# Patient Record
Sex: MALE | Race: BLACK OR AFRICAN AMERICAN | NOT HISPANIC OR LATINO | Employment: UNEMPLOYED | URBAN - METROPOLITAN AREA
[De-identification: names, ages, dates, MRNs, and addresses within clinical notes are randomized per-mention and may not be internally consistent; named-entity substitution may affect disease eponyms.]

---

## 2017-03-14 ENCOUNTER — HISTORICAL (OUTPATIENT)
Dept: RADIOLOGY | Facility: HOSPITAL | Age: 52
End: 2017-03-14

## 2017-04-10 ENCOUNTER — HISTORICAL (OUTPATIENT)
Dept: INTERNAL MEDICINE | Facility: CLINIC | Age: 52
End: 2017-04-10

## 2017-07-17 ENCOUNTER — HISTORICAL (OUTPATIENT)
Dept: INTERNAL MEDICINE | Facility: CLINIC | Age: 52
End: 2017-07-17

## 2017-07-17 LAB
ABS NEUT (OLG): 3.79 X10(3)/MCL (ref 2.1–9.2)
ALBUMIN SERPL-MCNC: 3.3 GM/DL (ref 3.4–5)
ALBUMIN/GLOB SERPL: 1 RATIO (ref 1–2)
ALP SERPL-CCNC: 141 UNIT/L (ref 45–117)
ALT SERPL-CCNC: 41 UNIT/L (ref 12–78)
APPEARANCE, UA: CLEAR
AST SERPL-CCNC: 18 UNIT/L (ref 15–37)
BACTERIA #/AREA URNS AUTO: ABNORMAL /[HPF]
BASOPHILS # BLD AUTO: 0.03 X10(3)/MCL
BASOPHILS NFR BLD AUTO: 0 % (ref 0–1)
BILIRUB SERPL-MCNC: 0.4 MG/DL (ref 0.2–1)
BILIRUB UR QL STRIP: NEGATIVE
BILIRUBIN DIRECT+TOT PNL SERPL-MCNC: 0.1 MG/DL
BILIRUBIN DIRECT+TOT PNL SERPL-MCNC: 0.3 MG/DL
BUN SERPL-MCNC: 22 MG/DL (ref 7–18)
CALCIUM SERPL-MCNC: 9 MG/DL (ref 8.5–10.1)
CHLORIDE SERPL-SCNC: 109 MMOL/L (ref 98–107)
CHOLEST SERPL-MCNC: 148 MG/DL
CHOLEST/HDLC SERPL: 3.5 {RATIO} (ref 0–5)
CO2 SERPL-SCNC: 29 MMOL/L (ref 21–32)
COLOR UR: NORMAL
CREAT SERPL-MCNC: 1.3 MG/DL (ref 0.6–1.3)
CREAT UR-MCNC: 149 MG/DL
EOSINOPHIL # BLD AUTO: 0.24 X10(3)/MCL
EOSINOPHIL NFR BLD AUTO: 4 % (ref 0–5)
ERYTHROCYTE [DISTWIDTH] IN BLOOD BY AUTOMATED COUNT: 12.7 % (ref 11.5–14.5)
EST. AVERAGE GLUCOSE BLD GHB EST-MCNC: 240 MG/DL
GLOBULIN SER-MCNC: 3.8 GM/ML (ref 2.3–3.5)
GLUCOSE (UA): NORMAL
GLUCOSE SERPL-MCNC: 189 MG/DL (ref 74–106)
HBA1C MFR BLD: 10 % (ref 4.2–6.3)
HCT VFR BLD AUTO: 40.2 % (ref 40–51)
HDLC SERPL-MCNC: 42 MG/DL
HGB BLD-MCNC: 13.4 GM/DL (ref 13.5–17.5)
HGB UR QL STRIP: NEGATIVE
HYALINE CASTS #/AREA URNS LPF: ABNORMAL /[LPF]
IMM GRANULOCYTES # BLD AUTO: 0.02 10*3/UL
IMM GRANULOCYTES NFR BLD AUTO: 0 %
KETONES UR QL STRIP: NEGATIVE
LDLC SERPL CALC-MCNC: 79 MG/DL (ref 0–130)
LEUKOCYTE ESTERASE UR QL STRIP: NEGATIVE
LYMPHOCYTES # BLD AUTO: 1.57 X10(3)/MCL
LYMPHOCYTES NFR BLD AUTO: 25 % (ref 15–40)
MCH RBC QN AUTO: 28.5 PG (ref 26–34)
MCHC RBC AUTO-ENTMCNC: 33.3 GM/DL (ref 31–37)
MCV RBC AUTO: 85.5 FL (ref 80–100)
MICROALBUMIN UR-MCNC: 6.2 MG/L (ref 0–19)
MICROALBUMIN/CREAT RATIO PNL UR: 4.2 MCG/MG CR (ref 0–29)
MONOCYTES # BLD AUTO: 0.54 X10(3)/MCL
MONOCYTES NFR BLD AUTO: 9 % (ref 4–12)
NEUTROPHILS # BLD AUTO: 3.79 X10(3)/MCL
NEUTROPHILS NFR BLD AUTO: 61 X10(3)/MCL
NITRITE UR QL STRIP: NEGATIVE
PH UR STRIP: 5.5 [PH] (ref 4.5–8)
PLATELET # BLD AUTO: 249 X10(3)/MCL (ref 130–400)
PMV BLD AUTO: 9.4 FL (ref 7.4–10.4)
POTASSIUM SERPL-SCNC: 3.7 MMOL/L (ref 3.5–5.1)
PROT SERPL-MCNC: 7.1 GM/DL (ref 6.4–8.2)
PROT UR QL STRIP: NEGATIVE
PSA SERPL-MCNC: 0.7 NG/ML
RBC # BLD AUTO: 4.7 X10(6)/MCL (ref 4.5–5.9)
RBC #/AREA URNS AUTO: ABNORMAL /[HPF]
SODIUM SERPL-SCNC: 144 MMOL/L (ref 136–145)
SP GR UR STRIP: 1.02 (ref 1–1.03)
SQUAMOUS #/AREA URNS LPF: ABNORMAL /[LPF]
TRIGL SERPL-MCNC: 136 MG/DL
TSH SERPL-ACNC: 1.24 MIU/L (ref 0.36–3.74)
UROBILINOGEN UR STRIP-ACNC: NORMAL
VLDLC SERPL CALC-MCNC: 27 MG/DL
WBC # SPEC AUTO: 6.2 X10(3)/MCL (ref 4.5–11)
WBC #/AREA URNS AUTO: ABNORMAL /HPF

## 2017-08-29 ENCOUNTER — HISTORICAL (OUTPATIENT)
Dept: INTERNAL MEDICINE | Facility: CLINIC | Age: 52
End: 2017-08-29

## 2017-08-29 LAB
ALBUMIN SERPL-MCNC: 3.3 GM/DL (ref 3.4–5)
ALBUMIN/GLOB SERPL: 1 RATIO (ref 1–2)
ALP SERPL-CCNC: 127 UNIT/L (ref 45–117)
ALT SERPL-CCNC: 42 UNIT/L (ref 12–78)
AST SERPL-CCNC: 19 UNIT/L (ref 15–37)
BILIRUB SERPL-MCNC: 0.3 MG/DL (ref 0.2–1)
BILIRUBIN DIRECT+TOT PNL SERPL-MCNC: 0.1 MG/DL
BILIRUBIN DIRECT+TOT PNL SERPL-MCNC: 0.2 MG/DL
BUN SERPL-MCNC: 19 MG/DL (ref 7–18)
CALCIUM SERPL-MCNC: 8.9 MG/DL (ref 8.5–10.1)
CHLORIDE SERPL-SCNC: 109 MMOL/L (ref 98–107)
CO2 SERPL-SCNC: 32 MMOL/L (ref 21–32)
CREAT SERPL-MCNC: 1.3 MG/DL (ref 0.6–1.3)
EST. AVERAGE GLUCOSE BLD GHB EST-MCNC: 197 MG/DL
GLOBULIN SER-MCNC: 3.8 GM/ML (ref 2.3–3.5)
GLUCOSE SERPL-MCNC: 185 MG/DL (ref 74–106)
HBA1C MFR BLD: 8.5 % (ref 4.2–6.3)
POTASSIUM SERPL-SCNC: 3.9 MMOL/L (ref 3.5–5.1)
PROT SERPL-MCNC: 7.1 GM/DL (ref 6.4–8.2)
SODIUM SERPL-SCNC: 145 MMOL/L (ref 136–145)

## 2017-09-20 ENCOUNTER — HISTORICAL (OUTPATIENT)
Dept: LAB | Facility: HOSPITAL | Age: 52
End: 2017-09-20

## 2017-09-20 LAB
ALBUMIN SERPL-MCNC: 3.7 GM/DL (ref 3.4–5)
ALBUMIN/GLOB SERPL: 1.1 RATIO (ref 1.1–2)
ALP SERPL-CCNC: 120 UNIT/L (ref 46–116)
ALT SERPL-CCNC: 49 UNIT/L (ref 12–78)
AST SERPL-CCNC: 23 UNIT/L (ref 15–37)
BILIRUB SERPL-MCNC: 0.6 MG/DL (ref 0.2–1)
BILIRUBIN DIRECT+TOT PNL SERPL-MCNC: 0.16 MG/DL (ref 0–0.2)
BILIRUBIN DIRECT+TOT PNL SERPL-MCNC: 0.44 MG/DL (ref 0–0.8)
BUN SERPL-MCNC: 20.5 MG/DL (ref 7–18)
CALCIUM SERPL-MCNC: 9.6 MG/DL (ref 8.5–10.1)
CHLORIDE SERPL-SCNC: 106 MMOL/L (ref 98–107)
CO2 SERPL-SCNC: 28.6 MMOL/L (ref 21–32)
CREAT SERPL-MCNC: 1.23 MG/DL (ref 0.6–1.3)
EST. AVERAGE GLUCOSE BLD GHB EST-MCNC: 209 MG/DL
GLOBULIN SER-MCNC: 3.4 GM/DL (ref 2.4–3.5)
GLUCOSE SERPL-MCNC: 191 MG/DL (ref 74–106)
HBA1C MFR BLD: 8.9 % (ref 4.5–6.2)
POTASSIUM SERPL-SCNC: 3.8 MMOL/L (ref 3.5–5.1)
PROT SERPL-MCNC: 7.1 GM/DL (ref 6.4–8.2)
SODIUM SERPL-SCNC: 145 MMOL/L (ref 136–145)

## 2017-12-18 ENCOUNTER — HISTORICAL (OUTPATIENT)
Dept: INTERNAL MEDICINE | Facility: CLINIC | Age: 52
End: 2017-12-18

## 2017-12-18 LAB
ALBUMIN SERPL-MCNC: 3.4 GM/DL (ref 3.4–5)
ALBUMIN/GLOB SERPL: 1 RATIO (ref 1–2)
ALP SERPL-CCNC: 143 UNIT/L (ref 45–117)
ALT SERPL-CCNC: 51 UNIT/L (ref 12–78)
AST SERPL-CCNC: 21 UNIT/L (ref 15–37)
BILIRUB SERPL-MCNC: 0.4 MG/DL (ref 0.2–1)
BILIRUBIN DIRECT+TOT PNL SERPL-MCNC: 0.1 MG/DL
BILIRUBIN DIRECT+TOT PNL SERPL-MCNC: 0.3 MG/DL
BUN SERPL-MCNC: 19 MG/DL (ref 7–18)
CALCIUM SERPL-MCNC: 9.3 MG/DL (ref 8.5–10.1)
CHLORIDE SERPL-SCNC: 109 MMOL/L (ref 98–107)
CO2 SERPL-SCNC: 30 MMOL/L (ref 21–32)
CREAT SERPL-MCNC: 1 MG/DL (ref 0.6–1.3)
EST. AVERAGE GLUCOSE BLD GHB EST-MCNC: 206 MG/DL
GLOBULIN SER-MCNC: 3.9 GM/ML (ref 2.3–3.5)
GLUCOSE SERPL-MCNC: 199 MG/DL (ref 74–106)
HBA1C MFR BLD: 8.8 % (ref 4.2–6.3)
POTASSIUM SERPL-SCNC: 3.8 MMOL/L (ref 3.5–5.1)
PROT SERPL-MCNC: 7.3 GM/DL (ref 6.4–8.2)
SODIUM SERPL-SCNC: 142 MMOL/L (ref 136–145)

## 2018-02-07 ENCOUNTER — HISTORICAL (OUTPATIENT)
Dept: INTERNAL MEDICINE | Facility: CLINIC | Age: 53
End: 2018-02-07

## 2018-02-07 LAB
ALBUMIN SERPL-MCNC: 3.2 GM/DL (ref 3.4–5)
ALBUMIN/GLOB SERPL: 1 RATIO (ref 1–2)
ALP SERPL-CCNC: 118 UNIT/L (ref 45–117)
ALT SERPL-CCNC: 38 UNIT/L (ref 12–78)
AST SERPL-CCNC: 16 UNIT/L (ref 15–37)
BILIRUB SERPL-MCNC: 0.4 MG/DL (ref 0.2–1)
BILIRUBIN DIRECT+TOT PNL SERPL-MCNC: 0.1 MG/DL
BILIRUBIN DIRECT+TOT PNL SERPL-MCNC: 0.3 MG/DL
BUN SERPL-MCNC: 12 MG/DL (ref 7–18)
CALCIUM SERPL-MCNC: 8.9 MG/DL (ref 8.5–10.1)
CHLORIDE SERPL-SCNC: 109 MMOL/L (ref 98–107)
CO2 SERPL-SCNC: 29 MMOL/L (ref 21–32)
CREAT SERPL-MCNC: 0.9 MG/DL (ref 0.6–1.3)
EST. AVERAGE GLUCOSE BLD GHB EST-MCNC: 258 MG/DL
GLOBULIN SER-MCNC: 3.8 GM/ML (ref 2.3–3.5)
GLUCOSE SERPL-MCNC: 88 MG/DL (ref 74–106)
HBA1C MFR BLD: 10.6 % (ref 4.2–6.3)
POTASSIUM SERPL-SCNC: 3.4 MMOL/L (ref 3.5–5.1)
PROT SERPL-MCNC: 7 GM/DL (ref 6.4–8.2)
SODIUM SERPL-SCNC: 145 MMOL/L (ref 136–145)

## 2018-03-29 ENCOUNTER — HISTORICAL (OUTPATIENT)
Dept: INTERNAL MEDICINE | Facility: CLINIC | Age: 53
End: 2018-03-29

## 2018-03-29 LAB
ALBUMIN SERPL-MCNC: 3.7 GM/DL (ref 3.4–5)
ALBUMIN/GLOB SERPL: 1 RATIO (ref 1–2)
ALP SERPL-CCNC: 103 UNIT/L (ref 45–117)
ALT SERPL-CCNC: 57 UNIT/L (ref 12–78)
AST SERPL-CCNC: 32 UNIT/L (ref 15–37)
BILIRUB SERPL-MCNC: 0.6 MG/DL (ref 0.2–1)
BILIRUBIN DIRECT+TOT PNL SERPL-MCNC: 0.2 MG/DL
BILIRUBIN DIRECT+TOT PNL SERPL-MCNC: 0.4 MG/DL
BUN SERPL-MCNC: 20 MG/DL (ref 7–18)
CALCIUM SERPL-MCNC: 9 MG/DL (ref 8.5–10.1)
CHLORIDE SERPL-SCNC: 108 MMOL/L (ref 98–107)
CO2 SERPL-SCNC: 31 MMOL/L (ref 21–32)
CREAT SERPL-MCNC: 1.2 MG/DL (ref 0.6–1.3)
EST. AVERAGE GLUCOSE BLD GHB EST-MCNC: 243 MG/DL
GLOBULIN SER-MCNC: 3.8 GM/ML (ref 2.3–3.5)
GLUCOSE SERPL-MCNC: 182 MG/DL (ref 74–106)
HBA1C MFR BLD: 10.1 % (ref 4.2–6.3)
HIV 1+2 AB+HIV1 P24 AG SERPL QL IA: NONREACTIVE
POTASSIUM SERPL-SCNC: 3.9 MMOL/L (ref 3.5–5.1)
PROT SERPL-MCNC: 7.5 GM/DL (ref 6.4–8.2)
SODIUM SERPL-SCNC: 145 MMOL/L (ref 136–145)

## 2018-05-21 ENCOUNTER — HISTORICAL (OUTPATIENT)
Dept: INTERNAL MEDICINE | Facility: CLINIC | Age: 53
End: 2018-05-21

## 2018-05-21 LAB
ABS NEUT (OLG): 11.63 X10(3)/MCL (ref 2.1–9.2)
ALBUMIN SERPL-MCNC: 3 GM/DL (ref 3.4–5)
ALBUMIN/GLOB SERPL: 1 RATIO (ref 1–2)
ALP SERPL-CCNC: 102 UNIT/L (ref 45–117)
ALT SERPL-CCNC: 32 UNIT/L (ref 12–78)
APPEARANCE, UA: CLEAR
AST SERPL-CCNC: 13 UNIT/L (ref 15–37)
BACTERIA #/AREA URNS AUTO: ABNORMAL /[HPF]
BASOPHILS # BLD AUTO: 0.03 X10(3)/MCL
BASOPHILS NFR BLD AUTO: 0 %
BILIRUB SERPL-MCNC: 0.9 MG/DL (ref 0.2–1)
BILIRUB UR QL STRIP: NEGATIVE
BILIRUBIN DIRECT+TOT PNL SERPL-MCNC: 0.3 MG/DL
BILIRUBIN DIRECT+TOT PNL SERPL-MCNC: 0.6 MG/DL
BUN SERPL-MCNC: 16 MG/DL (ref 7–18)
CALCIUM SERPL-MCNC: 8.3 MG/DL (ref 8.5–10.1)
CHLORIDE SERPL-SCNC: 100 MMOL/L (ref 98–107)
CHOLEST SERPL-MCNC: 127 MG/DL
CHOLEST/HDLC SERPL: 2.8 {RATIO} (ref 0–5)
CO2 SERPL-SCNC: 29 MMOL/L (ref 21–32)
COLOR UR: ABNORMAL
CREAT SERPL-MCNC: 1.2 MG/DL (ref 0.6–1.3)
CREAT UR-MCNC: 94 MG/DL
EOSINOPHIL # BLD AUTO: 0.02 X10(3)/MCL
EOSINOPHIL NFR BLD AUTO: 0 %
ERYTHROCYTE [DISTWIDTH] IN BLOOD BY AUTOMATED COUNT: 12.7 % (ref 11.5–14.5)
EST. AVERAGE GLUCOSE BLD GHB EST-MCNC: 235 MG/DL
GLOBULIN SER-MCNC: 4.6 GM/ML (ref 2.3–3.5)
GLUCOSE (UA): ABNORMAL MG/DL
GLUCOSE SERPL-MCNC: 324 MG/DL (ref 74–106)
HBA1C MFR BLD: 9.8 % (ref 4.2–6.3)
HCT VFR BLD AUTO: 38.8 % (ref 40–51)
HDLC SERPL-MCNC: 45 MG/DL
HGB BLD-MCNC: 13 GM/DL (ref 13.5–17.5)
HGB UR QL STRIP: 0.03 MG/DL
HYALINE CASTS #/AREA URNS LPF: ABNORMAL /[LPF]
IMM GRANULOCYTES # BLD AUTO: 0.06 10*3/UL
IMM GRANULOCYTES NFR BLD AUTO: 0 %
KETONES UR QL STRIP: NEGATIVE
LDLC SERPL CALC-MCNC: 64 MG/DL (ref 0–130)
LEUKOCYTE ESTERASE UR QL STRIP: NEGATIVE
LYMPHOCYTES # BLD AUTO: 1.13 X10(3)/MCL
LYMPHOCYTES NFR BLD AUTO: 8 % (ref 13–40)
MCH RBC QN AUTO: 28.6 PG (ref 26–34)
MCHC RBC AUTO-ENTMCNC: 33.5 GM/DL (ref 31–37)
MCV RBC AUTO: 85.5 FL (ref 80–100)
MICROALBUMIN UR-MCNC: 39 MG/L (ref 0–19)
MICROALBUMIN/CREAT RATIO PNL UR: 41.5 MCG/MG CR (ref 0–29)
MONOCYTES # BLD AUTO: 1.32 X10(3)/MCL
MONOCYTES NFR BLD AUTO: 9 % (ref 4–12)
NEUTROPHILS # BLD AUTO: 11.63 X10(3)/MCL
NEUTROPHILS NFR BLD AUTO: 82 X10(3)/MCL
NITRITE UR QL STRIP: NEGATIVE
PH UR STRIP: 5.5 [PH] (ref 4.5–8)
PLATELET # BLD AUTO: 230 X10(3)/MCL (ref 130–400)
PMV BLD AUTO: 10 FL (ref 7.4–10.4)
POTASSIUM SERPL-SCNC: 3.5 MMOL/L (ref 3.5–5.1)
PROT SERPL-MCNC: 7.6 GM/DL (ref 6.4–8.2)
PROT UR QL STRIP: 10 MG/DL
RBC # BLD AUTO: 4.54 X10(6)/MCL (ref 4.5–5.9)
RBC #/AREA URNS AUTO: ABNORMAL /[HPF]
SODIUM SERPL-SCNC: 134 MMOL/L (ref 136–145)
SP GR UR STRIP: 1.02 (ref 1–1.03)
SQUAMOUS #/AREA URNS LPF: ABNORMAL /[LPF]
TRIGL SERPL-MCNC: 92 MG/DL
TSH SERPL-ACNC: 0.84 MIU/L (ref 0.36–3.74)
UROBILINOGEN UR STRIP-ACNC: NORMAL
VLDLC SERPL CALC-MCNC: 18 MG/DL
WBC # SPEC AUTO: 14.2 X10(3)/MCL (ref 4.5–11)
WBC #/AREA URNS AUTO: ABNORMAL /HPF

## 2018-06-11 ENCOUNTER — HISTORICAL (OUTPATIENT)
Dept: RADIOLOGY | Facility: HOSPITAL | Age: 53
End: 2018-06-11

## 2018-07-06 ENCOUNTER — HISTORICAL (OUTPATIENT)
Dept: INTERNAL MEDICINE | Facility: CLINIC | Age: 53
End: 2018-07-06

## 2018-07-06 LAB
ALBUMIN SERPL-MCNC: 3.5 GM/DL (ref 3.4–5)
ALBUMIN/GLOB SERPL: 1 RATIO (ref 1–2)
ALP SERPL-CCNC: 132 UNIT/L (ref 45–117)
ALT SERPL-CCNC: 41 UNIT/L (ref 12–78)
AST SERPL-CCNC: 24 UNIT/L (ref 15–37)
BILIRUB SERPL-MCNC: 0.5 MG/DL (ref 0.2–1)
BILIRUBIN DIRECT+TOT PNL SERPL-MCNC: 0.2 MG/DL
BILIRUBIN DIRECT+TOT PNL SERPL-MCNC: 0.3 MG/DL
BUN SERPL-MCNC: 21 MG/DL (ref 7–18)
CALCIUM SERPL-MCNC: 8.8 MG/DL (ref 8.5–10.1)
CHLORIDE SERPL-SCNC: 109 MMOL/L (ref 98–107)
CO2 SERPL-SCNC: 28 MMOL/L (ref 21–32)
CREAT SERPL-MCNC: 1.2 MG/DL (ref 0.6–1.3)
DEPRECATED CALCIDIOL+CALCIFEROL SERPL-MC: 20.51 NG/ML (ref 30–80)
EST. AVERAGE GLUCOSE BLD GHB EST-MCNC: 209 MG/DL
GLOBULIN SER-MCNC: 3.9 GM/ML (ref 2.3–3.5)
GLUCOSE SERPL-MCNC: 258 MG/DL (ref 74–106)
HBA1C MFR BLD: 8.9 % (ref 4.2–6.3)
POTASSIUM SERPL-SCNC: 3.3 MMOL/L (ref 3.5–5.1)
PROT SERPL-MCNC: 7.4 GM/DL (ref 6.4–8.2)
SODIUM SERPL-SCNC: 145 MMOL/L (ref 136–145)

## 2018-10-25 ENCOUNTER — HISTORICAL (OUTPATIENT)
Dept: INTERNAL MEDICINE | Facility: CLINIC | Age: 53
End: 2018-10-25

## 2018-10-25 LAB
ALBUMIN SERPL-MCNC: 3.4 GM/DL (ref 3.4–5)
ALBUMIN/GLOB SERPL: 1 RATIO (ref 1–2)
ALP SERPL-CCNC: 118 UNIT/L (ref 45–117)
ALT SERPL-CCNC: 42 UNIT/L (ref 12–78)
AST SERPL-CCNC: 16 UNIT/L (ref 15–37)
BILIRUB SERPL-MCNC: 0.4 MG/DL (ref 0.2–1)
BILIRUBIN DIRECT+TOT PNL SERPL-MCNC: 0.1 MG/DL
BILIRUBIN DIRECT+TOT PNL SERPL-MCNC: 0.3 MG/DL
BUN SERPL-MCNC: 20 MG/DL (ref 7–18)
CALCIUM SERPL-MCNC: 8.6 MG/DL (ref 8.5–10.1)
CHLORIDE SERPL-SCNC: 107 MMOL/L (ref 98–107)
CO2 SERPL-SCNC: 29 MMOL/L (ref 21–32)
CREAT SERPL-MCNC: 1.2 MG/DL (ref 0.6–1.3)
EST. AVERAGE GLUCOSE BLD GHB EST-MCNC: 209 MG/DL
GLOBULIN SER-MCNC: 4.1 GM/ML (ref 2.3–3.5)
GLUCOSE SERPL-MCNC: 262 MG/DL (ref 74–106)
HBA1C MFR BLD: 8.9 % (ref 4.2–6.3)
POTASSIUM SERPL-SCNC: 3.7 MMOL/L (ref 3.5–5.1)
PROT SERPL-MCNC: 7.5 GM/DL (ref 6.4–8.2)
SODIUM SERPL-SCNC: 142 MMOL/L (ref 136–145)

## 2018-10-31 ENCOUNTER — HISTORICAL (OUTPATIENT)
Dept: ADMINISTRATIVE | Facility: HOSPITAL | Age: 53
End: 2018-10-31

## 2018-12-17 ENCOUNTER — HISTORICAL (OUTPATIENT)
Dept: LAB | Facility: HOSPITAL | Age: 53
End: 2018-12-17

## 2018-12-21 ENCOUNTER — HISTORICAL (OUTPATIENT)
Dept: INTERNAL MEDICINE | Facility: CLINIC | Age: 53
End: 2018-12-21

## 2019-01-18 ENCOUNTER — HISTORICAL (OUTPATIENT)
Dept: RADIOLOGY | Facility: HOSPITAL | Age: 54
End: 2019-01-18

## 2019-01-18 LAB
BUN SERPL-MCNC: 16 MG/DL (ref 7–18)
CALCIUM SERPL-MCNC: 8.8 MG/DL (ref 8.5–10.1)
CHLORIDE SERPL-SCNC: 111 MMOL/L (ref 98–107)
CO2 SERPL-SCNC: 32 MMOL/L (ref 21–32)
CREAT SERPL-MCNC: 1.1 MG/DL (ref 0.6–1.3)
CREAT/UREA NIT SERPL: 15
GLUCOSE SERPL-MCNC: 146 MG/DL (ref 74–106)
POTASSIUM SERPL-SCNC: 3.9 MMOL/L (ref 3.5–5.1)
SODIUM SERPL-SCNC: 144 MMOL/L (ref 136–145)

## 2019-03-20 ENCOUNTER — HISTORICAL (OUTPATIENT)
Dept: LAB | Facility: HOSPITAL | Age: 54
End: 2019-03-20

## 2019-03-20 LAB
ABS NEUT (OLG): 4.45 X10(3)/MCL (ref 2.1–9.2)
ALBUMIN SERPL-MCNC: 3.2 GM/DL (ref 3.4–5)
ALBUMIN/GLOB SERPL: 0.8 RATIO (ref 1.1–2)
ALP SERPL-CCNC: 126 UNIT/L (ref 46–116)
ALT SERPL-CCNC: 49 UNIT/L (ref 12–78)
APPEARANCE, UA: CLEAR
AST SERPL-CCNC: 31 UNIT/L (ref 15–37)
BASOPHILS # BLD AUTO: 0 X10(3)/MCL (ref 0–0.2)
BASOPHILS NFR BLD AUTO: 0 %
BILIRUB SERPL-MCNC: 0.4 MG/DL (ref 0.2–1)
BILIRUB UR QL STRIP: NEGATIVE
BILIRUBIN DIRECT+TOT PNL SERPL-MCNC: 0.13 MG/DL (ref 0–0.2)
BILIRUBIN DIRECT+TOT PNL SERPL-MCNC: 0.27 MG/DL (ref 0–0.8)
BUN SERPL-MCNC: 21 MG/DL (ref 7–18)
CALCIUM SERPL-MCNC: 9.2 MG/DL (ref 8.5–10.1)
CHLORIDE SERPL-SCNC: 106 MMOL/L (ref 98–107)
CHOLEST SERPL-MCNC: 156 MG/DL (ref 0–200)
CHOLEST/HDLC SERPL: 3.2 {RATIO} (ref 0–5)
CO2 SERPL-SCNC: 29.5 MMOL/L (ref 21–32)
COLOR UR: YELLOW
CREAT SERPL-MCNC: 1.23 MG/DL (ref 0.6–1.3)
DEPRECATED CALCIDIOL+CALCIFEROL SERPL-MC: 15.47 NG/ML (ref 30–80)
EOSINOPHIL # BLD AUTO: 0.3 X10(3)/MCL (ref 0–0.9)
EOSINOPHIL NFR BLD AUTO: 4 %
ERYTHROCYTE [DISTWIDTH] IN BLOOD BY AUTOMATED COUNT: 13.2 % (ref 11.5–17)
EST. AVERAGE GLUCOSE BLD GHB EST-MCNC: 252 MG/DL
GLOBULIN SER-MCNC: 3.8 GM/DL (ref 2.4–3.5)
GLUCOSE (UA): 250
GLUCOSE SERPL-MCNC: 248 MG/DL (ref 74–106)
HBA1C MFR BLD: 10.4 % (ref 4.5–6.2)
HCT VFR BLD AUTO: 41 % (ref 42–52)
HDLC SERPL-MCNC: 49 MG/DL (ref 40–60)
HGB BLD-MCNC: 13.5 GM/DL (ref 14–18)
HGB UR QL STRIP: NEGATIVE
IMM GRANULOCYTES # BLD AUTO: 0.01 % (ref 0–0.02)
IMM GRANULOCYTES NFR BLD AUTO: 0.2 % (ref 0–0.43)
KETONES UR QL STRIP: NEGATIVE
LDLC SERPL CALC-MCNC: 94 MG/DL (ref 0–129)
LEUKOCYTE ESTERASE UR QL STRIP: NEGATIVE
LYMPHOCYTES # BLD AUTO: 1.3 X10(3)/MCL (ref 0.6–4.6)
LYMPHOCYTES NFR BLD AUTO: 19 %
MCH RBC QN AUTO: 28.4 PG (ref 27–31)
MCHC RBC AUTO-ENTMCNC: 32.9 GM/DL (ref 33–36)
MCV RBC AUTO: 86.3 FL (ref 80–94)
MONOCYTES # BLD AUTO: 0.6 X10(3)/MCL (ref 0.1–1.3)
MONOCYTES NFR BLD AUTO: 8 %
NEUTROPHILS # BLD AUTO: 4.45 X10(3)/MCL (ref 1.4–7.9)
NEUTROPHILS NFR BLD AUTO: 68 %
NITRITE UR QL STRIP: NEGATIVE
PH UR STRIP: 6 [PH] (ref 5–9)
PLATELET # BLD AUTO: 244 X10(3)/MCL (ref 130–400)
PMV BLD AUTO: 9.8 FL (ref 9.4–12.4)
POTASSIUM SERPL-SCNC: 4.5 MMOL/L (ref 3.5–5.1)
PROT SERPL-MCNC: 7 GM/DL (ref 6.4–8.2)
PROT UR QL STRIP: ABNORMAL
PSA SERPL-MCNC: 0.82 NG/ML (ref 0–4)
RBC # BLD AUTO: 4.75 X10(6)/MCL (ref 4.7–6.1)
SODIUM SERPL-SCNC: 144 MMOL/L (ref 136–145)
SP GR UR STRIP: >=1.03 (ref 1–1.03)
TRIGL SERPL-MCNC: 65 MG/DL
UROBILINOGEN UR STRIP-ACNC: 0.2
VLDLC SERPL CALC-MCNC: 13 MG/DL
WBC # SPEC AUTO: 6.6 X10(3)/MCL (ref 4.5–11.5)

## 2019-05-06 ENCOUNTER — HISTORICAL (OUTPATIENT)
Dept: INTERNAL MEDICINE | Facility: CLINIC | Age: 54
End: 2019-05-06

## 2019-05-06 LAB
APPEARANCE, UA: CLEAR
BACTERIA #/AREA URNS AUTO: ABNORMAL /[HPF]
BILIRUB UR QL STRIP: NEGATIVE
BUN SERPL-MCNC: 20 MG/DL (ref 7–18)
CALCIUM SERPL-MCNC: 9.2 MG/DL (ref 8.5–10.1)
CHLORIDE SERPL-SCNC: 110 MMOL/L (ref 98–107)
CO2 SERPL-SCNC: 31 MMOL/L (ref 21–32)
COLOR UR: YELLOW
CREAT SERPL-MCNC: 1.2 MG/DL (ref 0.6–1.3)
CREAT UR-MCNC: 231 MG/DL
CREAT/UREA NIT SERPL: 17
EST. AVERAGE GLUCOSE BLD GHB EST-MCNC: 206 MG/DL
GLUCOSE (UA): NORMAL
GLUCOSE SERPL-MCNC: 116 MG/DL (ref 74–106)
HBA1C MFR BLD: 8.8 % (ref 4.2–6.3)
HGB UR QL STRIP: NEGATIVE
HYALINE CASTS #/AREA URNS LPF: ABNORMAL /[LPF]
KETONES UR QL STRIP: NEGATIVE
LEUKOCYTE ESTERASE UR QL STRIP: NEGATIVE
MICROALBUMIN UR-MCNC: 27.7 MG/L (ref 0–19)
MICROALBUMIN/CREAT RATIO PNL UR: 12 MCG/MG CR (ref 0–29)
NITRITE UR QL STRIP: NEGATIVE
PH UR STRIP: 5.5 [PH] (ref 4.5–8)
POTASSIUM SERPL-SCNC: 3.6 MMOL/L (ref 3.5–5.1)
PROT UR QL STRIP: 20 MG/DL
RBC #/AREA URNS AUTO: ABNORMAL /[HPF]
SODIUM SERPL-SCNC: 145 MMOL/L (ref 136–145)
SP GR UR STRIP: 1.03 (ref 1–1.03)
SQUAMOUS #/AREA URNS LPF: ABNORMAL /[LPF]
UROBILINOGEN UR STRIP-ACNC: 2 MG/DL
WBC #/AREA URNS AUTO: ABNORMAL /HPF

## 2019-07-08 ENCOUNTER — HISTORICAL (OUTPATIENT)
Dept: INTERNAL MEDICINE | Facility: CLINIC | Age: 54
End: 2019-07-08

## 2019-07-08 LAB
APPEARANCE, UA: CLEAR
BACTERIA #/AREA URNS AUTO: ABNORMAL /[HPF]
BILIRUB UR QL STRIP: NEGATIVE
BUN SERPL-MCNC: 39 MG/DL (ref 7–18)
CALCIUM SERPL-MCNC: 9.2 MG/DL (ref 8.5–10.1)
CHLORIDE SERPL-SCNC: 106 MMOL/L (ref 98–107)
CO2 SERPL-SCNC: 31 MMOL/L (ref 21–32)
COLOR UR: ABNORMAL
CREAT SERPL-MCNC: 1.5 MG/DL (ref 0.6–1.3)
CREAT/UREA NIT SERPL: 26
EST. AVERAGE GLUCOSE BLD GHB EST-MCNC: 301 MG/DL
GLUCOSE (UA): 50 MG/DL
GLUCOSE SERPL-MCNC: 200 MG/DL (ref 74–106)
HBA1C MFR BLD: 12.1 % (ref 4.2–6.3)
HGB UR QL STRIP: NEGATIVE
HYALINE CASTS #/AREA URNS LPF: ABNORMAL /[LPF]
KETONES UR QL STRIP: NEGATIVE
LEUKOCYTE ESTERASE UR QL STRIP: NEGATIVE
NITRITE UR QL STRIP: NEGATIVE
PH UR STRIP: 5.5 [PH] (ref 4.5–8)
POTASSIUM SERPL-SCNC: 3.9 MMOL/L (ref 3.5–5.1)
PROT UR QL STRIP: 10 MG/DL
RBC #/AREA URNS AUTO: ABNORMAL /[HPF]
SODIUM SERPL-SCNC: 142 MMOL/L (ref 136–145)
SP GR UR STRIP: 1.02 (ref 1–1.03)
SQUAMOUS #/AREA URNS LPF: ABNORMAL /[LPF]
TSH SERPL-ACNC: 1.06 MIU/L (ref 0.36–3.74)
UROBILINOGEN UR STRIP-ACNC: NORMAL
WBC #/AREA URNS AUTO: ABNORMAL /HPF

## 2019-10-14 ENCOUNTER — HISTORICAL (OUTPATIENT)
Dept: INTERNAL MEDICINE | Facility: CLINIC | Age: 54
End: 2019-10-14

## 2019-10-14 LAB
APPEARANCE, UA: CLEAR
BACTERIA #/AREA URNS AUTO: ABNORMAL /[HPF]
BILIRUB UR QL STRIP: NEGATIVE
BUN SERPL-MCNC: 22 MG/DL (ref 7–18)
CALCIUM SERPL-MCNC: 8.7 MG/DL (ref 8.5–10.1)
CHLORIDE SERPL-SCNC: 111 MMOL/L (ref 98–107)
CHOLEST SERPL-MCNC: 127 MG/DL
CHOLEST/HDLC SERPL: 2.6 {RATIO} (ref 0–5)
CO2 SERPL-SCNC: 29 MMOL/L (ref 21–32)
COLOR UR: ABNORMAL
CREAT SERPL-MCNC: 1.1 MG/DL (ref 0.6–1.3)
CREAT/UREA NIT SERPL: 20
DEPRECATED CALCIDIOL+CALCIFEROL SERPL-MC: 44.13 NG/ML (ref 30–80)
EST. AVERAGE GLUCOSE BLD GHB EST-MCNC: 226 MG/DL
GLUCOSE (UA): 150 MG/DL
GLUCOSE SERPL-MCNC: 166 MG/DL (ref 74–106)
HBA1C MFR BLD: 9.5 % (ref 4.2–6.3)
HDLC SERPL-MCNC: 49 MG/DL (ref 40–59)
HGB UR QL STRIP: NEGATIVE
HYALINE CASTS #/AREA URNS LPF: ABNORMAL /[LPF]
KETONES UR QL STRIP: NEGATIVE
LDLC SERPL CALC-MCNC: 68 MG/DL
LEUKOCYTE ESTERASE UR QL STRIP: NEGATIVE
NITRITE UR QL STRIP: NEGATIVE
PH UR STRIP: 6.5 [PH] (ref 4.5–8)
POTASSIUM SERPL-SCNC: 4 MMOL/L (ref 3.5–5.1)
PROT UR QL STRIP: 10 MG/DL
RBC #/AREA URNS AUTO: ABNORMAL /[HPF]
SODIUM SERPL-SCNC: 146 MMOL/L (ref 136–145)
SP GR UR STRIP: 1.02 (ref 1–1.03)
SQUAMOUS #/AREA URNS LPF: ABNORMAL /[LPF]
TRIGL SERPL-MCNC: 50 MG/DL
UROBILINOGEN UR STRIP-ACNC: NORMAL
VLDLC SERPL CALC-MCNC: 10 MG/DL
WBC #/AREA URNS AUTO: ABNORMAL /HPF

## 2020-01-20 ENCOUNTER — HOSPITAL ENCOUNTER (OUTPATIENT)
Dept: ONCOLOGY | Facility: HOSPITAL | Age: 55
End: 2020-01-22
Attending: SURGERY | Admitting: SURGERY

## 2020-01-20 LAB
ABS NEUT (OLG): 6.02 X10(3)/MCL (ref 2.1–9.2)
ALBUMIN SERPL-MCNC: 3.3 GM/DL (ref 3.4–5)
ALBUMIN/GLOB SERPL: 0.8 RATIO (ref 1.1–2)
ALP SERPL-CCNC: 126 UNIT/L (ref 50–136)
ALT SERPL-CCNC: 32 UNIT/L (ref 12–78)
AST SERPL-CCNC: 22 UNIT/L (ref 15–37)
BASOPHILS # BLD AUTO: 0 X10(3)/MCL (ref 0–0.2)
BASOPHILS NFR BLD AUTO: 1 %
BILIRUB SERPL-MCNC: 0.3 MG/DL (ref 0.2–1)
BILIRUBIN DIRECT+TOT PNL SERPL-MCNC: 0.1 MG/DL (ref 0–0.5)
BILIRUBIN DIRECT+TOT PNL SERPL-MCNC: 0.2 MG/DL (ref 0–0.8)
BUN SERPL-MCNC: 24 MG/DL (ref 7–18)
CALCIUM SERPL-MCNC: 8.7 MG/DL (ref 8.5–10.1)
CHLORIDE SERPL-SCNC: 109 MMOL/L (ref 98–107)
CO2 SERPL-SCNC: 27 MMOL/L (ref 21–32)
CREAT SERPL-MCNC: 1.27 MG/DL (ref 0.7–1.3)
EOSINOPHIL # BLD AUTO: 0.5 X10(3)/MCL (ref 0–0.9)
EOSINOPHIL NFR BLD AUTO: 6 %
ERYTHROCYTE [DISTWIDTH] IN BLOOD BY AUTOMATED COUNT: 13.2 % (ref 11.5–17)
GLOBULIN SER-MCNC: 4.3 GM/DL (ref 2.4–3.5)
GLUCOSE SERPL-MCNC: 105 MG/DL (ref 74–106)
HCT VFR BLD AUTO: 40 % (ref 42–52)
HGB BLD-MCNC: 12.9 GM/DL (ref 14–18)
LIPASE SERPL-CCNC: 127 UNIT/L (ref 73–393)
LYMPHOCYTES # BLD AUTO: 1.1 X10(3)/MCL (ref 0.6–4.6)
LYMPHOCYTES NFR BLD AUTO: 14 %
MCH RBC QN AUTO: 28.2 PG (ref 27–31)
MCHC RBC AUTO-ENTMCNC: 32.3 GM/DL (ref 33–36)
MCV RBC AUTO: 87.5 FL (ref 80–94)
MONOCYTES # BLD AUTO: 0.6 X10(3)/MCL (ref 0.1–1.3)
MONOCYTES NFR BLD AUTO: 7 %
NEUTROPHILS # BLD AUTO: 6.02 X10(3)/MCL (ref 2.1–9.2)
NEUTROPHILS NFR BLD AUTO: 72 %
PLATELET # BLD AUTO: 268 X10(3)/MCL (ref 130–400)
PMV BLD AUTO: 9.8 FL (ref 9.4–12.4)
POTASSIUM SERPL-SCNC: 3.7 MMOL/L (ref 3.5–5.1)
PROT SERPL-MCNC: 7.6 GM/DL (ref 6.4–8.2)
RBC # BLD AUTO: 4.57 X10(6)/MCL (ref 4.7–6.1)
SODIUM SERPL-SCNC: 141 MMOL/L (ref 136–145)
TROPONIN I SERPL-MCNC: 0.26 NG/ML (ref 0.02–0.49)
WBC # SPEC AUTO: 8.4 X10(3)/MCL (ref 4.5–11.5)

## 2020-01-21 LAB
ALBUMIN SERPL-MCNC: 2.8 GM/DL (ref 3.4–5)
ALBUMIN/GLOB SERPL: 0.7 RATIO (ref 1.1–2)
ALP SERPL-CCNC: 54 UNIT/L (ref 50–136)
ALT SERPL-CCNC: 43 UNIT/L (ref 12–78)
AST SERPL-CCNC: 26 UNIT/L (ref 15–37)
BILIRUB SERPL-MCNC: 0.3 MG/DL (ref 0.2–1)
BILIRUBIN DIRECT+TOT PNL SERPL-MCNC: 0.1 MG/DL (ref 0–0.5)
BILIRUBIN DIRECT+TOT PNL SERPL-MCNC: 0.2 MG/DL (ref 0–0.8)
BUN SERPL-MCNC: 19 MG/DL (ref 7–18)
CALCIUM SERPL-MCNC: 9.3 MG/DL (ref 8.5–10.1)
CHLORIDE SERPL-SCNC: 100 MMOL/L (ref 98–107)
CO2 SERPL-SCNC: 29 MMOL/L (ref 21–32)
CREAT SERPL-MCNC: 0.5 MG/DL (ref 0.7–1.3)
GLOBULIN SER-MCNC: 3.8 GM/DL (ref 2.4–3.5)
GLUCOSE SERPL-MCNC: 121 MG/DL (ref 74–106)
POTASSIUM SERPL-SCNC: 4.2 MMOL/L (ref 3.5–5.1)
PROT SERPL-MCNC: 6.6 GM/DL (ref 6.4–8.2)
SODIUM SERPL-SCNC: 138 MMOL/L (ref 136–145)

## 2020-01-22 LAB
ABS NEUT (OLG): 6.34 X10(3)/MCL (ref 2.1–9.2)
ALBUMIN SERPL-MCNC: 2.9 GM/DL (ref 3.4–5)
ALBUMIN/GLOB SERPL: 0.8 RATIO (ref 1.1–2)
ALP SERPL-CCNC: 96 UNIT/L (ref 50–136)
ALT SERPL-CCNC: 67 UNIT/L (ref 12–78)
AST SERPL-CCNC: 51 UNIT/L (ref 15–37)
BASOPHILS # BLD AUTO: 0 X10(3)/MCL (ref 0–0.2)
BASOPHILS NFR BLD AUTO: 0 %
BILIRUB SERPL-MCNC: 0.7 MG/DL (ref 0.2–1)
BILIRUBIN DIRECT+TOT PNL SERPL-MCNC: 0.2 MG/DL (ref 0–0.5)
BILIRUBIN DIRECT+TOT PNL SERPL-MCNC: 0.5 MG/DL (ref 0–0.8)
BUN SERPL-MCNC: 15 MG/DL (ref 7–18)
CALCIUM SERPL-MCNC: 8.2 MG/DL (ref 8.5–10.1)
CHLORIDE SERPL-SCNC: 105 MMOL/L (ref 98–107)
CO2 SERPL-SCNC: 29 MMOL/L (ref 21–32)
CREAT SERPL-MCNC: 0.95 MG/DL (ref 0.7–1.3)
EOSINOPHIL # BLD AUTO: 0.4 X10(3)/MCL (ref 0–0.9)
EOSINOPHIL NFR BLD AUTO: 4 %
ERYTHROCYTE [DISTWIDTH] IN BLOOD BY AUTOMATED COUNT: 13.3 % (ref 11.5–17)
GLOBULIN SER-MCNC: 3.6 GM/DL (ref 2.4–3.5)
GLUCOSE SERPL-MCNC: 222 MG/DL (ref 74–106)
HCT VFR BLD AUTO: 39.1 % (ref 42–52)
HGB BLD-MCNC: 13 GM/DL (ref 14–18)
LIPASE SERPL-CCNC: 42 UNIT/L (ref 73–393)
LYMPHOCYTES # BLD AUTO: 1 X10(3)/MCL (ref 0.6–4.6)
LYMPHOCYTES NFR BLD AUTO: 12 %
MCH RBC QN AUTO: 28.4 PG (ref 27–31)
MCHC RBC AUTO-ENTMCNC: 33.2 GM/DL (ref 33–36)
MCV RBC AUTO: 85.6 FL (ref 80–94)
MONOCYTES # BLD AUTO: 0.7 X10(3)/MCL (ref 0.1–1.3)
MONOCYTES NFR BLD AUTO: 8 %
NEUTROPHILS # BLD AUTO: 6.34 X10(3)/MCL (ref 2.1–9.2)
NEUTROPHILS NFR BLD AUTO: 75 %
PLATELET # BLD AUTO: 230 X10(3)/MCL (ref 130–400)
PMV BLD AUTO: 10 FL (ref 9.4–12.4)
POTASSIUM SERPL-SCNC: 3.6 MMOL/L (ref 3.5–5.1)
PROT SERPL-MCNC: 6.5 GM/DL (ref 6.4–8.2)
RBC # BLD AUTO: 4.57 X10(6)/MCL (ref 4.7–6.1)
SODIUM SERPL-SCNC: 140 MMOL/L (ref 136–145)
WBC # SPEC AUTO: 8.4 X10(3)/MCL (ref 4.5–11.5)

## 2020-03-12 ENCOUNTER — HISTORICAL (OUTPATIENT)
Dept: INTERNAL MEDICINE | Facility: CLINIC | Age: 55
End: 2020-03-12

## 2020-03-12 LAB
APPEARANCE, UA: CLEAR
BACTERIA #/AREA URNS AUTO: ABNORMAL /HPF
BILIRUB UR QL STRIP: NEGATIVE
BUN SERPL-MCNC: 21 MG/DL (ref 7–18)
CALCIUM SERPL-MCNC: 9 MG/DL (ref 8.5–10.1)
CHLORIDE SERPL-SCNC: 108 MMOL/L (ref 98–107)
CO2 SERPL-SCNC: 30 MMOL/L (ref 21–32)
COLOR UR: NORMAL
CREAT SERPL-MCNC: 1.4 MG/DL (ref 0.6–1.3)
CREAT/UREA NIT SERPL: 15
EST. AVERAGE GLUCOSE BLD GHB EST-MCNC: 206 MG/DL
GLUCOSE (UA): NEGATIVE
GLUCOSE SERPL-MCNC: 154 MG/DL (ref 74–106)
HBA1C MFR BLD: 8.8 % (ref 4.2–6.3)
HGB UR QL STRIP: NEGATIVE
HYALINE CASTS #/AREA URNS LPF: ABNORMAL /LPF
KETONES UR QL STRIP: NEGATIVE
LEUKOCYTE ESTERASE UR QL STRIP: NEGATIVE
NITRITE UR QL STRIP: NEGATIVE
PH UR STRIP: 6 [PH] (ref 4.5–8)
POTASSIUM SERPL-SCNC: 3.4 MMOL/L (ref 3.5–5.1)
PROT UR QL STRIP: NEGATIVE
RBC #/AREA URNS AUTO: ABNORMAL /HPF
SODIUM SERPL-SCNC: 143 MMOL/L (ref 136–145)
SP GR UR STRIP: 1.02 (ref 1–1.03)
SQUAMOUS #/AREA URNS LPF: ABNORMAL /LPF
UROBILINOGEN UR STRIP-ACNC: NORMAL
WBC #/AREA URNS AUTO: ABNORMAL /HPF

## 2020-04-20 ENCOUNTER — HISTORICAL (OUTPATIENT)
Dept: INTERNAL MEDICINE | Facility: CLINIC | Age: 55
End: 2020-04-20

## 2020-04-20 LAB
ABS NEUT (OLG): 3.42 X10(3)/MCL (ref 2.1–9.2)
APPEARANCE, UA: CLEAR
BACTERIA #/AREA URNS AUTO: ABNORMAL /HPF
BASOPHILS # BLD AUTO: 0 X10(3)/MCL (ref 0–0.2)
BASOPHILS NFR BLD AUTO: 0 %
BILIRUB UR QL STRIP: NEGATIVE
BUN SERPL-MCNC: 14 MG/DL (ref 7–18)
CALCIUM SERPL-MCNC: 8.5 MG/DL (ref 8.5–10.1)
CHLORIDE SERPL-SCNC: 111 MMOL/L (ref 98–107)
CO2 SERPL-SCNC: 30 MMOL/L (ref 21–32)
COLOR UR: NORMAL
CREAT SERPL-MCNC: 1.1 MG/DL (ref 0.6–1.3)
CREAT/UREA NIT SERPL: 13
EOSINOPHIL # BLD AUTO: 0.2 X10(3)/MCL (ref 0–0.9)
EOSINOPHIL NFR BLD AUTO: 4 %
ERYTHROCYTE [DISTWIDTH] IN BLOOD BY AUTOMATED COUNT: 13.9 % (ref 11.5–14.5)
EST. AVERAGE GLUCOSE BLD GHB EST-MCNC: 200 MG/DL
GLUCOSE (UA): NEGATIVE
GLUCOSE SERPL-MCNC: 137 MG/DL (ref 74–106)
HBA1C MFR BLD: 8.6 % (ref 4.2–6.3)
HCT VFR BLD AUTO: 40.2 % (ref 40–51)
HGB BLD-MCNC: 12.9 GM/DL (ref 13.5–17.5)
HGB UR QL STRIP: NEGATIVE
HYALINE CASTS #/AREA URNS LPF: ABNORMAL /LPF
IMM GRANULOCYTES # BLD AUTO: 0.01 10*3/UL
IMM GRANULOCYTES NFR BLD AUTO: 0 %
KETONES UR QL STRIP: NEGATIVE
LEUKOCYTE ESTERASE UR QL STRIP: NEGATIVE
LYMPHOCYTES # BLD AUTO: 1.4 X10(3)/MCL (ref 0.6–4.6)
LYMPHOCYTES NFR BLD AUTO: 24 %
MCH RBC QN AUTO: 28.1 PG (ref 26–34)
MCHC RBC AUTO-ENTMCNC: 32.1 GM/DL (ref 31–37)
MCV RBC AUTO: 87.6 FL (ref 80–100)
MONOCYTES # BLD AUTO: 0.5 X10(3)/MCL (ref 0.1–1.3)
MONOCYTES NFR BLD AUTO: 9 %
NEUTROPHILS # BLD AUTO: 3.42 X10(3)/MCL (ref 2.1–9.2)
NEUTROPHILS NFR BLD AUTO: 62 %
NITRITE UR QL STRIP: NEGATIVE
PH UR STRIP: 7.5 [PH] (ref 4.5–8)
PLATELET # BLD AUTO: 227 X10(3)/MCL (ref 130–400)
PMV BLD AUTO: 9.5 FL (ref 7.4–10.4)
POTASSIUM SERPL-SCNC: 4 MMOL/L (ref 3.5–5.1)
PROT UR QL STRIP: NEGATIVE
RBC # BLD AUTO: 4.59 X10(6)/MCL (ref 4.5–5.9)
RBC #/AREA URNS AUTO: ABNORMAL /HPF
SODIUM SERPL-SCNC: 144 MMOL/L (ref 136–145)
SP GR UR STRIP: 1.01 (ref 1–1.03)
SQUAMOUS #/AREA URNS LPF: ABNORMAL /LPF
UROBILINOGEN UR STRIP-ACNC: NORMAL
WBC # SPEC AUTO: 5.6 X10(3)/MCL (ref 4.5–11)
WBC #/AREA URNS AUTO: ABNORMAL /HPF

## 2020-07-20 ENCOUNTER — HISTORICAL (OUTPATIENT)
Dept: LAB | Facility: HOSPITAL | Age: 55
End: 2020-07-20

## 2020-07-20 LAB
APPEARANCE, UA: CLEAR
BACTERIA SPEC CULT: ABNORMAL
BILIRUB UR QL STRIP: NEGATIVE
BUN SERPL-MCNC: 20.9 MG/DL (ref 7–18)
CALCIUM SERPL-MCNC: 9.4 MG/DL (ref 8.5–10.1)
CHLORIDE SERPL-SCNC: 106 MMOL/L (ref 98–107)
CO2 SERPL-SCNC: 29.6 MMOL/L (ref 21–32)
COLOR UR: YELLOW
CREAT SERPL-MCNC: 1.18 MG/DL (ref 0.6–1.3)
CREAT UR-MCNC: 133.5 MG/DL (ref 30–125)
CREAT/UREA NIT SERPL: 18
EST. AVERAGE GLUCOSE BLD GHB EST-MCNC: 217 MG/DL
GLUCOSE (UA): NEGATIVE
GLUCOSE SERPL-MCNC: 229 MG/DL (ref 74–106)
HBA1C MFR BLD: 9.2 % (ref 4.5–6.2)
HGB UR QL STRIP: NEGATIVE
KETONES UR QL STRIP: NEGATIVE
LEUKOCYTE ESTERASE UR QL STRIP: NEGATIVE
MICROALBUMIN UR-MCNC: 0.6 MG/DL (ref 0–3)
MICROALBUMIN/CREAT RATIO PNL UR: 4.5 MG/GM CR (ref 0–30)
NITRITE UR QL STRIP: NEGATIVE
PH UR STRIP: 5.5 [PH] (ref 5–9)
POTASSIUM SERPL-SCNC: 3.6 MMOL/L (ref 3.5–5.1)
PROT UR QL STRIP: NEGATIVE
RBC #/AREA URNS HPF: ABNORMAL /[HPF]
SODIUM SERPL-SCNC: 143 MMOL/L (ref 136–145)
SP GR UR STRIP: 1.02 (ref 1–1.03)
SQUAMOUS EPITHELIAL, UA: ABNORMAL
TSH SERPL-ACNC: 1.43 MIU/ML (ref 0.36–3.74)
UROBILINOGEN UR STRIP-ACNC: 0.2
WBC #/AREA URNS HPF: ABNORMAL /[HPF]

## 2020-10-19 ENCOUNTER — HISTORICAL (OUTPATIENT)
Dept: LAB | Facility: HOSPITAL | Age: 55
End: 2020-10-19

## 2020-10-19 LAB
ABS NEUT (OLG): 2.63 X10(3)/MCL (ref 2.1–9.2)
ALBUMIN SERPL-MCNC: 3.6 GM/DL (ref 3.4–5)
ALBUMIN/GLOB SERPL: 1 RATIO (ref 1.1–2)
ALP SERPL-CCNC: 118 UNIT/L (ref 46–116)
ALT SERPL-CCNC: 37 UNIT/L (ref 12–78)
APPEARANCE, UA: CLEAR
AST SERPL-CCNC: 20 UNIT/L (ref 15–37)
BACTERIA SPEC CULT: NORMAL
BASOPHILS # BLD AUTO: 0 X10(3)/MCL (ref 0–0.2)
BASOPHILS NFR BLD AUTO: 1 %
BILIRUB SERPL-MCNC: 0.6 MG/DL (ref 0.2–1)
BILIRUB UR QL STRIP: NEGATIVE
BILIRUBIN DIRECT+TOT PNL SERPL-MCNC: 0.19 MG/DL (ref 0–0.2)
BILIRUBIN DIRECT+TOT PNL SERPL-MCNC: 0.41 MG/DL (ref 0–0.8)
BUN SERPL-MCNC: 16.5 MG/DL (ref 7–18)
CALCIUM SERPL-MCNC: 8.7 MG/DL (ref 8.5–10.1)
CHLORIDE SERPL-SCNC: 106 MMOL/L (ref 98–107)
CHOLEST SERPL-MCNC: 107 MG/DL (ref 0–200)
CHOLEST/HDLC SERPL: 2.3 {RATIO} (ref 0–5)
CO2 SERPL-SCNC: 29.2 MMOL/L (ref 21–32)
COLOR UR: YELLOW
CREAT SERPL-MCNC: 0.97 MG/DL (ref 0.6–1.3)
EOSINOPHIL # BLD AUTO: 0.2 X10(3)/MCL (ref 0–0.9)
EOSINOPHIL NFR BLD AUTO: 4 %
ERYTHROCYTE [DISTWIDTH] IN BLOOD BY AUTOMATED COUNT: 13 % (ref 11.5–17)
EST. AVERAGE GLUCOSE BLD GHB EST-MCNC: 212 MG/DL
GLOBULIN SER-MCNC: 3.7 GM/DL (ref 2.4–3.5)
GLUCOSE (UA): NEGATIVE
GLUCOSE SERPL-MCNC: 196 MG/DL (ref 74–106)
HBA1C MFR BLD: 9 % (ref 4.5–6.2)
HCT VFR BLD AUTO: 42.1 % (ref 42–52)
HDLC SERPL-MCNC: 46 MG/DL (ref 40–60)
HGB BLD-MCNC: 13.5 GM/DL (ref 14–18)
HGB UR QL STRIP: NEGATIVE
IMM GRANULOCYTES # BLD AUTO: 0.01 % (ref 0–0.02)
IMM GRANULOCYTES NFR BLD AUTO: 0.2 % (ref 0–0.43)
KETONES UR QL STRIP: NEGATIVE
LDLC SERPL CALC-MCNC: 49 MG/DL (ref 0–129)
LEUKOCYTE ESTERASE UR QL STRIP: NEGATIVE
LYMPHOCYTES # BLD AUTO: 1.4 X10(3)/MCL (ref 0.6–4.6)
LYMPHOCYTES NFR BLD AUTO: 30 %
MCH RBC QN AUTO: 28.5 PG (ref 27–31)
MCHC RBC AUTO-ENTMCNC: 32.1 GM/DL (ref 33–36)
MCV RBC AUTO: 88.8 FL (ref 80–94)
MONOCYTES # BLD AUTO: 0.5 X10(3)/MCL (ref 0.1–1.3)
MONOCYTES NFR BLD AUTO: 11 %
NEUTROPHILS # BLD AUTO: 2.63 X10(3)/MCL (ref 1.4–7.9)
NEUTROPHILS NFR BLD AUTO: 54 %
NITRITE UR QL STRIP: NEGATIVE
PH UR STRIP: 6.5 [PH] (ref 5–9)
PLATELET # BLD AUTO: 249 X10(3)/MCL (ref 130–400)
PMV BLD AUTO: 9.7 FL (ref 9.4–12.4)
POTASSIUM SERPL-SCNC: 4.3 MMOL/L (ref 3.5–5.1)
PROT SERPL-MCNC: 7.3 GM/DL (ref 6.4–8.2)
PROT UR QL STRIP: NEGATIVE
PSA SERPL-MCNC: 0.91 NG/ML (ref 0–4)
RBC # BLD AUTO: 4.74 X10(6)/MCL (ref 4.7–6.1)
RBC #/AREA URNS HPF: NORMAL /[HPF]
SODIUM SERPL-SCNC: 143 MMOL/L (ref 136–145)
SP GR UR STRIP: 1.02 (ref 1–1.03)
SQUAMOUS EPITHELIAL, UA: NORMAL
TRIGL SERPL-MCNC: 59 MG/DL
UROBILINOGEN UR STRIP-ACNC: 0.2
VLDLC SERPL CALC-MCNC: 12 MG/DL
WBC # SPEC AUTO: 4.8 X10(3)/MCL (ref 4.5–11.5)
WBC #/AREA URNS HPF: NORMAL /[HPF]

## 2020-11-02 ENCOUNTER — HISTORICAL (OUTPATIENT)
Dept: LAB | Facility: HOSPITAL | Age: 55
End: 2020-11-02

## 2020-11-02 LAB
EST. AVERAGE GLUCOSE BLD GHB EST-MCNC: 214 MG/DL
HBA1C MFR BLD: 9.1 % (ref 4.5–6.2)

## 2020-12-01 ENCOUNTER — PATIENT OUTREACH (OUTPATIENT)
Dept: EMERGENCY MEDICINE | Facility: HOSPITAL | Age: 55
End: 2020-12-01

## 2020-12-15 ENCOUNTER — HISTORICAL (OUTPATIENT)
Dept: LAB | Facility: HOSPITAL | Age: 55
End: 2020-12-15

## 2020-12-15 LAB
BUN SERPL-MCNC: 17.8 MG/DL (ref 8.4–25.7)
CALCIUM SERPL-MCNC: 8.8 MG/DL (ref 8.4–10.2)
CHLORIDE SERPL-SCNC: 107 MMOL/L (ref 98–107)
CO2 SERPL-SCNC: 32 MMOL/L (ref 22–29)
CREAT SERPL-MCNC: 1.05 MG/DL (ref 0.73–1.18)
CREAT/UREA NIT SERPL: 17
EST. AVERAGE GLUCOSE BLD GHB EST-MCNC: 220.2 MG/DL
GLUCOSE SERPL-MCNC: 72 MG/DL (ref 74–100)
HBA1C MFR BLD: 9.3 %
POTASSIUM SERPL-SCNC: 3.4 MMOL/L (ref 3.5–5.1)
SODIUM SERPL-SCNC: 148 MMOL/L (ref 136–145)

## 2020-12-28 ENCOUNTER — PATIENT OUTREACH (OUTPATIENT)
Dept: EMERGENCY MEDICINE | Facility: HOSPITAL | Age: 55
End: 2020-12-28

## 2021-01-31 ENCOUNTER — HISTORICAL (OUTPATIENT)
Dept: LAB | Facility: HOSPITAL | Age: 56
End: 2021-01-31

## 2021-01-31 LAB
EST. AVERAGE GLUCOSE BLD GHB EST-MCNC: 237.4 MG/DL
HBA1C MFR BLD: 9.9 %

## 2021-02-01 ENCOUNTER — HISTORICAL (OUTPATIENT)
Dept: ADMINISTRATIVE | Facility: HOSPITAL | Age: 56
End: 2021-02-01

## 2021-02-01 ENCOUNTER — PATIENT OUTREACH (OUTPATIENT)
Dept: EMERGENCY MEDICINE | Facility: HOSPITAL | Age: 56
End: 2021-02-01

## 2021-02-01 LAB
BUN SERPL-MCNC: 17 MG/DL (ref 8.4–25.7)
CALCIUM SERPL-MCNC: 9 MG/DL (ref 8.4–10.2)
CHLORIDE SERPL-SCNC: 105 MMOL/L (ref 98–107)
CO2 SERPL-SCNC: 30 MMOL/L (ref 22–29)
CREAT SERPL-MCNC: 1.22 MG/DL (ref 0.73–1.18)
CREAT/UREA NIT SERPL: 14
GLUCOSE SERPL-MCNC: 267 MG/DL (ref 74–100)
POTASSIUM SERPL-SCNC: 4 MMOL/L (ref 3.5–5.1)
SODIUM SERPL-SCNC: 143 MMOL/L (ref 136–145)

## 2021-02-04 ENCOUNTER — HISTORICAL (OUTPATIENT)
Dept: SLEEP MEDICINE | Facility: HOSPITAL | Age: 56
End: 2021-02-04

## 2021-02-18 ENCOUNTER — HISTORICAL (OUTPATIENT)
Dept: ADMINISTRATIVE | Facility: HOSPITAL | Age: 56
End: 2021-02-18

## 2021-03-01 ENCOUNTER — PATIENT OUTREACH (OUTPATIENT)
Dept: EMERGENCY MEDICINE | Facility: HOSPITAL | Age: 56
End: 2021-03-01

## 2021-03-07 ENCOUNTER — HISTORICAL (OUTPATIENT)
Dept: SLEEP MEDICINE | Facility: HOSPITAL | Age: 56
End: 2021-03-07

## 2021-03-19 ENCOUNTER — HISTORICAL (OUTPATIENT)
Dept: ADMINISTRATIVE | Facility: HOSPITAL | Age: 56
End: 2021-03-19

## 2021-03-19 ENCOUNTER — HISTORICAL (OUTPATIENT)
Dept: INTERNAL MEDICINE | Facility: CLINIC | Age: 56
End: 2021-03-19

## 2021-03-19 LAB
APPEARANCE, UA: CLEAR
BACTERIA #/AREA URNS AUTO: ABNORMAL /HPF
BILIRUB UR QL STRIP: NEGATIVE
BUN SERPL-MCNC: 14.1 MG/DL (ref 8.4–25.7)
CALCIUM SERPL-MCNC: 9.3 MG/DL (ref 8.4–10.2)
CHLORIDE SERPL-SCNC: 105 MMOL/L (ref 98–107)
CO2 SERPL-SCNC: 29 MMOL/L (ref 22–29)
COLOR UR: ABNORMAL
CREAT SERPL-MCNC: 0.93 MG/DL (ref 0.73–1.18)
CREAT/UREA NIT SERPL: 15
EST. AVERAGE GLUCOSE BLD GHB EST-MCNC: 188.6 MG/DL
GLUCOSE (UA): 200 MG/DL
GLUCOSE SERPL-MCNC: 207 MG/DL (ref 74–100)
HBA1C MFR BLD: 8.2 %
HGB UR QL STRIP: NEGATIVE
HYALINE CASTS #/AREA URNS LPF: ABNORMAL /LPF
KETONES UR QL STRIP: NEGATIVE
LEUKOCYTE ESTERASE UR QL STRIP: NEGATIVE
NITRITE UR QL STRIP: NEGATIVE
PH UR STRIP: 7 [PH] (ref 4.5–8)
POTASSIUM SERPL-SCNC: 3.8 MMOL/L (ref 3.5–5.1)
PROT UR QL STRIP: 10 MG/DL
RBC #/AREA URNS AUTO: ABNORMAL /HPF
SODIUM SERPL-SCNC: 142 MMOL/L (ref 136–145)
SP GR UR STRIP: 1.01 (ref 1–1.03)
SQUAMOUS #/AREA URNS LPF: ABNORMAL /LPF
UROBILINOGEN UR STRIP-ACNC: NORMAL
WBC #/AREA URNS AUTO: ABNORMAL /HPF

## 2021-03-31 ENCOUNTER — PATIENT OUTREACH (OUTPATIENT)
Dept: EMERGENCY MEDICINE | Facility: HOSPITAL | Age: 56
End: 2021-03-31

## 2021-04-14 ENCOUNTER — HISTORICAL (OUTPATIENT)
Dept: LAB | Facility: HOSPITAL | Age: 56
End: 2021-04-14

## 2021-04-14 LAB
EST. AVERAGE GLUCOSE BLD GHB EST-MCNC: 177.2 MG/DL
HBA1C MFR BLD: 7.8 %

## 2021-04-16 ENCOUNTER — HISTORICAL (OUTPATIENT)
Dept: RADIOLOGY | Facility: HOSPITAL | Age: 56
End: 2021-04-16

## 2021-05-07 ENCOUNTER — PATIENT OUTREACH (OUTPATIENT)
Dept: EMERGENCY MEDICINE | Facility: HOSPITAL | Age: 56
End: 2021-05-07

## 2021-05-19 ENCOUNTER — HISTORICAL (OUTPATIENT)
Dept: ADMINISTRATIVE | Facility: HOSPITAL | Age: 56
End: 2021-05-19

## 2021-06-16 ENCOUNTER — HISTORICAL (OUTPATIENT)
Dept: LAB | Facility: HOSPITAL | Age: 56
End: 2021-06-16

## 2021-06-16 LAB
ABS NEUT (OLG): 2.01 X10(3)/MCL (ref 2.1–9.2)
ALBUMIN SERPL-MCNC: 3.4 GM/DL (ref 3.5–5)
ALBUMIN/GLOB SERPL: 1 RATIO (ref 1.1–2)
ALP SERPL-CCNC: 93 UNIT/L (ref 40–150)
ALT SERPL-CCNC: 29 UNIT/L (ref 0–55)
AST SERPL-CCNC: 23 UNIT/L (ref 5–34)
BASOPHILS # BLD AUTO: 0 X10(3)/MCL (ref 0–0.2)
BASOPHILS NFR BLD AUTO: 1 %
BILIRUB SERPL-MCNC: 0.6 MG/DL
BILIRUBIN DIRECT+TOT PNL SERPL-MCNC: 0.3 MG/DL (ref 0–0.5)
BILIRUBIN DIRECT+TOT PNL SERPL-MCNC: 0.3 MG/DL (ref 0–0.8)
BUN SERPL-MCNC: 28.1 MG/DL (ref 8.4–25.7)
CALCIUM SERPL-MCNC: 9.1 MG/DL (ref 8.4–10.2)
CHLORIDE SERPL-SCNC: 111 MMOL/L (ref 98–107)
CO2 SERPL-SCNC: 26 MMOL/L (ref 22–29)
CREAT SERPL-MCNC: 1.44 MG/DL (ref 0.73–1.18)
EOSINOPHIL # BLD AUTO: 0.3 X10(3)/MCL (ref 0–0.9)
EOSINOPHIL NFR BLD AUTO: 7 %
ERYTHROCYTE [DISTWIDTH] IN BLOOD BY AUTOMATED COUNT: 13.7 % (ref 11.5–17)
EST. AVERAGE GLUCOSE BLD GHB EST-MCNC: 171.4 MG/DL
EST. AVERAGE GLUCOSE BLD GHB EST-MCNC: 171.4 MG/DL
GLOBULIN SER-MCNC: 3.5 GM/DL (ref 2.4–3.5)
GLUCOSE SERPL-MCNC: 134 MG/DL (ref 74–100)
HBA1C MFR BLD: 7.6 %
HBA1C MFR BLD: 7.6 %
HCT VFR BLD AUTO: 43.3 % (ref 42–52)
HGB BLD-MCNC: 13.9 GM/DL (ref 14–18)
LYMPHOCYTES # BLD AUTO: 1.3 X10(3)/MCL (ref 0.6–4.6)
LYMPHOCYTES NFR BLD AUTO: 31 %
MCH RBC QN AUTO: 27.8 PG (ref 27–31)
MCHC RBC AUTO-ENTMCNC: 32.1 GM/DL (ref 33–36)
MCV RBC AUTO: 86.6 FL (ref 80–94)
MONOCYTES # BLD AUTO: 0.5 X10(3)/MCL (ref 0.1–1.3)
MONOCYTES NFR BLD AUTO: 13 %
NEUTROPHILS # BLD AUTO: 2.01 X10(3)/MCL (ref 1.4–7.9)
NEUTROPHILS NFR BLD AUTO: 49 %
PLATELET # BLD AUTO: 311 X10(3)/MCL (ref 130–400)
PMV BLD AUTO: 10.2 FL (ref 9.4–12.4)
POTASSIUM SERPL-SCNC: 4 MMOL/L (ref 3.5–5.1)
PROT SERPL-MCNC: 6.9 GM/DL (ref 6.4–8.3)
RBC # BLD AUTO: 5 X10(6)/MCL (ref 4.7–6.1)
SODIUM SERPL-SCNC: 147 MMOL/L (ref 136–145)
WBC # SPEC AUTO: 4.1 X10(3)/MCL (ref 4.5–11.5)

## 2021-06-24 ENCOUNTER — HISTORICAL (OUTPATIENT)
Dept: RADIOLOGY | Facility: HOSPITAL | Age: 56
End: 2021-06-24

## 2021-07-07 ENCOUNTER — PATIENT OUTREACH (OUTPATIENT)
Dept: EMERGENCY MEDICINE | Facility: HOSPITAL | Age: 56
End: 2021-07-07

## 2021-07-08 ENCOUNTER — HISTORICAL (OUTPATIENT)
Dept: PHYSICAL THERAPY | Facility: HOSPITAL | Age: 56
End: 2021-07-08

## 2021-07-19 ENCOUNTER — HISTORICAL (OUTPATIENT)
Dept: PHYSICAL THERAPY | Facility: HOSPITAL | Age: 56
End: 2021-07-19

## 2021-07-19 ENCOUNTER — HISTORICAL (OUTPATIENT)
Dept: LAB | Facility: HOSPITAL | Age: 56
End: 2021-07-19

## 2021-07-19 LAB
BUN SERPL-MCNC: 29.8 MG/DL (ref 8.4–25.7)
CALCIUM SERPL-MCNC: 8.9 MG/DL (ref 8.4–10.2)
CHLORIDE SERPL-SCNC: 108 MMOL/L (ref 98–107)
CHOLEST SERPL-MCNC: 120 MG/DL
CHOLEST/HDLC SERPL: 3 {RATIO} (ref 0–5)
CO2 SERPL-SCNC: 24 MMOL/L (ref 22–29)
CREAT SERPL-MCNC: 2.04 MG/DL (ref 0.73–1.18)
CREAT/UREA NIT SERPL: 15
GLUCOSE SERPL-MCNC: 241 MG/DL (ref 74–100)
HDLC SERPL-MCNC: 44 MG/DL (ref 35–60)
LDLC SERPL CALC-MCNC: 60 MG/DL (ref 50–140)
POTASSIUM SERPL-SCNC: 4.1 MMOL/L (ref 3.5–5.1)
SODIUM SERPL-SCNC: 143 MMOL/L (ref 136–145)
TRIGL SERPL-MCNC: 82 MG/DL (ref 34–140)
VLDLC SERPL CALC-MCNC: 16 MG/DL

## 2021-07-23 ENCOUNTER — HISTORICAL (OUTPATIENT)
Dept: PHYSICAL THERAPY | Facility: HOSPITAL | Age: 56
End: 2021-07-23

## 2021-07-30 ENCOUNTER — HISTORICAL (OUTPATIENT)
Dept: PHYSICAL THERAPY | Facility: HOSPITAL | Age: 56
End: 2021-07-30

## 2021-08-03 ENCOUNTER — HISTORICAL (OUTPATIENT)
Dept: CARDIOLOGY | Facility: HOSPITAL | Age: 56
End: 2021-08-03

## 2021-08-03 LAB
ABS NEUT (OLG): 2.67 X10(3)/MCL (ref 2.1–9.2)
APTT PPP: 26.5 SECOND(S) (ref 23.3–37)
BASOPHILS # BLD AUTO: 0 X10(3)/MCL (ref 0–0.2)
BASOPHILS NFR BLD AUTO: 1 %
BUN SERPL-MCNC: 24.3 MG/DL (ref 8.4–25.7)
CALCIUM SERPL-MCNC: 9.5 MG/DL (ref 8.4–10.2)
CHLORIDE SERPL-SCNC: 105 MMOL/L (ref 98–107)
CO2 SERPL-SCNC: 30 MMOL/L (ref 22–29)
CREAT SERPL-MCNC: 1.41 MG/DL (ref 0.73–1.18)
CREAT/UREA NIT SERPL: 17
EOSINOPHIL # BLD AUTO: 0.2 X10(3)/MCL (ref 0–0.9)
EOSINOPHIL NFR BLD AUTO: 5 %
ERYTHROCYTE [DISTWIDTH] IN BLOOD BY AUTOMATED COUNT: 13.6 % (ref 11.5–14.5)
GLUCOSE SERPL-MCNC: 165 MG/DL (ref 74–100)
HCT VFR BLD AUTO: 38.6 % (ref 40–51)
HGB BLD-MCNC: 12.9 GM/DL (ref 13.5–17.5)
IMM GRANULOCYTES # BLD AUTO: 0.01 10*3/UL
IMM GRANULOCYTES NFR BLD AUTO: 0 %
INR PPP: 0.97 (ref 0.9–1.2)
LYMPHOCYTES # BLD AUTO: 1.2 X10(3)/MCL (ref 0.6–4.6)
LYMPHOCYTES NFR BLD AUTO: 26 %
MCH RBC QN AUTO: 29 PG (ref 26–34)
MCHC RBC AUTO-ENTMCNC: 33.4 GM/DL (ref 31–37)
MCV RBC AUTO: 86.7 FL (ref 80–100)
MONOCYTES # BLD AUTO: 0.5 X10(3)/MCL (ref 0.1–1.3)
MONOCYTES NFR BLD AUTO: 11 %
NEUTROPHILS # BLD AUTO: 2.67 X10(3)/MCL (ref 2.1–9.2)
NEUTROPHILS NFR BLD AUTO: 57 %
NRBC BLD AUTO-RTO: 0 % (ref 0–0.2)
PLATELET # BLD AUTO: 243 X10(3)/MCL (ref 130–400)
PMV BLD AUTO: 9.5 FL (ref 7.4–10.4)
POTASSIUM SERPL-SCNC: 3.2 MMOL/L (ref 3.5–5.1)
PROTHROMBIN TIME: 12.7 SECOND(S) (ref 11.9–14.4)
RBC # BLD AUTO: 4.45 X10(6)/MCL (ref 4.5–5.9)
SODIUM SERPL-SCNC: 141 MMOL/L (ref 136–145)
WBC # SPEC AUTO: 4.7 X10(3)/MCL (ref 4.5–11)

## 2021-08-09 ENCOUNTER — HISTORICAL (OUTPATIENT)
Dept: PHYSICAL THERAPY | Facility: HOSPITAL | Age: 56
End: 2021-08-09

## 2021-08-16 ENCOUNTER — HISTORICAL (OUTPATIENT)
Dept: PHYSICAL THERAPY | Facility: HOSPITAL | Age: 56
End: 2021-08-16

## 2021-08-18 ENCOUNTER — HISTORICAL (OUTPATIENT)
Dept: PHYSICAL THERAPY | Facility: HOSPITAL | Age: 56
End: 2021-08-18

## 2021-08-20 ENCOUNTER — HISTORICAL (OUTPATIENT)
Dept: CARDIOLOGY | Facility: HOSPITAL | Age: 56
End: 2021-08-20

## 2021-08-23 ENCOUNTER — HISTORICAL (OUTPATIENT)
Dept: PHYSICAL THERAPY | Facility: HOSPITAL | Age: 56
End: 2021-08-23

## 2021-08-24 ENCOUNTER — HISTORICAL (OUTPATIENT)
Dept: RADIOLOGY | Facility: HOSPITAL | Age: 56
End: 2021-08-24

## 2021-09-07 ENCOUNTER — HISTORICAL (OUTPATIENT)
Dept: LAB | Facility: HOSPITAL | Age: 56
End: 2021-09-07

## 2021-09-07 LAB
ABS NEUT (OLG): 3.2 X10(3)/MCL (ref 2.1–9.2)
ALBUMIN SERPL-MCNC: 3.6 GM/DL (ref 3.5–5)
ALBUMIN/GLOB SERPL: 1 RATIO (ref 1.1–2)
ALP SERPL-CCNC: 89 UNIT/L (ref 40–150)
ALT SERPL-CCNC: 28 UNIT/L (ref 0–55)
AST SERPL-CCNC: 22 UNIT/L (ref 5–34)
BASOPHILS # BLD AUTO: 0 X10(3)/MCL (ref 0–0.2)
BASOPHILS NFR BLD AUTO: 1 %
BILIRUB SERPL-MCNC: 0.5 MG/DL
BILIRUBIN DIRECT+TOT PNL SERPL-MCNC: 0.2 MG/DL (ref 0–0.8)
BILIRUBIN DIRECT+TOT PNL SERPL-MCNC: 0.3 MG/DL (ref 0–0.5)
BUN SERPL-MCNC: 24.3 MG/DL (ref 8.4–25.7)
CALCIUM SERPL-MCNC: 8.8 MG/DL (ref 8.4–10.2)
CHLORIDE SERPL-SCNC: 109 MMOL/L (ref 98–107)
CHOLEST SERPL-MCNC: 125 MG/DL
CHOLEST/HDLC SERPL: 3 {RATIO} (ref 0–5)
CO2 SERPL-SCNC: 27 MMOL/L (ref 22–29)
CREAT SERPL-MCNC: 1.12 MG/DL (ref 0.73–1.18)
EOSINOPHIL # BLD AUTO: 0.2 X10(3)/MCL (ref 0–0.9)
EOSINOPHIL NFR BLD AUTO: 4 %
ERYTHROCYTE [DISTWIDTH] IN BLOOD BY AUTOMATED COUNT: 13.4 % (ref 11.5–17)
EST. AVERAGE GLUCOSE BLD GHB EST-MCNC: 177.2 MG/DL
GLOBULIN SER-MCNC: 3.5 GM/DL (ref 2.4–3.5)
GLUCOSE SERPL-MCNC: 88 MG/DL (ref 74–100)
HBA1C MFR BLD: 7.8 %
HCT VFR BLD AUTO: 37.6 % (ref 42–52)
HDLC SERPL-MCNC: 40 MG/DL (ref 35–60)
HGB BLD-MCNC: 12.9 GM/DL (ref 14–18)
IMM GRANULOCYTES # BLD AUTO: 0.02 % (ref 0–0.02)
IMM GRANULOCYTES NFR BLD AUTO: 0.4 % (ref 0–0.43)
LDLC SERPL CALC-MCNC: 72 MG/DL (ref 50–140)
LYMPHOCYTES # BLD AUTO: 1.2 X10(3)/MCL (ref 0.6–4.6)
LYMPHOCYTES NFR BLD AUTO: 23 %
MCH RBC QN AUTO: 30.1 PG (ref 27–31)
MCHC RBC AUTO-ENTMCNC: 34.3 GM/DL (ref 33–36)
MCV RBC AUTO: 87.9 FL (ref 80–94)
MONOCYTES # BLD AUTO: 0.6 X10(3)/MCL (ref 0.1–1.3)
MONOCYTES NFR BLD AUTO: 11 %
NEUTROPHILS # BLD AUTO: 3.2 X10(3)/MCL (ref 1.4–7.9)
NEUTROPHILS NFR BLD AUTO: 60 %
PLATELET # BLD AUTO: 255 X10(3)/MCL (ref 130–400)
PMV BLD AUTO: 9.1 FL (ref 9.4–12.4)
POTASSIUM SERPL-SCNC: 3.3 MMOL/L (ref 3.5–5.1)
PROT SERPL-MCNC: 7.1 GM/DL (ref 6.4–8.3)
RBC # BLD AUTO: 4.28 X10(6)/MCL (ref 4.7–6.1)
SODIUM SERPL-SCNC: 146 MMOL/L (ref 136–145)
TRIGL SERPL-MCNC: 66 MG/DL (ref 34–140)
TSH SERPL-ACNC: 1.83 UIU/ML (ref 0.35–4.94)
VLDLC SERPL CALC-MCNC: 13 MG/DL
WBC # SPEC AUTO: 5.3 X10(3)/MCL (ref 4.5–11.5)

## 2021-09-21 ENCOUNTER — HISTORICAL (OUTPATIENT)
Dept: CARDIOLOGY | Facility: HOSPITAL | Age: 56
End: 2021-09-21

## 2021-09-21 LAB
BUN SERPL-MCNC: 24.1 MG/DL (ref 8.4–25.7)
CALCIUM SERPL-MCNC: 9.9 MG/DL (ref 8.4–10.2)
CHLORIDE SERPL-SCNC: 106 MMOL/L (ref 98–107)
CO2 SERPL-SCNC: 29 MMOL/L (ref 22–29)
CREAT SERPL-MCNC: 1.27 MG/DL (ref 0.73–1.18)
CREAT/UREA NIT SERPL: 19
GLUCOSE SERPL-MCNC: 106 MG/DL (ref 74–100)
POTASSIUM SERPL-SCNC: 3.3 MMOL/L (ref 3.5–5.1)
SODIUM SERPL-SCNC: 144 MMOL/L (ref 136–145)

## 2021-09-27 ENCOUNTER — HISTORICAL (OUTPATIENT)
Dept: CARDIOLOGY | Facility: HOSPITAL | Age: 56
End: 2021-09-27

## 2021-09-27 LAB
BUN SERPL-MCNC: 21.7 MG/DL (ref 8.4–25.7)
CALCIUM SERPL-MCNC: 9.5 MG/DL (ref 8.4–10.2)
CHLORIDE SERPL-SCNC: 109 MMOL/L (ref 98–107)
CO2 SERPL-SCNC: 26 MMOL/L (ref 22–29)
CREAT SERPL-MCNC: 1.41 MG/DL (ref 0.73–1.18)
CREAT/UREA NIT SERPL: 15
GLUCOSE SERPL-MCNC: 250 MG/DL (ref 74–100)
POTASSIUM SERPL-SCNC: 3.7 MMOL/L (ref 3.5–5.1)
SODIUM SERPL-SCNC: 142 MMOL/L (ref 136–145)

## 2021-10-06 ENCOUNTER — HISTORICAL (OUTPATIENT)
Dept: CARDIOLOGY | Facility: CLINIC | Age: 56
End: 2021-10-06

## 2021-10-06 LAB
BUN SERPL-MCNC: 23.6 MG/DL (ref 8.4–25.7)
CALCIUM SERPL-MCNC: 9.8 MG/DL (ref 8.4–10.2)
CHLORIDE SERPL-SCNC: 107 MMOL/L (ref 98–107)
CO2 SERPL-SCNC: 25 MMOL/L (ref 22–29)
CREAT SERPL-MCNC: 1.28 MG/DL (ref 0.73–1.18)
CREAT/UREA NIT SERPL: 18
GLUCOSE SERPL-MCNC: 223 MG/DL (ref 74–100)
POTASSIUM SERPL-SCNC: 3.2 MMOL/L (ref 3.5–5.1)
SODIUM SERPL-SCNC: 143 MMOL/L (ref 136–145)

## 2021-11-19 ENCOUNTER — HISTORICAL (OUTPATIENT)
Dept: CARDIOLOGY | Facility: CLINIC | Age: 56
End: 2021-11-19

## 2021-11-19 LAB
BUN SERPL-MCNC: 17.2 MG/DL (ref 8.4–25.7)
CALCIUM SERPL-MCNC: 9.5 MG/DL (ref 8.7–10.5)
CHLORIDE SERPL-SCNC: 107 MMOL/L (ref 98–107)
CO2 SERPL-SCNC: 28 MMOL/L (ref 22–29)
CREAT SERPL-MCNC: 1.47 MG/DL (ref 0.73–1.18)
CREAT/UREA NIT SERPL: 12
GLUCOSE SERPL-MCNC: 237 MG/DL (ref 74–100)
POTASSIUM SERPL-SCNC: 3.6 MMOL/L (ref 3.5–5.1)
SODIUM SERPL-SCNC: 144 MMOL/L (ref 136–145)

## 2021-12-02 ENCOUNTER — HISTORICAL (OUTPATIENT)
Dept: RADIOLOGY | Facility: HOSPITAL | Age: 56
End: 2021-12-02

## 2021-12-13 ENCOUNTER — PATIENT OUTREACH (OUTPATIENT)
Dept: EMERGENCY MEDICINE | Facility: HOSPITAL | Age: 56
End: 2021-12-13

## 2022-01-04 ENCOUNTER — HISTORICAL (OUTPATIENT)
Dept: ADMINISTRATIVE | Facility: HOSPITAL | Age: 57
End: 2022-01-04

## 2022-01-04 LAB
APPEARANCE, UA: CLEAR
BACTERIA SPEC CULT: NORMAL
BILIRUB UR QL STRIP: NEGATIVE
BUN SERPL-MCNC: 19.7 MG/DL (ref 8.4–25.7)
CALCIUM SERPL-MCNC: 10 MG/DL (ref 8.7–10.5)
CHLORIDE SERPL-SCNC: 103 MMOL/L (ref 98–107)
CO2 SERPL-SCNC: 30 MMOL/L (ref 22–29)
COLOR UR: NORMAL
CREAT SERPL-MCNC: 1.17 MG/DL (ref 0.73–1.18)
CREAT UR-MCNC: 89 MG/DL (ref 58–161)
CREAT/UREA NIT SERPL: 17
EST. AVERAGE GLUCOSE BLD GHB EST-MCNC: 197.2 MG/DL
GLUCOSE (UA): NORMAL /UL
GLUCOSE SERPL-MCNC: 160 MG/DL (ref 74–100)
HBA1C MFR BLD: 8.5 %
HGB UR QL STRIP: NEGATIVE /HPF
HYALINE CASTS #/AREA URNS LPF: NORMAL /LPF
KETONES UR QL STRIP: NEGATIVE /UL
LEUKOCYTE ESTERASE UR QL STRIP: NEGATIVE
MICROALBUMIN UR-MCNC: 16.7 MG/L
MICROALBUMIN/CREAT RATIO PNL UR: 18.8 MG/GM CR (ref 0–30)
NITRITE UR QL STRIP: NEGATIVE
PH UR STRIP: 5.5 /UL (ref 4.5–8)
POTASSIUM SERPL-SCNC: 3.6 MMOL/L (ref 3.5–5.1)
PROT UR QL STRIP: NEGATIVE /UL
PSA SERPL-MCNC: 1.19 NG/ML
RBC #/AREA URNS HPF: NORMAL /HPF
SODIUM SERPL-SCNC: 141 MMOL/L (ref 136–145)
SP GR UR STRIP: 1.02 (ref 1–1.03)
SQUAMOUS EPITHELIAL, UA: NORMAL /LPF
UROBILINOGEN UR STRIP-ACNC: NORMAL /HPF
WBC #/AREA URNS HPF: NORMAL /HPF

## 2022-02-15 ENCOUNTER — PATIENT OUTREACH (OUTPATIENT)
Dept: EMERGENCY MEDICINE | Facility: HOSPITAL | Age: 57
End: 2022-02-15

## 2022-04-11 ENCOUNTER — HISTORICAL (OUTPATIENT)
Dept: ADMINISTRATIVE | Facility: HOSPITAL | Age: 57
End: 2022-04-11
Payer: MEDICAID

## 2022-04-27 VITALS
DIASTOLIC BLOOD PRESSURE: 89 MMHG | SYSTOLIC BLOOD PRESSURE: 141 MMHG | HEIGHT: 66 IN | OXYGEN SATURATION: 100 % | BODY MASS INDEX: 41.1 KG/M2 | WEIGHT: 255.75 LBS

## 2022-04-30 NOTE — OP NOTE
DATE OF SURGERY:    01/21/2020    SURGEON:  Manoj Pate MD  ASSISTANT:  __________, PGY3    PROCEDURE PERFORMED:  Laparoscopic cholecystectomy.    INDICATIONS:  This is a 54-year-old male with morbid obesity and type 2 diabetes.  For the past 4 days he has had right upper quadrant epigastric pain.  Pain was originally not thought to be related to meals.  However, he became nauseous and unable to tolerate a p.o. diet yesterday.  He did have a similar episode about a year or 2 ago.  Other than this, he has had no similar episodes.  He was worked up with an abdominal ultrasound which demonstrated a thickened gallbladder with a GB wall of 5.1 mm which is thickened and indicates cholecystitis.  No stones were seen, however.  Patient was seen.  We discussed the risks and benefits of operative versus nonoperative management and he decided to go for cholecystectomy to treat his pain.  We also discussed the importance of weight loss for control of his type 2 diabetes and even the possibility of weight loss surgery in the future.    ANESTHESIA:  General endotracheal.    SPECIMEN REMOVED:  Gallbladder.    BLOOD LOSS:  45 cc.    FINDINGS:  A thickened distended gallbladder.  No stones.    COMPLICATIONS:  None.    PROCEDURE IN DETAIL:  Patient was brought to the operating room and laid supine on the operating room table.  General anesthesia was administered.  He was intubated endotracheally.  The abdomen was prepped and draped in the usual sterile fashion.  An 11 mm periumbilical Visiport was placed.  Pneumoperitoneum was achieved.  There was no injury to intraabdominal structures during the placement of this port.  Two additional working ports were placed in the right upper quadrant and one 11 mm port in the epigastrium.  The gallbladder was grasped and elevated in a cephalad and lateral direction, exposing the hilum of the gallbladder.  Blunt dissection was used to dissect the peritoneal attachments from the cystic  duct and cystic artery.  Both structures were identified, clearly going into the gallbladder with no intervening structures within this window of safety.  The cystic duct and cystic artery were triply clipped and then divided.  Electrocautery was used to remove the gallbladder from the gallbladder fossa.  There was some minor bleeding in the gallbladder fossa, which stopped with 3 minutes of pressure with a Ray-Elly sponge and then the bleeding vessel was located and easily stopped with a minor touch of the electrocautery device.  This stopped all bleeding.  There was no bile spillage during this procedure.  There was no bleeding in the gallbladder fossa.  The gallbladder was removed with a toothed grasper through the 11 mm epigastric Visiport.  All ports were then removed under direct vision after pneumoperitoneum was released.  The skin incisions were closed with interrupted 4-0 subcuticular Vicryl sutures.  Patient tolerated the procedure well.  Anesthesia plans to extubate the patient and bring him to the post-anesthesia care unit.  Patient did remain in stable, satisfactory condition for the duration of the operation.        ______________________________  Manoj Pate MD    RA/BAMBI  DD:  01/21/2020  Time:  10:31AM  DT:  01/21/2020  Time:  10:49AM  Job #:  [id number]

## 2022-05-05 NOTE — HISTORICAL [facility] CERNER
This is a historical note converted from Dara. Formatting and pictures may have been removed.  Please reference Dara for original formatting and attached multimedia. Chief Complaint  ULCERS TO WEB OF TOES  History of Present Illness  56 yo Black male being seen today for follow-up of tinea pedis bilaterally, with maceration between 4-5th toes.? Was referred to [facility] podiatry; however, missed appointment.? Hx includes medical non-compliance, obesity (BMI 42.21), uncontrolled Type 2 diabetes, HTN, HLD, adrenal adenoma, generalized DJD, and hx of COVID infection (1/12/2021).? Not a candidate for CSI due to uncontrolled diabetes.? Has attended individual diabetes education on 12/18/20 and has been attempting to follow ADA/DASH diet.?? Strong family history of CAD; sister was told her CAD is too advanced for surgery.?  ?   Labs 3/19/2021:  eGFR?>105  HgbA1C 8.2  ?   Today 4/5/2021:  Did  Gentian Violet 1% from [facility] and is applying between toes daily with Qtip.? Maceration and rash has completely resolved with that.? No recurrent calluses as of today.? States he took his BP medications; able to tell me which ones he is prescribed.?? Scheduled to see PCP next month, he believes.?  ?  ?   ?  Review of Systems  Except as stated in HPI, all other 10 body systems normal  ?  Physical Exam  Vitals & Measurements  T:?36.8? ?C (Oral)? HR:?83(Peripheral)? RR:?20? BP:?168/80?  BMI:?42.56?  General:??Blood pressure elevated again today; afebrile.??Obese; in no acute distress.  Eyes:? Bilateral conjunctival erythema.?  Respiratory:?breathing even, quiet, and unlabored.?? No coughing.?  Cardiovascular:? No peripheral edema.??No hemosiderin staining or varicosities.? No hair distribution over BLE.? Feet and toes warm to touch.? No temperature differences between BLE.? Toenails dystrophic, yellow, and brittle.? DP pulses palpable; unable to palpate PT pulses.?  Musculoskeletal:?full range of motion of all extremities;  no joint deformities or edema  Neurologic: A&O X 3; cranial nerves intact, no signs of peripheral neurological deficit, motor/sensory function intact.?  Psychiatric:?Calm, cooperative.??Mood and affect normal.??Responses appropriate  Integumentary:? Right and left 4th toewebs are dry, with no skin breakdown.? All other toes dry.? Gentian violet seen between all toes, as prescribed.?  ?  ?   Assessment/Plan:  ?   Non-compliance.  This is documented in his records by PCP.? Encouraged better compliance with recommendations made by diabetic educators and his PCP, DERIC Benitez, here @ [facility].?  ?   BMI 42.21:  Teaching?reinforced on role diabetes and morbid plays in development of wounds and skin issues, delays wound healing, and can lead to major complications (e.g., lower limb amputations).?  Weight loss encouraged.  30 minute walking exercise encouraged  Dietary changes encouraged, as being taught by diabetic educators.??  ?   Type 2 Diabetes:  Labs 3/19/2021:  eGFR?>105  HgbA1C 8.2, which has improved from 9.9 on 1/31/2021  Prescribed 32 units Lantus BID, Humalog 20 units TID/AC, and Metformin 1000 mg BID  Continued f/u with PCP, DERIC Nayak.?  ?  Tinea Pedis:  Teaching reinforced on underlying causes of condition/disease, s/s of condition/disease, complications, prevention, and treatment of condition/disease.??  We will see him every 3 months for diabetic foot care and callus care.?  ?  Hypertension:  Teaching? provided on goal B/P readings to prevent end-organ complications, low sodium diet principles, and current anti-hypertensive regimen.? Written instructions on goal BP for age. Handouts given.?  ?  ?  Assessment/Plan  1.?Medical non-compliance?Z91.19  2.?Obesity, morbid, BMI 40.0-49.9?E66.01  3.?Uncontrolled type 2 diabetes mellitus with hyperglycemia?E11.65  4.?Tinea pedis of both feet?B35.3  5.?Hypertension?I10  Orders:  Wound Care Outpatient, 07/06/21 9:00:00 CDT, Stop date 07/06/21 9:00:00 CDT    Problem List/Past Medical History  Ongoing  Adrenal adenoma  Bilateral primary osteoarthritis of knee  Cervical spinal stenosis  History of 2019 novel coronavirus disease (COVID-19)  HLD (hyperlipidemia)  HTN - Hypertension  Hypertension  Knowledge deficit  Medical non-compliance  Obesity, morbid, BMI 40.0-49.9  Right shoulder tendonitis  Tinea pedis of both feet  Uncontrolled type 2 diabetes mellitus with hyperglycemia  Historical  Polyp of colon  Procedure/Surgical History  Introduction of Remdesivir Anti-infective into Peripheral Vein, Percutaneous Approach, [employer] (01/14/2021)  Cholecystectomy Laparoscopic (.) (01/21/2020)  Laparoscopy, surgical; cholecystectomy (01/20/2020)  Resection of Gallbladder, Percutaneous Endoscopic Approach (01/20/2020)  Drainage of Buttock Skin, External Approach (05/21/2018)  Incision and drainage of abscess (eg, carbuncle, suppurative hidradenitis, cutaneous or subcutaneous abscess, cyst, furuncle, or paronychia); complicated or multiple (05/21/2018)  Colonoscopy, flexible; with biopsy, single or multiple (03/09/2016)  Excision of Large Intestine, Via Natural or Artificial Opening Endoscopic, Diagnostic (03/09/2016)  none   Medications  Albuterol (Eqv-ProAir HFA) 90 mcg/inh inhalation aerosol, 2 puff(s), INH, q6hr, 1 refills  amlodipine 5 mg oral tablet, 5 mg= 1 tab(s), Oral, Daily, 3 refills  Aspir 81  atorvastatin 80 mg oral tablet, 80 mg= 1 tab(s), Oral, Once a day (at bedtime), 3 refills  Blood pressure monitor, See Instructions  Glucometer, See Instructions  HumaLOG 100 units/mL injectable solution, 20 units, Subcutaneous, TIDAC, 1 refills  hydrochlorothiazide 25 mg oral tablet, 25 mg= 1 tab(s), Oral, Daily, 3 refills  Insulin needles (1cc/100 units) syringes, See Instructions, 11 refills  Insulin syringes U-50, See Instructions, 11 refills  Lantus 100 units/mL subcutaneous solution, 32 units, Subcutaneous, BID, 3 refills  Lantus solostar pen needles, See  Instructions, 11 refills  losartan 100 mg oral tablet, 100 mg= 1 tab(s), Oral, Daily, 3 refills  meloxicam 7.5 mg oral tablet, 7.5 mg= 1 tab(s), Oral, Daily  metFORMIN 1000 mg oral tablet, See Instructions, 2 refills  ONE TOUCH ULTRA TEST STRIPS STRP, See Instructions  potassium chloride 10 mEq oral TABLET extended release, 10 mEq= 1 tab(s), Oral, Daily, 3 refills  terbinafine 250 mg oral tablet, 250 mg= 1 tab(s), Oral, Daily  tiZANidine 4 mg oral capsule, 4 mg= 1 cap(s), Oral, TID, 1 refills  Allergies  No Known Allergies  Social History  Abuse/Neglect  No, 03/23/2021  Alcohol  denies alcohol use, 04/07/2015  Employment/School  Unemployed, Highest education level: High school., 04/07/2015  Exercise  Self assessment: Excellent condition., 06/16/2015  Exercise frequency: 3-4 times/week. Self assessment: Poor condition. Exercise type: Walking., 04/07/2015  Home/Environment  Lives with Siblings. Living situation: Home/Independent., 01/25/2021    Never in , 11/05/2020  Nutrition/Health  Regular, Caffeine intake amount: COFFEE 2 CUPS IN MORNING. Wants to lose weight: Yes. Sleeping concerns: No. Feels highly stressed: No., 09/01/2015  Other  Sexual  Gender Identity Identifies as male., 02/18/2021  Spiritual/Cultural  Holiness, Yes, 10/14/2019  Substance Use  Never, 03/04/2016  Tobacco  Never (less than 100 in lifetime), N/A, 03/23/2021  Never (less than 100 in lifetime), N/A, 03/19/2021  Family History  Breast cancer: Sister.  Heart disease: Mother.  Hypertension.: Mother and Father.  Immunizations  Vaccine Date Status Comments   influenza virus vaccine, inactivated 01/18/2021 Given    influenza virus vaccine, inactivated 11/05/2020 Given    influenza virus vaccine, inactivated 10/14/2019 Given    influenza virus vaccine, inactivated 10/31/2018 Given    influenza virus vaccine, inactivated - Not Given Patient Refuses     tetanus/diphtheria/pertussis, acel(Tdap) 03/06/2017 Given    influenza virus vaccine,  inactivated 11/11/2016 Given    influenza virus vaccine, inactivated 01/04/2016 Given    pneumococcal 7-valent vaccine 09/28/2011 Recorded    influenza virus vaccine, inactivated 09/28/2011 Recorded    influenza virus vaccine, inactivated 10/12/2009 Recorded    Health Maintenance  Health Maintenance  ???Pending?(in the next year)  ??? ??Due?  ??? ? ? ?Zoster Vaccine due??04/05/21??Unknown Frequency  ??? ??Due In Future?  ??? ? ? ?Hypertension Management-Education not due until??04/22/21??and every 1??year(s)  ??? ? ? ?Influenza Vaccine not due until??10/01/21??and every 1??day(s)  ??? ? ? ?Diabetes Maintenance-Fasting Lipid Profile not due until??10/19/21??and every 1??year(s)  ??? ? ? ?Diabetes Maintenance-Eye Exam not due until??12/20/21??and every 1??year(s)  ??? ? ? ?Obesity Screening not due until??01/01/22??and every 1??year(s)  ??? ? ? ?Alcohol Misuse Screening not due until??01/02/22??and every 1??year(s)  ??? ? ? ?Aspirin Therapy for CVD Prevention not due until??01/19/22??and every 1??year(s)  ??? ? ? ?Depression Screening not due until??03/19/22??and every 1??year(s)  ??? ? ? ?Diabetes Maintenance-HgbA1c not due until??03/19/22??and every 1??year(s)  ??? ? ? ?Hypertension Management-BMP not due until??03/19/22??and every 1??year(s)  ??? ? ? ?Diabetes Maintenance-Serum Creatinine not due until??03/19/22??and every 1??year(s)  ??? ? ? ?ADL Screening not due until??03/19/22??and every 1??year(s)  ??? ? ? ?Body Mass Index Check not due until??03/23/22??and every 1??year(s)  ??? ? ? ?Diabetes Maintenance-Foot Exam not due until??03/23/22??and every 1??year(s)  ???Satisfied?(in the past 1 year)  ??? ??Satisfied?  ??? ? ? ?ADL Screening on??03/19/21.??Satisfied by Karla Verma LPN  ??? ? ? ?Alcohol Misuse Screening on??03/19/21.??Satisfied by Karla Verma LPN  ??? ? ? ?Aspirin Therapy for CVD Prevention on??01/19/21.??Satisfied by Sindhu Smith LPN  ??? ? ? ?Blood Pressure Screening  on??04/05/21.??Satisfied by Pascale Chisholm RN  ??? ? ? ?Body Mass Index Check on??03/23/21.??Satisfied by Mary Felder LPN  ??? ? ? ?Depression Screening on??03/19/21.??Satisfied by Bayron SANCHEZ Karla M.  ??? ? ? ?Diabetes Maintenance-Foot Exam on??03/23/21.??Satisfied by Marie Plunkett  ??? ? ? ?Diabetes Maintenance-HgbA1c on??03/19/21.??Satisfied by Ronnie Nixon  ??? ? ? ?Diabetes Maintenance-Serum Creatinine on??03/19/21.??Satisfied by Ronnie Nixon  ??? ? ? ?Diabetes Maintenance-Eye Exam on??12/20/20.??Satisfied by Lee Frederick MD  ??? ? ? ?Diabetes Maintenance-Fasting Lipid Profile on??10/19/20.??Satisfied by Sherrill Rothman  ??? ? ? ?Diabetes Maintenance-Microalbumin on??07/20/20.??Satisfied by Sherrill Rothman  ??? ? ? ?Diabetes Screening on??03/19/21.??Satisfied by Ronnie Nixon  ??? ? ? ?Hypertension Management-Blood Pressure on??04/05/21.??Satisfied by Kathrine CASTAÑEDA, Pascale R  ??? ? ? ?Hypertension Management-BMP on??03/19/21.??Satisfied by Ronnie Nixon  ??? ? ? ?Hypertension Management-Education on??04/22/20.??Satisfied by Yelitza Roque NP  ??? ? ? ?Influenza Vaccine on??03/19/21.??Satisfied by Bayron SANCHEZ Karla M.  ??? ? ? ?Lipid Screening on??10/19/20.??Satisfied by Sherrill Rothman  ??? ? ? ?Obesity Screening on??03/23/21.??Satisfied by Mary Felder LPN  ??? ??Refused?  ??? ? ? ?Zoster Vaccine on??02/01/21.??Recorded by Christen Weathers LPN??Reason: Patient Refuses  ?

## 2022-05-05 NOTE — HISTORICAL [facility] CERNER
This is a historical note converted from Cerm[facility]. Formatting and pictures may have been removed.  Please reference Cermt for original formatting and attached multimedia. Chief Complaint  RUQ pain x 4 days  History of Present Illness  54M with RUQ pain x3 days presents to ER with worsening pain. History of T2DM on insulin, HTN, HLD. He states over the past 3 days he has had intense midepigastric radiating to the RUQ pain, with nausea no vomiting. He has never felt this type of pain before. He denies any reflux symptoms. He reports that his pain began after?eating chicken wings that he fried at home. He had been eating well for the past two?days until today when he?nausea caused him decreased appetite. ?He reports normal BM with no blood, diarrhea, dark tarry stool. He is followed by [facility] and has been diagnosed with a adrenal adenoma. He is up to date on colonoscopy.  ?  Denies tobacco, ETOH and illicit drug use.  Reports a family history of colon cancer, a sister with cirrhosis and lung cancer, two other sisters with breast cancer.  ?  US shows a 5 mm gallbladder wall with edema, no stones  Review of Systems  Negative unless otherwise stated  Physical Exam  Vitals & Measurements  T:?36.6? ?C (Oral)? HR:?70(Peripheral)? HR:?70(Monitored)? RR:?25? BP:?138/87? SpO2:?98%? WT:?116?kg?  Gen: NAD  CV: RR, peripheral pulses intact  Pulm: normal effort no accessory muscle use  Abd: soft mild tenderness in the RUQ, negative grace, no rebound, no peritoneal signs  MSK: no edema, CASAS, sensation intact in the BLE  Assessment/Plan  Abdominal pain?[id number]  A: 54M with HTN, DM, HLD, presents with US findings of thickened edematous gallbladder concern for functional gallbladder disease  ?  P:  Admit to floor  NPO  mIVF  HIDA in AM  prn pain  prn antiemetics  sliding scale   Problem List/Past Medical History  Ongoing  Adrenal adenoma  Cervical spinal stenosis  HLD (hyperlipidemia)  HTN -  Hypertension  Knowledge deficit  Medical non-compliance  Obesity, morbid, BMI 40.0-49.9  Right shoulder tendonitis  Uncontrolled type 2 diabetes mellitus with hyperglycemia  Historical  Polyp of colon  Procedure/Surgical History  Drainage of Buttock Skin, External Approach (05/21/2018)  Incision and drainage of abscess (eg, carbuncle, suppurative hidradenitis, cutaneous or subcutaneous abscess, cyst, furuncle, or paronychia); complicated or multiple (05/21/2018)  Colonoscopy, flexible; with biopsy, single or multiple (03/09/2016)  Excision of Large Intestine, Via Natural or Artificial Opening Endoscopic, Diagnostic (03/09/2016)  none   Medications  Inpatient  No active inpatient medications  Home  amlodipine 5 mg oral tablet, 5 mg= 1 tab(s), Oral, Daily, 1 refills  amlodipine 5 mg oral tablet, 5 mg= 1 tab(s), Oral, Daily  Aspir 81  atorvastatin 80 mg oral tablet, 80 mg= 1 tab(s), Oral, Daily, 4 refills  benazepril 20 mg oral tablet, 40 mg= 2 tab(s), Oral, Daily,? ?Not Taking per Prescriber  Blood pressure monitor, See Instructions,? ?Unable to obtain: Will  when he can  clotrimazole 1% topical cream, 1 ro, TOP, BID, 1 refills  enalapril 20 mg oral tablet, 20 mg= 1 tab(s), Oral, Daily, 2 refills,? ?Not taking: Ran out  Glucometer, See Instructions  HumaLOG 100 units/mL injectable solution, 7 units, Subcutaneous, TIDAC, 2 refills  hydrochlorothiazide 25 mg oral tablet, 25 mg= 1 tab(s), Oral, Daily, 4 refills  Insulin needles (1cc/100 units) syringes, See Instructions, 11 refills  Insulin pen needles, See Instructions, 11 refills  Insulin syringes U-50, See Instructions, 11 refills  Januvia 25 mg oral tablet, 25 mg= 1 tab(s), Oral, Daily,? ?Not Taking per Prescriber  Januvia 50 mg oral tablet, 50 mg= 1 tab(s), Oral, Daily, 4 refills  Lancets and Strips, See Instructions, 11 refills  Lantus 100 units/mL subcutaneous solution, 45 units, Subcutaneous, Once a day (at bedtime), 3 refills  losartan 100 mg oral  tablet, 100 mg= 1 tab(s), Oral, Daily, 3 refills  losartan 25 mg oral tablet, 25 mg= 1 tab(s), Oral, Daily,? ?Not Taking, Completed Rx  losartan 50 mg oral tablet, 50 mg= 1 tab(s), Oral, Daily,? ?Not Taking per Prescriber  metFORMIN 1000 mg oral tablet, 1000 mg= 1 tab(s), Oral, BID, 4 refills  ONE TOUCH DELICA LANCETS MISC, See Instructions, 11 refills  potassium chloride 10 mEq oral TABLET extended release, 10 mEq= 1 tab(s), Oral, Daily, 4 refills  Allergies  No Known Allergies  Social History  Abuse/Neglect  No, No, Yes, 07/08/2019  Alcohol  denies alcohol use, 04/07/2015  Employment/School  Unemployed, Highest education level: High school., 04/07/2015  Exercise  Self assessment: Excellent condition., 06/16/2015  Exercise frequency: 3-4 times/week. Self assessment: Poor condition. Exercise type: Walking., 04/07/2015  Home/Environment  Lives with Alone. Living situation: Home/Independent. Home equipment: Glucose monitoring, B/P CUFF. Alcohol abuse in household: No. Substance abuse in household: No. Smoker in household: No. Injuries/Abuse/Neglect in household: No. Feels unsafe at home: No. Safe place to go: Yes. Family/Friends available for support: Yes. Concern for family members at home: No. Major illness in household: No., 09/01/2015  Nutrition/Health  Regular, Caffeine intake amount: COFFEE 2 CUPS IN MORNING. Wants to lose weight: Yes. Sleeping concerns: No. Feels highly stressed: No., 09/01/2015  Other  Sexual  Spiritual/Cultural  Protestant, Yes, 10/14/2019  Substance Use  Never, 03/04/2016  Tobacco  Never (less than 100 in lifetime), No, Household tobacco concerns: No. Smokeless Tobacco Use: Never., 10/14/2019  Family History  Breast cancer: Sister.  Heart disease: Mother.  Hypertension.: Mother and Father.  Immunizations  Vaccine Date Status Comments   influenza virus vaccine, inactivated 10/14/2019 Given    influenza virus vaccine, inactivated 10/31/2018 Given    influenza virus vaccine, inactivated - Not  Given Patient Refuses  Patient left before recieving the injection.   tetanus/diphtheria/pertussis, acel(Tdap) 03/06/2017 Given    influenza virus vaccine, inactivated 11/11/2016 Given    influenza virus vaccine, inactivated 01/04/2016 Given    influenza virus vaccine, inactivated 09/28/2011 Recorded    pneumococcal 7-valent vaccine 09/28/2011 Recorded    influenza virus vaccine, inactivated 10/12/2009 Recorded        signs of cholecystitis.? will possibly get HIDA in AM

## 2022-05-05 NOTE — HISTORICAL [facility] CERNER
This is a historical note converted from Dara. Formatting and pictures may have been removed.  Please reference Dara for original formatting and attached multimedia. Chief Complaint  ULCERS TO WEB OF TOES  History of Present Illness  56 yo Black male being seen today, initial visit, for evaluation and management of tinea pedis bilaterally, with maceration between 4-5th toes.? Was referred to [facility] podiatry; however, missed appointment.? Reports BLE swelling for about 3 weeks now.? Hx includes medical non-compliance, obesity (BMI 42.21), uncontrolled Type 2 diabetes, HTN, HLD, adrenal adenoma, generalized DJD, and hx of COVID infection (1/12/2021).? Not a candidate for CSI due to uncontrolled diabetes.? Has attended individual diabetes education on 12/18/20 and has been attempting to follow ADA/DASH diet.?  ?   Labs 3/19/2021:  eGFR?>105  HgbA1C 8.2  ?   Today 3/23/2021:  Not applying anything between toes at this time.? Used to come to wound clinic for nail care in past; however, has been some time since he was seen here.? Has a wound to left anterior shin, below knee, that he cant recall how occurred. Skin has dried with a scab and he is leaving it open to air.? Denies fevers, chills, increased redness, increased pain, odors, or yellow/green drainage.?Sleeping well; eating well.? Denies chest pain, shortness of breath,?abdominal pain, nausea,?vomiting, diarrhea, constipation, changes in urinary patterns, burning/odor with urination, depression, anxiety, and SI/HI.??  ?  Review of Systems  Except as stated in HPI, all other 10 body systems normal  ?  Physical Exam  Vitals & Measurements  T:?36.8? ?C (Oral)? HR:?72(Peripheral)? RR:?20? BP:?161/84?  BMI:?42.56?  General:??Blood pressure elevated; afebrile.??Obese; in no acute distress  Respiratory:?breathing even, quiet, and unlabored.?? No coughing.?  Cardiovascular:? No peripheral edema.??No hemosiderin staining or varicosities.? No hair distribution over BLE.?  Feet and toes warm to touch.? No temperature differences between BLE.? Toenails dystrophic, yellow, and brittle.? DP pulses palpable; unable to palpate PT pulses.?  Musculoskeletal:?full range of motion of all extremities; no joint deformities or edema  Neurologic: A&O X 3; cranial nerves intact, no signs of peripheral neurological deficit, motor/sensory function intact.? Diabetic foot exam showed no loss of sensation in either foot/toe.?  Psychiatric:?Calm, cooperative.??Mood and affect normal.??Responses appropriate  Integumentary:? Right and left 4th toewebs macerated, with scattered areas of partial-thickness skin loss.? Base of ulcers pink and smooth, 100%.? No slough.? No erythema, purulent drainage, or odors.? Wound margins without induration, bogginess, or fluctuance.? Calluses on medial aspects of bilateral halluxes.?  ?  ?   Assessment/Plan:  ?   Non-compliance.  This is documented in his records by PCP.? Encouraged better compliance with recommendations made by diabetic educators and his PCP, DERIC Benitez, here @ [facility].?  ?   BMI 42.21:  Teaching provided on role diabetes and morbid plays in development of wounds and skin issues, delays wound healing, and can lead to major complications (e.g., lower limb amputations).?  Weight loss encouraged.  30 minute walking exercise encouraged  Dietary changes encouraged, as being taught by diabetic educators.?? BMI handout given, as well as obesity handout.?  ?   Type 2 Diabetes:  Labs 3/19/2021:  eGFR?>105  HgbA1C 8.2, which has improved from 9.9 on 1/31/2021  Prescribed 32 units Lantus BID, Humalog 20 units TID/AC, and Metformin 1000 mg BID  Diabetic foot exam performed; no loss of sensation (LOS).  Diabetic foot care teaching, and handouts, provided today.?  Procedure Today:? Trimmed all ten toenails using standard nail clippers.  Continued f/u with PCP, DERIC Nayak.?  ?   Tinea Pedis:  Teaching provided on underlying causes of condition/disease, s/s  of condition/disease, complications, prevention, and treatment of condition/disease.??  Instructed him to go to [employer], today, and purchase a bottle of gentian violet 1% ($4.87) and begin painting toewebs with that daily.? Instructed him that gentian violet is a drying agent, which will help prevent fungal overgrowth.??Slip of paper given to him with name of OTC product, cost, and where to purchase.? Nurse applied today, with teaching given.? Instructed him his toe webs should be purple when he comes for next weeks visit; as that will demonstrate compliance with treatment plan.?? [facility] precautions given for any future wounds, pending appointment with wound clinic.? Phone number provided for wound clinic.  ?  Calluses:  Procedure Today:?Using #3 dermal curette, shaved away callus to healthier? underlying skin.? No discomfort.??Had a pinpoint area of bleeding over right inner hallux with callus care.? Stopped with silver nitrite.? Mr. Stone tolerated procedure well.?  ?   He is to [facility] in one week for re-evaluation.? He will be scheduled with wound clinic every 3 months for routine diabetic foot care/teaching.?  ?  ?  Assessment/Plan  1.?Medical non-compliance?Z91.19  ?  2.?Obesity, morbid, BMI 40.0-49.9?E66.01  ?  3.?Uncontrolled type 2 diabetes mellitus with hyperglycemia?E11.65  ?  4.?Tinea pedis of both feet?B35.3  Ordered:  triple dye topical, 1 ro, form: Soln-Top, TOP, Once-Unscheduled, first dose 03/23/21 13:39:00 CDT  ?  5.?Traumatic ulcer of left lower leg?L97.929  ?  6.?Open wound of left lower leg?S81.802A  ?  7.?Foot callus?L84  ?  Orders:  Wound Care Outpatient, 03/30/21 10:30:00 CDT, Stop date 03/30/21 10:30:00 CDT   Problem List/Past Medical History  Ongoing  Adrenal adenoma  Bilateral primary osteoarthritis of knee  Cervical spinal stenosis  History of 2019 novel coronavirus disease (COVID-19)  HLD (hyperlipidemia)  HTN - Hypertension  Knowledge deficit  Medical non-compliance  Obesity,  morbid, BMI 40.0-49.9  Right shoulder tendonitis  Uncontrolled type 2 diabetes mellitus with hyperglycemia  Historical  Polyp of colon  Procedure/Surgical History  Introduction of Remdesivir Anti-infective into Peripheral Vein, Percutaneous Approach, [employer] (01/14/2021)  Cholecystectomy Laparoscopic (.) (01/21/2020)  Laparoscopy, surgical; cholecystectomy (01/20/2020)  Resection of Gallbladder, Percutaneous Endoscopic Approach (01/20/2020)  Drainage of Buttock Skin, External Approach (05/21/2018)  Incision and drainage of abscess (eg, carbuncle, suppurative hidradenitis, cutaneous or subcutaneous abscess, cyst, furuncle, or paronychia); complicated or multiple (05/21/2018)  Colonoscopy, flexible; with biopsy, single or multiple (03/09/2016)  Excision of Large Intestine, Via Natural or Artificial Opening Endoscopic, Diagnostic (03/09/2016)  none   Medications  Albuterol (Eqv-ProAir HFA) 90 mcg/inh inhalation aerosol, 2 puff(s), INH, q6hr, 1 refills  amlodipine 5 mg oral tablet, 5 mg= 1 tab(s), Oral, Daily, 3 refills  Aspir 81  atorvastatin 80 mg oral tablet, 80 mg= 1 tab(s), Oral, Once a day (at bedtime), 3 refills  Blood pressure monitor, See Instructions  Glucometer, See Instructions  HumaLOG 100 units/mL injectable solution, 20 units, Subcutaneous, TIDAC, 1 refills  hydrochlorothiazide 25 mg oral tablet, 25 mg= 1 tab(s), Oral, Daily, 3 refills  Insulin needles (1cc/100 units) syringes, See Instructions, 11 refills  Insulin syringes U-50, See Instructions, 11 refills  Lantus 100 units/mL subcutaneous solution, 32 units, Subcutaneous, BID, 3 refills  Lantus solostar pen needles, See Instructions, 11 refills  losartan 100 mg oral tablet, 100 mg= 1 tab(s), Oral, Daily, 3 refills  meloxicam 7.5 mg oral tablet, 7.5 mg= 1 tab(s), Oral, Daily  metFORMIN 1000 mg oral tablet, See Instructions, 2 refills  ONE TOUCH ULTRA TEST STRIPS STRP, See Instructions  potassium chloride 10 mEq oral TABLET extended  release, 10 mEq= 1 tab(s), Oral, Daily, 3 refills  terbinafine 250 mg oral tablet, 250 mg= 1 tab(s), Oral, Daily  tiZANidine 4 mg oral capsule, 4 mg= 1 cap(s), Oral, TID, 1 refills  Triple Dye, 1 ro, TOP, Once-Unscheduled  Allergies  No Known Allergies  Social History  Abuse/Neglect  No, 03/23/2021  Alcohol  denies alcohol use, 04/07/2015  Employment/School  Unemployed, Highest education level: High school., 04/07/2015  Exercise  Self assessment: Excellent condition., 06/16/2015  Exercise frequency: 3-4 times/week. Self assessment: Poor condition. Exercise type: Walking., 04/07/2015  Home/Environment  Lives with Siblings. Living situation: Home/Independent., 01/25/2021    Never in , 11/05/2020  Nutrition/Health  Regular, Caffeine intake amount: COFFEE 2 CUPS IN MORNING. Wants to lose weight: Yes. Sleeping concerns: No. Feels highly stressed: No., 09/01/2015  Other  Sexual  Gender Identity Identifies as male., 02/18/2021  Spiritual/Cultural  Sikh, Yes, 10/14/2019  Substance Use  Never, 03/04/2016  Tobacco  Never (less than 100 in lifetime), N/A, 03/23/2021  Never (less than 100 in lifetime), N/A, 03/19/2021  Family History  Breast cancer: Sister.  Heart disease: Mother.  Hypertension.: Mother and Father.  Immunizations  Vaccine Date Status Comments   influenza virus vaccine, inactivated 01/18/2021 Given    influenza virus vaccine, inactivated 11/05/2020 Given    influenza virus vaccine, inactivated 10/14/2019 Given    influenza virus vaccine, inactivated 10/31/2018 Given    influenza virus vaccine, inactivated - Not Given Patient Refuses     tetanus/diphtheria/pertussis, acel(Tdap) 03/06/2017 Given    influenza virus vaccine, inactivated 11/11/2016 Given    influenza virus vaccine, inactivated 01/04/2016 Given    pneumococcal 7-valent vaccine 09/28/2011 Recorded    influenza virus vaccine, inactivated 09/28/2011 Recorded    influenza virus vaccine, inactivated 10/12/2009 Recorded    Health  Maintenance  Health Maintenance  ???Pending?(in the next year)  ??? ??Due?  ??? ? ? ?Zoster Vaccine due??03/23/21??Unknown Frequency  ??? ??Due In Future?  ??? ? ? ?Hypertension Management-Education not due until??04/22/21??and every 1??year(s)  ??? ? ? ?Influenza Vaccine not due until??10/01/21??and every 1??day(s)  ??? ? ? ?Diabetes Maintenance-Fasting Lipid Profile not due until??10/19/21??and every 1??year(s)  ??? ? ? ?Diabetes Maintenance-Eye Exam not due until??12/20/21??and every 1??year(s)  ??? ? ? ?Obesity Screening not due until??01/01/22??and every 1??year(s)  ??? ? ? ?Alcohol Misuse Screening not due until??01/02/22??and every 1??year(s)  ??? ? ? ?Aspirin Therapy for CVD Prevention not due until??01/19/22??and every 1??year(s)  ??? ? ? ?Depression Screening not due until??03/19/22??and every 1??year(s)  ??? ? ? ?Diabetes Maintenance-Foot Exam not due until??03/19/22??and every 1??year(s)  ??? ? ? ?Diabetes Maintenance-HgbA1c not due until??03/19/22??and every 1??year(s)  ??? ? ? ?Hypertension Management-BMP not due until??03/19/22??and every 1??year(s)  ??? ? ? ?Diabetes Maintenance-Serum Creatinine not due until??03/19/22??and every 1??year(s)  ??? ? ? ?ADL Screening not due until??03/19/22??and every 1??year(s)  ???Satisfied?(in the past 1 year)  ??? ??Satisfied?  ??? ? ? ?ADL Screening on??03/19/21.??Satisfied by Bayron LPN, Karla M.  ??? ? ? ?Alcohol Misuse Screening on??03/19/21.??Satisfied by Karla Verma LPN.  ??? ? ? ?Aspirin Therapy for CVD Prevention on??01/19/21.??Satisfied by Sindhu Smith LPN  ??? ? ? ?Blood Pressure Screening on??03/23/21.??Satisfied by Mary Felder LPN  ??? ? ? ?Body Mass Index Check on??03/23/21.??Satisfied by Mary Felder LPN  ??? ? ? ?Depression Screening on??03/19/21.??Satisfied by Karla Verma LPN.  ??? ? ? ?Diabetes Maintenance-Foot Exam on??03/23/21.??Satisfied by Marie Plunkett  ??? ? ? ?Diabetes Maintenance-HgbA1c  on??03/19/21.??Satisfied by Ronnie Nixon  ??? ? ? ?Diabetes Maintenance-Serum Creatinine on??03/19/21.??Satisfied by Ronnie Nixon  ??? ? ? ?Diabetes Maintenance-Eye Exam on??12/20/20.??Satisfied by Lee Frederick MD  ??? ? ? ?Diabetes Maintenance-Fasting Lipid Profile on??10/19/20.??Satisfied by Sherrill Rothman  ??? ? ? ?Diabetes Maintenance-Microalbumin on??07/20/20.??Satisfied by Sherrill Rothman  ??? ? ? ?Diabetes Screening on??03/19/21.??Satisfied by Ronnie Nixon  ??? ? ? ?Hypertension Management-Blood Pressure on??03/23/21.??Satisfied by Mary Felder LPN  ??? ? ? ?Hypertension Management-BMP on??03/19/21.??Satisfied by Ronnie Nixon  ??? ? ? ?Hypertension Management-Education on??04/22/20.??Satisfied by Yelitza Roque NP  ??? ? ? ?Influenza Vaccine on??03/19/21.??Satisfied by Karla Verma LPN  ??? ? ? ?Lipid Screening on??10/19/20.??Satisfied by Sherrill Rothman  ??? ? ? ?Obesity Screening on??03/23/21.??Satisfied by Mary Felder LPN  ??? ??Refused?  ??? ? ? ?Zoster Vaccine on??02/01/21.??Recorded by Christen Weathers LPN??Reason: Patient Refuses  ?  Lab Results  Last 6 Months?  ??Lipid Profile: ?Basic Metabolic Panel: ?Hematology:   ?: () ?Sodium Lvl: 142 (03/19/21) ?Hgb: 13.3 (01/19/21)   ?: () ?Potassium Lvl: 3.8 (03/19/21) ?: ()   ?: () ?: () ?WBC: 8.1 (01/19/21)   ?LDL: 49 (10/19/20) ?: () ?Platelet: 378 (01/19/21)   ? ?BUN: 14.1 (03/19/21) ?INR: 0.99 (01/14/21)   ? ?Creatinine: 0.93 (03/19/21) ?   ? ?: () ?   ?  Urinalysis:?  ??: () ?: ()   ?: () ?: ()   ?UA Ketones: Negative (03/19/21) ?: ()   ?: () ?: ()   ?: () ?: ()   ?: () ?: ()   ?  ?  Additional - Last 6 Months?  ??A/G Ratio: 0.6 (01/19/21) ?Abs Baso: 0.0 (01/19/21) ?Abs Eos: 0.2 (10/19/20)   ?Abs Lymph: 0.9 (01/19/21) ?Abs Mono: 0.3 (01/19/21) ?Abs Neut: 6.60 (01/19/21)   ?Abs Neutro: 6.60 (01/19/21) ?Albumin Lvl: 2.5 (01/19/21) ?Alk Phos: 82 (01/19/21)   ?Allens: Positive  (21) ?ALT: 43 (21) ?AST: 21 (21)   ?Basophil Auto: 0 (21) ?Bili Direct: 0.3 (21) ?Bili Indirect: 0.40 (21)   ?Bili Total: 0.7 (21) ?Blood Culture: Review (01/15/21) ?BNP: 19.5 (21)   ?BUN/Creat Ratio: 15 (21) ?Calcium Lvl: 9.3 (21) ?Chloride: 105 (21)   ?Chol: 107 (10/19/20) ?Chol/HDL: 2.3 (10/19/20) ?CK MB: 6.5 (21)   ?CO Hgb: 1.5 (21) ?CO2: 29 (21) ?CO2 Totl Art: 25.2 (21)   ?COVID-19 RT PCR Abbott: Detected (21) ?CRP: 4.41 (21) ?D base: 1.7 (21)   ?D-Dimer: 0.58 (21) ?EA.6 (21) ?eGFR-AA: >105 (21)   ?eGFR-BETH: 90 (21) ?Eos Auto: 4 (10/19/20) ?Est Creat Clearance Ser: 64.56 (21)   ?Ferritin Lvl: 235.40 (21) ?Globulin: 4.4 (21) ?Glucose Lvl: 207 (21)   ?HCO3 Art: 24.2 (21) ?Hct: 41.0 (21) ?HDL: 46 (10/19/20)   ?Hgb A1c: 8.2 (21) ?IG#: .320 (21) ?IG%: 4 (21)   ?Influ A Ag: Negative (21) ?Influ B Ag: Negative (21) ?LDH: 402 (21)   ?Lymph Auto: 11 (21) ?Magnesium Lvl: 1.95 (21) ?MCH: 28.1 (21)   ?MCHC: 32.4 (21) ?MCV: 86.5 (21) ?Met Hgb Art: 1.0 (21)   ?Mono Auto: 4 (21) ?MPV: 9.3 (21) ?Neutro Auto: 81 (21)   ?O2 Hgb: 91.5 (21) ?O2 Sat Art: 93.8 (21) ?pCO2 Art: 31.0 (21)   ?pH Art: 7.50 (21) ?Phosphorus: 5.0 (21) ?pO2 Art: 61.0 (21)   ?PSA: 0.91 (10/19/20) ?PT: Prothrombin Time (21) ?PTT: Partial Thromboplastin Time (21)   ?RBC: 4.74 (21) ?RDW: 12.8 (21) ?Sample ABG: arterial (21)   ?Sed Rate: 59 (21) ?Site: Radial Rt (21) ?THB AB.3 (21)   ?Total CK: 498 (21) ?Total Protein: 6.9 (21) ?Treatment: room air (21)   ?Tri (10/19/20) ?Troponin-I: Troponin-I (21) ?UA Appear: Clear (21)   ?UA Bact Interp: None Seen (21) ?UA Bacteria: None  Seen (10/19/20) ?UA Bili: Negative (03/19/21)   ?UA Blood: Negative (03/19/21) ?UA Color: LIGHT YELLOW (03/19/21) ?UA Glucose: 200 (03/19/21)   ?UA Hyal Cast Interp: 0-2 (03/19/21) ?UA Leuk Est: Negative (03/19/21) ?UA Nitrite: Negative (03/19/21)   ?UA pH: 7.0 (03/19/21) ?UA Protein: 10 (03/19/21) ?UA RBC: None Seen (10/19/20)   ?UA RBC Interp: 0-2 (03/19/21) ?UA Spec Grav: 1.014 (03/19/21) ?UA Squam Epi Interp: 1-2 (03/19/21)   ?UA Squam Epithelial: None Seen (10/19/20) ?UA Urobilinogen: Normal (03/19/21) ?UA WBC: None Seen (10/19/20)   ?UA WBC Interp: 0-2 (03/19/21) ?VLDL: 12 (10/19/20) ?   ?  ?  ?

## 2022-05-15 ENCOUNTER — HOSPITAL ENCOUNTER (EMERGENCY)
Facility: HOSPITAL | Age: 57
Discharge: HOME | End: 2022-05-15
Attending: FAMILY MEDICINE
Payer: MEDICAID

## 2022-05-15 VITALS
SYSTOLIC BLOOD PRESSURE: 134 MMHG | BODY MASS INDEX: 40.81 KG/M2 | WEIGHT: 260 LBS | DIASTOLIC BLOOD PRESSURE: 88 MMHG | OXYGEN SATURATION: 98 % | HEIGHT: 67 IN | HEART RATE: 63 BPM | RESPIRATION RATE: 20 BRPM | TEMPERATURE: 97 F

## 2022-05-15 DIAGNOSIS — R60.9 EDEMA, UNSPECIFIED TYPE: Primary | ICD-10-CM

## 2022-05-15 DIAGNOSIS — R06.02 SHORTNESS OF BREATH: ICD-10-CM

## 2022-05-15 LAB
ALBUMIN SERPL-MCNC: 3.7 GM/DL (ref 3.5–5)
ALBUMIN/GLOB SERPL: 1 RATIO (ref 1.1–2)
ALP SERPL-CCNC: 121 UNIT/L (ref 40–150)
ALT SERPL-CCNC: 43 UNIT/L (ref 0–55)
AST SERPL-CCNC: 27 UNIT/L (ref 5–34)
BASOPHILS # BLD AUTO: 0.03 X10(3)/MCL (ref 0–0.2)
BASOPHILS NFR BLD AUTO: 0.5 %
BILIRUBIN DIRECT+TOT PNL SERPL-MCNC: 0.6 MG/DL
BNP BLD-MCNC: 26.5 PG/ML
BUN SERPL-MCNC: 21.1 MG/DL (ref 8.4–25.7)
CALCIUM SERPL-MCNC: 9.1 MG/DL (ref 8.4–10.2)
CHLORIDE SERPL-SCNC: 104 MMOL/L (ref 98–107)
CO2 SERPL-SCNC: 28 MMOL/L (ref 22–29)
CREAT SERPL-MCNC: 1.49 MG/DL (ref 0.73–1.18)
EOSINOPHIL # BLD AUTO: 0.12 X10(3)/MCL (ref 0–0.9)
EOSINOPHIL NFR BLD AUTO: 2 %
ERYTHROCYTE [DISTWIDTH] IN BLOOD BY AUTOMATED COUNT: 13 % (ref 11.5–17)
GLOBULIN SER-MCNC: 3.6 GM/DL (ref 2.4–3.5)
GLUCOSE SERPL-MCNC: 270 MG/DL (ref 74–100)
HCT VFR BLD AUTO: 39.1 % (ref 42–52)
HGB BLD-MCNC: 13 GM/DL (ref 14–18)
IMM GRANULOCYTES # BLD AUTO: 0.02 X10(3)/MCL (ref 0–0.02)
IMM GRANULOCYTES NFR BLD AUTO: 0.3 % (ref 0–0.43)
LYMPHOCYTES # BLD AUTO: 1.24 X10(3)/MCL (ref 0.6–4.6)
LYMPHOCYTES NFR BLD AUTO: 20.3 %
MCH RBC QN AUTO: 28.3 PG (ref 27–31)
MCHC RBC AUTO-ENTMCNC: 33.2 MG/DL (ref 33–36)
MCV RBC AUTO: 85.2 FL (ref 80–94)
MONOCYTES # BLD AUTO: 0.54 X10(3)/MCL (ref 0.1–1.3)
MONOCYTES NFR BLD AUTO: 8.9 %
NEUTROPHILS # BLD AUTO: 4.2 X10(3)/MCL (ref 2.1–9.2)
NEUTROPHILS NFR BLD AUTO: 68 %
PLATELET # BLD AUTO: 270 X10(3)/MCL (ref 130–400)
PMV BLD AUTO: 9.3 FL (ref 9.4–12.4)
POC CARDIAC TROPONIN I: 0.02 NG/ML
POTASSIUM SERPL-SCNC: 3.5 MMOL/L (ref 3.5–5.1)
PROT SERPL-MCNC: 7.3 GM/DL (ref 6.4–8.3)
RBC # BLD AUTO: 4.59 X10(6)/MCL (ref 4.7–6.1)
SAMPLE: NORMAL
SODIUM SERPL-SCNC: 143 MMOL/L (ref 136–145)
WBC # SPEC AUTO: 6.1 X10(3)/MCL (ref 4.5–11.5)

## 2022-05-15 PROCEDURE — 93005 ELECTROCARDIOGRAM TRACING: CPT

## 2022-05-15 PROCEDURE — 93010 EKG 12-LEAD: ICD-10-PCS | Mod: ,,, | Performed by: INTERNAL MEDICINE

## 2022-05-15 PROCEDURE — 83880 ASSAY OF NATRIURETIC PEPTIDE: CPT | Performed by: FAMILY MEDICINE

## 2022-05-15 PROCEDURE — 84484 ASSAY OF TROPONIN QUANT: CPT

## 2022-05-15 PROCEDURE — 85025 COMPLETE CBC W/AUTO DIFF WBC: CPT | Performed by: FAMILY MEDICINE

## 2022-05-15 PROCEDURE — 96374 THER/PROPH/DIAG INJ IV PUSH: CPT

## 2022-05-15 PROCEDURE — 93010 ELECTROCARDIOGRAM REPORT: CPT | Mod: ,,, | Performed by: INTERNAL MEDICINE

## 2022-05-15 PROCEDURE — 99285 EMERGENCY DEPT VISIT HI MDM: CPT | Mod: 25

## 2022-05-15 PROCEDURE — 36415 COLL VENOUS BLD VENIPUNCTURE: CPT | Performed by: FAMILY MEDICINE

## 2022-05-15 PROCEDURE — 63600175 PHARM REV CODE 636 W HCPCS: Performed by: FAMILY MEDICINE

## 2022-05-15 PROCEDURE — 80053 COMPREHEN METABOLIC PANEL: CPT | Performed by: FAMILY MEDICINE

## 2022-05-15 RX ORDER — POTASSIUM CHLORIDE 750 MG/1
10 TABLET, EXTENDED RELEASE ORAL DAILY
COMMUNITY
Start: 2022-05-05 | End: 2022-05-20 | Stop reason: DRUGHIGH

## 2022-05-15 RX ORDER — LOSARTAN POTASSIUM 100 MG/1
100 TABLET ORAL DAILY
COMMUNITY
Start: 2022-05-09 | End: 2022-05-20 | Stop reason: SDUPTHER

## 2022-05-15 RX ORDER — FUROSEMIDE 10 MG/ML
60 INJECTION INTRAMUSCULAR; INTRAVENOUS
Status: COMPLETED | OUTPATIENT
Start: 2022-05-15 | End: 2022-05-15

## 2022-05-15 RX ORDER — FUROSEMIDE 20 MG/1
20 TABLET ORAL DAILY PRN
Qty: 14 TABLET | Refills: 0 | Status: SHIPPED | OUTPATIENT
Start: 2022-05-15 | End: 2022-05-20 | Stop reason: SDUPTHER

## 2022-05-15 RX ORDER — NIFEDIPINE 90 MG/1
90 TABLET, FILM COATED, EXTENDED RELEASE ORAL DAILY
COMMUNITY
Start: 2022-04-28 | End: 2022-05-20 | Stop reason: SDUPTHER

## 2022-05-15 RX ORDER — HYDROCHLOROTHIAZIDE 50 MG/1
100 TABLET ORAL DAILY
COMMUNITY
Start: 2022-04-28 | End: 2022-05-20 | Stop reason: SDUPTHER

## 2022-05-15 RX ORDER — ATORVASTATIN CALCIUM 80 MG/1
80 TABLET, FILM COATED ORAL NIGHTLY
COMMUNITY
Start: 2022-03-16 | End: 2022-05-20 | Stop reason: SDUPTHER

## 2022-05-15 RX ORDER — FUROSEMIDE 20 MG/1
TABLET ORAL
COMMUNITY
Start: 2022-04-23 | End: 2022-05-20 | Stop reason: SDUPTHER

## 2022-05-15 RX ORDER — MELOXICAM 7.5 MG/1
15 TABLET ORAL
COMMUNITY
Start: 2021-06-03 | End: 2022-10-25 | Stop reason: ALTCHOICE

## 2022-05-15 RX ORDER — BLOOD SUGAR DIAGNOSTIC
STRIP MISCELLANEOUS
COMMUNITY
Start: 2022-05-02 | End: 2023-03-10

## 2022-05-15 RX ORDER — INSULIN GLARGINE 100 [IU]/ML
INJECTION, SOLUTION SUBCUTANEOUS
COMMUNITY
Start: 2022-04-28 | End: 2022-08-19 | Stop reason: DRUGHIGH

## 2022-05-15 RX ORDER — METFORMIN HYDROCHLORIDE 1000 MG/1
1000 TABLET ORAL 2 TIMES DAILY
COMMUNITY
Start: 2022-04-28 | End: 2022-05-20 | Stop reason: SDUPTHER

## 2022-05-15 RX ORDER — INSULIN LISPRO 100 [IU]/ML
INJECTION, SOLUTION INTRAVENOUS; SUBCUTANEOUS
COMMUNITY
Start: 2022-01-21 | End: 2022-05-15 | Stop reason: SDUPTHER

## 2022-05-15 RX ORDER — ASPIRIN 81 MG/1
81 TABLET ORAL
COMMUNITY
End: 2022-05-20 | Stop reason: SDUPTHER

## 2022-05-15 RX ADMIN — FUROSEMIDE 60 MG: 10 INJECTION, SOLUTION INTRAMUSCULAR; INTRAVENOUS at 11:05

## 2022-05-15 NOTE — ED PROVIDER NOTES
Encounter Date: 5/15/2022       History     Chief Complaint   Patient presents with    Leg Pain     Reports he has been dealing with fluid and pain in both his legs for a month.       Leg Pain   The incident occurred at home. There was no injury mechanism. The incident occurred several weeks ago. The pain location is generalized. The quality of the pain is described as throbbing. The pain has been fluctuating since onset. He has tried nothing for the symptoms. The treatment provided mild relief.     Review of patient's allergies indicates:  No Known Allergies  Past Medical History:   Diagnosis Date    Coronary artery disease     Diabetes mellitus     Hypertension      Past Surgical History:   Procedure Laterality Date    CHOLECYSTECTOMY       No family history on file.  Social History     Tobacco Use    Smokeless tobacco: Never Used   Substance Use Topics    Alcohol use: Never     Review of Systems   Cardiovascular: Positive for leg swelling.   All other systems reviewed and are negative.      Physical Exam     Initial Vitals [05/15/22 1015]   BP Pulse Resp Temp SpO2   (!) 188/81 72 20 97.2 °F (36.2 °C) 97 %      MAP       --         Physical Exam    Nursing note and vitals reviewed.  Constitutional: He appears well-developed and well-nourished. He is not diaphoretic. No distress.   HENT:   Head: Normocephalic and atraumatic.   Right Ear: External ear normal.   Left Ear: External ear normal.   Nose: Nose normal.   Mouth/Throat: Oropharynx is clear and moist. No oropharyngeal exudate.   Eyes: Conjunctivae and EOM are normal. Pupils are equal, round, and reactive to light. Right eye exhibits no discharge. Left eye exhibits no discharge.   Neck: Neck supple. No thyromegaly present. No tracheal deviation present. No JVD present.   Normal range of motion.  Cardiovascular: Normal rate, regular rhythm, normal heart sounds and intact distal pulses. Exam reveals no gallop and no friction rub.    No murmur  heard.  Pulmonary/Chest: Breath sounds normal. No stridor. No respiratory distress. He has no wheezes. He has no rhonchi. He has no rales. He exhibits no tenderness.   Abdominal: Abdomen is soft. Bowel sounds are normal. He exhibits no distension. There is no abdominal tenderness. There is no rebound and no guarding.   Musculoskeletal:         General: Edema present. No tenderness. Normal range of motion.      Cervical back: Normal range of motion and neck supple.     Lymphadenopathy:     He has no cervical adenopathy.   Neurological: He is alert and oriented to person, place, and time. He has normal strength and normal reflexes. No cranial nerve deficit or sensory deficit. GCS score is 15. GCS eye subscore is 4. GCS verbal subscore is 5. GCS motor subscore is 6.   Skin: Skin is warm and dry. Rash noted. No abscess noted. No erythema.   Psychiatric: He has a normal mood and affect. His behavior is normal. Judgment and thought content normal.         ED Course   Procedures  Labs Reviewed   COMPREHENSIVE METABOLIC PANEL - Abnormal; Notable for the following components:       Result Value    Glucose Level 270 (*)     Creatinine 1.49 (*)     Globulin 3.6 (*)     Albumin/Globulin Ratio 1.0 (*)     All other components within normal limits   CBC WITH DIFFERENTIAL - Abnormal; Notable for the following components:    RBC 4.59 (*)     Hgb 13.0 (*)     Hct 39.1 (*)     MPV 9.3 (*)     IG# 0.02 (*)     All other components within normal limits   B-TYPE NATRIURETIC PEPTIDE - Normal   CBC W/ AUTO DIFFERENTIAL    Narrative:     The following orders were created for panel order CBC auto differential.  Procedure                               Abnormality         Status                     ---------                               -----------         ------                     CBC with Differential[[id number]]        Abnormal            Final result                 Please view results for these tests on the individual orders.   TROPONIN  ISTAT   POCT TROPONIN          Imaging Results          X-Ray Chest AP Portable (Final result)  Result time 05/15/22 12:02:16    Final result by Mariano Rosario MD (05/15/22 12:02:16)                 Impression:      No acute thoracic abnormality.      Electronically signed by: Mariano Rosario  Date:    05/15/2022  Time:    12:02             Narrative:    EXAMINATION:  XR CHEST AP PORTABLE    CLINICAL HISTORY:  CHF;;    COMPARISON:  01/14/2021    FINDINGS:  No focal consolidations, pleural effusions or pneumothoraces.    Cardiomediastinal silhouette within normal limits.    No acute bony pathology.    Soft tissues within normal limits.                                 Medications   furosemide injection 60 mg (60 mg Intravenous Given 5/15/22 1111)                          Clinical Impression:   Final diagnoses:  [R06.02] Shortness of breath  [R60.9] Edema, unspecified type (Primary)          ED Disposition Condition    Discharge Stable        ED Prescriptions     Medication Sig Dispense Start Date End Date Auth. Provider    furosemide (LASIX) 20 MG tablet Take 1 tablet (20 mg total) by mouth daily as needed (edema). 14 tablet 5/15/2022 5/29/2022 Milton Douglas MD        Follow-up Information    None          Milton Douglas MD  05/15/22 1300

## 2022-05-15 NOTE — ED NOTES
Has been having swelling and pain to bilateral lower legs for one month. Just decided to come see about it today. Reports he does always take his fluid medications like he should because it makes him urinated too much. Denies chest pain or shortness of breath at this time

## 2022-05-19 DIAGNOSIS — G47.33 OSA ON CPAP: Primary | ICD-10-CM

## 2022-05-19 RX ORDER — POTASSIUM CHLORIDE 1500 MG/1
20 TABLET, EXTENDED RELEASE ORAL
COMMUNITY
Start: 2022-01-28 | End: 2022-05-20 | Stop reason: SDUPTHER

## 2022-05-19 NOTE — PROGRESS NOTES
"Chief Complaint:  Routine diabetic foot care.       History of Present Illness:  57 yo Black male being seen today for follow-up of tinea pedis and diabetic foot/nail/callus care. Continues applying gentian violet 1% between toes with Q-tip with good control of rash/moistness between toes as-needed.  Lesion to dorsal right foot biopsied by [facility] dermatology since last visit; underwent punch biopsy with negative findings.  Hx includes medical non-compliance, obesity (BMI 44.61), uncontrolled Type 2 diabetes (insulin-dependent), HTN, HLD, adrenal adenoma, generalized DJD, and hx of COVID infection (1/12/2021). Has attended individual diabetes education on 12/18/20 and has been attempting to follow ADA/DASH diet. Underwent angiogram 8/20/2021 here @ [facility]; very mild nonobstructive one vessel disease. Followed by [facility] cardiology clinic.  RLE 12/2021 venous reflux study showed reflux of 4.3 seconds in the GSV at the level of the upper calf and reflux of 4.8 seconds and a branch of the GSV at the level of the upper calf. August 2021 LLE venous reflux study revealed no substantial venous reflux and no DVT. Plan is to trial conservative compression stockings for edema for next three months; if edema persists, cardiology will refer to Dr. Gallo's PVD clinic for further evaluation.     Review of Most Recent Labs:  5/15/2022:  CBC unremrakable.  CR 1.49.  eGFR >60.  Albumin 3.7.  Cholesterol 136, with LDL 67.  HgbA1C 8.9.    1/4/2022: eGFR 83, with CR 1.17. HgbA1C 8.5. PSA 1.19  10/6/2021: K+ 3.2. eGFR 75, with CR 1.28.   9/7/2021: Albumin 3.6. LDL 72. CBC unremarkable. HgbA1C 7.8.   8/3/2021: eGFR 67, with CR 1.41. H&H 12.9 & 38.6. Plts 243.      Today 5/20/2022:  Not wearing compression stockings today; scheduled to see cardiologist after today's visit.  Says he is weighing daily; however, believes his scale is not working.  Denies numbness, tingling, and pain in feet/toes.  Rash between toes is "doing good."  Continues with " Gentian violet, as needed, with complete resolution of rash within a day or two.  Wears shoes that cover heels and toes at all times, when out of bed.     2/18/2022:  Mr. Stone presented without compression stockings on. Denies numbness, tingling, and pain in feet toes. Wears shoes at all times, while out of bed. Agreeable to being referred to [facility] dermatology to have lesion to right dorsal foot evaluated, and possibly biopsied. No new skin breakdown, rashes, or other skin issues. No rash or moisture between toes. He uses Gentian Violet 1%, as needed, which resolves rash quickly.     11/19/2021:  Denies numbness, tingling, and pain in bilateral feet. No changes to lesion on middle of right foot since his last visit. No new skin breakdown, rashes, or lesions. Applying gentian violet 1% between toes daily.    8/18/2021:  Denies numbness, tingling, and pain in bilateral feet. No changes to lesion on middle of right foot since his last visit. No new skin breakdown, rashes, or lesions.       Review of Systems:  Except as stated in HPI, all other 10 body systems normal      Physical Exam Vitals & Measurements:    Vitals:    05/20/22 0852   BP: 136/73   Pulse: 71   Resp: 20   Temp: 97.9 °F (36.6 °C)         General: VSS; afebrile. Obese; in no acute distress.  Respiratory: breathing even, quiet, and unlabored. No coughing.   Cardiovascular:  2+ pitting edema over BLE, with shiny and taut skin. No hemosiderin staining or varicosities. No hair distribution over BLE. Feet and toes warm to touch. No temperature differences between BLE. Toenails dystrophic and overgrown. DP and PT pulses dopplered 5/20/2022.  Musculoskeletal: full range of motion of all extremities. Bunion to left 1st met head.   Neurologic: A&O X 3; cranial nerves grossly intact.  Normal monofilament testing 5/20/2022.  No loss of sensation.    Psychiatric: Calm, cooperative. Mood and affect normal. Responses appropriate  Integumentary:   Ten toenails all  overgrown and dystrophic.     Small raised hard lesion to right mid-foot with measurements: 1.0 X 1.5 cms, which has not changed in size. Regular margins. No erythema, purulent drainage, or odors. Wound margins without induration, bogginess, or fluctuance.             Assessment/Plan:    Morbid Obesity (BMI 44.61):  This has been identified as a co-factor towards foot health, and increased risk of calluses, and diabetic foot ulcers.   Being managed by PCP.     Type 2 Diabetes:  Poorly-controlled.    5/15/2022:  CBC unremrakable.  CR 1.49.  eGFR >60.  Albumin 3.7.  Cholesterol 136, with LDL 67.  HgbA1C 8.9.    1/4/2022: eGFR 83, with CR 1.17. HgbA1C 8.5.   No LOS on monofilament testing (5/20/2022, 11/19/2021 and 8/18/2021).  Diabetic foot care interventions reinforced. Teaching reinforced on role diabetes plays in infection, diabetic foot wounds, non-healing wounds, bone infections, and lower limb amputations. Stressed medication and dietary adherence.   Continued f/u with PCP, MARIVEL Roque, APRN.     Overgrown toenails:  Procedure Today: cutting and filing all 10 dystrophic nails  Informed Consent: Instructed on benefits and risks of today's procedure, with rationale. Patient gave verbal consent prior to beginning procedure.   Using standard podiatry clippers and [place] boards, I cut, trimmed, filed/sanded all ten toenails. No bleeding or discomfort; Mr. Stone tolerated procedure without complications.     Lesion right medial dorsal/lateral foot:  Measurements: 1 x 1.5 cms.   Has been evaluated by [facility] dermatology, with negative biopsy for neoplasm.     Tinea Pedis:  Stable with gentian violet 1%, as-needed.   Teaching reinforced on underlying causes of condition/disease, s/s of condition/disease, complications, prevention, and treatment of condition/disease. I          RTC in three months. Instructed on s/s of deterioration to call wound clinic for promptly in between clinic visits so we can schedule him that day,  or the next day.

## 2022-05-20 ENCOUNTER — OFFICE VISIT (OUTPATIENT)
Dept: INTERNAL MEDICINE | Facility: CLINIC | Age: 57
End: 2022-05-20
Payer: MEDICAID

## 2022-05-20 ENCOUNTER — OFFICE VISIT (OUTPATIENT)
Dept: CARDIOLOGY | Facility: CLINIC | Age: 57
End: 2022-05-20
Payer: MEDICAID

## 2022-05-20 ENCOUNTER — OFFICE VISIT (OUTPATIENT)
Dept: WOUND CARE | Facility: HOSPITAL | Age: 57
End: 2022-05-20
Attending: NURSE PRACTITIONER
Payer: MEDICAID

## 2022-05-20 VITALS
DIASTOLIC BLOOD PRESSURE: 76 MMHG | OXYGEN SATURATION: 100 % | WEIGHT: 284.81 LBS | TEMPERATURE: 98 F | RESPIRATION RATE: 18 BRPM | SYSTOLIC BLOOD PRESSURE: 129 MMHG | HEIGHT: 67 IN | HEART RATE: 68 BPM | BODY MASS INDEX: 44.7 KG/M2

## 2022-05-20 VITALS
SYSTOLIC BLOOD PRESSURE: 136 MMHG | SYSTOLIC BLOOD PRESSURE: 136 MMHG | RESPIRATION RATE: 20 BRPM | HEART RATE: 71 BPM | DIASTOLIC BLOOD PRESSURE: 73 MMHG | WEIGHT: 285.06 LBS | WEIGHT: 285 LBS | TEMPERATURE: 98 F | TEMPERATURE: 98 F | DIASTOLIC BLOOD PRESSURE: 73 MMHG | BODY MASS INDEX: 44.73 KG/M2 | HEIGHT: 67 IN | RESPIRATION RATE: 20 BRPM | BODY MASS INDEX: 44.74 KG/M2 | HEIGHT: 67 IN | HEART RATE: 71 BPM

## 2022-05-20 DIAGNOSIS — L60.2 OVERGROWN TOENAILS: Primary | ICD-10-CM

## 2022-05-20 DIAGNOSIS — E11.65 UNCONTROLLED TYPE 2 DIABETES MELLITUS WITH HYPERGLYCEMIA: ICD-10-CM

## 2022-05-20 DIAGNOSIS — R60.0 EDEMA OF LOWER EXTREMITY: ICD-10-CM

## 2022-05-20 DIAGNOSIS — E66.01 MORBID OBESITY WITH BMI OF 40.0-44.9, ADULT: ICD-10-CM

## 2022-05-20 DIAGNOSIS — Z23 NEED FOR VACCINATION: Primary | ICD-10-CM

## 2022-05-20 DIAGNOSIS — I25.10 MILD CAD: Primary | ICD-10-CM

## 2022-05-20 DIAGNOSIS — I10 PRIMARY HYPERTENSION: ICD-10-CM

## 2022-05-20 DIAGNOSIS — R06.09 DOE (DYSPNEA ON EXERTION): ICD-10-CM

## 2022-05-20 DIAGNOSIS — E78.5 HYPERLIPIDEMIA LDL GOAL <70: ICD-10-CM

## 2022-05-20 DIAGNOSIS — Z12.11 SCREENING FOR MALIGNANT NEOPLASM OF COLON: ICD-10-CM

## 2022-05-20 DIAGNOSIS — B35.3 TINEA PEDIS OF BOTH FEET: ICD-10-CM

## 2022-05-20 DIAGNOSIS — G47.33 OBSTRUCTIVE SLEEP APNEA SYNDROME: ICD-10-CM

## 2022-05-20 DIAGNOSIS — I25.10 ATHEROSCLEROSIS OF CORONARY ARTERY OF NATIVE HEART, UNSPECIFIED VESSEL OR LESION TYPE, UNSPECIFIED WHETHER ANGINA PRESENT: ICD-10-CM

## 2022-05-20 DIAGNOSIS — L60.3 DYSTROPHIC NAIL: ICD-10-CM

## 2022-05-20 DIAGNOSIS — I87.2 VENOUS INSUFFICIENCY: ICD-10-CM

## 2022-05-20 DIAGNOSIS — E66.01 MORBID OBESITY: ICD-10-CM

## 2022-05-20 DIAGNOSIS — E78.2 MIXED HYPERLIPIDEMIA: ICD-10-CM

## 2022-05-20 PROBLEM — Z86.16 HISTORY OF SEVERE ACUTE RESPIRATORY SYNDROME CORONAVIRUS 2 (SARS-COV-2) DISEASE: Status: ACTIVE | Noted: 2022-05-20

## 2022-05-20 PROBLEM — M17.0 PRIMARY OSTEOARTHRITIS OF BOTH KNEES: Status: ACTIVE | Noted: 2022-05-20

## 2022-05-20 PROBLEM — M48.02 SPINAL STENOSIS OF CERVICAL REGION: Status: ACTIVE | Noted: 2022-05-20

## 2022-05-20 PROBLEM — M67.919 DISORDER OF TENDON OF SHOULDER REGION: Status: ACTIVE | Noted: 2022-05-20

## 2022-05-20 PROCEDURE — 3066F PR DOCUMENTATION OF TREATMENT FOR NEPHROPATHY: ICD-10-PCS | Mod: CPTII,,, | Performed by: NURSE PRACTITIONER

## 2022-05-20 PROCEDURE — 3075F PR MOST RECENT SYSTOLIC BLOOD PRESS GE 130-139MM HG: ICD-10-PCS | Mod: CPTII,,, | Performed by: NURSE PRACTITIONER

## 2022-05-20 PROCEDURE — 4010F ACE/ARB THERAPY RXD/TAKEN: CPT | Mod: CPTII,,, | Performed by: NURSE PRACTITIONER

## 2022-05-20 PROCEDURE — 99214 OFFICE O/P EST MOD 30 MIN: CPT | Mod: S$PBB,,, | Performed by: NURSE PRACTITIONER

## 2022-05-20 PROCEDURE — 1160F RVW MEDS BY RX/DR IN RCRD: CPT | Mod: CPTII,,, | Performed by: NURSE PRACTITIONER

## 2022-05-20 PROCEDURE — 1159F PR MEDICATION LIST DOCUMENTED IN MEDICAL RECORD: ICD-10-PCS | Mod: CPTII,,, | Performed by: NURSE PRACTITIONER

## 2022-05-20 PROCEDURE — 3078F PR MOST RECENT DIASTOLIC BLOOD PRESSURE < 80 MM HG: ICD-10-PCS | Mod: CPTII,,, | Performed by: NURSE PRACTITIONER

## 2022-05-20 PROCEDURE — 3078F DIAST BP <80 MM HG: CPT | Mod: CPTII,,, | Performed by: NURSE PRACTITIONER

## 2022-05-20 PROCEDURE — 99213 OFFICE O/P EST LOW 20 MIN: CPT | Mod: 25,,, | Performed by: NURSE PRACTITIONER

## 2022-05-20 PROCEDURE — 99215 OFFICE O/P EST HI 40 MIN: CPT | Mod: PBBFAC,27 | Performed by: NURSE PRACTITIONER

## 2022-05-20 PROCEDURE — 1159F MED LIST DOCD IN RCRD: CPT | Mod: CPTII,,, | Performed by: NURSE PRACTITIONER

## 2022-05-20 PROCEDURE — 3066F NEPHROPATHY DOC TX: CPT | Mod: CPTII,,, | Performed by: NURSE PRACTITIONER

## 2022-05-20 PROCEDURE — 3052F PR MOST RECENT HEMOGLOBIN A1C LEVEL 8.0 - < 9.0%: ICD-10-PCS | Mod: CPTII,,, | Performed by: NURSE PRACTITIONER

## 2022-05-20 PROCEDURE — 11719 TRIM NAIL(S) ANY NUMBER: CPT | Mod: ,,, | Performed by: NURSE PRACTITIONER

## 2022-05-20 PROCEDURE — 3061F NEG MICROALBUMINURIA REV: CPT | Mod: CPTII,,, | Performed by: NURSE PRACTITIONER

## 2022-05-20 PROCEDURE — 4010F PR ACE/ARB THEARPY RXD/TAKEN: ICD-10-PCS | Mod: CPTII,,, | Performed by: NURSE PRACTITIONER

## 2022-05-20 PROCEDURE — 11719 TRIM NAIL(S) ANY NUMBER: CPT

## 2022-05-20 PROCEDURE — 3075F SYST BP GE 130 - 139MM HG: CPT | Mod: CPTII,,, | Performed by: NURSE PRACTITIONER

## 2022-05-20 PROCEDURE — 3008F BODY MASS INDEX DOCD: CPT | Mod: CPTII,,, | Performed by: NURSE PRACTITIONER

## 2022-05-20 PROCEDURE — 99214 OFFICE O/P EST MOD 30 MIN: CPT | Mod: PBBFAC | Performed by: NURSE PRACTITIONER

## 2022-05-20 PROCEDURE — 3008F PR BODY MASS INDEX (BMI) DOCUMENTED: ICD-10-PCS | Mod: CPTII,,, | Performed by: NURSE PRACTITIONER

## 2022-05-20 PROCEDURE — 99213 PR OFFICE/OUTPT VISIT, EST, LEVL III, 20-29 MIN: ICD-10-PCS | Mod: 25,,, | Performed by: NURSE PRACTITIONER

## 2022-05-20 PROCEDURE — 11719 PR TRIM NAIL(S): ICD-10-PCS | Mod: ,,, | Performed by: NURSE PRACTITIONER

## 2022-05-20 PROCEDURE — 3061F PR NEG MICROALBUMINURIA RESULT DOCUMENTED/REVIEW: ICD-10-PCS | Mod: CPTII,,, | Performed by: NURSE PRACTITIONER

## 2022-05-20 PROCEDURE — 3074F SYST BP LT 130 MM HG: CPT | Mod: CPTII,,, | Performed by: NURSE PRACTITIONER

## 2022-05-20 PROCEDURE — 1160F PR REVIEW ALL MEDS BY PRESCRIBER/CLIN PHARMACIST DOCUMENTED: ICD-10-PCS | Mod: CPTII,,, | Performed by: NURSE PRACTITIONER

## 2022-05-20 PROCEDURE — 3052F HG A1C>EQUAL 8.0%<EQUAL 9.0%: CPT | Mod: CPTII,,, | Performed by: NURSE PRACTITIONER

## 2022-05-20 PROCEDURE — 99214 PR OFFICE/OUTPT VISIT, EST, LEVL IV, 30-39 MIN: ICD-10-PCS | Mod: S$PBB,,, | Performed by: NURSE PRACTITIONER

## 2022-05-20 PROCEDURE — 90750 HZV VACC RECOMBINANT IM: CPT | Mod: PBBFAC

## 2022-05-20 PROCEDURE — 3074F PR MOST RECENT SYSTOLIC BLOOD PRESSURE < 130 MM HG: ICD-10-PCS | Mod: CPTII,,, | Performed by: NURSE PRACTITIONER

## 2022-05-20 RX ORDER — FUROSEMIDE 20 MG/1
20 TABLET ORAL DAILY
Qty: 90 TABLET | Refills: 3 | Status: SHIPPED | OUTPATIENT
Start: 2022-05-20 | End: 2023-03-21 | Stop reason: SDUPTHER

## 2022-05-20 RX ORDER — METFORMIN HYDROCHLORIDE 1000 MG/1
1000 TABLET ORAL 2 TIMES DAILY WITH MEALS
Qty: 180 TABLET | Refills: 1 | Status: SHIPPED | OUTPATIENT
Start: 2022-05-20 | End: 2022-11-02 | Stop reason: SDUPTHER

## 2022-05-20 RX ORDER — INSULIN LISPRO 100 [IU]/ML
20 INJECTION, SOLUTION INTRAVENOUS; SUBCUTANEOUS
Qty: 60 ML | Refills: 1 | Status: SHIPPED | OUTPATIENT
Start: 2022-05-20 | End: 2023-05-16 | Stop reason: SDUPTHER

## 2022-05-20 RX ORDER — ASPIRIN 81 MG/1
81 TABLET ORAL DAILY
Qty: 90 TABLET | Refills: 3 | Status: SHIPPED | OUTPATIENT
Start: 2022-05-20 | End: 2023-06-09 | Stop reason: SDUPTHER

## 2022-05-20 RX ORDER — HYDROCHLOROTHIAZIDE 50 MG/1
100 TABLET ORAL DAILY
Qty: 180 TABLET | Refills: 3 | Status: SHIPPED | OUTPATIENT
Start: 2022-05-20 | End: 2023-03-21 | Stop reason: SDUPTHER

## 2022-05-20 RX ORDER — LOSARTAN POTASSIUM 100 MG/1
100 TABLET ORAL DAILY
Qty: 90 TABLET | Refills: 3 | Status: SHIPPED | OUTPATIENT
Start: 2022-05-20 | End: 2023-03-21 | Stop reason: SDUPTHER

## 2022-05-20 RX ORDER — POTASSIUM CHLORIDE 1500 MG/1
20 TABLET, EXTENDED RELEASE ORAL DAILY
Qty: 90 TABLET | Refills: 3 | Status: SHIPPED | OUTPATIENT
Start: 2022-05-20 | End: 2023-03-21 | Stop reason: SDUPTHER

## 2022-05-20 RX ORDER — ATORVASTATIN CALCIUM 80 MG/1
80 TABLET, FILM COATED ORAL NIGHTLY
Qty: 90 TABLET | Refills: 3 | Status: SHIPPED | OUTPATIENT
Start: 2022-05-20 | End: 2023-03-21 | Stop reason: SDUPTHER

## 2022-05-20 RX ORDER — NIFEDIPINE 90 MG/1
90 TABLET, FILM COATED, EXTENDED RELEASE ORAL DAILY
Qty: 90 TABLET | Refills: 3 | Status: SHIPPED | OUTPATIENT
Start: 2022-05-20 | End: 2023-03-21

## 2022-05-20 NOTE — ASSESSMENT & PLAN NOTE
Wishes to reattempt use.  Pt has previous dx of MINA.  Pt was noncompliant d/t anxiety and pressure intolerance.  Pt is still having symptoms of EDS, snoring and witnessed apnea and is still in need of PAP device.  Re-titration ordered.

## 2022-05-20 NOTE — ASSESSMENT & PLAN NOTE
LDL 72 / Trig 66 / HDL - 40 / total chol - 125.  Refilled atorvastatin.  Follow a low cholesterol, low saturated fat diet with less than 200 mg of cholesterol a day.   Avoid fried foods and high saturated fats (smith, sausage, cookies, cakes, chips, cheese, whole milk, butter, mayonnaise, creamy dressings, gravy and cream sauces).  Add flax seed or fish oil supplements to diet.   Increase dietary fiber.   Regular exercise improves cholesterol levels.  Physical activity 5 times a week for 30 minutes per day (or 150 minutes per week).   Stressed importance of dietary modifications.

## 2022-05-20 NOTE — PROGRESS NOTES
CHIEF COMPLAINT:   Chief Complaint   Patient presents with    Follow-up     4 mos f/u w/ labs    Shortness of Breath     Sob w/exertion                   Review of patient's allergies indicates:  No Known Allergies                                       HPI:  Michael Stone 56 y.o. male with a past medical history of uncontrolled diabetes, hyperlipidemia, hypertension, MINA, adrenal adenoma, and obesity presents for 6 month follow up and ongoing care.  Patient completed SADE testing in December 2021 which revealed no evidence of significant arterial insufficiency. He also completed a right lower extremity venous reflux study in December 2021 which revealed reflux of 4.3 seconds in the GSV at the level of the upper calf and reflux of 4.8 seconds and a branch of the GSV at the level of the upper calf. Patient underwent left heart cath procedure on 8.20.21 which revealed very mild nonobstructive one-vessel disease. See report below. He previously completed a nuclear pharmacologic stress test on 6/24/2021 which showed medium sized area of moderate anterior and apical ischemia and no scar. Patient completed left lower extremity venous reflux studies on August 24, 2021 which revealed no DVT and no substantial reflux identified in the interrogated portions of the left leg deep system, GSV, or SSV.     Patient reported a recent emergency room visit at [facility] on May 15, 2022 for complaints of worsening shortness of breath and peripheral edema.  BNP level drawn was normal at 26.5.  He was instructed at that time to consume a low-salt diet and take Lasix every other day.  Of note, patient's renal function had worsened her his labs on that day.  She denies chest pain, palpitations, or syncope.  He states his CPAP was picked up due to noncompliance.       CORONARY ANGIOGRAM 8.20.21  Left Main: large vessel.   Left anterior descending: large vessel.   Diagonal 1: small vessel.   Ramus: medium size vessel. 30%  ostial stenosis  Circumflex: large vessel.   Obtuse marginal 1: medium size vessel.   Right coronary artery: medium size vessel.   Posterior descending artery: Tiny vessel.     COMPLICATIONS  No immediate complications.    Closure of access site: Radial band  Estimated blood loss: less than 10 ml  Specimens obtained: none   SUMMARY  Very mild nonobstructive 1 vessel disease      Nuclear pharmacologic stress test 06/24/2021:  Rest EKG: Sinus rhythm  Stress EKG: No significant new EKG changes  Medium size area of moderate anterior and apical ischemia  No scar    TTE 4.16.21  Mild concentric left ventricular hypertrophy  Left ventricular ejection fraction is measured at approximately 50%  Structurally normal mitral valve  Trace mitral regurgitation  Structurally aortic valve is trileaflet  Trace tricuspid regurgitation with RVSP of 38 mmHg  No evidence of pulmonic regurgitation  The pulmonic valve was not well visualized  Mildly dilated left atrium  Borderline dilated right atrium  Normal right ventricular size  No evidence of pericardial effusion  No evidence of pleural effusion                                                                                                                                                                                                                                                                                                                                                                                                                                                                                             Patient Active Problem List   Diagnosis    Atherosclerosis of coronary artery    Disorder of tendon of shoulder region    Edema of lower extremity    History of severe acute respiratory syndrome coronavirus 2 (SARS-CoV-2) disease    Hyperlipidemia    Hypertension    Morbid obesity    Obstructive sleep apnea syndrome    Onychogryposis    Primary osteoarthritis of both  knees    Spinal stenosis of cervical region    Tinea pedis    Uncontrolled type 2 diabetes mellitus     Past Surgical History:   Procedure Laterality Date    ANGIOGRAPHY      CHOLECYSTECTOMY      FOOT SURGERY Right      Social History     Socioeconomic History    Marital status: Single   Tobacco Use    Smoking status: Never Smoker    Smokeless tobacco: Never Used   Substance and Sexual Activity    Alcohol use: Never    Drug use: Never    Sexual activity: Yes     Partners: Female     Birth control/protection: None        Family History   Problem Relation Age of Onset    Hypertension Mother     Heart disease Mother     Stroke Father     Cancer Sister     Heart disease Sister     Cancer Sister     Heart disease Sister     Heart disease Brother     Heart disease Brother          Current Outpatient Medications:     aspirin (ECOTRIN) 81 MG EC tablet, Take 81 mg by mouth., Disp: , Rfl:     atorvastatin (LIPITOR) 80 MG tablet, Take 80 mg by mouth nightly., Disp: , Rfl:     furosemide (LASIX) 20 MG tablet, TAKE 1 TABLET BY MOUTH ONCE DAILY AS NEEDED FOR EDEMA, Disp: , Rfl:     furosemide (LASIX) 20 MG tablet, Take 1 tablet (20 mg total) by mouth daily as needed (edema)., Disp: 14 tablet, Rfl: 0    HUMALOG U-100 INSULIN 100 unit/mL injection, Inject 20 Units into the skin 3 (three) times daily before meals., Disp: 60 mL, Rfl: 1    hydroCHLOROthiazide (HYDRODIURIL) 50 MG tablet, Take 100 mg by mouth once daily., Disp: , Rfl:     insulin syringes, disposable, 1 mL Syrg, Inject 1 Syringe into the skin 3 (three) times daily before meals., Disp: 100 each, Rfl: 3    LANTUS U-100 INSULIN 100 unit/mL injection, INJECT 42 UNITS SUBCUTANEOUSLY TWICE DAILY WITH BREAKFAST AND AT BEDTIME (INCREASED FROM 36 UNITS TWICE DAILY), Disp: , Rfl:     losartan (COZAAR) 100 MG tablet, Take 100 mg by mouth once daily., Disp: , Rfl:     meloxicam (MOBIC) 7.5 MG tablet, Take 15 mg by mouth., Disp: , Rfl:     metFORMIN  "(GLUCOPHAGE) 1000 MG tablet, Take 1 tablet (1,000 mg total) by mouth 2 (two) times daily with meals., Disp: 180 tablet, Rfl: 1    NIFEdipine (ADALAT CC) 90 MG TbSR, Take 90 mg by mouth once daily., Disp: , Rfl:     ONETOUCH ULTRA TEST Strp, USE THREE TIMES A DAY AS DIRECTED BY DOCTOR, Disp: , Rfl:     potassium chloride (K-TAB) 20 mEq, Take 20 mEq by mouth., Disp: , Rfl:     potassium chloride (KLOR-CON) 10 MEQ TbSR, Take 10 mEq by mouth once daily., Disp: , Rfl:      ROS:                                                                                                                                                                             Review of Systems   Constitutional: Negative.    Respiratory: Positive for shortness of breath.    Cardiovascular: Positive for leg swelling.   Gastrointestinal: Negative.    Musculoskeletal: Negative.    Skin: Negative.    Neurological: Negative.    Psychiatric/Behavioral: Negative.         Blood pressure 129/76, pulse 68, temperature 97.9 °F (36.6 °C), resp. rate 18, height 5' 7" (1.702 m), weight 129.2 kg (284 lb 13.4 oz), SpO2 100 %.   PE:  Physical Exam  Constitutional:       Appearance: Normal appearance.   HENT:      Head: Normocephalic.   Eyes:      Extraocular Movements: Extraocular movements intact.   Cardiovascular:      Rate and Rhythm: Normal rate and regular rhythm.   Pulmonary:      Effort: Pulmonary effort is normal.   Abdominal:      Palpations: Abdomen is soft.   Musculoskeletal:         General: Normal range of motion.      Cervical back: Neck supple.      Right lower leg: Edema present.      Left lower leg: Edema present.      Comments: 2+ pitting edema BLE   Skin:     General: Skin is warm and dry.   Neurological:      General: No focal deficit present.      Mental Status: He is alert and oriented to person, place, and time.   Psychiatric:         Mood and Affect: Mood normal.        ASSESSMENT/PLAN:  Very Mild CAD  LVEF - 50% , Mild LVH per TTE " 4.16.21  Lexiscan stress test done on 6/21/2021 with medium sized area of moderate anterior and apical ischemia and no scar  Denies chest pain   Continue ASA, atorvastatin, losartan  Counseled on heart healthy diet    JOSEPH  Recent ER visit 5.15.22 for worsening SOB and peripheral edema  Will repeat Echocardiogram  Instructed to take Lasix 20mg daily for now    Venous Insufficiency - RLE  Continue with Lasix prn as directed - continue HCTZ  Recommended low-sodium diet, compression stocking therapy, and leg elevation  Right lower extremity venous reflux study in December 2021 which revealed reflux of 4.3 seconds in the GSV at the level of the upper calf and reflux of 4.8 seconds and a branch of the GSV at the level of the upper calf.   Left lower extremity venous reflux study revealed no substantial venous reflux and no DVT (Aug. 2021)  Recommend compression stockings    HLD  LDL at goal - 67  Continue with Atorvastatin 80 mg po daily  Counseled patient on low-cholesterol diet and exercise as tolerated    HTN  BP at goal - 129/76  Continue Losartan 100mg daily, HCTZ 100mg daily, and Procardia XL 90mg daily  Instructed to take Lasix 20mg daily for now  BMP in 2 weeks  Counseled on low salt diet    Diabetes  Last A1C not at goal - 8.9  Continue management per PCP    MINA  States CPAP was picked up due to noncompliance    Obesity  Counseled on the importance of weight loss through diet and exercise      Echocardiogram in 2 weeks  BMP in 2 weeks  Follow up in Cardiology Clinic in 2 months  Follow up with PCP as directed

## 2022-05-20 NOTE — ASSESSMENT & PLAN NOTE
BMI 44. Goal BMI <30.  Aerobic exercise 150 minutes per week.  Avoid soda, simple sugars, sweets, excessive rice, pasta, potatoes or bread.   Choose brown options when available and portion control.  Limit fast foods and fried foods.   Choose complex carbs in moderation (ex: green, leafy vegetables, beans, oatmeal).  Eat plenty of fresh fruits and vegetables with lean meats daily.   Consider permanent healthy lifestyle changes.

## 2022-05-20 NOTE — ASSESSMENT & PLAN NOTE
Will defer management to cardio - has appt this am.  Keep the affected body part raised (elevated) above the level of your heart when you are sitting or lying down.  Do not sit still or stand for long periods of time.  Do not wear tight clothing. Do not wear garters on your upper legs.  Exercise your legs to get your circulation going. This helps to move the fluid back into your blood vessels, and it may help the swelling go down.  Wear elastic bandages or support stockings to reduce swelling as told by your health care provider.  Eat a low-salt (low-sodium) diet to reduce fluid as told by your health care provider.  Depending on the cause of your swelling, you may need to limit how much fluid you drink (fluid restriction).  Take over-the-counter and prescription medicines only as told by your health care provider.

## 2022-05-20 NOTE — ASSESSMENT & PLAN NOTE
A1C 8.9 not at goal. Previous A1C 8.5  Continue lantus 42 units with breakfast and at bedtime daily.  Continue humalog 20 units TIDAC.  Continue metformin.  Continue medications as prescribed.  Strongly encouraged to follow ADA diet.  Avoid soda, simple sweets, and limit rice/pasta/bread/starches and consume brown options when possible.   Maintain healthy weight with BMI goal <30.   Perform aerobic exercise for 150 minutes per week (or 5 days a week for 30 minutes each day).   Examine feet daily.   Obtain annual dilated eye exam.  Eye exam: 12/18/20 (at next visit)  Foot exam: 5/20/22

## 2022-05-20 NOTE — ASSESSMENT & PLAN NOTE
Continue daily Aspirin.  Keep cardio appts/diagnostics as scheduled.  Stressed importance of adequate LDL, HA1C, and BP control.  Discussed appropriate lifestyle changes including smoking cessation, exercise, weight loss, and dietary modifications.  ED precautions reviewed including SOB, JOSEPH, chest pain, dizziness, near syncope, palpitations, or jaw/back/neck discomfort.

## 2022-05-20 NOTE — PROGRESS NOTES
BIANCA Cabral   [facility]  [address] [facility] [address]      PATIENT NAME: Michael Stone  : 1965  DATE: 22  MRN: [id number]      Billing Provider: BIANCA Cabral  Level of Service:   Patient PCP Information     Provider PCP Type    BIANCA Cabral General          Reason for Visit / Chief Complaint: Hyperlipidemia (Lab results)       History of Present Illness / Problem Focused Workflow     Michael Stone is a 56 y.o. Black or  male presents to the clinic for f/u visit. PMH uncontrolled DM, HLD, HTN, mild CAD, Covid pneumonia (21), adrenal adenoma, vit d deficiency, obesity, cervical spinal stenosis, MINA (Cpap returned d/t noncompliance), bilateral knee OA, tinea pedis and med non-compliance. Followed by [facility] cardio, ortho and wound clinics. Pt has cardio and wound clinic appts today. Pt had lesion to right foot removed in minor surgery clinic which was negative for malignancy. Is continuing to heal. Pt inquiring if CPAP could be reordered. Continues to report excessive daytime sleepiness, periods of witnessed apnea and snoring. Reports med compliance. Continues to attempt ADA/dash diet; does not exercise. Amendable to shingles #2 today. CBGs ranging 60-400s at home when checked. Is aware that foods consumed effects blood glucose. Reported to ED at [facility] for BLE edema and lasix was changed to every other day. Reports associated SOB with the edema. Denies chest pain, cough, fever, headache, dizziness, weakness, abdominal pain, nausea, vomiting, diarrhea, constipation, dysuria, depression, anxiety, SI/HI.    Prostate Cancer Screening - PSA 1.19 on 22. Follow up annually.  Colon Cancer Screening - FIT negative on 18. Follow up annually. Colonoscopy on 3/2016. Repeat colonoscopy in 3-5 years ([id number]).  Vaccinations: Flu - 10/14/19 / Covid - 21 & 21 / Shingles -  10/6/21 & 5/20/22/ Pneumonia - UTD (9/2011) Tetanus - UTD (3/2017)  Labwork - UTD      Review of Systems     Review of Systems   Constitutional: Negative.    HENT: Negative.    Eyes: Negative.    Respiratory: Positive for shortness of breath.    Cardiovascular: Positive for leg swelling.   Gastrointestinal: Negative.    Endocrine: Negative.    Genitourinary: Negative.    Musculoskeletal: Negative.    Integumentary:  Negative.   Neurological: Negative.    Psychiatric/Behavioral: Negative.         Medical / Social / Family History     Past Medical History:   Diagnosis Date    Coronary artery disease     Diabetes mellitus     Hyperlipidemia     Hypertension        Past Surgical History:   Procedure Laterality Date    ANGIOGRAPHY      CHOLECYSTECTOMY      FOOT SURGERY Right        Social History    reports that he has never smoked. He has never used smokeless tobacco. He reports that he does not drink alcohol and does not use drugs.    Family History  's family history includes Cancer in his sister and sister; Heart disease in his brother, brother, mother, sister, and sister; Hypertension in his mother; Stroke in his father.    Medications and Allergies     Medications  Medication List with Changes/Refills   Current Medications    ASPIRIN (ECOTRIN) 81 MG EC TABLET    Take 81 mg by mouth.    ATORVASTATIN (LIPITOR) 80 MG TABLET    Take 80 mg by mouth nightly.    FUROSEMIDE (LASIX) 20 MG TABLET    TAKE 1 TABLET BY MOUTH ONCE DAILY AS NEEDED FOR EDEMA    FUROSEMIDE (LASIX) 20 MG TABLET    Take 1 tablet (20 mg total) by mouth daily as needed (edema).    HUMALOG U-100 INSULIN 100 UNIT/ML INJECTION    INJECT 20 UNITS SUBCUTANEOUSLY THREE TIMES DAILY BEFORE MEAL(S) (DOSE  INCREASE  FROM  7  UNITS  3  TIMES  DAILY)    HYDROCHLOROTHIAZIDE (HYDRODIURIL) 50 MG TABLET    Take 100 mg by mouth once daily.    INSULIN SYRINGES, DISPOSABLE, 1 ML SYRG      Insulin syringes U-100, See Instructions, Inject  "insulin TID as prescribed., # 100 EA, 11 Refill(s), Pharmacy: [id number], 170, cm, Height/Length Dosing, 22 13:58:00 CST, 122.9, kg, Weight Dosing, 22 13:58:00 CST    LANTUS U-100 INSULIN 100 UNIT/ML INJECTION    INJECT 42 UNITS SUBCUTANEOUSLY TWICE DAILY WITH BREAKFAST AND AT BEDTIME (INCREASED FROM 36 UNITS TWICE DAILY)    LOSARTAN (COZAAR) 100 MG TABLET    Take 100 mg by mouth once daily.    MELOXICAM (MOBIC) 7.5 MG TABLET    Take 15 mg by mouth.    METFORMIN (GLUCOPHAGE) 1000 MG TABLET    Take 1,000 mg by mouth 2 (two) times daily.    NIFEDIPINE (ADALAT CC) 90 MG TBSR    Take 90 mg by mouth once daily.    ONETOUCH ULTRA TEST STRP    USE THREE TIMES A DAY AS DIRECTED BY DOCTOR    POTASSIUM CHLORIDE (K-TAB) 20 MEQ    Take 20 mEq by mouth.    POTASSIUM CHLORIDE (KLOR-CON) 10 MEQ TBSR    Take 10 mEq by mouth once daily.         Allergies  Review of patient's allergies indicates:  No Known Allergies    Physical Examination   Vital Signs  Temp: 97.9 °F (36.6 °C)  Temp src: Oral  Pulse: 71  Resp: 20  BP: 136/73  BP Location: Left arm  Patient Position: Sitting  Height and Weight  Height: 5' 6.93" (170 cm)  Weight: 129.3 kg (285 lb 0.9 oz)  BSA (Calculated - sq m): 2.47 sq meters  BMI (Calculated): 44.7  Weight in (lb) to have BMI = 25: 158.9]    Physical Exam  Constitutional:       Appearance: Normal appearance. He is obese.   Cardiovascular:      Rate and Rhythm: Normal rate and regular rhythm.      Pulses:           Dorsalis pedis pulses are 2+ on the right side and 2+ on the left side.   Pulmonary:      Effort: Pulmonary effort is normal.      Breath sounds: Normal breath sounds.   Musculoskeletal:         General: Normal range of motion.      Cervical back: Normal range of motion.      Right lower le+ Pitting Edema present.      Left lower le+ Pitting Edema present.      Right foot: Bunion present.      Left foot: Bunion present.        Feet:    Feet:      Right foot:      Protective " Sensation: 9 sites tested. 9 sites sensed.      Skin integrity: Skin integrity normal.      Toenail Condition: Right toenails are abnormally thick and long.      Left foot:      Protective Sensation: 9 sites tested. 9 sites sensed.      Skin integrity: Skin integrity normal.      Toenail Condition: Left toenails are abnormally thick and long.   Skin:     General: Skin is warm and dry.   Neurological:      General: No focal deficit present.      Mental Status: He is alert and oriented to person, place, and time.   Psychiatric:         Mood and Affect: Mood normal.           Results     Lab Results   Component Value Date    WBC 6.1 05/15/2022    RBC 4.59 (L) 05/15/2022    HGB 13.0 (L) 05/15/2022    HCT 39.1 (L) 05/15/2022    MCV 85.2 05/15/2022    MCH 28.3 05/15/2022    MCHC 33.2 05/15/2022    RDW 13.0 05/15/2022     05/15/2022    MPV 9.3 (L) 05/15/2022     Sodium Level   Date Value Ref Range Status   05/15/2022 143 136 - 145 mmol/L Final     Potassium Level   Date Value Ref Range Status   05/15/2022 3.5 3.5 - 5.1 mmol/L Final     Carbon Dioxide   Date Value Ref Range Status   05/15/2022 28 22 - 29 mmol/L Final     Blood Urea Nitrogen   Date Value Ref Range Status   05/15/2022 21.1 8.4 - 25.7 mg/dL Final     Creatinine   Date Value Ref Range Status   05/15/2022 1.49 (H) 0.73 - 1.18 mg/dL Final     Calcium Level Total   Date Value Ref Range Status   05/15/2022 9.1 8.4 - 10.2 mg/dL Final     Albumin Level   Date Value Ref Range Status   05/15/2022 3.7 3.5 - 5.0 gm/dL Final     Bilirubin Total   Date Value Ref Range Status   05/15/2022 0.6 <=1.5 mg/dL Final     Alkaline Phosphatase   Date Value Ref Range Status   05/15/2022 121 40 - 150 unit/L Final     Aspartate Aminotransferase   Date Value Ref Range Status   05/15/2022 27 5 - 34 unit/L Final     Alanine Aminotransferase   Date Value Ref Range Status   05/15/2022 43 0 - 55 unit/L Final     Estimated GFR-Non    Date Value Ref Range Status    01/04/2022 69 (L) >>=90 mL/min/1.73 m2 Final     Lab Results   Component Value Date    CHOL 125 09/07/2021     Lab Results   Component Value Date    HDL 40 09/07/2021     No results found for: LDLCALC  Lab Results   Component Value Date    TRIG 66 09/07/2021     No results found for: CHOLHDL  Lab Results   Component Value Date    TSH 1.8340 09/07/2021     Lab Results   Component Value Date    PHUR 5.5 01/04/2022    PROTEINUA Negative 01/04/2022    GLUCUA Normal 01/04/2022    KETONESU Negative 01/04/2022    OCCULTUA Negative 01/04/2022    NITRITE Negative 01/04/2022    LEUKOCYTESUR Negative 01/04/2022     Lab Results   Component Value Date    HGBA1C 8.5 (H) 01/04/2022    HGBA1C 7.8 (H) 09/07/2021    HGBA1C 7.6 (H) 06/16/2021     No results found for: MICROALBUR, [id number]   No results found for this or any previous visit.         Assessment and Plan (including Health Maintenance)     Plan: Will call with med changes/abn lab results. RTC 3 months and prn.         Health Maintenance Due   Topic Date Due    Hepatitis C Screening  Never done    Eye Exam  Never done    Colorectal Cancer Screening  Never done    COVID-19 Vaccine (3 - Booster for Moderna series) 10/25/2021    Shingles Vaccine (2 of 2) 12/01/2021    Hemoglobin A1c  04/04/2022       Problem List Items Addressed This Visit        Cardiac/Vascular    Atherosclerosis of coronary artery    Hyperlipidemia    Hypertension       Endocrine    Morbid obesity    Uncontrolled type 2 diabetes mellitus       Other    Obstructive sleep apnea syndrome      Other Visit Diagnoses     Need for vaccination    -  Primary    Relevant Orders    Zoster Recombinant Vaccine    Screening for malignant neoplasm of colon        Relevant Orders    Fecal Immunochemical Test (iFOBT)          Health Maintenance Topics with due status: Not Due       Topic Last Completion Date    TETANUS VACCINE 03/06/2017    Lipid Panel 09/07/2021    Diabetes Urine Screening 01/04/2022    Low Dose  Statin 05/20/2022    Foot Exam 05/20/2022       Future Appointments   Date Time Provider Department Center   5/20/2022  9:00 AM BIANCA Caceres [facility] OPND Shay Hein   5/20/2022  9:50 AM JAZZ Oropeza [facility] CARD Shay Hein   7/27/2022 12:20 PM Gertrudis Isidro MD [facility] ORTHO Shay Un            Signature:  BIANCA Cabral  [facility]  [address] [facility] [address]    Date of encounter: 5/20/22

## 2022-05-20 NOTE — ASSESSMENT & PLAN NOTE
ED visit on 5/15/22 for worsening SOBOE and BLE edema - lasix was increased to qd from prn.  CXR unremarkable.  Pt has cardio visit today - will defer changes/diagnostics to them.  Re-educated on ED precautions.

## 2022-05-20 NOTE — PATIENT INSTRUCTIONS
Echocardiogram in 2 weeks  BMP in 2 weeks  Follow up in Cardiology Clinic in 2 months  Follow up with PCP as directed

## 2022-05-20 NOTE — ASSESSMENT & PLAN NOTE
Continue HCTZ, losartan and nifedipine as ordered per cardio.  Follow a low sodium (less than 2 grams of sodium per day), DASH diet.   Continue medications as prescribed.  Monitor blood pressure and report any consistent values greater than 140/90 and keep a log.  Maintain healthy weight with a BMI goal of <30.   Aerobic exercise for 150 minutes per week (or 5 days a week for 30 minutes each day).

## 2022-05-24 ENCOUNTER — APPOINTMENT (OUTPATIENT)
Dept: LAB | Facility: HOSPITAL | Age: 57
End: 2022-05-24
Attending: NURSE PRACTITIONER
Payer: MEDICAID

## 2022-05-24 DIAGNOSIS — Z12.11 SCREENING FOR COLON CANCER: Primary | ICD-10-CM

## 2022-05-24 LAB
OB IA INT NEG CNTRL (OHS): NEGATIVE
OB IA INT POS CNTRL (OHS): POSITIVE
OCCULT BLD IA (OHS): NEGATIVE

## 2022-05-24 PROCEDURE — 82274 ASSAY TEST FOR BLOOD FECAL: CPT

## 2022-06-01 ENCOUNTER — HOSPITAL ENCOUNTER (OUTPATIENT)
Dept: CARDIOLOGY | Facility: HOSPITAL | Age: 57
Discharge: HOME | End: 2022-06-01
Attending: NURSE PRACTITIONER
Payer: MEDICAID

## 2022-06-01 VITALS
BODY MASS INDEX: 44.57 KG/M2 | SYSTOLIC BLOOD PRESSURE: 125 MMHG | HEIGHT: 67 IN | DIASTOLIC BLOOD PRESSURE: 75 MMHG | WEIGHT: 284 LBS

## 2022-06-01 DIAGNOSIS — R06.09 DOE (DYSPNEA ON EXERTION): ICD-10-CM

## 2022-06-01 LAB
AV INDEX (PROSTH): 0.62
AV MEAN GRADIENT: 8 MMHG
AV PEAK GRADIENT: 13 MMHG
AV VALVE AREA: 2.3 CM2
AV VELOCITY RATIO: 0.52
BSA FOR ECHO PROCEDURE: 2.47 M2
CV ECHO LV RWT: 0.58 CM
DOP CALC AO PEAK VEL: 1.78 M/S
DOP CALC AO VTI: 34.4 CM
DOP CALC LVOT AREA: 3.7 CM2
DOP CALC LVOT DIAMETER: 2.18 CM
DOP CALC LVOT PEAK VEL: 0.93 M/S
DOP CALC LVOT STROKE VOLUME: 79.09 CM3
DOP CALC MV VTI: 38.1 CM
DOP CALCLVOT PEAK VEL VTI: 21.2 CM
E WAVE DECELERATION TIME: 277.05 MSEC
E/A RATIO: 0.86
E/E' RATIO: 12.4 M/S
ECHO LV POSTERIOR WALL: 1.34 CM (ref 0.6–1.1)
EJECTION FRACTION: 59 %
FRACTIONAL SHORTENING: 8 % (ref 28–44)
INTERVENTRICULAR SEPTUM: 1.54 CM (ref 0.6–1.1)
LEFT ATRIUM SIZE: 4.59 CM
LEFT ATRIUM VOLUME INDEX MOD: 26.8 ML/M2
LEFT ATRIUM VOLUME MOD: 63 CM3
LEFT INTERNAL DIMENSION IN SYSTOLE: 4.23 CM (ref 2.1–4)
LEFT VENTRICLE DIASTOLIC VOLUME INDEX: 69.79 ML/M2
LEFT VENTRICLE DIASTOLIC VOLUME: 164 ML
LEFT VENTRICLE MASS INDEX: 114 G/M2
LEFT VENTRICLE SYSTOLIC VOLUME INDEX: 29.8 ML/M2
LEFT VENTRICLE SYSTOLIC VOLUME: 70 ML
LEFT VENTRICULAR INTERNAL DIMENSION IN DIASTOLE: 4.61 CM (ref 3.5–6)
LEFT VENTRICULAR MASS: 268.69 G
LV LATERAL E/E' RATIO: 15.5 M/S
LV SEPTAL E/E' RATIO: 10.33 M/S
LVOT MG: 1.78 MMHG
LVOT MV: 0.61 CM/S
MV MEAN GRADIENT: 2 MMHG
MV PEAK A VEL: 1.08 M/S
MV PEAK E VEL: 0.93 M/S
MV PEAK GRADIENT: 5 MMHG
MV STENOSIS PRESSURE HALF TIME: 80.35 MS
MV VALVE AREA BY CONTINUITY EQUATION: 2.08 CM2
MV VALVE AREA P 1/2 METHOD: 2.74 CM2
PISA TR MAX VEL: 1.31 M/S
RA PRESSURE: 3 MMHG
RIGHT VENTRICULAR END-DIASTOLIC DIMENSION: 3.41 CM
TDI LATERAL: 0.06 M/S
TDI SEPTAL: 0.09 M/S
TDI: 0.08 M/S
TR MAX PG: 7 MMHG
TV REST PULMONARY ARTERY PRESSURE: 10 MMHG

## 2022-06-01 PROCEDURE — 93306 TTE W/DOPPLER COMPLETE: CPT

## 2022-06-09 ENCOUNTER — PATIENT OUTREACH (OUTPATIENT)
Dept: EMERGENCY MEDICINE | Facility: HOSPITAL | Age: 57
End: 2022-06-09
Payer: MEDICAID

## 2022-06-11 ENCOUNTER — HOSPITAL ENCOUNTER (EMERGENCY)
Facility: HOSPITAL | Age: 57
Discharge: HOME | End: 2022-06-11
Attending: EMERGENCY MEDICINE
Payer: MEDICAID

## 2022-06-11 VITALS
HEART RATE: 65 BPM | RESPIRATION RATE: 18 BRPM | OXYGEN SATURATION: 97 % | SYSTOLIC BLOOD PRESSURE: 155 MMHG | DIASTOLIC BLOOD PRESSURE: 81 MMHG | BODY MASS INDEX: 45.64 KG/M2 | TEMPERATURE: 98 F | HEIGHT: 66 IN

## 2022-06-11 DIAGNOSIS — I10 HYPERTENSION, UNSPECIFIED TYPE: ICD-10-CM

## 2022-06-11 DIAGNOSIS — G89.29 CHRONIC PAIN OF RIGHT KNEE: ICD-10-CM

## 2022-06-11 DIAGNOSIS — R06.02 SHORTNESS OF BREATH: ICD-10-CM

## 2022-06-11 DIAGNOSIS — R52 PAIN: ICD-10-CM

## 2022-06-11 DIAGNOSIS — S80.01XD CONTUSION OF RIGHT KNEE, SUBSEQUENT ENCOUNTER: Primary | ICD-10-CM

## 2022-06-11 DIAGNOSIS — M25.561 CHRONIC PAIN OF RIGHT KNEE: ICD-10-CM

## 2022-06-11 LAB
ALBUMIN SERPL-MCNC: 3.8 GM/DL (ref 3.5–5)
ALBUMIN/GLOB SERPL: 1.2 RATIO (ref 1.1–2)
ALP SERPL-CCNC: 116 UNIT/L (ref 40–150)
ALT SERPL-CCNC: 38 UNIT/L (ref 0–55)
AST SERPL-CCNC: 32 UNIT/L (ref 5–34)
BASOPHILS # BLD AUTO: 0.04 X10(3)/MCL (ref 0–0.2)
BASOPHILS NFR BLD AUTO: 0.7 %
BILIRUBIN DIRECT+TOT PNL SERPL-MCNC: 0.7 MG/DL
BNP BLD-MCNC: <10 PG/ML
BUN SERPL-MCNC: 19.7 MG/DL (ref 8.4–25.7)
CALCIUM SERPL-MCNC: 8.8 MG/DL (ref 8.4–10.2)
CHLORIDE SERPL-SCNC: 102 MMOL/L (ref 98–107)
CO2 SERPL-SCNC: 25 MMOL/L (ref 22–29)
CREAT SERPL-MCNC: 1.46 MG/DL (ref 0.73–1.18)
EOSINOPHIL # BLD AUTO: 0.11 X10(3)/MCL (ref 0–0.9)
EOSINOPHIL NFR BLD AUTO: 1.8 %
ERYTHROCYTE [DISTWIDTH] IN BLOOD BY AUTOMATED COUNT: 13.5 % (ref 11.5–17)
GLOBULIN SER-MCNC: 3.2 GM/DL (ref 2.4–3.5)
GLUCOSE SERPL-MCNC: 307 MG/DL (ref 74–100)
HCT VFR BLD AUTO: 39.1 % (ref 42–52)
HGB BLD-MCNC: 12.9 GM/DL (ref 14–18)
IMM GRANULOCYTES # BLD AUTO: 0.01 X10(3)/MCL (ref 0–0.02)
IMM GRANULOCYTES NFR BLD AUTO: 0.2 % (ref 0–0.43)
LYMPHOCYTES # BLD AUTO: 0.84 X10(3)/MCL (ref 0.6–4.6)
LYMPHOCYTES NFR BLD AUTO: 13.7 %
MCH RBC QN AUTO: 28.9 PG (ref 27–31)
MCHC RBC AUTO-ENTMCNC: 33 MG/DL (ref 33–36)
MCV RBC AUTO: 87.7 FL (ref 80–94)
MONOCYTES # BLD AUTO: 0.54 X10(3)/MCL (ref 0.1–1.3)
MONOCYTES NFR BLD AUTO: 8.8 %
NEUTROPHILS # BLD AUTO: 4.6 X10(3)/MCL (ref 2.1–9.2)
NEUTROPHILS NFR BLD AUTO: 74.8 %
NRBC BLD AUTO-RTO: 0 %
PLATELET # BLD AUTO: 252 X10(3)/MCL (ref 130–400)
PMV BLD AUTO: 9.5 FL (ref 9.4–12.4)
POTASSIUM SERPL-SCNC: 3.2 MMOL/L (ref 3.5–5.1)
PROT SERPL-MCNC: 7 GM/DL (ref 6.4–8.3)
RBC # BLD AUTO: 4.46 X10(6)/MCL (ref 4.7–6.1)
SODIUM SERPL-SCNC: 138 MMOL/L (ref 136–145)
TROPONIN I SERPL-MCNC: 0.04 NG/ML (ref 0–0.04)
WBC # SPEC AUTO: 6.1 X10(3)/MCL (ref 4.5–11.5)

## 2022-06-11 PROCEDURE — 84484 ASSAY OF TROPONIN QUANT: CPT | Performed by: PHYSICIAN ASSISTANT

## 2022-06-11 PROCEDURE — 96372 THER/PROPH/DIAG INJ SC/IM: CPT | Performed by: EMERGENCY MEDICINE

## 2022-06-11 PROCEDURE — 80053 COMPREHEN METABOLIC PANEL: CPT | Performed by: PHYSICIAN ASSISTANT

## 2022-06-11 PROCEDURE — 93005 ELECTROCARDIOGRAM TRACING: CPT

## 2022-06-11 PROCEDURE — 25000003 PHARM REV CODE 250: Performed by: PHYSICIAN ASSISTANT

## 2022-06-11 PROCEDURE — 63600175 PHARM REV CODE 636 W HCPCS

## 2022-06-11 PROCEDURE — 99285 EMERGENCY DEPT VISIT HI MDM: CPT | Mod: 25

## 2022-06-11 PROCEDURE — 36415 COLL VENOUS BLD VENIPUNCTURE: CPT | Performed by: PHYSICIAN ASSISTANT

## 2022-06-11 PROCEDURE — 85025 COMPLETE CBC W/AUTO DIFF WBC: CPT | Performed by: PHYSICIAN ASSISTANT

## 2022-06-11 PROCEDURE — 83880 ASSAY OF NATRIURETIC PEPTIDE: CPT | Performed by: PHYSICIAN ASSISTANT

## 2022-06-11 RX ORDER — HYDROCODONE BITARTRATE AND ACETAMINOPHEN 7.5; 325 MG/1; MG/1
1 TABLET ORAL EVERY 6 HOURS PRN
Qty: 15 TABLET | Refills: 0 | Status: SHIPPED | OUTPATIENT
Start: 2022-06-11 | End: 2022-08-19 | Stop reason: ALTCHOICE

## 2022-06-11 RX ORDER — HYDROCODONE BITARTRATE AND ACETAMINOPHEN 7.5; 325 MG/1; MG/1
1 TABLET ORAL EVERY 6 HOURS PRN
Status: DISCONTINUED | OUTPATIENT
Start: 2022-06-12 | End: 2022-06-12 | Stop reason: HOSPADM

## 2022-06-11 RX ORDER — LOSARTAN POTASSIUM 50 MG/1
100 TABLET ORAL DAILY
Status: DISCONTINUED | OUTPATIENT
Start: 2022-06-11 | End: 2022-06-12 | Stop reason: HOSPADM

## 2022-06-11 RX ADMIN — LOSARTAN POTASSIUM 100 MG: 50 TABLET, FILM COATED ORAL at 11:06

## 2022-06-12 NOTE — ED PROVIDER NOTES
Encounter Date: 6/11/2022       History     Chief Complaint   Patient presents with    Knee Pain     Complaint of right knee pain.  Symptoms started yesterday. SOB with exertion.   Hx of gout.        Leg Pain   The incident occurred at home. There was no injury mechanism. The incident occurred several hours ago. The pain is present in the right knee. The pain is at a severity of 6/10. The pain has been worsening since onset. Pertinent negatives include no numbness and no inability to bear weight. The symptoms are aggravated by bearing weight.     Review of patient's allergies indicates:  No Known Allergies  Past Medical History:   Diagnosis Date    Adrenal adenoma     Coronary artery disease     Diabetes mellitus     Hyperlipidemia     Hypertension     Spinal stenosis     Unspecified osteoarthritis, unspecified site      Past Surgical History:   Procedure Laterality Date    ANGIOGRAPHY      CHOLECYSTECTOMY      FOOT SURGERY Right      Family History   Problem Relation Age of Onset    Hypertension Mother     Heart disease Mother     Stroke Father     Cancer Sister     Heart disease Sister     Cancer Sister     Heart disease Sister     Heart disease Brother     Heart disease Brother      Social History     Tobacco Use    Smoking status: Never Smoker    Smokeless tobacco: Never Used   Substance Use Topics    Alcohol use: Never    Drug use: Never     Review of Systems   Constitutional: Negative for fever.   HENT: Negative for sore throat.    Respiratory: Negative for shortness of breath.    Cardiovascular: Negative for chest pain.   Gastrointestinal: Negative for nausea.   Genitourinary: Negative for dysuria.   Musculoskeletal: Negative for back pain.        Knee pain      Skin: Negative for rash.   Neurological: Negative for weakness and numbness.   Hematological: Does not bruise/bleed easily.       Physical Exam     Initial Vitals [06/11/22 2005]   BP Pulse Resp Temp SpO2   (!) 161/89 81 18  98.4 °F (36.9 °C) (!) 94 %      MAP       --         Physical Exam    Nursing note and vitals reviewed.  Constitutional: He appears well-developed and well-nourished.   Cardiovascular: Normal rate and regular rhythm.   Pulmonary/Chest: Breath sounds normal. No respiratory distress. He has no wheezes. He has no rhonchi.   Abdominal: Abdomen is soft.   Musculoskeletal:      Right knee: No swelling. Normal range of motion. Tenderness present. Normal pulse.      Instability Tests: Anterior drawer test negative.     Neurological: He is alert and oriented to person, place, and time. He has normal strength.   Skin: Skin is dry.   Psychiatric: His behavior is normal. Judgment normal.         ED Course   Procedures  Labs Reviewed   COMPREHENSIVE METABOLIC PANEL - Abnormal; Notable for the following components:       Result Value    Potassium Level 3.2 (*)     Glucose Level 307 (*)     Creatinine 1.46 (*)     All other components within normal limits   CBC WITH DIFFERENTIAL - Abnormal; Notable for the following components:    RBC 4.46 (*)     Hgb 12.9 (*)     Hct 39.1 (*)     All other components within normal limits   B-TYPE NATRIURETIC PEPTIDE - Normal   TROPONIN I - Normal   CBC W/ AUTO DIFFERENTIAL    Narrative:     The following orders were created for panel order CBC Auto Differential.  Procedure                               Abnormality         Status                     ---------                               -----------         ------                     CBC with Differential[[id number]]        Abnormal            Final result                 Please view results for these tests on the individual orders.   POCT GLUCOSE MONITORING CONTINUOUS     EKG Readings: (Independently Interpreted)   Rhythm: Normal Sinus Rhythm. Ectopy: No Ectopy. Conduction: Normal. Axis: Normal.     ECG Results          EKG 12-lead (In process)  Result time 06/11/22 20:11:38    In process by Interface, Lab In [facility] (06/11/22 20:11:38)                  Narrative:    Test Reason : R06.02,    Vent. Rate : 075 BPM     Atrial Rate : 075 BPM     P-R Int : 156 ms          QRS Dur : 096 ms      QT Int : 408 ms       P-R-T Axes : 003 104 -01 degrees     QTc Int : 455 ms    Normal sinus rhythm  Rightward axis  Cannot rule out Anterior infarct ,age undetermined  Abnormal ECG  When compared with ECG of 15-MAY-2022 10:59,  Minimal criteria for Anterior infarct are now Present  Nonspecific T wave abnormality, improved in Lateral leads    Referred By:             Confirmed By:                             Imaging Results          X-Ray Chest 1 View (Final result)  Result time 06/11/22 20:43:21    Final result by Mario Alberto Kay MD (06/11/22 20:43:21)                 Impression:      1. No fracture of the right knee.  Questionable suprapatellar joint effusion and soft tissue swelling is identified.  2. No acute cardiopulmonary process.      Electronically signed by: Mario Alberto Kay MD  Date:    06/11/2022  Time:    20:43             Narrative:    EXAMINATION:  XR CHEST 1 VIEW; XR KNEE COMP 4 OR MORE VIEWS RIGHT    CLINICAL HISTORY:  Shortness of breath; Pain, unspecified    TECHNIQUE:  Single frontal view of the chest was performed.    COMPARISON:  05/15/2022    FINDINGS:  Chest:    Cardiomediastinal silhouette and pulmonary vasculature are normal.    The lungs and pleural spaces are clear.    Knee:    No fracture.  The alignment is normal.  Joint space narrowing is identified greatest in the department.  Undersurface spurring of the patella is identified.  Also spurring of the tibial spines and marginal joint osteophytes.  Small suprapatellar joint effusion.  Soft tissue thickening is identified in the region of the patellar tendon.                               X-Ray Knee Complete 4 Or More Views Right (Final result)  Result time 06/11/22 20:43:21    Final result by Mario Alberto Kay MD (06/11/22 20:43:21)                 Impression:      1. No fracture of the right knee.   Questionable suprapatellar joint effusion and soft tissue swelling is identified.  2. No acute cardiopulmonary process.      Electronically signed by: Mario Alberto Kay MD  Date:    06/11/2022  Time:    20:43             Narrative:    EXAMINATION:  XR CHEST 1 VIEW; XR KNEE COMP 4 OR MORE VIEWS RIGHT    CLINICAL HISTORY:  Shortness of breath; Pain, unspecified    TECHNIQUE:  Single frontal view of the chest was performed.    COMPARISON:  05/15/2022    FINDINGS:  Chest:    Cardiomediastinal silhouette and pulmonary vasculature are normal.    The lungs and pleural spaces are clear.    Knee:    No fracture.  The alignment is normal.  Joint space narrowing is identified greatest in the department.  Undersurface spurring of the patella is identified.  Also spurring of the tibial spines and marginal joint osteophytes.  Small suprapatellar joint effusion.  Soft tissue thickening is identified in the region of the patellar tendon.                                 Medications   losartan tablet 100 mg (100 mg Oral Given 6/11/22 2300)   insulin regular injection 20 Units 0.2 mL (has no administration in time range)   HYDROcodone-acetaminophen 7.5-325 mg per tablet 1 tablet (has no administration in time range)                 ED Course as of 06/11/22 2304   Sat Jun 11, 2022 2156 RBC(!): 4.46 [YG]   2156 Hemoglobin(!): 12.9 [YG]   2156 Hematocrit(!): 39.1 [YG]   2156 Potassium(!): 3.2 [YG]   2156 Glucose(!): 307 [YG]   2156 Creatinine(!): 1.46 [YG]      ED Course User Index  [YG] DAVID Lee             Clinical Impression:   Final diagnoses:  [R06.02] Shortness of breath  [R52] Pain  [M25.561, G89.29] Chronic pain of right knee  [S80.01XD] Contusion of right knee, subsequent encounter (Primary)  [I10] Hypertension, unspecified type          ED Disposition Condition    Discharge Stable        ED Prescriptions     Medication Sig Dispense Start Date End Date Auth. Provider    HYDROcodone-acetaminophen (NORCO) 7.5-325 mg per  tablet Take 1 tablet by mouth every 6 (six) hours as needed for Pain. 15 tablet 6/11/2022 6/16/2022 DAVID Lee        Follow-up Information     Follow up With Specialties Details Why Contact Info    [facility] - Emergency Dept Emergency Medicine Schedule an appointment as soon as possible for a visit  If symptoms worsen [address] [facility] [address]  [phone]    BIANCA Eli Nurse Practitioner Schedule an appointment as soon as possible for a visit  If symptoms worsen [address] [facility]  Internal Medicine Clinic  [facility] [address]  [phone]             DAVID Lee  06/11/22 4250

## 2022-06-12 NOTE — FIRST PROVIDER EVALUATION
"Medical screening exam completed.  I have conducted a focused provider triage encounter, findings are as follows:    Brief history of present illness:  56 year old male presents to ED for right lateral knee pain, leg swelling and dyspnea on exertion; denies injury/trauma    Vitals:    06/11/22 2005   BP: (!) 161/89   BP Location: Left arm   Patient Position: Sitting   Pulse: 81   Resp: 18   Temp: 98.4 °F (36.9 °C)   SpO2: (!) 94%   Height: 5' 6.14" (1.68 m)       Pertinent physical exam:  Awake, alert    Brief workup plan:  Labs, US venous RLE, EKG, Chest x-ray     Preliminary workup initiated; this workup will be continued and followed by the physician or advanced practice provider that is assigned to the patient when roomed.  "

## 2022-06-22 ENCOUNTER — HOSPITAL ENCOUNTER (EMERGENCY)
Facility: HOSPITAL | Age: 57
Discharge: HOME | End: 2022-06-22
Attending: STUDENT IN AN ORGANIZED HEALTH CARE EDUCATION/TRAINING PROGRAM
Payer: MEDICAID

## 2022-06-22 VITALS
HEART RATE: 69 BPM | DIASTOLIC BLOOD PRESSURE: 86 MMHG | RESPIRATION RATE: 18 BRPM | WEIGHT: 285 LBS | HEIGHT: 68 IN | TEMPERATURE: 98 F | SYSTOLIC BLOOD PRESSURE: 145 MMHG | OXYGEN SATURATION: 98 % | BODY MASS INDEX: 43.19 KG/M2

## 2022-06-22 DIAGNOSIS — J11.1 FLU: Primary | ICD-10-CM

## 2022-06-22 LAB
FLUAV AG UPPER RESP QL IA.RAPID: DETECTED
FLUBV AG UPPER RESP QL IA.RAPID: NOT DETECTED
SARS-COV-2 RNA RESP QL NAA+PROBE: NOT DETECTED

## 2022-06-22 PROCEDURE — 87636 SARSCOV2 & INF A&B AMP PRB: CPT | Performed by: STUDENT IN AN ORGANIZED HEALTH CARE EDUCATION/TRAINING PROGRAM

## 2022-06-22 PROCEDURE — 99283 EMERGENCY DEPT VISIT LOW MDM: CPT | Mod: 25

## 2022-06-22 RX ORDER — OSELTAMIVIR PHOSPHATE 75 MG/1
75 CAPSULE ORAL 2 TIMES DAILY
Qty: 10 CAPSULE | Refills: 0 | Status: SHIPPED | OUTPATIENT
Start: 2022-06-22 | End: 2022-06-27

## 2022-06-22 NOTE — ED PROVIDER NOTES
Encounter Date: 6/22/2022       History     Chief Complaint   Patient presents with    Cough     Reports having cough for the past two days and reports having upper abd pain only when he coughs.      Patient is a 56-year-old  male past medical history of hypertension, insulin-dependent diabetes and hyperlipidemia who presented to the ER today due to a 3 day history of sinus congestion, clear rhinorrhea, nonproductive cough.  Patient states he took a home COVID test this morning no was negative.  Patient denies any notable other symptoms.  Patient denies any fevers, chills, chest pain, shortness of breath, abdominal pain, diarrhea, constipation.  Patient states when he coughs sometimes the upper abdomen area becomes tender.  Patient states he goes away between coughs.        Review of patient's allergies indicates:  No Known Allergies  Past Medical History:   Diagnosis Date    Adrenal adenoma     Coronary artery disease     Diabetes mellitus     Hyperlipidemia     Hypertension     Spinal stenosis     Unspecified osteoarthritis, unspecified site      Past Surgical History:   Procedure Laterality Date    ANGIOGRAPHY      CHOLECYSTECTOMY      FOOT SURGERY Right      Family History   Problem Relation Age of Onset    Hypertension Mother     Heart disease Mother     Stroke Father     Cancer Sister     Heart disease Sister     Cancer Sister     Heart disease Sister     Heart disease Brother     Heart disease Brother      Social History     Tobacco Use    Smoking status: Never Smoker    Smokeless tobacco: Never Used   Substance Use Topics    Alcohol use: Never    Drug use: Never     Review of Systems   Constitutional: Negative for chills, fatigue and fever.   HENT: Positive for congestion and rhinorrhea. Negative for sore throat and trouble swallowing.    Eyes: Negative for pain and visual disturbance.   Respiratory: Positive for cough. Negative for shortness of breath and wheezing.     Cardiovascular: Negative for chest pain and palpitations.   Gastrointestinal: Negative for abdominal pain, blood in stool, constipation, diarrhea, nausea and vomiting.   Genitourinary: Negative for dysuria and hematuria.   Musculoskeletal: Negative for back pain and myalgias.   Skin: Negative for rash and wound.   Neurological: Negative for seizures, syncope and headaches.   Psychiatric/Behavioral: Negative for confusion. The patient is not nervous/anxious.        Physical Exam     Initial Vitals [06/22/22 0626]   BP Pulse Resp Temp SpO2   (!) 145/86 69 18 98.3 °F (36.8 °C) 98 %      MAP       --         Physical Exam    Nursing note and vitals reviewed.  Constitutional: He appears well-developed and well-nourished. No distress.   HENT:   Head: Normocephalic and atraumatic.   Eyes: Conjunctivae and EOM are normal. Right eye exhibits no discharge. Left eye exhibits no discharge. No scleral icterus.   Neck: No tracheal deviation present.   Normal range of motion.  Cardiovascular: Normal rate, regular rhythm, normal heart sounds and intact distal pulses. Exam reveals no gallop and no friction rub.    No murmur heard.  Pulmonary/Chest: Breath sounds normal. No respiratory distress. He has no wheezes. He has no rhonchi. He has no rales.   Abdominal: Abdomen is soft. He exhibits no distension. There is no abdominal tenderness. There is no guarding.   Musculoskeletal:         General: No tenderness or edema. Normal range of motion.      Cervical back: Normal range of motion.     Neurological: He is alert. He has normal strength. No cranial nerve deficit.   Skin: Skin is warm and dry. No rash and no abscess noted. No erythema. No pallor.   Psychiatric: His behavior is normal. Judgment normal.         ED Course   Procedures  Labs Reviewed   COVID/FLU A&B PCR - Abnormal; Notable for the following components:       Result Value    Influenza A PCR Detected (*)     All other components within normal limits          Imaging  Results          X-Ray Chest 1 View (Final result)  Result time 06/22/22 07:26:57    Final result by Bonilla Ritchie MD (06/22/22 07:26:57)                 Impression:      No acute pulmonary process identified.      Electronically signed by: Bonilla Ritchie  Date:    06/22/2022  Time:    07:26             Narrative:    EXAMINATION:  XR CHEST 1 VIEW    CLINICAL HISTORY:  cough;    TECHNIQUE:  Frontal view(s) of the chest.    COMPARISON:  Radiography 06/11/2022    FINDINGS:  Normal cardiac silhouette.  The lungs are well-inflated.  No consolidation identified.  No significant pleural effusion or discernible pneumothorax.                                 Medications - No data to display  Medical Decision Making:   Initial Assessment:   Overall well-appearing 56-year-old male  Differential Diagnosis:   Infectious etiology  ED Management:  Vital signs stable  Patient is afebrile  Symptom onset 48 hours ago  COVID test negative  Flu test positive  Chest x-ray unremarkable  Tamiflu sent to the pharmacy for the next 5 days  Adequate hydration was discussed at length  All questions answered in layman's terms  Remain well hydrated was advised  Follow-up with PCP was recommended and return precautions were discussed                      Clinical Impression:   Final diagnoses:  [J11.1] Flu (Primary)          ED Disposition Condition    Discharge Stable        ED Prescriptions     Medication Sig Dispense Start Date End Date Auth. Provider    oseltamivir (TAMIFLU) 75 MG capsule Take 1 capsule (75 mg total) by mouth 2 (two) times daily. for 5 days 10 capsule 6/22/2022 6/27/2022 Akash Calhoun MD        Follow-up Information     Follow up With Specialties Details Why Contact Info    [facility] - Emergency Dept Emergency Medicine  If symptoms worsen [address] [facility] [id number] [phone]    BIANCA Eli Nurse Practitioner  As needed [address] [facility]  Internal Medicine  [facility] [id number]  [phone]             Akash Calhoun MD  06/22/22 0754

## 2022-07-14 NOTE — PROGRESS NOTES
Transferred MCIP note from  [facility] to [facility] Entered On:  3/2/2021 9:33 CST    Performed On:  3/2/2021 9:23 CST by Yumiko Steward LPN               Discharge Past 30 Days   Visit to the Hospital in the Last 30 Days :   None   Perception of Change in Health Status DC :   Improving   Discharged To :   Home independently   DC Instructions :   Received discharge instructions , Understands discharge instructions    Education Provided :   ED utilization, Importance of keeping appointments   Discharge Past 30 Days Addntl Comments :   Spoke with patient, seen by Sports Med/Ortho for knee pain 02/18/21 but was unable to received steroid injection due to elevated A1c sugar level. Rx anti-imflammatory given also with knee exercise and follow up scheduled for 05/18/21. Advised to utilized urgent care for non-urgent matters if PCP unavailable. Voices understanding.     Yumiko Steward LPN - 3/2/2021 9:23 CST   Barriers to Care   In the last 12 months, did you ever eat less than you felt you should because there was not enough money for food? :   No   In the last 12 months, has the electricity, gas, or water company threatened to shut off services in your home? :   No   Are you worried that in the next 2 months, you may not have a regular place to live? :   No   In the last 12 months, have you needed to see a doctor/practitioner, but could not because it cost too much money? :   No   Do you ever need help reading papers given to you from doctor's office/hospital and/or needing help filling out forms? :   No   Do you feel lonely? :   No   Has any of your known medical problems caused you to go to the ED instead of your doctor/provider's office? :   No   Are there any problems affecting your health? :   No   Yumiko Steward LPN - 3/2/2021 9:23 CST   Prescriptions   Medication Reconcilation Completed :   Yes   Medication Prescriptions Filled After DC :   Confirms all medications filled , Confirms taking  medications as prescribed    Questions About Meds Prescribed at DC :   Denies any questions or concerns                    Yumiko Steward LPN 3/2/2021 9:23 CST   Appointments   Follow-Up Appt Scheduled :   Yes   Follow-Up Provider Appt Scheduled By :   Hospital staff   PCP Name :   Yelitza Roque NP   Follow-Up Date :   3/19/2021 CDT   Follow-Up Appointment Status :   Confirms scheduled appointment    PCP Visit Upcoming :   Yes   Appointment Date/Time :   3/19/2021 CDT   PCP Visit Upcoming Reason :   Regular visit   PCP Visit Within Year :   Yes   PCP Visit Upcoming Reason :   Regular visit   PCP Visit One Year Date :   2/1/2021 CST   Follow-Up Specialist Appt Scheduled :   No   Follow-Up Lab Appt Scheduled :   Yes   Lab Orders :   BMP  A1c   Follow-Up Radiology Appt Scheduled :   No   Yumiko Steward LPN 3/2/2021 9:23 CST   Navigation   Initial Assesment Completed By :   Phone   MCIP Navigation Call Log :   Third follow up call   Referral To HE Care :   NA   Transportation Arrangements Made :   Yes   Providers Patient Visited Last Year :   Yelitza Isidro   Root Causes for High-ED Utilization :   Lack of access to care   Plan: :   Educated on appropriate ED utilization, Educated on alternate means of health care; ie urgent care when PCP not available, Educated on importance of follow up care with PCP   Yumiko Steward LPN 3/2/2021 9:23 CST

## 2022-07-14 NOTE — PROGRESS NOTES
MCIP note from [facility] Entered On:  12/2/2020 7:52 CST    Performed On:  12/1/2020 13:45 CST by Yumiko Steward LPN               Discharge Past 30 Days   Visit to the Hospital in the Last 30 Days :   None   Perception of Change in Health Status DC :   Improving   Discharged To :   Home independently   DC Instructions :   Received discharge instructions , Understands discharge instructions    Barriers to Care :   Appointment availability   Education Provided :   ED utilization, Importance of keeping appointments   Discharge Past 30 Days Addntl Comments :   Called and spoke with patient, he has seen his PCP 11/5/20 for follow up. States he doing the home knee exercises as recommended by PCP and it seems to help. Ortho clinic referral accepted and appointment is pending. Advised to utilized urgent care for non-urgent issues.     Yumiko Steward LPN - 12/2/2020 7:45 CST   Prescriptions   Medication Reconcilation Completed :   Unknown   Medication Prescriptions Filled After DC :   Confirms all medications filled , Confirms taking medications as prescribed    Questions About Meds Prescribed at DC :   Denies any questions or concerns                    Yumiko Steward LPN - 12/2/2020 7:45 CST   Appointments   Follow-Up Appt Scheduled :   Yes   PCP Name :   Yelitza oRque NP   Follow-Up Date :   2/5/2021 CST   Follow-Up Appointment Status :   Confirms scheduled appointment    PCP Visit Upcoming :   Yes   Appointment Date/Time :   2/5/2021 CST   PCP Visit One Year Reason :   Regular visit   PCP Visit Within Year :   Yes   PCP Visit Upcoming Reason :   Regular visit   PCP Visit One Year Date :   11/5/2020 CST   Follow-Up Specialist Appt Scheduled :   No   Follow-Up Lab Appt Scheduled :   No   Follow-Up Radiology Appt Scheduled :   No   Yumiko Steward LPN - 12/2/2020 7:45 CST   Navigation   Initial Assesment Completed By :   Phone   MCIP Navigation Call Log :   Initial MCIP contact   Referral To HE  Care :   NA   Transportation Arrangements Made :   Yes   Providers Patient Visited Last Year :   Yelitza Roque NP   Root Causes for High-ED Utilization :   Lack of access to care   Plan: :   Educated on appropriate ED utilization, Educated on alternate means of health care; ie urgent care when PCP not available, Educated on importance of follow up care with PCP   Yumiko Steward LPN - 12/2/2020 7:45 CST

## 2022-07-14 NOTE — PROGRESS NOTES
MCIP note from [facility] Entered On:  12/28/2020 14:52 CST    Performed On:  12/28/2020 14:38 CST by Yumiko Steward LPN               Discharge Past 30 Days   Visit to the Hospital in the Last 30 Days :   None   Perception of Change in Health Status DC :   Improving   Discharged To :   Home independently   DC Instructions :   Received discharge instructions , Understands discharge instructions    Barriers to Care :   Appointment availability   Education Provided :   ED utilization, Importance of keeping appointments   Discharge Past 30 Days Addntl Comments :   Called and spoke with patient,states his knee aches every now and than but have a Ortho appointment coming up on 02/02/2021. Stress the importance of following diabetic ADA diet as ordered by PCP and instruced by dietician. Advised to utilized urgent care for non urgent matters. Voiced understanding/     Yumiko Steward LPN - 12/28/2020 14:38 CST   Prescriptions   Medication Reconcilation Completed :   Unknown   Medication Prescriptions Filled After DC :   Confirms all medications filled , Confirms taking medications as prescribed    Questions About Meds Prescribed at DC :   Denies any questions or concerns                    Yumiko Steward LPN - 12/28/2020 14:38 CST   Appointments   Follow-Up Appt Scheduled :   Yes   PCP Name :   Yelitza Roque NP   Follow-Up Date :   1/22/2021 CST   Follow-Up Appointment Status :   Confirms scheduled appointment    PCP Visit Upcoming :   Yes   Appointment Date/Time :   1/22/2021 CST   PCP Visit Upcoming Reason :   Regular visit   PCP Visit Within Year :   Yes   PCP Visit Upcoming Reason :   Regular visit   PCP Visit One Year Date :   12/18/2020 CST   Follow-Up Specialist Appt Scheduled :   Yes   Type of Specialist :   Orthopedic/Sports Med   Follow-Up Lab Appt Scheduled :   Yes   Lab Orders :   Hgb A1c   Follow-Up Date :   2/2/2021 CST   Follow-Up Radiology Appt Scheduled :   No   Yumiko Steward LPN  G - 12/28/2020 14:38 CST   Navigation   Initial Assesment Completed By :   Phone   MCIP Navigation Call Log :   First follow up call   Referral To HE Care :   NA   Transportation Arrangements Made :   Yes   Root Causes for High-ED Utilization :   Lack of access to care   Plan: :   Educated on appropriate ED utilization, Educated on alternate means of health care; ie urgent care when PCP not available, Educated on importance of follow up care with PCP   Yumiko Steward LPN G - 12/28/2020 14:38 CST

## 2022-07-14 NOTE — PROGRESS NOTES
MCIP note 4th follow up in [place] transferred to [facility] Entered On:  3/31/2021 16:14 CDT    Performed On:  3/31/2021 15:31 CDT by Yumiko Steward LPN               Discharge Past 30 Days   Visit to the Hospital in the Last 30 Days :   Emergency department (ED) visit   Reason for Choosing ED for Care :   Could not get primary care appointment   Perception of Change in Health Status DC :   Improving   Discharged To :   Home independently   DC Instructions :   Received discharge instructions , Understands discharge instructions    Education Provided :   ED utilization, Importance of keeping appointments   Discharge Past 30 Days Addntl Comments :   Called and spoke with patient ,states he is doing good. has no current issues.  Has been compliant with keeping all scheduled follow up appts. Instructed to complete ordered lab work prior to PCP appt 4/19/21. His knee pain is being managed with Meloxicam and a rubbing gel. Has an up coming appt. with Sports/Med  05/18/21.  States he did get the medicated liquid for his feet as instructed by wound care. Appt. to see the Podiatrist at [facility] 4/29/21. Advised to utilized urgent care for non urgent issues if PCP is unavailable. Voices understanding.     Yumiko Steward LPN - 3/31/2021 15:31 CDT   Barriers to Care   In the last 12 months, did you ever eat less than you felt you should because there was not enough money for food? :   No   In the last 12 months, has the electricity, gas, or water company threatened to shut off services in your home? :   No   Are you worried that in the next 2 months, you may not have a regular place to live? :   No   In the last 12 months, have you needed to see a doctor/practitioner, but could not because it cost too much money? :   No   Do you ever need help reading papers given to you from doctor's office/hospital and/or needing help filling out forms? :   No   Do you feel lonely? :   No   In the last 12   months, have you ever had to miss seeing a doctor/practitioner or picking up your medications because you did not have a way to get there? :   No   When you are sick, can you call your doctor/practitioner and be seen for care right away? :   No   Has any of your known medical problems caused you to go to the ED instead of your doctor/provider's office? :   No   Are there any problems affecting your health? :   No   SDoH Reviewed :   Yes   Yumiko Steward LPN - 3/31/2021 15:31 CDT   Prescriptions   Medication Reconcilation Completed :   Unknown   Medication Prescriptions Filled After DC :   Confirms all medications filled , Confirms taking medications as prescribed    Questions About Meds Prescribed at DC :   Denies any questions or concerns                    Yumiko Steward LPN - 3/31/2021 15:31 CDT   Appointments   Follow-Up Appt Scheduled :   Yes   Follow-Up Provider Appt Scheduled By :   Other: [facility]   PCP Name :   Yelitza Roque NP   Follow-Up Date :   4/19/2021 CDT   Follow-Up Appointment Status :   Confirms scheduled appointment    PCP Visit Upcoming :   Yes   Appointment Date/Time :   4/19/2021 CDT   PCP Visit Upcoming Reason :   Regular visit   PCP Visit Within Year :   Yes   PCP Visit Upcoming Reason :   Regular visit   PCP Visit One Year Date :   3/19/2021 CDT   Follow-Up Specialist Appt Scheduled :   Yes   Type of Specialist :   Podiatrist  [facility]   Follow-Up Lab Appt Scheduled :   Yes   Lab Orders :   A1c  CBC w auto diff  CMP   Follow-Up Date :   4/29/2021 CDT   Follow-Up Date :   4/16/2021 CDT   Follow-Up Radiology Appt Scheduled :   No   Yumiko Steward LPN 3/31/2021 15:31 CDT   Navigation   Initial Assesment Completed By :   Phone   MCIP Navigation Call Log :   Subsequent call   Referral To  Care :   NA   Transportation Arrangements Made :   Yes   Providers Patient Visited Last Year :   Yelitza Brown Dr  Ananya Aguilera   Root Causes for High-ED Utilization :   Lack of access to care   Plan: :   Educated on appropriate ED utilization, Educated on alternate means of health care; ie urgent care when PCP not available, Educated on importance of follow up care with PCP   Participation in Activity Designed to Address Lack of Annual Ambulatory or Preventative Care Visit? :   Yes   Participation in Activity Designed to Address Avoidable ED Utilization? :   Yes   During Current Measurement Year, Did Enrollee Receive Education Regarding Outpatient Primary Care Options? :   Yes   During Current Measurement Year, Did Enrollee Receive an Appt Reminder 24-48 Hours Before a Scheduled Appt? :   Yes   During Current Measurement Year, Did the Network Provider Schedule and Appt or Provide a Referral to Enrollee? :   Yes   Yumiko Steward LPN - 3/31/2021 15:31 CDT

## 2022-07-15 ENCOUNTER — LAB VISIT (OUTPATIENT)
Dept: LAB | Facility: HOSPITAL | Age: 57
End: 2022-07-15
Attending: NURSE PRACTITIONER
Payer: MEDICAID

## 2022-07-15 DIAGNOSIS — E11.65 UNCONTROLLED TYPE 2 DIABETES MELLITUS WITH HYPERGLYCEMIA: ICD-10-CM

## 2022-07-15 DIAGNOSIS — R06.09 DOE (DYSPNEA ON EXERTION): ICD-10-CM

## 2022-07-15 LAB
ANION GAP SERPL CALC-SCNC: 10 MEQ/L
BASOPHILS # BLD AUTO: 0.03 X10(3)/MCL (ref 0–0.2)
BASOPHILS NFR BLD AUTO: 0.6 %
BUN SERPL-MCNC: 18.3 MG/DL (ref 8.4–25.7)
CALCIUM SERPL-MCNC: 9 MG/DL (ref 8.4–10.2)
CHLORIDE SERPL-SCNC: 105 MMOL/L (ref 98–107)
CO2 SERPL-SCNC: 30 MMOL/L (ref 22–29)
CREAT SERPL-MCNC: 1.52 MG/DL (ref 0.73–1.18)
CREAT/UREA NIT SERPL: 12
EOSINOPHIL # BLD AUTO: 0.17 X10(3)/MCL (ref 0–0.9)
EOSINOPHIL NFR BLD AUTO: 3.2 %
ERYTHROCYTE [DISTWIDTH] IN BLOOD BY AUTOMATED COUNT: 13.5 % (ref 11.5–17)
GLUCOSE SERPL-MCNC: 188 MG/DL (ref 74–100)
HCT VFR BLD AUTO: 38.8 % (ref 42–52)
HGB BLD-MCNC: 12.7 GM/DL (ref 14–18)
IMM GRANULOCYTES # BLD AUTO: 0.02 X10(3)/MCL (ref 0–0.04)
IMM GRANULOCYTES NFR BLD AUTO: 0.4 %
LYMPHOCYTES # BLD AUTO: 1.28 X10(3)/MCL (ref 0.6–4.6)
LYMPHOCYTES NFR BLD AUTO: 24.1 %
MCH RBC QN AUTO: 28.9 PG (ref 27–31)
MCHC RBC AUTO-ENTMCNC: 32.7 MG/DL (ref 33–36)
MCV RBC AUTO: 88.4 FL (ref 80–94)
MONOCYTES # BLD AUTO: 0.45 X10(3)/MCL (ref 0.1–1.3)
MONOCYTES NFR BLD AUTO: 8.5 %
NEUTROPHILS # BLD AUTO: 3.4 X10(3)/MCL (ref 2.1–9.2)
NEUTROPHILS NFR BLD AUTO: 63.2 %
PLATELET # BLD AUTO: 289 X10(3)/MCL (ref 130–400)
PMV BLD AUTO: 9.8 FL (ref 7.4–10.4)
POTASSIUM SERPL-SCNC: 3.5 MMOL/L (ref 3.5–5.1)
RBC # BLD AUTO: 4.39 X10(6)/MCL (ref 4.7–6.1)
SODIUM SERPL-SCNC: 145 MMOL/L (ref 136–145)
WBC # SPEC AUTO: 5.3 X10(3)/MCL (ref 4.5–11.5)

## 2022-07-15 PROCEDURE — 36415 COLL VENOUS BLD VENIPUNCTURE: CPT

## 2022-07-15 PROCEDURE — 80048 BASIC METABOLIC PNL TOTAL CA: CPT

## 2022-07-15 PROCEDURE — 85025 COMPLETE CBC W/AUTO DIFF WBC: CPT

## 2022-07-18 ENCOUNTER — PATIENT OUTREACH (OUTPATIENT)
Dept: EMERGENCY MEDICINE | Facility: HOSPITAL | Age: 57
End: 2022-07-18
Payer: MEDICAID

## 2022-07-18 NOTE — PROGRESS NOTES
Transferred note from [facility] to [facility]  05/07/21    [facility] Entered On:  5/7/2021 9:19 CDT    Performed On:  5/7/2021 9:06 CDT by Yumiko Steward LPN               Discharge Past 30 Days   Visit to the Hospital in the Last 30 Days :   Emergency department (ED) visit   Reason for Choosing ED for Care :   Could not get primary care appointment   Perception of Change in Health Status DC :   Improving   Discharged To :   Home independently   DC Instructions :   Received discharge instructions , Understands discharge instructions    Education Provided :   ED utilization, Importance of keeping appointments   Discharge Past 30 Days Addntl Comments :   Spoke with patient, having no problems right now. States he understands the PCP instructions regarding prescribed fluid pill and if he becomes SOB or have increased fluid retention to call clinic or report to the ED. Confirmed up coming scheduled Ortho/Sports appt 5/18/21. Informed that PCP referred him to Cardiology Clinic and once the referral is reviewed ,the clinic will contact him with an appt.      Yumiko Steward LPN - 5/7/2021 9:06 CDT   Barriers to Care   In the last 12 months, did you ever eat less than you felt you should because there was not enough money for food? :   No   In the last 12 months, has the electricity, gas, or water company threatened to shut off services in your home? :   No   Are you worried that in the next 2 months, you may not have a regular place to live? :   No   In the last 12 months, have you needed to see a doctor/practitioner, but could not because it cost too much money? :   No   Do you ever need help reading papers given to you from doctor's office/hospital and/or needing help filling out forms? :   No   Do you feel lonely? :   No   In the last 12  months, have you ever had to miss seeing a doctor/practitioner or picking up your medications because you did not have a way to get there? :   No   When you are sick, can you call your  doctor/practitioner and be seen for care right away? :   No   Has any of your known medical problems caused you to go to the ED instead of your doctor/provider's office? :   No   Are there any problems affecting your health? :   No   SDoH Reviewed :   Yes   Have you seen a dentist in the last year? :   Yes   (Comment:  2 extractions 2 weeks ago 4/22/21 [Yumiko Steward LPN 5/7/2021 9:06 CDT] )   Yumiko Steward LPN 5/7/2021 9:06 CDT   Prescriptions   Medication Reconcilation Completed :   Unknown   Medication Prescriptions Filled After DC :   Confirms all medications filled , Confirms taking medications as prescribed    Questions About Meds Prescribed at DC :   Denies any questions or concerns                    Yumiko Steward LPN 5/7/2021 9:06 CDT   Appointments   Follow-Up Appt Scheduled :   Yes   Follow-Up Provider Appt Scheduled By :   Hospital staff   (Comment: IM clinic [Yumiko Steward LPN 5/7/2021 9:06 CDT] )   PCP Name :   Yelitza Roque NP   Follow-Up Date :   6/17/2021 CDT   Follow-Up Appointment Status :   Confirms scheduled appointment    PCP Visit Upcoming :   Yes   Appointment Date/Time :   6/17/2021 CDT   PCP Visit Upcoming Reason :   Regular visit   PCP Visit Within Year :   Yes   PCP Visit Upcoming Reason :   Regular visit   PCP Visit One Year Date :   4/19/2021 CDT   Follow-Up Specialist Appt Scheduled :   Yes   Type of Specialist :   Ortho/Sports   Dr Kay Isidro   Follow-Up Lab Appt Scheduled :   No   Follow-Up Date :   5/18/2021 CDT   Follow-Up Radiology Appt Scheduled :   No   Yumiko Steward LPN 5/7/2021 9:06 CDT   Navigation   Initial Assesment Completed By :   Phone   MCIP Navigation Call Log :   Subsequent call   Referral To HE Care :   NA   Transportation Arrangements Made :   Yes   Providers Patient Visited Last Year :   Yelitza Isidro   Root Causes for High-ED Utilization :   Lack of access to care   Plan: :   Educated on appropriate ED  utilization, Educated on alternate means of health care; ie urgent care when PCP not available, Educated on importance of follow up care with PCP   Participation in Activity Designed to Address Lack of Annual Ambulatory or Preventative Care Visit? :   Yes   Participation in Activity Designed to Address Avoidable ED Utilization? :   Yes   During Current Measurement Year, Did Enrollee Receive Education Regarding Outpatient Primary Care Options? :   Yes   During Current Measurement Year, Did Enrollee Receive an Appt Reminder 24-48 Hours Before a Scheduled Appt? :   Yes   During Current Measurement Year, Did the Network Provider Schedule and Appt or Provide a Referral to Enrollee? :   Yes   Yumiko Steward LPN - 5/7/2021 9:06 CDT

## 2022-07-18 NOTE — PROGRESS NOTES
Transferred from [facility] to [facility] Care Entered On:  7/8/2021 9:41 CDT    Performed On:  7/7/2021 16:56 CDT by Yumiko Steward LPN               Discharge Past 30 Days   Visit to the Hospital in the Last 30 Days :   None   Perception of Change in Health Status DC :   Improving   Discharged To :   Home independently   DC Instructions :   Received discharge instructions , Understands discharge instructions    Education Provided :   Chronic disease process, ED utilization, Importance of keeping appointments   Discharge Past 30 Days Addntl Comments :   Called and spoke with patient, states he has been compliant with going to all scheduled follow up doctor's appointments. Has been monitoring his blood sugars 6 times a day , but his PCP had ordered to check 3 times a day before meals. He is out of test strips and his insurance won't approve a refill for more until the 26th. He states he would just wait and carefully monitor his diet until than. States having no issues with fluid retention at this time.  He  will be starting PT for his knee pain next week with a follow up in [facility] next month 9/9/21. Congratulated patient on his success in being compliant with his healthcare.. Advised to utilized urgent care for non  urgent matters. Voices understanding.     Yumiko Steward LPN - 7/8/2021 9:13 CDT   Barriers to Care   SDoH Eat Less Than You Should :   No   SDoH Shut Off Services to Your Home :   No   SDoH No Regular Place to Live :   No   SDoH Needed Provider but Costs too Much :   No   SDoH Help Reading and Writing Paper Work :   No   SDoH Feel Lonely Often :   No   SDoH Missed Appt/Meds- No Transportation :   No   SDoH Call Dr To Be Seen Right Away :   No   SDoH Medical Problems Cause ED Visit :   No   SDoH Other Problems Affecting Health :   No   SDoH Reviewed :   Yes   SDoH Dentist Seen in Last Year :   No   Yumiko Steward LPN - 7/8/2021 9:13 CDT   Prescriptions   Medication Reconcilation  Completed :   Unknown   Medication Prescriptions Filled After DC :   Confirms all medications filled , Confirms taking medications as prescribed    Questions About Meds Prescribed at DC :   Denies any questions or concerns                    Yumiko Steward LPN 7/8/2021 9:13 CDT   Appointments   Follow-Up Appt Scheduled :   Yes   Follow-Up Provider Appt Scheduled By :   Hospital staff   PCP Name :   Yelitza Roque NP   Follow-Up Date :   7/20/2021 CDT   PCP Visit Upcoming :   Yes   Appointment Date/Time :   9/22/2021 CDT   PCP Visit Upcoming Reason :   Regular visit   PCP Visit Within Year :   Yes   PCP Visit Upcoming Reason :   Regular visit   PCP Visit One Year Date :   6/22/2021 CDT   Follow-Up Specialist Appt Scheduled :   Yes   Type of Specialist :   Cardiology [facility]  [id number]   Follow-Up Lab Appt Scheduled :   No   Follow-Up Date :   7/20/2021 CDT   Follow-Up Radiology Appt Scheduled :   Yes   Radiology Orders :   CA Venous Niva Reflux   Yumiko Steward LPN 7/8/2021 9:13 CDT   Navigation   Initial Assesment Completed By :   Phone   ED FIN :   [id number]   [facility]   Subsequent call   Referral To HE Care :   NA   Transportation Arrangements Made :   Yes   Providers Patient Visited Last Year :   Yelitza Aguilera   Root Causes for High-ED Utilization :   Chronic conditions, Lack of access to care, Patient knowledge/Low Health Literacy   Plan: :   Educated on appropriate ED utilization, Educated on alternate means of health care; ie urgent care when PCP not available, Educated on importance of follow up care with PCP, Set up appointment, Educated on importance of follow up preventative dental care with dentist   Participation in Activity Designed to Address Lack of Annual Ambulatory or Preventative Care Visit? :   Yes   Participation in Activity Designed to Address Avoidable ED Utilization? :   Yes    During Current Measurement Year, Did Enrollee Receive Education Regarding Outpatient Primary Care Options? :   Yes   During Current Measurement Year, Did Enrollee Receive an Appt Reminder 24-48 Hours Before a Scheduled Appt? :   Yes   During Current Measurement Year, Did the Network Provider Schedule and Appt or Provide a Referral to Enrollee? :   Yes   Yumiko Steward LPN G - 7/8/2021 9:13 CDT

## 2022-07-18 NOTE — PROGRESS NOTES
Transferred note 02/15/2022 from [facility] to [facility] Entered On:  2/15/2022 16:45 CST    Performed On:  2/15/2022 16:08 CST by Yumiko Steward LPN               Discharge Past 30 Days   Visit to the Hospital in the Last 30 Days :   None   Perception of Change in Health Status DC :   Improving   Discharged To :   Home independently   DC Instructions :   Received discharge instructions , Understands discharge instructions    Education Provided :   ED utilization, Importance of keeping appointments, Medication Compliance, Diet Compliance   Discharge Past 30 Days Addntl Comments :   Called spoke with patient,says he received  his last left knee gel shot approximately 1 week ago 02/04/22 and it feels like he has fluid in his knee, but it doesn't hurt. Explained that the gel shot was injected in the joint space in his knee providing a cushion preventing a bone on bone friction. Confirmed his scheduled appointment 03/24/2022 to return to Ortho/Sports Med for an initial visit this time to address his left wrist CTS pain. Instructed him to get a CTS brace from any pharmacy  to provide added wrist support until his next Ortho appointment if pain is untolerable. Advised to continued  monitoring his BS and keeping a log of readings  for his PCP to better track his medication dosage and stressed that he follows his DM diet. Encouraged to utilized [facility] for non urgent issues. Voices understanding.     Patient pregnant :   N/A   Yumiko Steward LPN - 2/15/2022 16:08 CST   Barriers to Care   SDoH Eat Less Than You Should :   No   SDoH Shut Off Services to Your Home :   No   SDoH No Regular Place to Live :   No   SDoH Needed Provider but Costs too Much :   No   SDoH Help Reading and Writing Paper Work :   No   SDoH Feel Lonely Often :   No   SDoH Missed Appt/Meds- No Transportation :   No   SDoH Call Dr To Be Seen Right Away :   No   SDoH Medical Problems Cause ED Visit :   No   SDoH Other Problems Affecting  Health :   No   SDoH Reviewed :   Yes   SDoH Dentist Seen in Last Year :   No   Bin LAURAYumiko MAYRA Warner 2/15/2022 16:08 CST   Prescriptions   Medication Reconcilation Completed :   Unknown   Medication Prescriptions Filled After DC :   Confirms all medications filled , Confirms taking medications as prescribed    Questions About Meds Prescribed at DC :   Denies any questions or concerns                    Yumiko Steward LPN MAYRA Warner 2/15/2022 16:08 CST   Appointments   Follow-Up Appt Scheduled :   Yes   PCP Name :   Yelitza Roque NP   Follow-Up Date :   3/24/2022 CDT   Follow-Up Appointment Status :   Confirms scheduled appointment    PCP Visit Upcoming :   Yes   Appointment Date/Time :   5/20/2022 CDT   PCP Visit Upcoming Reason :   Regular visit   PCP Visit Within Year :   Yes   PCP Visit Upcoming Reason :   Regular visit   PCP Visit One Year Date :   1/21/2022 CST   Follow-Up Specialist Appt Scheduled :   Yes   Type of Specialist :   [facility]  01/18/2022  [facility] Ortho/Sports Med     02/24/2022  and 05/20/2022  [facility] Cardiology       05/20/2022     Follow-Up Lab Appt Scheduled :   No   Follow-Up Date :   3/24/2022 CDT   Follow-Up Radiology Appt Scheduled :   No   Bin SANCHEZMarciazenia Warner 2/15/2022 16:08 CST   Navigation   Initial Assesment Completed By :   Phone   ED FIN :   [id number]   [facility] Navigation Call Log :   Subsequent call   Referral To HE Care :   NA   Transportation Arrangements Made :   Yes   Providers Patient Visited Last Year :   Yelitza Serrato FNP  Dr Shelby Butcher FNP   Root Causes for High-ED Utilization :   Lack of access to care   Plan: :   Educated on appropriate ED utilization, Educated on alternate means of health care; ie urgent care when PCP not available, Educated on importance of follow up care with PCP, Set up appointment, Educated on importance of follow up preventative dental care  with dentist, Budget-friendly health eating education given   Participation in Activity Designed to Address Lack of Annual Ambulatory or Preventative Care Visit? :   Yes   Participation in Activity Designed to Address Avoidable ED Utilization? :   Yes   During Current Measurement Year, Did Enrollee Receive Education Regarding Outpatient Primary Care Options? :   Yes   During Current Measurement Year, Did Enrollee Receive an Appt Reminder 24-48 Hours Before a Scheduled Appt? :   Yes   During Current Measurement Year, Did the Network Provider Schedule and Appt or Provide a Referral to Enrollee? :   Yes   Education Provided :   Verbal   Yumiko Steward LPN 2/15/2022 16:08 CST

## 2022-07-18 NOTE — PROGRESS NOTES
Transferred note 12/13/2021  from [facility] to [facility] Entered On:  12/13/2021 11:32 CST    Performed On:  12/13/2021 11:06 CST by Yumiko Steward LPN               Discharge Past 30 Days   Visit to the Hospital in the Last 30 Days :   Emergency department (ED) visit   Reason for Choosing ED for Care :   Convenience   Perception of Change in Health Status DC :   Improving   Discharged To :   Home independently   DC Instructions :   Received discharge instructions , Understands discharge instructions    Education Provided :   ED utilization, Importance of keeping appointments, Medication Compliance, Diet Compliance   Discharge Past 30 Days Addntl Comments :   Spoke with patient, recent ED visit for left wrist pain x 1 month and was diagnosed with CTS. Instructed to get a CTS velcro brace from local pharmacy for support until his PCP follow up next month 01/21/22 and to discuss a possible referral to Ortho Clinic if no improvement in symptoms. He does have a follow up Sports Med appointment for his left knee 01/04/22,but he will need a documented referral in the system for them to evaluate the left wrist. Says he has completed formal PT for his knee which did help but the pain does still come back off and on. Advised again to utilized [facility] for non acute matters,but he said he was visiting someone at the hospital and since he was already there he decided to go to the ED to check out his wrist pain.     Yumiko Steward LPN - 12/13/2021 11:06 CST   Barriers to Care   SDoH Eat Less Than You Should :   No   SDoH Shut Off Services to Your Home :   No   SDoH No Regular Place to Live :   No   SDoH Needed Provider but Costs too Much :   No   SDoH Help Reading and Writing Paper Work :   No   SDoH Feel Lonely Often :   No   SDoH Missed Appt/Meds- No Transportation :   No   SDoH Call Dr To Be Seen Right Away :   No   SDoH Medical Problems Cause ED Visit :   No   SDoH Other Problems Affecting Health :   No   SDoH  Reviewed :   Yes   SDoH Dentist Seen in Last Year :   Parris Steward LPNYumiko MAYRA - 12/13/2021 11:06 CST   Prescriptions   Medication Reconcilation Completed :   Unknown   Medication Prescriptions Filled After DC :   Confirms all medications filled , Confirms taking medications as prescribed    Questions About Meds Prescribed at DC :   Denies any questions or concerns                    Yumiko Steward LPN MAYRA - 12/13/2021 11:06 CST   Appointments   Follow-Up Appt Scheduled :   Yes   Follow-Up Provider Appt Scheduled By :   Hospital staff   PCP Name :   Yelitza Roque NP   Follow-Up Date :   1/21/2022 CST   Follow-Up Appointment Status :   Confirms scheduled appointment    PCP Visit Upcoming :   Yes   Appointment Date/Time :   1/21/2022 CST   PCP Visit Upcoming Reason :   Regular visit   PCP Visit Within Year :   Yes   PCP Visit Upcoming Reason :   Regular visit   PCP Visit One Year Date :   10/6/2021 CDT   Follow-Up Specialist Appt Scheduled :   Yes   Type of Specialist :   [facility] Ortho/Sports Med   01/04/2022  [facility] Cardiology  01/21/2022  [facility]  02/18/2022   Follow-Up Lab Appt Scheduled :   Yes   Lab Orders :   BMP  A1c  PSA  U/A  Creatinine   Follow-Up Date :   1/4/2022 CST   Follow-Up Date :   1/14/2022 CST   Follow-Up Radiology Appt Scheduled :   No   Bin SANCHEZMarciazenia NUNEZ - 12/13/2021 11:06 CST   Navigation   Initial Assesment Completed By :   Phone   ED FIN :   [id number]   [facility] Navigation Call Log :   Subsequent call   Referral To  Care :   NA   Transportation Arrangements Made :   Yes   Providers Patient Visited Last Year :   Yelitza Walker     Root Causes for High-ED Utilization :   Lack of access to care   Plan: :   Educated on appropriate ED utilization, Educated on alternate means of health care; ie urgent care when PCP not available, Educated on  importance of follow up care with PCP, Set up appointment, Educated on importance of follow up preventative dental care with dentist, Budget-friendly health eating education given   Participation in Activity Designed to Address Lack of Annual Ambulatory or Preventative Care Visit? :   Yes   Participation in Activity Designed to Address Avoidable ED Utilization? :   Yes   During Current Measurement Year, Did Enrollee Receive Education Regarding Outpatient Primary Care Options? :   Yes   During Current Measurement Year, Did Enrollee Receive an Appt Reminder 24-48 Hours Before a Scheduled Appt? :   Yes   During Current Measurement Year, Did the Network Provider Schedule and Appt or Provide a Referral to Enrollee? :   Yes   Education Provided :   Yumiko Lawson LPN 12/13/2021 11:06 CST

## 2022-07-19 ENCOUNTER — PROCEDURE VISIT (OUTPATIENT)
Dept: SLEEP MEDICINE | Facility: HOSPITAL | Age: 57
End: 2022-07-19
Attending: NURSE PRACTITIONER
Payer: MEDICAID

## 2022-07-19 DIAGNOSIS — G47.33 OSA ON CPAP: ICD-10-CM

## 2022-07-19 PROCEDURE — 95811 POLYSOM 6/>YRS CPAP 4/> PARM: CPT

## 2022-07-20 ENCOUNTER — OFFICE VISIT (OUTPATIENT)
Dept: CARDIOLOGY | Facility: CLINIC | Age: 57
End: 2022-07-20
Payer: MEDICAID

## 2022-07-20 ENCOUNTER — HOSPITAL ENCOUNTER (EMERGENCY)
Facility: HOSPITAL | Age: 57
Discharge: HOME | End: 2022-07-20
Attending: FAMILY MEDICINE
Payer: MEDICAID

## 2022-07-20 VITALS
SYSTOLIC BLOOD PRESSURE: 162 MMHG | BODY MASS INDEX: 42.56 KG/M2 | WEIGHT: 271.19 LBS | OXYGEN SATURATION: 99 % | RESPIRATION RATE: 20 BRPM | HEART RATE: 74 BPM | HEIGHT: 67 IN | DIASTOLIC BLOOD PRESSURE: 99 MMHG | TEMPERATURE: 97 F

## 2022-07-20 VITALS
HEIGHT: 67 IN | SYSTOLIC BLOOD PRESSURE: 134 MMHG | BODY MASS INDEX: 42.84 KG/M2 | HEART RATE: 68 BPM | RESPIRATION RATE: 20 BRPM | OXYGEN SATURATION: 98 % | WEIGHT: 272.94 LBS | TEMPERATURE: 98 F | DIASTOLIC BLOOD PRESSURE: 78 MMHG

## 2022-07-20 DIAGNOSIS — R07.89 OTHER CHEST PAIN: ICD-10-CM

## 2022-07-20 DIAGNOSIS — G47.33 OSA (OBSTRUCTIVE SLEEP APNEA): ICD-10-CM

## 2022-07-20 DIAGNOSIS — I10 PRIMARY HYPERTENSION: ICD-10-CM

## 2022-07-20 DIAGNOSIS — B35.3 TINEA PEDIS OF LEFT FOOT: Primary | ICD-10-CM

## 2022-07-20 DIAGNOSIS — I87.2 VENOUS INSUFFICIENCY: ICD-10-CM

## 2022-07-20 DIAGNOSIS — R06.09 DOE (DYSPNEA ON EXERTION): Primary | ICD-10-CM

## 2022-07-20 DIAGNOSIS — I25.10 MILD CAD: ICD-10-CM

## 2022-07-20 DIAGNOSIS — E78.5 HYPERLIPIDEMIA LDL GOAL <70: ICD-10-CM

## 2022-07-20 DIAGNOSIS — E11.9 DIABETES MELLITUS WITHOUT COMPLICATION: ICD-10-CM

## 2022-07-20 PROCEDURE — 1159F PR MEDICATION LIST DOCUMENTED IN MEDICAL RECORD: ICD-10-PCS | Mod: CPTII,,, | Performed by: NURSE PRACTITIONER

## 2022-07-20 PROCEDURE — 3061F NEG MICROALBUMINURIA REV: CPT | Mod: CPTII,,, | Performed by: NURSE PRACTITIONER

## 2022-07-20 PROCEDURE — 3066F NEPHROPATHY DOC TX: CPT | Mod: CPTII,,, | Performed by: NURSE PRACTITIONER

## 2022-07-20 PROCEDURE — 3075F PR MOST RECENT SYSTOLIC BLOOD PRESS GE 130-139MM HG: ICD-10-PCS | Mod: CPTII,,, | Performed by: NURSE PRACTITIONER

## 2022-07-20 PROCEDURE — 3008F BODY MASS INDEX DOCD: CPT | Mod: CPTII,,, | Performed by: NURSE PRACTITIONER

## 2022-07-20 PROCEDURE — 4010F PR ACE/ARB THEARPY RXD/TAKEN: ICD-10-PCS | Mod: CPTII,,, | Performed by: NURSE PRACTITIONER

## 2022-07-20 PROCEDURE — 99214 PR OFFICE/OUTPT VISIT, EST, LEVL IV, 30-39 MIN: ICD-10-PCS | Mod: S$PBB,,, | Performed by: NURSE PRACTITIONER

## 2022-07-20 PROCEDURE — 1160F RVW MEDS BY RX/DR IN RCRD: CPT | Mod: CPTII,,, | Performed by: NURSE PRACTITIONER

## 2022-07-20 PROCEDURE — 3061F PR NEG MICROALBUMINURIA RESULT DOCUMENTED/REVIEW: ICD-10-PCS | Mod: CPTII,,, | Performed by: NURSE PRACTITIONER

## 2022-07-20 PROCEDURE — 4010F ACE/ARB THERAPY RXD/TAKEN: CPT | Mod: CPTII,,, | Performed by: NURSE PRACTITIONER

## 2022-07-20 PROCEDURE — 99281 EMR DPT VST MAYX REQ PHY/QHP: CPT | Mod: 27

## 2022-07-20 PROCEDURE — 99215 OFFICE O/P EST HI 40 MIN: CPT | Mod: PBBFAC | Performed by: NURSE PRACTITIONER

## 2022-07-20 PROCEDURE — 1159F MED LIST DOCD IN RCRD: CPT | Mod: CPTII,,, | Performed by: NURSE PRACTITIONER

## 2022-07-20 PROCEDURE — 1160F PR REVIEW ALL MEDS BY PRESCRIBER/CLIN PHARMACIST DOCUMENTED: ICD-10-PCS | Mod: CPTII,,, | Performed by: NURSE PRACTITIONER

## 2022-07-20 PROCEDURE — 99214 OFFICE O/P EST MOD 30 MIN: CPT | Mod: S$PBB,,, | Performed by: NURSE PRACTITIONER

## 2022-07-20 PROCEDURE — 3066F PR DOCUMENTATION OF TREATMENT FOR NEPHROPATHY: ICD-10-PCS | Mod: CPTII,,, | Performed by: NURSE PRACTITIONER

## 2022-07-20 PROCEDURE — 3075F SYST BP GE 130 - 139MM HG: CPT | Mod: CPTII,,, | Performed by: NURSE PRACTITIONER

## 2022-07-20 PROCEDURE — 3078F DIAST BP <80 MM HG: CPT | Mod: CPTII,,, | Performed by: NURSE PRACTITIONER

## 2022-07-20 PROCEDURE — 3078F PR MOST RECENT DIASTOLIC BLOOD PRESSURE < 80 MM HG: ICD-10-PCS | Mod: CPTII,,, | Performed by: NURSE PRACTITIONER

## 2022-07-20 PROCEDURE — 3008F PR BODY MASS INDEX (BMI) DOCUMENTED: ICD-10-PCS | Mod: CPTII,,, | Performed by: NURSE PRACTITIONER

## 2022-07-20 RX ORDER — NITROGLYCERIN 0.4 MG/1
0.4 TABLET SUBLINGUAL EVERY 5 MIN PRN
Qty: 25 TABLET | Refills: 6 | Status: SHIPPED | OUTPATIENT
Start: 2022-07-20 | End: 2024-02-16

## 2022-07-20 NOTE — PROGRESS NOTES
CHIEF COMPLAINT:   Chief Complaint   Patient presents with    Follow-up    Shortness of Breath    Chest Pain     Patient here for 2 mth f/u w ECHO and lab results. Pt c/o cp L chest wall  a mth ago. Pt c/o sob while walking. Pt has nonpitting edema to feliciano lower legs. Pt denies palpitations and dizziness. Questions.                   Review of patient's allergies indicates:  No Known Allergies                                       HPI:  Michael Stone 56 y.o. male with a past medical history of uncontrolled diabetes, hyperlipidemia, hypertension, MINA, adrenal adenoma, and obesity presents for 2 month follow up and results of Echocardiogram.   He completed and Echocardiogram on 6.1.22 which revealed an EF of 59%.  See report below.  Patient completed SADE testing in December 2021 which revealed no evidence of significant arterial insufficiency. He also completed a right lower extremity venous reflux study in December 2021 which revealed reflux of 4.3 seconds in the GSV at the level of the upper calf and reflux of 4.8 seconds and a branch of the GSV at the level of the upper calf. Patient underwent left heart cath procedure on 8.20.21 which revealed very mild nonobstructive one-vessel disease. See report below. He previously completed a nuclear pharmacologic stress test on 6/24/2021 which showed medium sized area of moderate anterior and apical ischemia and no scar. Patient completed left lower extremity venous reflux studies on August 24, 2021 which revealed no DVT and no substantial reflux identified in the interrogated portions of the left leg deep system, GSV, or SSV.     Patient reported an episode of chest pain approximately 1 month ago.  Patient states he was sitting watching television when he experienced a pain in his left chest wall.  He states he took 2 baby aspirin with eventual relief of symptoms.  He reports some improvement in his shortness of breath.  He continues to endorse bilateral lower  extremity edema.  In the past, his CPAP was picked up due to noncompliance/nonuse, however he states he completed another sleep study last night.  He reports compliance with his medications.      Echocardiogram 6.1.22  The left ventricle is normal in size with mild concentric hypertrophy and normal systolic function.  The estimated ejection fraction is 59%.  Normal right ventricular size with normal right ventricular systolic function.  Normal left ventricular diastolic function.  There is no pulmonary hypertension.  The estimated PA systolic pressure is 10 mmHg.  Normal central venous pressure (3 mmHg).      CORONARY ANGIOGRAM 8.20.21  Left Main: large vessel.   Left anterior descending: large vessel.   Diagonal 1: small vessel.   Ramus: medium size vessel. 30% ostial stenosis  Circumflex: large vessel.   Obtuse marginal 1: medium size vessel.   Right coronary artery: medium size vessel.   Posterior descending artery: Tiny vessel.     COMPLICATIONS  No immediate complications.    Closure of access site: Radial band  Estimated blood loss: less than 10 ml  Specimens obtained: none   SUMMARY  Very mild nonobstructive 1 vessel disease      Nuclear pharmacologic stress test 06/24/2021:  Rest EKG: Sinus rhythm  Stress EKG: No significant new EKG changes  Medium size area of moderate anterior and apical ischemia  No scar    TTE 4.16.21  Mild concentric left ventricular hypertrophy  Left ventricular ejection fraction is measured at approximately 50%  Structurally normal mitral valve  Trace mitral regurgitation  Structurally aortic valve is trileaflet  Trace tricuspid regurgitation with RVSP of 38 mmHg  No evidence of pulmonic regurgitation  The pulmonic valve was not well visualized  Mildly dilated left atrium  Borderline dilated right atrium  Normal right ventricular size  No evidence of pericardial effusion  No evidence of pleural effusion                                                                                                                                                                                                                                                                                                                                                                                                                                                                                              Patient Active Problem List   Diagnosis    Mild CAD    Disorder of tendon of shoulder region    Edema of lower extremity    History of severe acute respiratory syndrome coronavirus 2 (SARS-CoV-2) disease    Hyperlipidemia LDL goal <70    Primary hypertension    Morbid obesity    MINA (obstructive sleep apnea)    Onychogryposis    Primary osteoarthritis of both knees    Spinal stenosis of cervical region    Tinea pedis    Uncontrolled type 2 diabetes mellitus    JOSEPH (dyspnea on exertion)    Venous insufficiency    Diabetes mellitus without complication    Other chest pain     Past Surgical History:   Procedure Laterality Date    ANGIOGRAPHY      CHOLECYSTECTOMY      FOOT SURGERY Right      Social History     Socioeconomic History    Marital status: Single   Tobacco Use    Smoking status: Never Smoker    Smokeless tobacco: Never Used   Substance and Sexual Activity    Alcohol use: Never    Drug use: Never    Sexual activity: Yes     Partners: Female     Birth control/protection: None     Social Determinants of Health     Financial Resource Strain: Low Risk     Difficulty of Paying Living Expenses: Not very hard   Food Insecurity: No Food Insecurity    Worried About Running Out of Food in the Last Year: Never true    Ran Out of Food in the Last Year: Never true   Transportation Needs: No Transportation Needs    Lack of Transportation (Medical): No    Lack of Transportation (Non-Medical): No   Physical Activity: Insufficiently Active    Days of Exercise per Week: 4 days    Minutes of Exercise per  Session: 20 min   Stress: Stress Concern Present    Feeling of Stress : To some extent   Social Connections: Moderately Isolated    Frequency of Communication with Friends and Family: More than three times a week    Frequency of Social Gatherings with Friends and Family: More than three times a week    Attends Evangelical Services: 1 to 4 times per year    Active Member of Clubs or Organizations: No    Marital Status: Never    Housing Stability: Low Risk     Unable to Pay for Housing in the Last Year: No    Number of Places Lived in the Last Year: 1    Unstable Housing in the Last Year: No        Family History   Problem Relation Age of Onset    Hypertension Mother     Heart disease Mother     Stroke Father     Cancer Sister     Heart disease Sister     Cancer Sister     Heart disease Sister     Heart disease Brother     Heart disease Brother          Current Outpatient Medications:     aspirin (ECOTRIN) 81 MG EC tablet, Take 1 tablet (81 mg total) by mouth once daily., Disp: 90 tablet, Rfl: 3    atorvastatin (LIPITOR) 80 MG tablet, Take 1 tablet (80 mg total) by mouth nightly., Disp: 90 tablet, Rfl: 3    furosemide (LASIX) 20 MG tablet, Take 1 tablet (20 mg total) by mouth once daily., Disp: 90 tablet, Rfl: 3    HUMALOG U-100 INSULIN 100 unit/mL injection, Inject 20 Units into the skin 3 (three) times daily before meals., Disp: 60 mL, Rfl: 1    hydroCHLOROthiazide (HYDRODIURIL) 50 MG tablet, Take 2 tablets (100 mg total) by mouth once daily., Disp: 180 tablet, Rfl: 3    insulin syringes, disposable, 1 mL Syrg, Inject 1 Syringe into the skin 3 (three) times daily before meals., Disp: 100 each, Rfl: 3    LANTUS U-100 INSULIN 100 unit/mL injection, INJECT 42 UNITS SUBCUTANEOUSLY TWICE DAILY WITH BREAKFAST AND AT BEDTIME (INCREASED FROM 36 UNITS TWICE DAILY), Disp: , Rfl:     losartan (COZAAR) 100 MG tablet, Take 1 tablet (100 mg total) by mouth once daily., Disp: 90 tablet, Rfl: 3     "meloxicam (MOBIC) 7.5 MG tablet, Take 15 mg by mouth., Disp: , Rfl:     metFORMIN (GLUCOPHAGE) 1000 MG tablet, Take 1 tablet (1,000 mg total) by mouth 2 (two) times daily with meals., Disp: 180 tablet, Rfl: 1    NIFEdipine (ADALAT CC) 90 MG TbSR, Take 1 tablet (90 mg total) by mouth once daily., Disp: 90 tablet, Rfl: 3    potassium chloride (K-TAB) 20 mEq, Take 1 tablet (20 mEq total) by mouth once daily., Disp: 90 tablet, Rfl: 3    HYDROcodone-acetaminophen (NORCO) 7.5-325 mg per tablet, Take 1 tablet by mouth every 6 (six) hours as needed for Pain., Disp: 15 tablet, Rfl: 0    ONETOUCH ULTRA TEST Strp, USE THREE TIMES A DAY AS DIRECTED BY DOCTOR, Disp: , Rfl:      ROS:                                                                                                                                                                             Review of Systems   Constitutional: Positive for chills.   Respiratory: Positive for shortness of breath.    Cardiovascular: Positive for chest pain and leg swelling.   Gastrointestinal: Negative.    Musculoskeletal: Negative.    Skin: Negative.    Neurological: Negative.    Psychiatric/Behavioral: Negative.         Blood pressure 134/78, pulse 68, temperature 98.2 °F (36.8 °C), resp. rate 20, height 5' 7" (1.702 m), weight 123.8 kg (272 lb 14.9 oz), SpO2 98 %.   PE:  Physical Exam  Constitutional:       Appearance: Normal appearance.   HENT:      Head: Normocephalic.   Eyes:      Extraocular Movements: Extraocular movements intact.   Cardiovascular:      Rate and Rhythm: Normal rate and regular rhythm.   Pulmonary:      Effort: Pulmonary effort is normal.   Abdominal:      Palpations: Abdomen is soft.   Musculoskeletal:         General: Normal range of motion.      Cervical back: Neck supple.      Right lower leg: Edema present.      Left lower leg: Edema present.      Comments: 2+ pitting edema BLE   Skin:     General: Skin is warm and dry.   Neurological:      General: No " focal deficit present.      Mental Status: He is alert and oriented to person, place, and time.   Psychiatric:         Mood and Affect: Mood normal.        ASSESSMENT/PLAN:  Very Mild CAD  LVEF - 59% per Echo 6.1.22   Lexiscan stress test done on 6/21/2021 with medium sized area of moderate anterior and apical ischemia and no scar  Reported 1 episode of CP about a month ago - occurred at rest   Will send Rx for SL Nitro prn CP  Continue ASA, atorvastatin, and losartan  Counseled on heart healthy diet    JOSEPH - improved  EF 59% per Echo 6.1.22  Continue and HCTZ and Lasix      Venous Insufficiency - RLE  Still with BLE edema  Continue Lasix and HCTZ  Recommended low-sodium diet, compression stocking therapy, and leg elevation  Right lower extremity venous reflux study in December 2021 which revealed reflux of 4.3 seconds in the GSV at the level of the upper calf and reflux of 4.8 seconds and a branch of the GSV at the level of the upper calf.   Left lower extremity venous reflux study revealed no substantial venous reflux and no DVT (Aug. 2021)  Will refer to Dr. Gallo for further evaluation of venous insufficiency    HLD  LDL at goal - 67  Continue with Atorvastatin 80 mg po daily  Counseled patient on low-cholesterol diet and exercise as tolerated    HTN  BP at goal - 134/78  Continue Losartan 100mg daily, HCTZ 100mg daily, and Procardia XL 90mg daily, and Lasix 20mg daily   Counseled on low salt diet    Diabetes  Last A1C not at goal - 8.9  Continue management per PCP    MINA  States he completed another sleep study last night  States CPAP was picked up in the past due to noncompliance    Obesity  Counseled on the importance of weight loss through diet and exercise    BMP in 6 weeks  Refer to Dr. Gallo for evaluation of venous reflux  Follow up in Cardiology Clinic in 4 months  Follow up with PCP as directed

## 2022-07-20 NOTE — PATIENT INSTRUCTIONS
BMP in 6 weeks  Refer to Dr. Gallo for evaluation of venous reflux  Follow up in Cardiology Clinic in 4 months  Follow up with PCP as directed

## 2022-07-20 NOTE — ED PROVIDER NOTES
"Encounter Date: 7/20/2022       History     Chief Complaint   Patient presents with    Wound Check     Left foot non healing wound,      55 y/o male with a history of DMII presents to the ER with complaints of pain in the left foot and a potential "hole" in his foot that he noticed at home.  Patient reports the location is between the 4th and 5th digit.  No fever or chills.  No nausea or vomiting.  Reports the pain as mild.        Review of patient's allergies indicates:  No Known Allergies  Past Medical History:   Diagnosis Date    Adrenal adenoma     Coronary artery disease     Diabetes mellitus     Hyperlipidemia     Hypertension     Spinal stenosis     Unspecified osteoarthritis, unspecified site      Past Surgical History:   Procedure Laterality Date    ANGIOGRAPHY      CHOLECYSTECTOMY      FOOT SURGERY Right      Family History   Problem Relation Age of Onset    Hypertension Mother     Heart disease Mother     Stroke Father     Cancer Sister     Heart disease Sister     Cancer Sister     Heart disease Sister     Heart disease Brother     Heart disease Brother      Social History     Tobacco Use    Smoking status: Never Smoker    Smokeless tobacco: Never Used   Substance Use Topics    Alcohol use: Never    Drug use: Never     Review of Systems   Constitutional: Negative for chills, fatigue and fever.   HENT: Negative for ear pain, rhinorrhea and sore throat.    Eyes: Negative for photophobia and pain.   Respiratory: Negative for cough, shortness of breath and wheezing.    Cardiovascular: Negative for chest pain.   Gastrointestinal: Negative for abdominal pain, diarrhea, nausea and vomiting.   Genitourinary: Negative for dysuria.   Neurological: Negative for dizziness, weakness and headaches.   All other systems reviewed and are negative.      Physical Exam     Initial Vitals [07/20/22 0440]   BP Pulse Resp Temp SpO2   (!) 162/99 74 20 97.2 °F (36.2 °C) 99 %      MAP       --     " "    Physical Exam    Constitutional: He appears well-developed and well-nourished.   HENT:   Head: Normocephalic and atraumatic.   Eyes: EOM are normal.   Cardiovascular: Normal rate, regular rhythm and normal heart sounds.   Pulmonary/Chest: Breath sounds normal. No respiratory distress.   Abdominal: Abdomen is soft. He exhibits no distension.   Musculoskeletal:      Comments: Mild intertrigo / athlete's foot between the 4th and 5th digit webspace of the left foot.  No skin breakdown.  No "hole".  2+ bilateral DP pulses.  No erythema of the feet; no skin breakdown.           ED Course   Procedures  Labs Reviewed - No data to display       Imaging Results    None          Medications - No data to display  Medical Decision Making:   Initial Assessment:   Discussed with patient regarding the findings - possibly early tinea pedis.  No skin breakdown.  Encouraged to keep area clean and dry.  Patient will followup with PCP.  ER precautions for any acute worsening.                      Clinical Impression:   Final diagnoses:  [B35.3] Tinea pedis of left foot (Primary)          ED Disposition Condition    Discharge Stable        ED Prescriptions     None        Follow-up Information     Follow up With Specialties Details Why Contact Info    [facility] Emergency Medicine  As needed, If symptoms worsen [address] [facility] [address] [phone]    Primary Care Physician  In 5 days      BIANCA Eli Nurse Practitioner   [address] [facility]  Internal Medicine [facility]  [facility] [id number] [phone]             Murtaza Hernandez MD  07/20/22 0454    "

## 2022-07-27 ENCOUNTER — PATIENT OUTREACH (OUTPATIENT)
Dept: EMERGENCY MEDICINE | Facility: HOSPITAL | Age: 57
End: 2022-07-27
Payer: MEDICAID

## 2022-08-08 ENCOUNTER — TELEPHONE (OUTPATIENT)
Dept: INTERNAL MEDICINE | Facility: CLINIC | Age: 57
End: 2022-08-08
Payer: MEDICAID

## 2022-08-08 NOTE — TELEPHONE ENCOUNTER
Patient called and wanted me to notify you of his blood pressure readings these past 8 days. Says he has been feeling a little down and concerned because his bottom number has been low. Please advise.  1) 126/46  2) 152/45  3) 163/49  4) 118/44  5) 116/52  6) 116/54  7) 92/41  8) 135/40

## 2022-08-09 ENCOUNTER — TELEPHONE (OUTPATIENT)
Dept: CARDIOLOGY | Facility: CLINIC | Age: 57
End: 2022-08-09
Payer: MEDICAID

## 2022-08-09 NOTE — TELEPHONE ENCOUNTER
Attempted to contact pt concerning BP issues. No answer. Unable to leave message.    ----- Message from Priya Clifton MA sent at 8/8/2022 10:58 AM CDT -----  Regarding: BP  PT STATING HIS BP HAS BEEN LOW. 8/2 152/45, 8/3 163/49, 8/4 118/44, 8/5 116/52, 8/6 116/54, 8/7 92/41, 8/8 135/40. THIS WAS AFTER THE PATIENT TOOK HIS BP MEDS, CAN SOMEONE FIVE HIM A CALL, THANKS

## 2022-08-10 ENCOUNTER — TELEPHONE (OUTPATIENT)
Dept: ORTHOPEDICS | Facility: CLINIC | Age: 57
End: 2022-08-10
Payer: MEDICAID

## 2022-08-10 ENCOUNTER — OFFICE VISIT (OUTPATIENT)
Dept: ORTHOPEDICS | Facility: CLINIC | Age: 57
End: 2022-08-10
Payer: MEDICAID

## 2022-08-10 VITALS — BODY MASS INDEX: 39.24 KG/M2 | WEIGHT: 250 LBS | HEIGHT: 67 IN

## 2022-08-10 DIAGNOSIS — M65.4 DE QUERVAIN'S TENOSYNOVITIS, LEFT: Primary | ICD-10-CM

## 2022-08-10 DIAGNOSIS — E66.9 OBESITY, UNSPECIFIED CLASSIFICATION, UNSPECIFIED OBESITY TYPE, UNSPECIFIED WHETHER SERIOUS COMORBIDITY PRESENT: ICD-10-CM

## 2022-08-10 PROCEDURE — 3008F BODY MASS INDEX DOCD: CPT | Mod: CPTII,95,, | Performed by: STUDENT IN AN ORGANIZED HEALTH CARE EDUCATION/TRAINING PROGRAM

## 2022-08-10 PROCEDURE — 4010F PR ACE/ARB THEARPY RXD/TAKEN: ICD-10-PCS | Mod: CPTII,95,, | Performed by: STUDENT IN AN ORGANIZED HEALTH CARE EDUCATION/TRAINING PROGRAM

## 2022-08-10 PROCEDURE — 1160F PR REVIEW ALL MEDS BY PRESCRIBER/CLIN PHARMACIST DOCUMENTED: ICD-10-PCS | Mod: CPTII,95,, | Performed by: STUDENT IN AN ORGANIZED HEALTH CARE EDUCATION/TRAINING PROGRAM

## 2022-08-10 PROCEDURE — 4010F ACE/ARB THERAPY RXD/TAKEN: CPT | Mod: CPTII,95,, | Performed by: STUDENT IN AN ORGANIZED HEALTH CARE EDUCATION/TRAINING PROGRAM

## 2022-08-10 PROCEDURE — 3066F NEPHROPATHY DOC TX: CPT | Mod: CPTII,95,, | Performed by: STUDENT IN AN ORGANIZED HEALTH CARE EDUCATION/TRAINING PROGRAM

## 2022-08-10 PROCEDURE — 1159F PR MEDICATION LIST DOCUMENTED IN MEDICAL RECORD: ICD-10-PCS | Mod: CPTII,95,, | Performed by: STUDENT IN AN ORGANIZED HEALTH CARE EDUCATION/TRAINING PROGRAM

## 2022-08-10 PROCEDURE — 99214 PR OFFICE/OUTPT VISIT, EST, LEVL IV, 30-39 MIN: ICD-10-PCS | Mod: 95,,, | Performed by: STUDENT IN AN ORGANIZED HEALTH CARE EDUCATION/TRAINING PROGRAM

## 2022-08-10 PROCEDURE — 3061F NEG MICROALBUMINURIA REV: CPT | Mod: CPTII,95,, | Performed by: STUDENT IN AN ORGANIZED HEALTH CARE EDUCATION/TRAINING PROGRAM

## 2022-08-10 PROCEDURE — 1159F MED LIST DOCD IN RCRD: CPT | Mod: CPTII,95,, | Performed by: STUDENT IN AN ORGANIZED HEALTH CARE EDUCATION/TRAINING PROGRAM

## 2022-08-10 PROCEDURE — 99214 OFFICE O/P EST MOD 30 MIN: CPT | Mod: 95,,, | Performed by: STUDENT IN AN ORGANIZED HEALTH CARE EDUCATION/TRAINING PROGRAM

## 2022-08-10 PROCEDURE — 3008F PR BODY MASS INDEX (BMI) DOCUMENTED: ICD-10-PCS | Mod: CPTII,95,, | Performed by: STUDENT IN AN ORGANIZED HEALTH CARE EDUCATION/TRAINING PROGRAM

## 2022-08-10 PROCEDURE — 3061F PR NEG MICROALBUMINURIA RESULT DOCUMENTED/REVIEW: ICD-10-PCS | Mod: CPTII,95,, | Performed by: STUDENT IN AN ORGANIZED HEALTH CARE EDUCATION/TRAINING PROGRAM

## 2022-08-10 PROCEDURE — 1160F RVW MEDS BY RX/DR IN RCRD: CPT | Mod: CPTII,95,, | Performed by: STUDENT IN AN ORGANIZED HEALTH CARE EDUCATION/TRAINING PROGRAM

## 2022-08-10 PROCEDURE — 3066F PR DOCUMENTATION OF TREATMENT FOR NEPHROPATHY: ICD-10-PCS | Mod: CPTII,95,, | Performed by: STUDENT IN AN ORGANIZED HEALTH CARE EDUCATION/TRAINING PROGRAM

## 2022-08-10 NOTE — PROGRESS NOTES
This is a telemedicine encounter note.   Michael Stone was evaluated and treated using telemedicine, real time audio and video attempted but video unsuccessful, according to [facility] protocols.   I, Gertrudis Isidro MD, conducted the visit from [facility] or an [facility] connected phone line. The patient participated in the visit at a non-OU location selected by the patient (or patients representative), identified as there personal residence in [place], [place]. I am currently licensed in the [facility] where the patient stated they are located. The patient (or patients representative) stated that they understood and accepted the privacy and security risks to their information at their location. The platform used for this telemedicine encounter was through our [facility] ro.      Subjective:       Existing pt, last seen 03/24/2022   Patient ID: Michael Stone is a 56 y.o. male. Non-smoker. Employment HX: [employer], currently employed.        Chief Complaint: Follow-up of the Left Wrist and Follow-up of the Left Knee      HPI:    56 Years RHD Male presents to [facility] Clinic for follow-up visit for left wrist pain.  Patient was given steroid injection to his left 1st dorsal extensor compartment at his last visit in March.  Patient states his symptoms have resolved since the steroid injection at last visit    Onset: ~ late 2021. he has pain over his medial wrist near the base of his thumb especially when he tucks his thumb in his palm.  No prior injuries.  Previous treatment: CSI to 1st dorsal extensor compartment of 03/24/2022, resolution of symptoms  Current pain level: 0/10   Modifying factors: worse with activity;   Associated Symptoms: no numbness or tingling; intermittent swelling ; no skin changes; no weakness; no decrease in ROM  Activity: sedentary, able to complete ADLs    Note:  Pt is also seen for bilateral knee pain.  he completed Orthovisc series on 02/04/2022.   He is unsure of next the visit for this issue but would like to be seen.  He also notes he has been referred to a vascular specialist by his PCP unsure when their visit is.      Current Outpatient Medications:     aspirin (ECOTRIN) 81 MG EC tablet, Take 1 tablet (81 mg total) by mouth once daily., Disp: 90 tablet, Rfl: 3    atorvastatin (LIPITOR) 80 MG tablet, Take 1 tablet (80 mg total) by mouth nightly., Disp: 90 tablet, Rfl: 3    furosemide (LASIX) 20 MG tablet, Take 1 tablet (20 mg total) by mouth once daily., Disp: 90 tablet, Rfl: 3    HUMALOG U-100 INSULIN 100 unit/mL injection, Inject 20 Units into the skin 3 (three) times daily before meals., Disp: 60 mL, Rfl: 1    hydroCHLOROthiazide (HYDRODIURIL) 50 MG tablet, Take 2 tablets (100 mg total) by mouth once daily., Disp: 180 tablet, Rfl: 3    HYDROcodone-acetaminophen (NORCO) 7.5-325 mg per tablet, Take 1 tablet by mouth every 6 (six) hours as needed for Pain., Disp: 15 tablet, Rfl: 0    insulin syringes, disposable, 1 mL Syrg, Inject 1 Syringe into the skin 3 (three) times daily before meals., Disp: 100 each, Rfl: 3    LANTUS U-100 INSULIN 100 unit/mL injection, INJECT 42 UNITS SUBCUTANEOUSLY TWICE DAILY WITH BREAKFAST AND AT BEDTIME (INCREASED FROM 36 UNITS TWICE DAILY), Disp: , Rfl:     losartan (COZAAR) 100 MG tablet, Take 1 tablet (100 mg total) by mouth once daily., Disp: 90 tablet, Rfl: 3    meloxicam (MOBIC) 7.5 MG tablet, Take 15 mg by mouth., Disp: , Rfl:     metFORMIN (GLUCOPHAGE) 1000 MG tablet, Take 1 tablet (1,000 mg total) by mouth 2 (two) times daily with meals., Disp: 180 tablet, Rfl: 1    NIFEdipine (ADALAT CC) 90 MG TbSR, Take 1 tablet (90 mg total) by mouth once daily., Disp: 90 tablet, Rfl: 3    nitroGLYCERIN (NITROSTAT) 0.4 MG SL tablet, Place 1 tablet (0.4 mg total) under the tongue every 5 (five) minutes as needed for Chest pain., Disp: 25 tablet, Rfl: 6    ONETOUCH ULTRA TEST Strp, USE THREE TIMES A DAY AS DIRECTED BY  "DOCTOR, Disp: , Rfl:     potassium chloride (K-TAB) 20 mEq, Take 1 tablet (20 mEq total) by mouth once daily., Disp: 90 tablet, Rfl: 3    Review of patient's allergies indicates:  No Known Allergies    No results found for: LABA1C   Body mass index is 39.16 kg/m².   Vitals:    08/10/22 1359 08/10/22 1400 08/10/22 1401   Weight: 113.4 kg (250 lb)     Height: 5' 7" (1.702 m)     PainSc:    2   7        ROS   Review of Systems:  A ten-point review of systems was performed and is negative, except as mentioned above.        Objective:      General: pt cooperative with conversation  PSYCH: alert and oriented with appropriate mood  Resp: quiet breathing, no perceived SOB  MSK: limited by telemed      Imaging:  X-ray reviewed and independently interpreted by me.  Discussed with patient.      As per my interpretation of this patient's left wrist plain film. Joint spaces are grossly preserved. There are no fractures or dislocations noted.        Assessment:             1. De Quervain's tenosynovitis, left    2. Obesity, unspecified classification, unspecified obesity type, unspecified whether serious comorbidity present            Plan:          56 Years old patient returning for evaluation of left wrist pain likely secondary to de Quervain's tenosynovitis.  Patient was given CSI his last visit 4 and 1/2 months ago and reports resolution of his wrist symptoms.      Radiological studies interpreted by me, my independent interpretation attached, reviewed my findings with patient and showed them their images  Activity as tolerated, behavior modification encouraged.   Encouraged HEP daily, Ice/Heat prn, NSAIDs/Tylenol/topical anasthetics PRN, med precautions given,   Follow up PRN     BMI today: Body mass index is 39.16 kg/m².     Goal BMI: <30    Exercise prescription given to patient to increase fitness and facilitate weight loss. Prescription to include goals of 150 to 300 minutes/week of moderate intensity exercise include " activities like brisk walking, light biking or water exercise and/or vigorous activity 75 to 150 minutes/week to include activities like jogging, swimming, fast bicycling or tennis. They were given goal of 7,500-10,000 steps per day. Muscle strength training was also discussed and recommended goal of 2 to 3 days a week to include activities like activities with elastic bands, body weight exercises or dumbbells.        real time audio and video attempted but video unsuccessful Of the time spent with patient,over 50% of which was used for education and counseling regarding medical conditions, current medications including risk/benefit and side effects/adverse events, over the counter medications-uses/doses, home self-care and contact precautions, and red flags and indications for immediate medical attention.  The patient is receptive, expresses understanding and is agreeable to plan. All questions answered to patient's satisfaction.    NOTE:  Extended time spent with patient collecting history and educating on DX and treatment plan.  10 minutes spent prior to call reviewing EMR: prior notes, outside provider documentation, labs and imaging.   10 minutes spent on telemedicine call with the patient obtaining history, observing patient's body part and movement, reviewing results and discussing treatment plan and recommendations.  10 minutes spent after call completing electronic health record documentation.    Total time devoted to this patient: 30 minutes     Gertrudis Isidro MD

## 2022-08-10 NOTE — TELEPHONE ENCOUNTER
Attempted to call patient at both numbers listed in chart for virtual visit later today with Dr Isidro. Patient did not answer with number and was not able to leave message on either phone number listed in chart. Will attempt to call again closer to appt time.

## 2022-08-16 ENCOUNTER — OFFICE VISIT (OUTPATIENT)
Dept: ORTHOPEDICS | Facility: CLINIC | Age: 57
End: 2022-08-16
Payer: MEDICAID

## 2022-08-16 VITALS
RESPIRATION RATE: 20 BRPM | WEIGHT: 272.19 LBS | DIASTOLIC BLOOD PRESSURE: 94 MMHG | BODY MASS INDEX: 42.72 KG/M2 | HEIGHT: 67 IN | HEART RATE: 66 BPM | SYSTOLIC BLOOD PRESSURE: 149 MMHG

## 2022-08-16 DIAGNOSIS — M17.12 PRIMARY OSTEOARTHRITIS OF LEFT KNEE: Primary | ICD-10-CM

## 2022-08-16 DIAGNOSIS — E66.9 OBESITY, UNSPECIFIED CLASSIFICATION, UNSPECIFIED OBESITY TYPE, UNSPECIFIED WHETHER SERIOUS COMORBIDITY PRESENT: ICD-10-CM

## 2022-08-16 DIAGNOSIS — M17.11 PRIMARY OSTEOARTHRITIS OF RIGHT KNEE: ICD-10-CM

## 2022-08-16 DIAGNOSIS — E11.65 UNCONTROLLED TYPE 2 DIABETES MELLITUS WITH HYPERGLYCEMIA: ICD-10-CM

## 2022-08-16 PROCEDURE — 3008F BODY MASS INDEX DOCD: CPT | Mod: CPTII,,, | Performed by: STUDENT IN AN ORGANIZED HEALTH CARE EDUCATION/TRAINING PROGRAM

## 2022-08-16 PROCEDURE — 99214 OFFICE O/P EST MOD 30 MIN: CPT | Mod: S$PBB,,, | Performed by: STUDENT IN AN ORGANIZED HEALTH CARE EDUCATION/TRAINING PROGRAM

## 2022-08-16 PROCEDURE — 3061F NEG MICROALBUMINURIA REV: CPT | Mod: CPTII,,, | Performed by: STUDENT IN AN ORGANIZED HEALTH CARE EDUCATION/TRAINING PROGRAM

## 2022-08-16 PROCEDURE — 3066F PR DOCUMENTATION OF TREATMENT FOR NEPHROPATHY: ICD-10-PCS | Mod: CPTII,,, | Performed by: STUDENT IN AN ORGANIZED HEALTH CARE EDUCATION/TRAINING PROGRAM

## 2022-08-16 PROCEDURE — 99215 OFFICE O/P EST HI 40 MIN: CPT | Mod: PBBFAC | Performed by: STUDENT IN AN ORGANIZED HEALTH CARE EDUCATION/TRAINING PROGRAM

## 2022-08-16 PROCEDURE — 4010F PR ACE/ARB THEARPY RXD/TAKEN: ICD-10-PCS | Mod: CPTII,,, | Performed by: STUDENT IN AN ORGANIZED HEALTH CARE EDUCATION/TRAINING PROGRAM

## 2022-08-16 PROCEDURE — 3008F PR BODY MASS INDEX (BMI) DOCUMENTED: ICD-10-PCS | Mod: CPTII,,, | Performed by: STUDENT IN AN ORGANIZED HEALTH CARE EDUCATION/TRAINING PROGRAM

## 2022-08-16 PROCEDURE — 3066F NEPHROPATHY DOC TX: CPT | Mod: CPTII,,, | Performed by: STUDENT IN AN ORGANIZED HEALTH CARE EDUCATION/TRAINING PROGRAM

## 2022-08-16 PROCEDURE — 1159F PR MEDICATION LIST DOCUMENTED IN MEDICAL RECORD: ICD-10-PCS | Mod: CPTII,,, | Performed by: STUDENT IN AN ORGANIZED HEALTH CARE EDUCATION/TRAINING PROGRAM

## 2022-08-16 PROCEDURE — 3077F SYST BP >= 140 MM HG: CPT | Mod: CPTII,,, | Performed by: STUDENT IN AN ORGANIZED HEALTH CARE EDUCATION/TRAINING PROGRAM

## 2022-08-16 PROCEDURE — 1160F RVW MEDS BY RX/DR IN RCRD: CPT | Mod: CPTII,,, | Performed by: STUDENT IN AN ORGANIZED HEALTH CARE EDUCATION/TRAINING PROGRAM

## 2022-08-16 PROCEDURE — 1159F MED LIST DOCD IN RCRD: CPT | Mod: CPTII,,, | Performed by: STUDENT IN AN ORGANIZED HEALTH CARE EDUCATION/TRAINING PROGRAM

## 2022-08-16 PROCEDURE — 3061F PR NEG MICROALBUMINURIA RESULT DOCUMENTED/REVIEW: ICD-10-PCS | Mod: CPTII,,, | Performed by: STUDENT IN AN ORGANIZED HEALTH CARE EDUCATION/TRAINING PROGRAM

## 2022-08-16 PROCEDURE — 99214 PR OFFICE/OUTPT VISIT, EST, LEVL IV, 30-39 MIN: ICD-10-PCS | Mod: S$PBB,,, | Performed by: STUDENT IN AN ORGANIZED HEALTH CARE EDUCATION/TRAINING PROGRAM

## 2022-08-16 PROCEDURE — 3077F PR MOST RECENT SYSTOLIC BLOOD PRESSURE >= 140 MM HG: ICD-10-PCS | Mod: CPTII,,, | Performed by: STUDENT IN AN ORGANIZED HEALTH CARE EDUCATION/TRAINING PROGRAM

## 2022-08-16 PROCEDURE — 3080F PR MOST RECENT DIASTOLIC BLOOD PRESSURE >= 90 MM HG: ICD-10-PCS | Mod: CPTII,,, | Performed by: STUDENT IN AN ORGANIZED HEALTH CARE EDUCATION/TRAINING PROGRAM

## 2022-08-16 PROCEDURE — 1160F PR REVIEW ALL MEDS BY PRESCRIBER/CLIN PHARMACIST DOCUMENTED: ICD-10-PCS | Mod: CPTII,,, | Performed by: STUDENT IN AN ORGANIZED HEALTH CARE EDUCATION/TRAINING PROGRAM

## 2022-08-16 PROCEDURE — 3080F DIAST BP >= 90 MM HG: CPT | Mod: CPTII,,, | Performed by: STUDENT IN AN ORGANIZED HEALTH CARE EDUCATION/TRAINING PROGRAM

## 2022-08-16 PROCEDURE — 4010F ACE/ARB THERAPY RXD/TAKEN: CPT | Mod: CPTII,,, | Performed by: STUDENT IN AN ORGANIZED HEALTH CARE EDUCATION/TRAINING PROGRAM

## 2022-08-16 NOTE — PROGRESS NOTES
"  Subjective:       Existing pt, last seen February 2022, given Orthovisc series from January until beginning of February.   Patient ID: Michael Stone is a 57 y.o. male. Non-smoker. Employment HX: [employer], currently employed.         Chief Complaint: Pain of the Left Knee    HPI:    56 Years Male presents to sports medicine clinic for follow up visit of bilateral knee pain, more pain in left knee. Last here in Feb 2022, completed orthovisc series, last note reviewed. He he had significant relief after completing the Orthovisc series. Occasionally it swells and he applies arthritis cream with some relief. He has been having more and more pain in the right knee recently but was given meds by his PCP which improved his discomfort.    Onset:~Feb 2021. pain is worsening , trying to manage with tylenol and meloxicam.  Current pain level: 5/10 "rated as stable"  Quality described as: aching, throbbing, sharp  Modifying factors: worse with activity; stiffness after immobilization; stiffness improved with <30min of activity  Previous treatment: meloxicam, good relief. Completed PT in Aug 2021  Associated symptoms: crepitus/grinding; no numbness/tingling; weakness; intermittent swelling; no skin changes; mild decrease in ROM  Activity: sedentary, full ADLs; pain interferes with function/daily activity "mildly"      Note: Labs reviewed by myself, patient's hemoglobin A1c from May 2022 was found to be 8.9.      Current Outpatient Medications:     aspirin (ECOTRIN) 81 MG EC tablet, Take 1 tablet (81 mg total) by mouth once daily., Disp: 90 tablet, Rfl: 3    atorvastatin (LIPITOR) 80 MG tablet, Take 1 tablet (80 mg total) by mouth nightly., Disp: 90 tablet, Rfl: 3    furosemide (LASIX) 20 MG tablet, Take 1 tablet (20 mg total) by mouth once daily., Disp: 90 tablet, Rfl: 3    HUMALOG U-100 INSULIN 100 unit/mL injection, Inject 20 Units into the skin 3 (three) times daily before meals., Disp: 60 mL, Rfl: 1    " "hydroCHLOROthiazide (HYDRODIURIL) 50 MG tablet, Take 2 tablets (100 mg total) by mouth once daily., Disp: 180 tablet, Rfl: 3    insulin syringes, disposable, 1 mL Syrg, Inject 1 Syringe into the skin 3 (three) times daily before meals., Disp: 100 each, Rfl: 3    LANTUS U-100 INSULIN 100 unit/mL injection, INJECT 42 UNITS SUBCUTANEOUSLY TWICE DAILY WITH BREAKFAST AND AT BEDTIME (INCREASED FROM 36 UNITS TWICE DAILY), Disp: , Rfl:     losartan (COZAAR) 100 MG tablet, Take 1 tablet (100 mg total) by mouth once daily., Disp: 90 tablet, Rfl: 3    meloxicam (MOBIC) 7.5 MG tablet, Take 15 mg by mouth., Disp: , Rfl:     metFORMIN (GLUCOPHAGE) 1000 MG tablet, Take 1 tablet (1,000 mg total) by mouth 2 (two) times daily with meals., Disp: 180 tablet, Rfl: 1    NIFEdipine (ADALAT CC) 90 MG TbSR, Take 1 tablet (90 mg total) by mouth once daily., Disp: 90 tablet, Rfl: 3    nitroGLYCERIN (NITROSTAT) 0.4 MG SL tablet, Place 1 tablet (0.4 mg total) under the tongue every 5 (five) minutes as needed for Chest pain., Disp: 25 tablet, Rfl: 6    ONETOUCH ULTRA TEST Strp, USE THREE TIMES A DAY AS DIRECTED BY DOCTOR, Disp: , Rfl:     potassium chloride (K-TAB) 20 mEq, Take 1 tablet (20 mEq total) by mouth once daily., Disp: 90 tablet, Rfl: 3    HYDROcodone-acetaminophen (NORCO) 7.5-325 mg per tablet, Take 1 tablet by mouth every 6 (six) hours as needed for Pain., Disp: 15 tablet, Rfl: 0    Review of patient's allergies indicates:  No Known Allergies    No results found for: LABA1C   Body mass index is 42.63 kg/m².   Vitals:    08/16/22 0720   BP: (!) 149/94   Pulse: 66   Resp: 20   Weight: 123.5 kg (272 lb 3.2 oz)   Height: 5' 7" (1.702 m)   PainSc:   5        ROS   Review of Systems:  A ten-point review of systems was performed and is negative, except as mentioned above.        Objective:        General: well developed; well nourished; cooperative  PSYCH: alert and oriented with appropriate mood and affect  SKIN: inspection and " palpation of skin and soft tissue normal;  CV: vascular integrity noted; +2 symmetrical pulses  Resp: Normal work of breathing, quiet breathing    Leftknee   Inspection:   Normal gait/station; full weight bearing; normal alignment; mild swelling; no erythema; no bruising; no atrophy or deconditioning noted   Palpation: non-tender; Crepitus: present  ROM:   Active, passive Flexion (0-140):130  Active, passive Extension : 0  Strength: Flexion 5/5, Extension 5/5  Special Tests:  Ballotable Effusion: Negative  Fluid Wave: Negative   Anterior Drawer: Negative  Lachman: Negative  Pivot Shift: Negative   Posterior Sag Sign: Negative  Posterior Drawer: Negative  Dial Test: not tested   Varus Stress: LCL stable at 0 and 30 degrees   Valgus Stress: MCL stable at 0 and 30 degrees   Patellar Grind: Negative   Patella Tracking: normal  Patella Crepitation: none  Micheal: Negative    Neurovascular: Intact; 2+ distal pulse, sensation intact to light touch  Reflexes: 2+          Imaging:  X-ray reviewed and independently interpreted by me.  Discussed with patient.    As per my interpretation of this patient's leftknee plain films there are changes consistent with moderate DJD as evidenced by medial joint space narrowing, tricompartmental osteophyte presence and subchondral sclerosis. No fracture/dislocations noted.      Assessment:             1. Primary osteoarthritis of left knee    2. Primary osteoarthritis of right knee    3. Uncontrolled type 2 diabetes mellitus with hyperglycemia    4. Obesity, unspecified classification, unspecified obesity type, unspecified whether serious comorbidity present          Plan:          57 Years old patient returning for evaluation of worsening left knee pain likely secondary to OA and obesity.      Moderate chronic exacerbation  Radiological studies interpreted by me, my independent interpretation attached, reviewed my findings with patient and showed them their images  Will hold off on  injections until the pain and disability warrant  Activity as tolerated, behavior modification encouraged  Continue HEP daily, Ice/Heat prn, NSAIDs/Tylenol/topical anasthetics PRN, med precautions given,   Follow up 8 weeks or sooner if condition worsens    2. Uncontrolled diabetes mellitus   Most recent hemoglobin A1c:  8.9 from May 2022, encouraged to continue following with PCP for management of diabetes,goal to keep  hemoglobin A1c <8  .   3. Obesity   BMI today:42  Goal BMI: <40    Exercise prescription given to patient to increase fitness and facilitate weight loss. Prescription to include goals of 150 to 300 minutes/week of moderate intensity exercise include activities like brisk walking, light biking or water exercise and/or vigorous activity 75 to 150 minutes/week to include activities like jogging, swimming, fast bicycling or tennis. They were given goal of 7,500-10,000 steps per day. Muscle strength training was also discussed and recommended goal of 2 to 3 days a week to include activities like activities with elastic bands, body weight exercises or dumbbells.        Gertrudis Isidro MD

## 2022-08-18 ENCOUNTER — PATIENT OUTREACH (OUTPATIENT)
Dept: EMERGENCY MEDICINE | Facility: HOSPITAL | Age: 57
End: 2022-08-18
Payer: MEDICAID

## 2022-08-19 ENCOUNTER — OFFICE VISIT (OUTPATIENT)
Dept: INTERNAL MEDICINE | Facility: CLINIC | Age: 57
End: 2022-08-19
Payer: MEDICAID

## 2022-08-19 ENCOUNTER — HOSPITAL ENCOUNTER (OUTPATIENT)
Dept: WOUND CARE | Facility: HOSPITAL | Age: 57
Discharge: HOME | End: 2022-08-19
Attending: NURSE PRACTITIONER
Payer: MEDICAID

## 2022-08-19 VITALS
WEIGHT: 266.75 LBS | DIASTOLIC BLOOD PRESSURE: 80 MMHG | RESPIRATION RATE: 18 BRPM | HEART RATE: 62 BPM | TEMPERATURE: 98 F | BODY MASS INDEX: 41.87 KG/M2 | SYSTOLIC BLOOD PRESSURE: 140 MMHG | HEIGHT: 67 IN

## 2022-08-19 DIAGNOSIS — I87.2 VENOUS INSUFFICIENCY: ICD-10-CM

## 2022-08-19 DIAGNOSIS — L60.2 OVERGROWN TOENAILS: Primary | ICD-10-CM

## 2022-08-19 DIAGNOSIS — B35.3 TINEA PEDIS OF BOTH FEET: ICD-10-CM

## 2022-08-19 DIAGNOSIS — G47.33 OSA (OBSTRUCTIVE SLEEP APNEA): ICD-10-CM

## 2022-08-19 DIAGNOSIS — E78.5 HYPERLIPIDEMIA LDL GOAL <70: ICD-10-CM

## 2022-08-19 DIAGNOSIS — E11.65 UNCONTROLLED TYPE 2 DIABETES MELLITUS WITH HYPERGLYCEMIA: Primary | ICD-10-CM

## 2022-08-19 DIAGNOSIS — M17.0 PRIMARY OSTEOARTHRITIS OF BOTH KNEES: ICD-10-CM

## 2022-08-19 DIAGNOSIS — I25.10 MILD CAD: ICD-10-CM

## 2022-08-19 DIAGNOSIS — M48.02 SPINAL STENOSIS OF CERVICAL REGION: ICD-10-CM

## 2022-08-19 DIAGNOSIS — R60.0 EDEMA OF LOWER EXTREMITY: ICD-10-CM

## 2022-08-19 DIAGNOSIS — E66.01 MORBID OBESITY WITH BMI OF 40.0-44.9, ADULT: ICD-10-CM

## 2022-08-19 DIAGNOSIS — L84 CALLUS OF FOOT: ICD-10-CM

## 2022-08-19 DIAGNOSIS — E11.65 UNCONTROLLED TYPE 2 DIABETES MELLITUS WITH HYPERGLYCEMIA: ICD-10-CM

## 2022-08-19 DIAGNOSIS — L60.3 DYSTROPHIC NAIL: ICD-10-CM

## 2022-08-19 DIAGNOSIS — I10 PRIMARY HYPERTENSION: ICD-10-CM

## 2022-08-19 PROCEDURE — 99213 OFFICE O/P EST LOW 20 MIN: CPT | Mod: ,,, | Performed by: NURSE PRACTITIONER

## 2022-08-19 PROCEDURE — 3066F NEPHROPATHY DOC TX: CPT | Mod: CPTII,,, | Performed by: NURSE PRACTITIONER

## 2022-08-19 PROCEDURE — 99213 PR OFFICE/OUTPT VISIT, EST, LEVL III, 20-29 MIN: ICD-10-PCS | Mod: ,,, | Performed by: NURSE PRACTITIONER

## 2022-08-19 PROCEDURE — 3008F PR BODY MASS INDEX (BMI) DOCUMENTED: ICD-10-PCS | Mod: CPTII,,, | Performed by: NURSE PRACTITIONER

## 2022-08-19 PROCEDURE — 11719 TRIM NAIL(S) ANY NUMBER: CPT

## 2022-08-19 PROCEDURE — 99214 PR OFFICE/OUTPT VISIT, EST, LEVL IV, 30-39 MIN: ICD-10-PCS | Mod: S$PBB,,, | Performed by: NURSE PRACTITIONER

## 2022-08-19 PROCEDURE — 4010F PR ACE/ARB THEARPY RXD/TAKEN: ICD-10-PCS | Mod: CPTII,,, | Performed by: NURSE PRACTITIONER

## 2022-08-19 PROCEDURE — 99215 OFFICE O/P EST HI 40 MIN: CPT | Mod: PBBFAC | Performed by: NURSE PRACTITIONER

## 2022-08-19 PROCEDURE — 3061F PR NEG MICROALBUMINURIA RESULT DOCUMENTED/REVIEW: ICD-10-PCS | Mod: CPTII,,, | Performed by: NURSE PRACTITIONER

## 2022-08-19 PROCEDURE — 3079F PR MOST RECENT DIASTOLIC BLOOD PRESSURE 80-89 MM HG: ICD-10-PCS | Mod: CPTII,,, | Performed by: NURSE PRACTITIONER

## 2022-08-19 PROCEDURE — 3077F PR MOST RECENT SYSTOLIC BLOOD PRESSURE >= 140 MM HG: ICD-10-PCS | Mod: CPTII,,, | Performed by: NURSE PRACTITIONER

## 2022-08-19 PROCEDURE — 99214 OFFICE O/P EST MOD 30 MIN: CPT | Mod: S$PBB,,, | Performed by: NURSE PRACTITIONER

## 2022-08-19 PROCEDURE — 3061F NEG MICROALBUMINURIA REV: CPT | Mod: CPTII,,, | Performed by: NURSE PRACTITIONER

## 2022-08-19 PROCEDURE — 4010F ACE/ARB THERAPY RXD/TAKEN: CPT | Mod: CPTII,,, | Performed by: NURSE PRACTITIONER

## 2022-08-19 PROCEDURE — 3077F SYST BP >= 140 MM HG: CPT | Mod: CPTII,,, | Performed by: NURSE PRACTITIONER

## 2022-08-19 PROCEDURE — 3008F BODY MASS INDEX DOCD: CPT | Mod: CPTII,,, | Performed by: NURSE PRACTITIONER

## 2022-08-19 PROCEDURE — 3066F PR DOCUMENTATION OF TREATMENT FOR NEPHROPATHY: ICD-10-PCS | Mod: CPTII,,, | Performed by: NURSE PRACTITIONER

## 2022-08-19 PROCEDURE — 3079F DIAST BP 80-89 MM HG: CPT | Mod: CPTII,,, | Performed by: NURSE PRACTITIONER

## 2022-08-19 RX ORDER — AMMONIUM LACTATE 12 G/100G
1 CREAM TOPICAL 2 TIMES DAILY
Qty: 385 G | Refills: 11 | Status: SHIPPED | OUTPATIENT
Start: 2022-08-19 | End: 2022-09-18

## 2022-08-19 RX ORDER — NIFEDIPINE 30 MG/1
30 TABLET, EXTENDED RELEASE ORAL NIGHTLY
Qty: 90 TABLET | Refills: 1 | Status: SHIPPED | OUTPATIENT
Start: 2022-08-19 | End: 2022-11-02 | Stop reason: SDUPTHER

## 2022-08-19 RX ORDER — INSULIN GLARGINE 100 [IU]/ML
INJECTION, SOLUTION SUBCUTANEOUS
Qty: 20 ML | Refills: 1 | Status: SHIPPED | OUTPATIENT
Start: 2022-08-19 | End: 2022-11-02 | Stop reason: SDUPTHER

## 2022-08-19 NOTE — ASSESSMENT & PLAN NOTE
Elevated.  Continue procardia 90 mg qam.  Rx procardia 30 mg qpm.  Continue HCTZ, losartan and lasix.  Follow a low sodium (less than 2 grams of sodium per day), DASH diet.   Continue medications as prescribed.  Encouraged smoking cessation to aid in BP reduction and co-morbidities.   Maintain healthy weight with a BMI goal of <30.   Aerobic exercise for 150 minutes per week (or 5 days a week for 30 minutes each day).

## 2022-08-19 NOTE — ASSESSMENT & PLAN NOTE
Keep appt with Dr. Gallo as scheduled.  Continue lasix daily.   Keep the affected body part raised (elevated) above the level of your heart when you are sitting or lying down.  Do not sit still or stand for long periods of time.  Do not wear tight clothing. Do not wear garters on your upper legs.  Exercise your legs to get your circulation going. This helps to move the fluid back into your blood vessels, and it may help the swelling go down.  Wear elastic bandages or support stockings to reduce swelling as told by your health care provider.  Eat a low-salt (low-sodium) diet to reduce fluid as told by your health care provider.  Depending on the cause of your swelling, you may need to limit how much fluid you drink (fluid restriction).  Take over-the-counter and prescription medicines only as told by your health care provider.

## 2022-08-19 NOTE — ASSESSMENT & PLAN NOTE
Foot care discussed.  Is compliant with diabetic foot care regimen.  Educated on achieving adequate glucose control.

## 2022-08-19 NOTE — PROGRESS NOTES
"  BIANCA Cabral   [facility]  [address] [address]      PATIENT NAME: Michael Stone  : 1965  DATE: 22  MRN: [id number]      Billing Provider: BIANCA Cabral  Level of Service: AR OFFICE/OUTPT VISIT, EST, LEVL IV, 30-39 MIN  Patient PCP Information     Provider PCP Type    BIANCA Cabral General          Reason for Visit / Chief Complaint: Follow-up (Lab results, needs refills)       History of Present Illness / Problem Focused Workflow     Michael Stone is a 57 y.o. Black or  male presents to the clinic for DM f/u. PMH uncontrolled DM, HLD, HTN, mild CAD, Covid pneumonia (21), adrenal adenoma, vit d deficiency, morbid obesity, cervical spinal stenosis, MINA (Cpap returned d/t noncompliance), bilateral knee OA, tinea pedis and med non-compliance. Followed by [facility] cardio, ortho and wound clinics. Reports has been taking lantus daily instead of BID as ordered; reports other med compliance. Reports having BP and CBG log; did not bring either to clinic and cannot recall exact readings. States some "real high and some fred low, depending on what I eat." Was referred to Dr. Gallo for venous reflux per cardio; states appt coming up. Has wound care appt today for DFC. Continues to report some SOBOE. Attempts to follow ADA/dash diet; does not exercise. Tested positive for influenza on 22. Completed repeat sleep study; is awaiting equipment. Plans to be compliant this time. Denies chest pain, cough, fever, headache, dizziness, weakness, abdominal pain, nausea, vomiting, diarrhea, constipation, dysuria, depression, anxiety, SI/HI.    Review of Systems     Review of Systems   Constitutional: Positive for fatigue.   Endocrine: Positive for polydipsia and polyuria. Negative for polyphagia.   Integumentary:  Negative for pallor.   Neurological: Negative for dizziness, tremors, seizures, " speech difficulty, weakness and headaches.   Psychiatric/Behavioral: Negative for confusion. The patient is not nervous/anxious.         Medical / Social / Family History     Past Medical History:   Diagnosis Date    Adrenal adenoma     Callus of foot 8/19/2022    Coronary artery disease     Diabetes mellitus     Hyperlipidemia     Hypertension     Spinal stenosis     Unspecified osteoarthritis, unspecified site        Past Surgical History:   Procedure Laterality Date    ANGIOGRAPHY      CHOLECYSTECTOMY      FOOT SURGERY Right        Social History  Mr. Stone  reports that he has never smoked. He has never used smokeless tobacco. He reports that he does not drink alcohol and does not use drugs.    Family History  Mr. Stone's family history includes Cancer in his sister and sister; Heart disease in his brother, brother, mother, sister, and sister; Hypertension in his mother; Stroke in his father.    Medications and Allergies     Medications  Medication List with Changes/Refills   New Medications    AMMONIUM LACTATE 12 % CREA    Apply 1 application topically 2 (two) times a day. Apply thin layer of Vaseline over Lac-Hydrin twice a day.  NOTHING BETWEEN TOES.    INSULIN GLARGINE (LANTUS U-100 INSULIN) 100 UNIT/ML INJECTION    Inject 42 units subq qam and 20 units at bedtime daily.    NIFEDIPINE (PROCARDIA-XL) 30 MG (OSM) 24 HR TABLET    Take 1 tablet (30 mg total) by mouth every evening.   Current Medications    ASPIRIN (ECOTRIN) 81 MG EC TABLET    Take 1 tablet (81 mg total) by mouth once daily.    ATORVASTATIN (LIPITOR) 80 MG TABLET    Take 1 tablet (80 mg total) by mouth nightly.    FUROSEMIDE (LASIX) 20 MG TABLET    Take 1 tablet (20 mg total) by mouth once daily.    HUMALOG U-100 INSULIN 100 UNIT/ML INJECTION    Inject 20 Units into the skin 3 (three) times daily before meals.    HYDROCHLOROTHIAZIDE (HYDRODIURIL) 50 MG TABLET    Take 2 tablets (100 mg total) by mouth once daily.    INSULIN  "SYRINGES, DISPOSABLE, 1 ML SYRG    Inject 1 Syringe into the skin 3 (three) times daily before meals.    LOSARTAN (COZAAR) 100 MG TABLET    Take 1 tablet (100 mg total) by mouth once daily.    MELOXICAM (MOBIC) 7.5 MG TABLET    Take 15 mg by mouth.    METFORMIN (GLUCOPHAGE) 1000 MG TABLET    Take 1 tablet (1,000 mg total) by mouth 2 (two) times daily with meals.    NIFEDIPINE (ADALAT CC) 90 MG TBSR    Take 1 tablet (90 mg total) by mouth once daily.    NITROGLYCERIN (NITROSTAT) 0.4 MG SL TABLET    Place 1 tablet (0.4 mg total) under the tongue every 5 (five) minutes as needed for Chest pain.    ONETOUCH ULTRA TEST STRP    USE THREE TIMES A DAY AS DIRECTED BY DOCTOR    POTASSIUM CHLORIDE (K-TAB) 20 MEQ    Take 1 tablet (20 mEq total) by mouth once daily.   Discontinued Medications    HYDROCODONE-ACETAMINOPHEN (NORCO) 7.5-325 MG PER TABLET    Take 1 tablet by mouth every 6 (six) hours as needed for Pain.    LANTUS U-100 INSULIN 100 UNIT/ML INJECTION    INJECT 42 UNITS SUBCUTANEOUSLY TWICE DAILY WITH BREAKFAST AND AT BEDTIME (INCREASED FROM 36 UNITS TWICE DAILY)         Allergies  Review of patient's allergies indicates:  No Known Allergies    Physical Examination   Vital Signs  Temp: 98.1 °F (36.7 °C)  Temp src: Oral  Pulse: 62  Resp: 18  BP: (!) 140/80  BP Location: Left arm  Patient Position: Sitting  Pain Score: 0-No pain  Height and Weight  Height: 5' 7.01" (170.2 cm)  Weight: 121 kg (266 lb 12.1 oz)  BSA (Calculated - sq m): 2.39 sq meters  BMI (Calculated): 41.8  Weight in (lb) to have BMI = 25: 159.3]    Physical Exam  Vitals reviewed.   Constitutional:       Appearance: Normal appearance. He is morbidly obese.   HENT:      Head: Normocephalic.   Eyes:      Pupils: Pupils are equal, round, and reactive to light.   Cardiovascular:      Rate and Rhythm: Normal rate and regular rhythm.      Pulses: Normal pulses.      Heart sounds: Normal heart sounds.   Pulmonary:      Effort: Pulmonary effort is normal.      " Breath sounds: Normal breath sounds.   Abdominal:      General: Bowel sounds are normal.      Palpations: Abdomen is soft.   Musculoskeletal:         General: Normal range of motion.      Right lower le+ Edema present.      Left lower le+ Edema present.   Skin:     General: Skin is warm.   Neurological:      Mental Status: He is alert and oriented to person, place, and time.   Psychiatric:         Mood and Affect: Mood normal.         Results     Lab Results   Component Value Date    WBC 5.3 07/15/2022    RBC 4.39 (L) 07/15/2022    HGB 12.7 (L) 07/15/2022    HCT 38.8 (L) 07/15/2022    MCV 88.4 07/15/2022    MCH 28.9 07/15/2022    MCHC 32.7 (L) 07/15/2022    RDW 13.5 07/15/2022     07/15/2022    MPV 9.8 07/15/2022     Sodium Level   Date Value Ref Range Status   2022 141 136 - 145 mmol/L Final     Potassium Level   Date Value Ref Range Status   2022 3.3 (L) 3.5 - 5.1 mmol/L Final     Carbon Dioxide   Date Value Ref Range Status   2022 28 22 - 29 mmol/L Final     Blood Urea Nitrogen   Date Value Ref Range Status   2022 19.6 8.4 - 25.7 mg/dL Final     Creatinine   Date Value Ref Range Status   2022 1.28 (H) 0.73 - 1.18 mg/dL Final     Calcium Level Total   Date Value Ref Range Status   2022 9.6 8.4 - 10.2 mg/dL Final     Albumin Level   Date Value Ref Range Status   2022 3.5 3.5 - 5.0 gm/dL Final     Bilirubin Total   Date Value Ref Range Status   2022 0.5 <=1.5 mg/dL Final     Alkaline Phosphatase   Date Value Ref Range Status   2022 113 40 - 150 unit/L Final     Aspartate Aminotransferase   Date Value Ref Range Status   2022 19 5 - 34 unit/L Final     Alanine Aminotransferase   Date Value Ref Range Status   2022 35 0 - 55 unit/L Final     Estimated GFR-Non    Date Value Ref Range Status   2022 69 (L) >>=90 mL/min/1.73 m2 Final     Lab Results   Component Value Date    CHOL 136 2022     Lab Results   Component  Value Date    HDL 40 05/20/2022     No results found for: LDLCALC  Lab Results   Component Value Date    TRIG 145 (H) 05/20/2022     No results found for: CHOLHDL  Lab Results   Component Value Date    TSH 1.8340 09/07/2021     Lab Results   Component Value Date    PHUR 5.5 01/04/2022    PROTEINUA Negative 01/04/2022    GLUCUA Normal 01/04/2022    KETONESU Negative 01/04/2022    OCCULTUA Negative 01/04/2022    NITRITE Negative 01/04/2022    LEUKOCYTESUR Negative 01/04/2022     Lab Results   Component Value Date    HGBA1C 9.4 (H) 08/19/2022    HGBA1C 8.9 (H) 05/20/2022    HGBA1C 8.5 (H) 01/04/2022     No results found for: MICROALBUR, [id number]   No results found for this or any previous visit.         Assessment and Plan (including Health Maintenance)     Plan: RTC 6 weeks and prn. Labs 1-2 days prior as appt.        Health Maintenance Due   Topic Date Due    Hepatitis C Screening  Never done    Pneumococcal Vaccines (Age 0-64) (1 - PCV) 08/12/1972    Eye Exam  Never done    Colorectal Cancer Screening  Never done       Problem List Items Addressed This Visit        Neuro    Spinal stenosis of cervical region       Derm    Tinea pedis    Current Assessment & Plan     Foot care discussed.  Is compliant with diabetic foot care regimen.  Educated on achieving adequate glucose control.              Cardiac/Vascular    Mild CAD    Overview     Followed by [facility] cardio.           Current Assessment & Plan     Keep cardio appts as scheduled.  Continue daily Aspirin.  Keep cardio appts/diagnostics as scheduled.  Stressed importance of adequate LDL, HA1C, and BP control.  Discussed appropriate lifestyle changes including smoking cessation, exercise, weight loss, and dietary modifications.  ED precautions reviewed including SOB, JOSEPH, chest pain, dizziness, near syncope, palpitations, or jaw/back/neck discomfort.              Hyperlipidemia LDL goal <70    Overview     On atorvastatin 80 mg.  Followed by [facility] cardio.            Current Assessment & Plan     Continue crestor.  Follow a low cholesterol, low saturated fat diet with less than 200 mg of cholesterol a day.   Avoid fried foods and high saturated fats (smith, sausage, cookies, cakes, chips, cheese, whole milk, butter, mayonnaise, creamy dressings, gravy and cream sauces).  Add flax seed or fish oil supplements to diet.   Increase dietary fiber.   Regular exercise improves cholesterol levels.  Physical activity 5 times a week for 30 minutes per day (or 150 minutes per week).   Stressed importance of dietary modifications.             Primary hypertension    Current Assessment & Plan     Elevated.  Continue procardia 90 mg qam.  Rx procardia 30 mg qpm.  Continue HCTZ, losartan and lasix.  Follow a low sodium (less than 2 grams of sodium per day), DASH diet.   Continue medications as prescribed.  Encouraged smoking cessation to aid in BP reduction and co-morbidities.   Maintain healthy weight with a BMI goal of <30.   Aerobic exercise for 150 minutes per week (or 5 days a week for 30 minutes each day).             Venous insufficiency    Current Assessment & Plan     Keep appt/diagnostics with Dr. Gallo as scheduled.                Endocrine    Uncontrolled type 2 diabetes mellitus - Primary    Overview     Remains uncontrolled.  Reports med compliance - does not follow ADA diet.  Frequently eats sweets.  A1C 8.9. Previous A1C 8.5.           Current Assessment & Plan     A1C 9.4 not at goal. Previous A1C 8.9.   Rx lantus 42 units qam and 20 units at bedtime.  Continue metformin 1000 mg BID.  Continue humalog 20 units TIDWM.   Follow ADA diet.  Avoid soda, simple sweets, and limit rice/pasta/bread/starches and consume brown options when possible.   Maintain healthy weight with BMI goal <30.   Perform aerobic exercise for 150 minutes per week (or 5 days a week for 30 minutes each day).   Examine feet daily.   Obtain annual dilated eye exam.  Eye exam: (at next visit)  Foot exam:  5/20/22             Relevant Medications    insulin glargine (LANTUS U-100 INSULIN) 100 unit/mL injection    Other Relevant Orders    Hemoglobin A1C    BMI 40.0-44.9, adult    Current Assessment & Plan     BMI 41. Goal BMI <30.  Aerobic exercise 150 minutes per week.  Avoid soda, simple sugars, sweets, excessive rice, pasta, potatoes or bread.   Choose brown options when available and portion control.  Limit fast foods and fried foods.   Choose complex carbs in moderation (ex: green, leafy vegetables, beans, oatmeal).  Eat plenty of fresh fruits and vegetables with lean meats daily.   Consider permanent healthy lifestyle changes.                Orthopedic    Primary osteoarthritis of both knees    Current Assessment & Plan     Keep ortho appts as scheduled.  Has received CSI with some improvement.  Perform knee exercises daily.   Avoid activities than increase knee pain or stiffness.   Apply heating pad, ice pack, and BioFreeze/topical capsaicin as needed; alternate every 15-20 minutes.   Continue tylenol arthritis as needed.                Other    Edema of lower extremity    Current Assessment & Plan     Keep appt with Dr. Gallo as scheduled.  Continue lasix daily.   Keep the affected body part raised (elevated) above the level of your heart when you are sitting or lying down.  Do not sit still or stand for long periods of time.  Do not wear tight clothing. Do not wear garters on your upper legs.  Exercise your legs to get your circulation going. This helps to move the fluid back into your blood vessels, and it may help the swelling go down.  Wear elastic bandages or support stockings to reduce swelling as told by your health care provider.  Eat a low-salt (low-sodium) diet to reduce fluid as told by your health care provider.  Depending on the cause of your swelling, you may need to limit how much fluid you drink (fluid restriction).  Take over-the-counter and prescription medicines only as told by your health  care provider.             MINA (obstructive sleep apnea)    Overview     Machine returned d/t noncompliance.  EPWORTH SLEEPINESS SCALE 5/20/2022   Sitting and reading 1   Watching TV 2   Sitting, inactive in a public place (e.g. a theatre or a meeting) 1   As a passenger in a car for an hour without a break 0   Lying down to rest in the afternoon when circumstances permit 3   Sitting and talking to someone 1   Sitting quietly after a lunch without alcohol 2   In a car, while stopped for a few minutes in traffic 0   Total score 10                Current Assessment & Plan     Intends on compliance once CPAP received.                  Health Maintenance Topics with due status: Not Due       Topic Last Completion Date    TETANUS VACCINE 03/06/2017    Influenza Vaccine 10/06/2021    Diabetes Urine Screening 01/04/2022    Foot Exam 05/20/2022    Lipid Panel 05/20/2022    COVID-19 Vaccine 06/10/2022    High Dose Statin 08/19/2022    Aspirin/Antiplatelet Therapy 08/19/2022    Hemoglobin A1c 08/19/2022       Future Appointments   Date Time Provider Department Center   9/30/2022  7:00 AM BIANCA Eli [facility] INTMED [place]   10/27/2022  8:30 AM Gertrudis Isidro MD [facility] ORTHO [facility]   11/21/2022  9:15 AM JAZZ [facility] CARD [place]    11/22/2022 11:00 AM BIANCA Cace[facility] OPND [place]             Signature:  BIANCA Cabral  [facility]  [address] [address]    Date of encounter: 8/19/22    Answers for HPI/ROS submitted by the patient on 8/13/2022  Diabetes type: type 2  MedicAlert ID: No  blurred vision: Yes  foot paresthesias: No  foot ulcerations: Yes  visual change: No  weight loss: No  Symptom course: stable  hunger: No  mood changes: No  sleepiness: No  sweats: No  blackouts: No  hospitalization: No  nocturnal hypoglycemia: No  required assistance: No  required glucagon: No  CVA: No  heart  disease: No  impotence: No  nephropathy: No  peripheral neuropathy: No  PVD: No  retinopathy: No  autonomic neuropathy: No  CAD risks: dyslipidemia, hypertension, obesity, diabetes mellitus  Current treatments: insulin injections, oral agent (dual therapy)  Treatment compliance: all of the time  Dose schedule: at bedtime  Given by: relative  Injection sites: abdominal wall  Home blood tests: 1-2 x per day  Monitoring compliance: excellent  Blood glucose trend: fluctuating minimally  Weight trend: stable  Current diet: diabetic  Meal planning: none  Exercise: rarely  Dietitian visit: No  Eye exam current: No  Sees podiatrist: Yes

## 2022-08-19 NOTE — ASSESSMENT & PLAN NOTE
BMI 41. Goal BMI <30.  Aerobic exercise 150 minutes per week.  Avoid soda, simple sugars, sweets, excessive rice, pasta, potatoes or bread.   Choose brown options when available and portion control.  Limit fast foods and fried foods.   Choose complex carbs in moderation (ex: green, leafy vegetables, beans, oatmeal).  Eat plenty of fresh fruits and vegetables with lean meats daily.   Consider permanent healthy lifestyle changes.

## 2022-08-19 NOTE — ASSESSMENT & PLAN NOTE
A1C 9.4 not at goal. Previous A1C 8.9.   Rx lantus 42 units qam and 20 units at bedtime.  Continue metformin 1000 mg BID.  Continue humalog 20 units TIDWM.   Follow ADA diet.  Avoid soda, simple sweets, and limit rice/pasta/bread/starches and consume brown options when possible.   Maintain healthy weight with BMI goal <30.   Perform aerobic exercise for 150 minutes per week (or 5 days a week for 30 minutes each day).   Examine feet daily.   Obtain annual dilated eye exam.  Eye exam: (at next visit)  Foot exam: 5/20/22

## 2022-08-19 NOTE — ASSESSMENT & PLAN NOTE
Keep ortho appts as scheduled.  Has received CSI with some improvement.  Perform knee exercises daily.   Avoid activities than increase knee pain or stiffness.   Apply heating pad, ice pack, and BioFreeze/topical capsaicin as needed; alternate every 15-20 minutes.   Continue tylenol arthritis as needed.

## 2022-08-19 NOTE — ASSESSMENT & PLAN NOTE
Keep cardio appts as scheduled.  Continue daily Aspirin.  Keep cardio appts/diagnostics as scheduled.  Stressed importance of adequate LDL, HA1C, and BP control.  Discussed appropriate lifestyle changes including smoking cessation, exercise, weight loss, and dietary modifications.  ED precautions reviewed including SOB, JOSEPH, chest pain, dizziness, near syncope, palpitations, or jaw/back/neck discomfort.

## 2022-08-19 NOTE — ASSESSMENT & PLAN NOTE
Continue crestor.  Follow a low cholesterol, low saturated fat diet with less than 200 mg of cholesterol a day.   Avoid fried foods and high saturated fats (smith, sausage, cookies, cakes, chips, cheese, whole milk, butter, mayonnaise, creamy dressings, gravy and cream sauces).  Add flax seed or fish oil supplements to diet.   Increase dietary fiber.   Regular exercise improves cholesterol levels.  Physical activity 5 times a week for 30 minutes per day (or 150 minutes per week).   Stressed importance of dietary modifications.

## 2022-08-19 NOTE — PROGRESS NOTES
Chief Complaint:  Routine diabetic foot care.       History of Present Illness:  56 yo Black male being seen today for follow-up of tinea pedis and diabetic foot/nail/callus care. Continues applying gentian violet 1% between toes with Q-tip with good control of rash/moistness between toes as-needed.  Lesion to dorsal right foot biopsied by [place] [facility] since last visit; underwent punch biopsy with negative findings.   Hx includes medical non-compliance, obesity (BMI 41.77), uncontrolled Type 2 diabetes (insulin-dependent), HTN, HLD, adrenal adenoma, generalized DJD, bilateral 4th toeweb calluses,  and hx of COVID infection (1/12/2021). Has attended individual diabetes education on 12/18/20 and has been attempting to follow ADA/DASH diet. Underwent angiogram 8/20/2021 here @ [place]; very mild nonobstructive one vessel disease. Followed by [facility] cardi[facility] clinic.  RLE 12/2021 venous reflux study showed reflux of 4.3 seconds in the GSV at the level of the upper calf and reflux of 4.8 seconds and a branch of the GSV at the level of the upper calf. August 2021 LLE venous reflux study revealed no substantial venous reflux and no DVT. Plan is to trial conservative compression stockings for edema for next three months; if edema persists, cardiology will refer to Dr. Gallo's PVD clinic for further evaluation.     Review of Most Recent Labs:  8/19/202:  K+ 3.3.  CR 1.28.  HgbA1C 9.4.    5/15/2022:  CBC unremrakable.  CR 1.49.  eGFR >60.  Albumin 3.7.  Cholesterol 136, with LDL 67.  HgbA1C 8.9.    1/4/2022: eGFR 83, with CR 1.17. HgbA1C 8.5. PSA 1.19      Today 8/19/2022:  Has noticed tiny dry hole in space between both little toes, since last visit.   [place] [facility] [place] was told there were no wounds there.  Denies pain in those areas.  Both little toes are more stiff than his other toes.  Denies numbness, pain, and tingling in all toes.  Not aware of any calluses.  Toenails need cut and filed.  Continues  "using gentian violet between toes, as needed.  No rashes between toes.  Wearing pair of shoes that used to belong to his wife; they protect toes and heels.  Seen by PCP earlier this morning and had labs drawn.  Denies weakness; no stomach problems.   Not wearing compression stockings.      5/20/2022:  Not wearing compression stockings today; scheduled to see cardiologist after today's visit.  Says he is weighing daily; however, believes his scale is not working.  Denies numbness, tingling, and pain in feet/toes.  Rash between toes is "doing good."  Continues with Gentian violet, as needed, with complete resolution of rash within a day or two.  Wears shoes that cover heels and toes at all times, when out of bed.     2/18/2022:  Mr. Stone presented without compression stockings on. Denies numbness, tingling, and pain in feet toes. Wears shoes at all times, while out of bed. Agreeable to being referred to [facility] dermatology to have lesion to right dorsal foot evaluated, and possibly biopsied. No new skin breakdown, rashes, or other skin issues. No rash or moisture between toes. He uses Gentian Violet 1%, as needed, which resolves rash quickly.     11/19/2021:  Denies numbness, tingling, and pain in bilateral feet. No changes to lesion on middle of right foot since his last visit. No new skin breakdown, rashes, or lesions. Applying gentian violet 1% between toes daily.    8/18/2021:  Denies numbness, tingling, and pain in bilateral feet. No changes to lesion on middle of right foot since his last visit. No new skin breakdown, rashes, or lesions.       Review of Systems:  Except as stated in HPI, all other 10 body systems normal      Physical Exam Vitals & Measurements:    Vitals:    08/19/22 1118   BP: (!) (P) 162/90   Pulse: (!) (P) 59   Resp: (P) 20         General:  Hypertensive, asymptomatic with; afebrile. Morbidly obese; in no acute distress.  Respiratory: breathing even, quiet, and unlabored. No coughing. "   Cardiovascular:   Mild non-pitting edema over BLE, extending down over dorsal foot and bilateral ankles.  No hemosiderin staining or varicosities.  No hair distribution over BLE. Feet and toes warm to touch. No temperature differences between BLE. Toenails dystrophic and overgrown.  DP pulses palpated bilaterally.    Musculoskeletal: full range of motion of all extremities.  Bunion to left 1st met head.   Neurologic: A&O X 3; cranial nerves grossly intact.  Normal monofilament testing.  No loss of sensation.    Psychiatric: Calm, cooperative. Mood and affect normal. Responses appropriate  Integumentary:   Ten toenails all overgrown and dystrophic.     4th toeweb calluses:  Mr. Stone's 5th toes are positioned where he is walking on side of the toe, with calluses forming at base of posterior toe, extending up into the medial side of the 5th toe.  Skin entirely intact.  Was able to gently sand calluses down to soft underlying skin.          Assessment/Plan:    Morbid Obesity (BMI 41.77):  This has been identified as a co-factor towards foot health, and increased risk of calluses, and diabetic foot ulcers.   Being managed by PCP.     Hypopotassemia:  K+ 3.3.    No GI s/s.  No weakness.   Communication sent to PCP regarding this morning's lab results.      Type 2 Diabetes:  Poorly-controlled.    8/19/202:  CR 1.28.  HgbA1C 9.4.   5/15/2022:  CBC unremrakable.  CR 1.49.  eGFR >60.  Albumin 3.7.  Cholesterol 136, with LDL 67.  HgbA1C 8.9.    1/4/2022: eGFR 83, with CR 1.17. HgbA1C 8.5.   No LOS on monofilament testing.  Continued f/u with PCP, DERIC Nayak.     Overgrown toenails:  Procedure Today: cutting and filing all 10 dystrophic nails  Informed Consent: Instructed on benefits and risks of today's procedure, with rationale. Patient gave verbal consent prior to beginning procedure.   Using standard podiatry clippers and Combined Locks boards, I cut, trimmed, filed/sanded all ten toenails. No bleeding or discomfort;   Chase tolerated procedure without complications.     Calluses of Bilateral Toewebs:  Procedure Today:  Gentle sanding down of two 4th toe calluses  Rationale: calluses can lead to pre-ulcerative calluses with resultant diabetic foot ulcers.   Informed consent: Instructed on benefits and risks of today's procedure, with rationale. Patient gave verbal consent prior to beginning procedure.   Using an Alcove board, I gently sanded two small calluses to healthy soft tissue. No bleeding or discomfort with procedure.  Instructed him why he is getting calluses there, risk of wounds between those two toes, and importance of calling wound clinic if calluses return before next visit.      Tinea Pedis:  Stable with gentian violet 1%, as-needed.   Teaching reinforced on underlying causes of condition/disease, s/s of condition/disease, complications, prevention, and treatment of condition/disease. I          RTC in three months. Instructed on s/s of deterioration to call wound clinic for promptly in between clinic visits so we can schedule him that day, or the next day.

## 2022-08-24 ENCOUNTER — TELEPHONE (OUTPATIENT)
Dept: CARDIOLOGY | Facility: CLINIC | Age: 57
End: 2022-08-24
Payer: MEDICAID

## 2022-08-24 DIAGNOSIS — E87.6 HYPOKALEMIA: Primary | ICD-10-CM

## 2022-08-24 NOTE — TELEPHONE ENCOUNTER
"Instructions given for patient to increase dose starting today to 40 mEq of potassium chol ride for two days and repeat labs in 3-4 days. Patient is agreeable with plan of care. Lab work ordered.   ----- Message from JAZZ Oropeza sent at 8/23/2022  4:05 PM CDT -----  Regarding: RE: Lab resultsssss  Please instruct patient to take Potassium Chloride 40mEq daily x 2 days, then resume the home dose of Potassium Chloride 20mEq daily.  Patient to repeat Potassium level in 3-4 days.  Thanks.      ----- Message -----  From: Nabeel Kumar RN  Sent: 8/22/2022  12:34 PM CDT  To: JAZZ Oropeza  Subject: RE: Lab resultsssss                              Good afternoon. Spoke with pt. Pt stated that PCP nor woundcare addressed the low potassium issue. He stated woundcare made him aware of it and stated, "primary will probably call you once she notices it." Thank you.  ----- Message -----  From: JAZZ Oropeza  Sent: 8/19/2022   1:16 PM CDT  To: [facility] Cardiology Clinical Support Staff  Subject: Lab resultsssss                                  Good Afternoon,    Please contact patient on Monday to verify if whether or not his hypokalemia was addressed by his PCP.      Thanks.          "

## 2022-08-26 ENCOUNTER — LAB VISIT (OUTPATIENT)
Dept: LAB | Facility: HOSPITAL | Age: 57
End: 2022-08-26
Attending: NURSE PRACTITIONER
Payer: MEDICAID

## 2022-08-26 DIAGNOSIS — E87.6 HYPOKALEMIA: ICD-10-CM

## 2022-08-26 LAB — POTASSIUM SERPL-SCNC: 3.5 MMOL/L (ref 3.5–5.1)

## 2022-08-26 PROCEDURE — 36415 COLL VENOUS BLD VENIPUNCTURE: CPT

## 2022-08-26 PROCEDURE — 84132 ASSAY OF SERUM POTASSIUM: CPT

## 2022-09-21 NOTE — PROGRESS NOTES
Transferred note from [facility] to [facility]  05/07/21      [facility] Care Entered On:  5/7/2021 9:19 CDT    Performed On:  5/7/2021 9:06 CDT by Yumiko Steward LPN               Discharge Past 30 Days   Visit to the Hospital in the Last 30 Days :   Emergency department (ED) visit   Reason for Choosing ED for Care :   Could not get primary care appointment   Perception of Change in Health Status DC :   Improving   Discharged To :   Home independently   DC Instructions :   Received discharge instructions , Understands discharge instructions    Education Provided :   ED utilization, Importance of keeping appointments   Discharge Past 30 Days Addntl Comments :   Spoke with patient, having no problems right now. States he understands the PCP instructions regarding prescribed fluid pill and if he becomes SOB or have increased fluid retention to call clinic or report to the ED. Confirmed up coming scheduled Ortho/Sports appt 5/18/21. Informed that PCP referred him to Cardiology Clinic and once the referral is reviewed ,the clinic will contact him with an appt.      Yumiko Steward LPN - 5/7/2021 9:06 CDT   Barriers to Care   In the last 12 months, did you ever eat less than you felt you should because there was not enough money for food? :   No   In the last 12 months, has the electricity, gas, or water company threatened to shut off services in your home? :   No   Are you worried that in the next 2 months, you may not have a regular place to live? :   No   In the last 12 months, have you needed to see a doctor/practitioner, but could not because it cost too much money? :   No   Do you ever need help reading papers given to you from doctor's office/hospital and/or needing help filling out forms? :   No   Do you feel lonely? :   No   In the last 12  months, have you ever had to miss seeing a doctor/practitioner or picking up your medications because you did not have a way to get there? :   No   When you are sick, can you call  your doctor/practitioner and be seen for care right away? :   No   Has any of your known medical problems caused you to go to the ED instead of your doctor/provider's office? :   No   Are there any problems affecting your health? :   No   SDoH Reviewed :   Yes   Have you seen a dentist in the last year? :   Yes   (Comment:  2 extractions 2 weeks ago 4/22/21 [Yumiko Steward LPN 5/7/2021 9:06 CDT] )   Yumiko Steward LPN 5/7/2021 9:06 CDT   Prescriptions   Medication Reconcilation Completed :   Unknown   Medication Prescriptions Filled After DC :   Confirms all medications filled , Confirms taking medications as prescribed    Questions About Meds Prescribed at DC :   Denies any questions or concerns                    Yumiko Steward LPN 5/7/2021 9:06 CDT   Appointments   Follow-Up Appt Scheduled :   Yes   Follow-Up Provider Appt Scheduled By :   Hospital staff   (Comment: IM clinic [Yumiko Steward LPN 5/7/2021 9:06 CDT] )   PCP Name :   Yelitza Roque NP   Follow-Up Date :   6/17/2021 CDT   Follow-Up Appointment Status :   Confirms scheduled appointment    PCP Visit Upcoming :   Yes   Appointment Date/Time :   6/17/2021 CDT   PCP Visit Upcoming Reason :   Regular visit   PCP Visit Within Year :   Yes   PCP Visit Upcoming Reason :   Regular visit   PCP Visit One Year Date :   4/19/2021 CDT   Follow-Up Specialist Appt Scheduled :   Yes   Type of Specialist :   Ortho/Sports   Dr Kay Isidro   Follow-Up Lab Appt Scheduled :   No   Follow-Up Date :   5/18/2021 CDT   Follow-Up Radiology Appt Scheduled :   No   Yumiko Steward LPN 5/7/2021 9:06 CDT   Navigation   Initial Assesment Completed By :   Phone   [facility] Navigation Call Log :   Subsequent call   Referral To HE Care :   NA   Transportation Arrangements Made :   Yes   Providers Patient Visited Last Year :   Yelitza Isidro   Root Causes for High-ED Utilization :   Lack of access to care   Plan: :   Educated on appropriate ED  utilization, Educated on alternate means of health care; ie urgent care when PCP not available, Educated on importance of follow up care with PCP   Participation in Activity Designed to Address Lack of Annual Ambulatory or Preventative Care Visit? :   Yes   Participation in Activity Designed to Address Avoidable ED Utilization? :   Yes   During Current Measurement Year, Did Enrollee Receive Education Regarding Outpatient Primary Care Options? :   Yes   During Current Measurement Year, Did Enrollee Receive an Appt Reminder 24-48 Hours Before a Scheduled Appt? :   Yes   During Current Measurement Year, Did the Network Provider Schedule and Appt or Provide a Referral to Enrollee? :   Yes   Yumiko Steward LPN - 5/7/2021 9:06 CDT

## 2022-09-21 NOTE — PROGRESS NOTES
Transferred note from [facility] to [facility]  05/07/21      [facility] Care Entered On:  5/7/2021 9:19 CDT    Performed On:  5/7/2021 9:06 CDT by Yumiko Steward LPN               Discharge Past 30 Days   Visit to the Hospital in the Last 30 Days :   Emergency department (ED) visit   Reason for Choosing ED for Care :   Could not get primary care appointment   Perception of Change in Health Status DC :   Improving   Discharged To :   Home independently   DC Instructions :   Received discharge instructions , Understands discharge instructions    Education Provided :   ED utilization, Importance of keeping appointments   Discharge Past 30 Days Addntl Comments :   Spoke with patient, having no problems right now. States he understands the PCP instructions regarding prescribed fluid pill and if he becomes SOB or have increased fluid retention to call clinic or report to the ED. Confirmed up coming scheduled Ortho/Sports appt 5/18/21. Informed that PCP referred him to Cardiology Clinic and once the referral is reviewed ,the clinic will contact him with an appt.      Yumiko Steward LPN - 5/7/2021 9:06 CDT   Barriers to Care   In the last 12 months, did you ever eat less than you felt you should because there was not enough money for food? :   No   In the last 12 months, has the electricity, gas, or water company threatened to shut off services in your home? :   No   Are you worried that in the next 2 months, you may not have a regular place to live? :   No   In the last 12 months, have you needed to see a doctor/practitioner, but could not because it cost too much money? :   No   Do you ever need help reading papers given to you from doctor's office/hospital and/or needing help filling out forms? :   No   Do you feel lonely? :   No   In the last 12  months, have you ever had to miss seeing a doctor/practitioner or picking up your medications because you did not have a way to get there? :   No   When you are sick, can you call  your doctor/practitioner and be seen for care right away? :   No   Has any of your known medical problems caused you to go to the ED instead of your doctor/provider's office? :   No   Are there any problems affecting your health? :   No   SDoH Reviewed :   Yes   Have you seen a dentist in the last year? :   Yes   (Comment:  2 extractions 2 weeks ago 4/22/21 [Yumiko Steward LPN 5/7/2021 9:06 CDT] )   Yumiko Steward LPN 5/7/2021 9:06 CDT   Prescriptions   Medication Reconcilation Completed :   Unknown   Medication Prescriptions Filled After DC :   Confirms all medications filled , Confirms taking medications as prescribed    Questions About Meds Prescribed at DC :   Denies any questions or concerns                    Yumiko Steward LPN 5/7/2021 9:06 CDT   Appointments   Follow-Up Appt Scheduled :   Yes   Follow-Up Provider Appt Scheduled By :   Hospital staff   (Comment: IM clinic [Yumiko Steward LPN 5/7/2021 9:06 CDT] )   PCP Name :   Yelitza Roque NP   Follow-Up Date :   6/17/2021 CDT   Follow-Up Appointment Status :   Confirms scheduled appointment    PCP Visit Upcoming :   Yes   Appointment Date/Time :   6/17/2021 CDT   PCP Visit Upcoming Reason :   Regular visit   PCP Visit Within Year :   Yes   PCP Visit Upcoming Reason :   Regular visit   PCP Visit One Year Date :   4/19/2021 CDT   Follow-Up Specialist Appt Scheduled :   Yes   Type of Specialist :   Ortho/Sports   Dr Kay Isidro   Follow-Up Lab Appt Scheduled :   No   Follow-Up Date :   5/18/2021 CDT   Follow-Up Radiology Appt Scheduled :   No   Yumiko Steward LPN 5/7/2021 9:06 CDT   Navigation   Initial Assesment Completed By :   Phone   [facility] Navigation Call Log :   Subsequent call   Referral To HE Care :   NA   Transportation Arrangements Made :   Yes   Providers Patient Visited Last Year :   Yelitza Isidro   Root Causes for High-ED Utilization :   Lack of access to care   Plan: :   Educated on appropriate ED  utilization, Educated on alternate means of health care; ie urgent care when PCP not available, Educated on importance of follow up care with PCP   Participation in Activity Designed to Address Lack of Annual Ambulatory or Preventative Care Visit? :   Yes   Participation in Activity Designed to Address Avoidable ED Utilization? :   Yes   During Current Measurement Year, Did Enrollee Receive Education Regarding Outpatient Primary Care Options? :   Yes   During Current Measurement Year, Did Enrollee Receive an Appt Reminder 24-48 Hours Before a Scheduled Appt? :   Yes   During Current Measurement Year, Did the Network Provider Schedule and Appt or Provide a Referral to Enrollee? :   Yes   Yumiko Steward LPN - 5/7/2021 9:06 CDT

## 2022-09-21 NOTE — PROGRESS NOTES
Transferred [facility] note to [facility] Entered On:  2/1/2021 12:20 CST    Performed On:  2/1/2021 12:08 CST by Yumiko Steward LPN               Discharge Past 30 Days   Visit to the Hospital in the Last 30 Days :   Unplanned inpatient admission   Perception of Change in Health Status DC :   Improving   Discharged To :   Home with family care   DC Instructions :   Received discharge instructions , Understands discharge instructions    Barriers to Care :   None   Education Provided :   Chronic disease process, ED utilization, Importance of keeping appointments   Discharge Past 30 Days Addntl Comments :   Spoke with patient,recent hospital admission 1/12/21 and discharged 1/16/21 positive COVID. States he is feeling better, had PCP appointment right now. Ortho appointment confirmed for 2/18/21     Yumiko Steward LPN 2/1/2021 12:08 CST   Prescriptions   Medication Reconcilation Completed :   Unknown   Medication Prescriptions Filled After DC :   Confirms all medications filled , Confirms taking medications as prescribed    Questions About Meds Prescribed at DC :   Denies any questions or concerns                    Yumiko Steward LPN 2/1/2021 12:08 CST   Appointments   Follow-Up Appt Scheduled :   Yes   Follow-Up Provider Appt Scheduled By :   Hospital staff   PCP Name :   Yelitza Roque NP   Follow-Up Date :   2/1/2021 CST   Follow-Up Appointment Status :   Confirms scheduled appointment    PCP Visit Upcoming :   Yes   Appointment Date/Time :   2/1/2021 CST   PCP Visit Upcoming Reason :   Regular visit   PCP Visit Within Year :   Yes   PCP Visit Upcoming Reason :   Regular visit   PCP Visit One Year Date :   12/18/2021 CST   Follow-Up Specialist Appt Scheduled :   Yes   Type of Specialist :   Ortho /Sports   Follow-Up Lab Appt Scheduled :   No   Follow-Up Date :   2/18/2021 CST   Follow-Up Radiology Appt Scheduled :   No   Yumiko Steward LPN 2/1/2021 12:08 CST   Navigation   Initial Assesment  Completed By :   Phone   MCIP Navigation Call Log :   Second follow up call   Referral To HE Care :   NA   Transportation Arrangements Made :   Yes   Providers Patient Visited Last Year :   Yelitza Aguilera   Root Causes for High-ED Utilization :   Chronic conditions, Lack of access to care   Plan: :   Educated on appropriate ED utilization, Educated on alternate means of health care; ie urgent care when PCP not available, Educated on importance of follow up care with PCP   Yumiko Steward LPN 2/1/2021 12:08 CST         Education Note (Verified)  Patient Education Materials Follows:  Diabetes Mellitus and Foot Care     Foot care is an important part of your health, especially when you have diabetes. Diabetes may cause you to have problems because of poor blood flow (circulation) to your feet and legs, which can cause your skin to:  · Become thinner and drier.  · Break more easily.  · Heal more slowly.  · Peel and crack.  You may also have nerve damage (neuropathy) in your legs and feet, causing decreased feeling in them. This means that you may not notice minor injuries to your feet that could lead to more serious problems. Noticing and addressing any potential problems early is the best way to prevent future foot problems.  How to care for your feet  Foot hygiene  · Wash your feet daily with warm water and mild soap. Do not use hot water. Then, pat your feet and the areas between your toes until they are completely dry. Do not soak your feet as this can dry your skin.  · Trim your toenails straight across. Do not dig under them or around the cuticle. File the edges of your nails with an emery board or nail file.  · Apply a moisturizing lotion or petroleum jelly to the skin on your feet and to dry, brittle toenails. Use lotion that does not contain alcohol and is unscented. Do not apply lotion between your toes.  Shoes and  socks  · Wear clean socks or stockings every day. Make sure they are not too tight. Do not wear knee-high stockings since they may decrease blood flow to your legs.  · Wear shoes that fit properly and have enough cushioning. Always look in your shoes before you put them on to be sure there are no objects inside.  · To break in new shoes, wear them for just a few hours a day. This prevents injuries on your feet.  Wounds, scrapes, corns, and calluses  · Check your feet daily for blisters, cuts, bruises, sores, and redness. If you cannot see the bottom of your feet, use a mirror or ask someone for help.  · Do not cut corns or calluses or try to remove them with medicine.  · If you find a minor scrape, cut, or break in the skin on your feet, keep it and the skin around it clean and dry. You may clean these areas with mild soap and water. Do not clean the area with peroxide, alcohol, or iodine.  · If you have a wound, scrape, corn, or callus on your foot, look at it several times a day to make sure it is healing and not infected. Check for:  ? Redness, swelling, or pain.  ? Fluid or blood.  ? Warmth.  ? Pus or a bad smell.  General instructions  · Do not cross your legs. This may decrease blood flow to your feet.  · Do not use heating pads or hot water bottles on your feet. They may burn your skin. If you have lost feeling in your feet or legs, you may not know this is happening until it is too late.  · Protect your feet from hot and cold by wearing shoes, such as at the beach or on hot pavement.  · Schedule a complete foot exam at least once a year (annually) or more often if you have foot problems. If you have foot problems, report any cuts, sores, or bruises to your health care provider immediately.  Contact a health care provider if:  · You have a medical condition that increases your risk of infection and you have any cuts, sores, or bruises on your feet.  · You have an injury that is not healing.  · You have  redness on your legs or feet.  · You feel burning or tingling in your legs or feet.  · You have pain or cramps in your legs and feet.  · Your legs or feet are numb.  · Your feet always feel cold.  · You have pain around a toenail.  Get help right away if:  · You have a wound, scrape, corn, or callus on your foot and:  ? You have pain, swelling, or redness that gets worse.  ? You have fluid or blood coming from the wound, scrape, corn, or callus.  ? Your wound, scrape, corn, or callus feels warm to the touch.  ? You have pus or a bad smell coming from the wound, scrape, corn, or callus.  ? You have a fever.  ? You have a red line going up your leg.  Summary  · Check your feet every day for cuts, sores, red spots, swelling, and blisters.  · Moisturize feet and legs daily.  · Wear shoes that fit properly and have enough cushioning.  · If you have foot problems, report any cuts, sores, or bruises to your health care provider immediately.  · Schedule a complete foot exam at least once a year (annually) or more often if you have foot problems.  This information is not intended to replace advice given to you by your health care provider. Make sure you discuss any questions you have with your health care provider.  Document Released: 12/15/2001 Document Revised: 01/30/2019 Document Reviewed: 01/19/2018  [employer] Interactive Patient Education © 2019 [employer]        Result type: Education Note  Result date: February 01, 2021 13:11 CST

## 2022-09-21 NOTE — PROGRESS NOTES
Spoke with patient appointment for sleep lab confirmed for 07/19/22.  Instructed to report to the sleep lab at 7:00 PM Tuesday night. Reminded he also has a Cardiology appointment the following day 07/20/22 at [facility]. He says he will just stay at [facility] following his release from the sleep lab to go to his cardiology appointment. Advised to check with cardio clinic he may be seen sooner that his scheduled time at 11:15. Voices understanding.

## 2022-09-27 ENCOUNTER — LAB VISIT (OUTPATIENT)
Dept: LAB | Facility: HOSPITAL | Age: 57
End: 2022-09-27
Attending: NURSE PRACTITIONER
Payer: MEDICAID

## 2022-09-27 ENCOUNTER — TELEPHONE (OUTPATIENT)
Dept: INTERNAL MEDICINE | Facility: CLINIC | Age: 57
End: 2022-09-27
Payer: MEDICAID

## 2022-09-27 DIAGNOSIS — E11.65 UNCONTROLLED TYPE 2 DIABETES MELLITUS WITH HYPERGLYCEMIA: ICD-10-CM

## 2022-09-27 LAB
ALBUMIN SERPL-MCNC: 3.7 GM/DL (ref 3.5–5)
ALBUMIN/GLOB SERPL: 1 RATIO (ref 1.1–2)
ALP SERPL-CCNC: 117 UNIT/L (ref 40–150)
ALT SERPL-CCNC: 33 UNIT/L (ref 0–55)
AST SERPL-CCNC: 23 UNIT/L (ref 5–34)
BILIRUBIN DIRECT+TOT PNL SERPL-MCNC: 0.6 MG/DL
BUN SERPL-MCNC: 16 MG/DL (ref 8.4–25.7)
CALCIUM SERPL-MCNC: 10 MG/DL (ref 8.4–10.2)
CHLORIDE SERPL-SCNC: 104 MMOL/L (ref 98–107)
CO2 SERPL-SCNC: 31 MMOL/L (ref 22–29)
CREAT SERPL-MCNC: 1 MG/DL (ref 0.73–1.18)
EST. AVERAGE GLUCOSE BLD GHB EST-MCNC: 217.3 MG/DL
GFR SERPLBLD CREATININE-BSD FMLA CKD-EPI: >60 MLS/MIN/1.73/M2
GLOBULIN SER-MCNC: 3.8 GM/DL (ref 2.4–3.5)
GLUCOSE SERPL-MCNC: 42 MG/DL (ref 74–100)
HBA1C MFR BLD: 9.2 %
POTASSIUM SERPL-SCNC: 3.2 MMOL/L (ref 3.5–5.1)
PROT SERPL-MCNC: 7.5 GM/DL (ref 6.4–8.3)
SODIUM SERPL-SCNC: 146 MMOL/L (ref 136–145)

## 2022-09-27 PROCEDURE — 83036 HEMOGLOBIN GLYCOSYLATED A1C: CPT

## 2022-09-27 PROCEDURE — 36415 COLL VENOUS BLD VENIPUNCTURE: CPT

## 2022-09-27 PROCEDURE — 80053 COMPREHEN METABOLIC PANEL: CPT

## 2022-09-27 NOTE — TELEPHONE ENCOUNTER
[facility] ba[facility]a with [facility] called and stated that pt glucose is at a  critical level of 42.

## 2022-09-27 NOTE — TELEPHONE ENCOUNTER
08:30 Provider made aware of critical lab. Pt notified via phone of need to eat DT blood glucose being low. Pt states he has already had some chicken and 3 pieces of candy. Denies hypoglycemic symptoms, states he could tell sugar was low before labs because he could feel it. States he is feeling much better. Pt states he was on his way home from the store. Encouraged to check BG upon returning home and notify office of results, pt verified understanding.

## 2022-09-29 ENCOUNTER — PATIENT OUTREACH (OUTPATIENT)
Dept: EMERGENCY MEDICINE | Facility: HOSPITAL | Age: 57
End: 2022-09-29
Payer: MEDICAID

## 2022-09-29 NOTE — PROGRESS NOTES
Called and spoke with patient,reports having no issues with his BS. Verified he continues to check BS daily and document the readings for PCP visits.States his BS runs close to 200 or lower Says the other day he received a phone call from his provider's clinic informing him his BS was really low like 42. He says he didn't have any symptoms  and had eaten a little earlier. When he got home he took his BS and it was 150. Stressed the importance of eating properly and staying on schedule with his meals. Verified he is taking his Rx medications as ordered. Confirmed his telemed PCP appointment for tomorrow 09/30/22. Reminded to utilized urgent care for non acute issues if PCP is unavailable. Voices understanding.

## 2022-09-29 NOTE — PATIENT INSTRUCTIONS
What Do I Do If I Wake Up Sick                                                     If you wake up sick, or you start to fill sick during the day, try these tips to get care and start to feel better soon.                                                                                                                              As soon as you start to feel bad, call your doctor's office and ask for same day or next day visit appointment.        If you cannot be seen with your doctor's office within 24 hours, you can go to an urgent care and be seen.          How to stay well:     Take your medicine as ordered by your doctor      Fill your Medicine before you run out      Exercise       Enjoy walking in the sunlight daily  Keep your scheduled clinic appointments      Keep you scheduled yearly wellness visits

## 2022-10-25 ENCOUNTER — HOSPITAL ENCOUNTER (EMERGENCY)
Facility: HOSPITAL | Age: 57
Discharge: HOME | End: 2022-10-25
Attending: EMERGENCY MEDICINE
Payer: MEDICAID

## 2022-10-25 VITALS
HEIGHT: 67 IN | SYSTOLIC BLOOD PRESSURE: 134 MMHG | DIASTOLIC BLOOD PRESSURE: 76 MMHG | OXYGEN SATURATION: 97 % | RESPIRATION RATE: 20 BRPM | BODY MASS INDEX: 40.81 KG/M2 | TEMPERATURE: 98 F | WEIGHT: 260 LBS | HEART RATE: 73 BPM

## 2022-10-25 DIAGNOSIS — M54.2 CERVICAL PAIN (NECK): ICD-10-CM

## 2022-10-25 DIAGNOSIS — M54.2 NECK PAIN: Primary | ICD-10-CM

## 2022-10-25 PROCEDURE — 99284 EMERGENCY DEPT VISIT MOD MDM: CPT | Mod: 25

## 2022-10-25 RX ORDER — TIZANIDINE 2 MG/1
4 TABLET ORAL NIGHTLY
Qty: 20 TABLET | Refills: 1 | Status: SHIPPED | OUTPATIENT
Start: 2022-10-25 | End: 2022-11-04

## 2022-10-25 RX ORDER — MELOXICAM 15 MG/1
15 TABLET ORAL DAILY
Qty: 30 TABLET | Refills: 1 | Status: SHIPPED | OUTPATIENT
Start: 2022-10-25 | End: 2022-11-24

## 2022-10-25 NOTE — ED PROVIDER NOTES
Encounter Date: 10/25/2022       History     Chief Complaint   Patient presents with    Neck Pain     Neck pain all over for a couple months/ denies injury     This 57-year-old male presents with complaints of neck pain which been present past 2-3 months.  He states pain is increased by pressing on his head and pain is increased by leaning his head forward.  He has no history of trauma he has no history of fever chills or sweats.  Gives no history of numbness or tingling or referred pain.     Review of patient's allergies indicates:  No Known Allergies  Past Medical History:   Diagnosis Date    Adrenal adenoma     Callus of foot 8/19/2022    Coronary artery disease     Diabetes mellitus     Hyperlipidemia     Hypertension     Spinal stenosis     Unspecified osteoarthritis, unspecified site      Past Surgical History:   Procedure Laterality Date    ANGIOGRAPHY      CHOLECYSTECTOMY      FOOT SURGERY Right      Family History   Problem Relation Age of Onset    Hypertension Mother     Heart disease Mother     Stroke Father     Cancer Sister     Heart disease Sister     Cancer Sister     Heart disease Sister     Heart disease Brother     Heart disease Brother      Social History     Tobacco Use    Smoking status: Never    Smokeless tobacco: Never   Substance Use Topics    Alcohol use: Never    Drug use: Never     Review of Systems   Constitutional:  Negative for fever.   HENT:  Negative for sore throat.    Respiratory:  Negative for shortness of breath.    Cardiovascular:  Negative for chest pain.   Gastrointestinal:  Negative for nausea.   Genitourinary:  Negative for dysuria.   Musculoskeletal:  Positive for arthralgias and neck pain. Negative for back pain.   Skin:  Negative for rash.   Neurological:  Negative for weakness.   Hematological:  Does not bruise/bleed easily.     Physical Exam     Initial Vitals [10/25/22 0743]   BP Pulse Resp Temp SpO2   134/76 73 20 97.7 °F (36.5 °C) 97 %      MAP       --          Physical Exam    Constitutional: He appears well-developed and well-nourished.   HENT:   Head: Normocephalic and atraumatic.   Mouth/Throat: Mucous membranes are normal.   Eyes: EOM are normal. Pupils are equal, round, and reactive to light.   Neck: Neck supple.   Normal range of motion.  Cardiovascular:  Normal rate, regular rhythm, normal heart sounds and intact distal pulses.           Pulmonary/Chest: Breath sounds normal.   Abdominal: Abdomen is soft. Bowel sounds are normal.   Musculoskeletal:         General: Tenderness present. Normal range of motion.      Cervical back: Normal range of motion and neck supple.     Neurological: He is alert and oriented to person, place, and time. He has normal strength.   Skin: Skin is warm and dry. Capillary refill takes less than 2 seconds.   Psychiatric: He has a normal mood and affect. His behavior is normal. Judgment and thought content normal.       ED Course   Procedures  Labs Reviewed - No data to display       Imaging Results              X-Ray Cervical Spine AP And Lateral (In process)                   X-Rays:   Independently Interpreted Readings:   Other Readings:  C-spine shows degenerative changes without acute fracture or dislocation. Unable to see below C6.  Medications - No data to display                           Clinical Impression:   Final diagnoses:  [M54.2] Cervical pain (neck)  [M54.2] Neck pain (Primary)      ED Disposition Condition    Discharge Stable          ED Prescriptions       Medication Sig Dispense Start Date End Date Auth. Provider    meloxicam (MOBIC) 15 MG tablet Take 1 tablet (15 mg total) by mouth once daily. 30 tablet 10/25/2022 11/24/2022 Lucio Greco MD    tiZANidine (ZANAFLEX) 2 MG tablet Take 2 tablets (4 mg total) by mouth every evening. for 10 days 20 tablet 10/25/2022 11/4/2022 Lucio Greco MD          Follow-up Information       Follow up With Specialties Details Why Contact Info    BIANCA Eli Nurse  Practitioner   [address] [facility]  Internal Medicine [facility] [address]  [phone]               Lucio Greco MD  10/25/22 0925

## 2022-11-01 ENCOUNTER — PATIENT OUTREACH (OUTPATIENT)
Dept: EMERGENCY MEDICINE | Facility: HOSPITAL | Age: 57
End: 2022-11-01
Payer: MEDICAID

## 2022-11-01 NOTE — PROGRESS NOTES
Called and spoke with patient,confirmed his PCP appointment for tomorrow 11/02/22 with Patience Jude MONTES. Recent ED visit 10/25/22 for neck pain which has cause discomfort for 2-3 months. Reports the neck pain has improved for now,advised to discuss this with his PCP at visit tomorrow to determine if physical therapy would be a possibly in the future. Reminded to utilized urgent care for non acute issues. Voices understanding.

## 2022-11-02 ENCOUNTER — OFFICE VISIT (OUTPATIENT)
Dept: INTERNAL MEDICINE | Facility: CLINIC | Age: 57
End: 2022-11-02
Payer: MEDICAID

## 2022-11-02 DIAGNOSIS — I10 PRIMARY HYPERTENSION: ICD-10-CM

## 2022-11-02 DIAGNOSIS — G47.33 OSA (OBSTRUCTIVE SLEEP APNEA): Primary | ICD-10-CM

## 2022-11-02 DIAGNOSIS — Z12.5 SCREENING FOR MALIGNANT NEOPLASM OF PROSTATE: ICD-10-CM

## 2022-11-02 DIAGNOSIS — E66.01 CLASS 3 SEVERE OBESITY DUE TO EXCESS CALORIES WITH SERIOUS COMORBIDITY AND BODY MASS INDEX (BMI) OF 40.0 TO 44.9 IN ADULT: ICD-10-CM

## 2022-11-02 DIAGNOSIS — E11.65 UNCONTROLLED TYPE 2 DIABETES MELLITUS WITH HYPERGLYCEMIA: ICD-10-CM

## 2022-11-02 DIAGNOSIS — M50.30 DDD (DEGENERATIVE DISC DISEASE), CERVICAL: ICD-10-CM

## 2022-11-02 PROBLEM — E66.813 CLASS 3 SEVERE OBESITY DUE TO EXCESS CALORIES WITH SERIOUS COMORBIDITY AND BODY MASS INDEX (BMI) OF 40.0 TO 44.9 IN ADULT: Status: ACTIVE | Noted: 2022-05-20

## 2022-11-02 PROCEDURE — 99214 OFFICE O/P EST MOD 30 MIN: CPT | Mod: 95,,, | Performed by: NURSE PRACTITIONER

## 2022-11-02 PROCEDURE — 1159F MED LIST DOCD IN RCRD: CPT | Mod: CPTII,95,, | Performed by: NURSE PRACTITIONER

## 2022-11-02 PROCEDURE — 3061F NEG MICROALBUMINURIA REV: CPT | Mod: CPTII,95,, | Performed by: NURSE PRACTITIONER

## 2022-11-02 PROCEDURE — 3061F PR NEG MICROALBUMINURIA RESULT DOCUMENTED/REVIEW: ICD-10-PCS | Mod: CPTII,95,, | Performed by: NURSE PRACTITIONER

## 2022-11-02 PROCEDURE — 1159F PR MEDICATION LIST DOCUMENTED IN MEDICAL RECORD: ICD-10-PCS | Mod: CPTII,95,, | Performed by: NURSE PRACTITIONER

## 2022-11-02 PROCEDURE — 3066F PR DOCUMENTATION OF TREATMENT FOR NEPHROPATHY: ICD-10-PCS | Mod: CPTII,95,, | Performed by: NURSE PRACTITIONER

## 2022-11-02 PROCEDURE — 99214 PR OFFICE/OUTPT VISIT, EST, LEVL IV, 30-39 MIN: ICD-10-PCS | Mod: 95,,, | Performed by: NURSE PRACTITIONER

## 2022-11-02 PROCEDURE — 4010F ACE/ARB THERAPY RXD/TAKEN: CPT | Mod: CPTII,95,, | Performed by: NURSE PRACTITIONER

## 2022-11-02 PROCEDURE — 4010F PR ACE/ARB THEARPY RXD/TAKEN: ICD-10-PCS | Mod: CPTII,95,, | Performed by: NURSE PRACTITIONER

## 2022-11-02 PROCEDURE — 3066F NEPHROPATHY DOC TX: CPT | Mod: CPTII,95,, | Performed by: NURSE PRACTITIONER

## 2022-11-02 RX ORDER — NIFEDIPINE 30 MG/1
30 TABLET, EXTENDED RELEASE ORAL NIGHTLY
Qty: 90 TABLET | Refills: 3 | Status: SHIPPED | OUTPATIENT
Start: 2022-11-02 | End: 2023-03-21

## 2022-11-02 RX ORDER — INSULIN GLARGINE 100 [IU]/ML
INJECTION, SOLUTION SUBCUTANEOUS
Qty: 60 ML | Refills: 1 | Status: SHIPPED | OUTPATIENT
Start: 2022-11-02 | End: 2023-03-03 | Stop reason: DRUGHIGH

## 2022-11-02 RX ORDER — COVID-19 MOLECULAR TEST ASSAY
KIT MISCELLANEOUS
COMMUNITY
Start: 2022-06-24

## 2022-11-02 RX ORDER — METFORMIN HYDROCHLORIDE 1000 MG/1
1000 TABLET ORAL 2 TIMES DAILY WITH MEALS
Qty: 180 TABLET | Refills: 3 | Status: SHIPPED | OUTPATIENT
Start: 2022-11-02 | End: 2023-10-20

## 2022-11-02 RX ORDER — SYRING-NEEDL,DISP,INSUL,0.3 ML 31GX15/64"
SYRINGE, EMPTY DISPOSABLE MISCELLANEOUS
COMMUNITY
Start: 2022-10-14 | End: 2023-04-03

## 2022-11-02 RX ORDER — DULAGLUTIDE 1.5 MG/.5ML
1.5 INJECTION, SOLUTION SUBCUTANEOUS
Qty: 4 PEN | Refills: 11 | Status: SHIPPED | OUTPATIENT
Start: 2022-11-02 | End: 2023-03-03 | Stop reason: DRUGHIGH

## 2022-11-02 NOTE — PROGRESS NOTES
Audio Only Telehealth Visit     The patient location is: at home  The chief complaint leading to consultation is: uncontrolled DM and ED f/u.   Visit type: Virtual visit with audio only (telephone)  Total time spent with patient: 35 mins     The reason for the audio only service rather than synchronous audio and video virtual visit was related to technical difficulties or patient preference/necessity.     Each patient to whom I provide medical services by telemedicine is:  (1) informed of the relationship between the physician and patient and the respective role of any other health care provider with respect to management of the patient; and (2) notified that they may decline to receive medical services by telemedicine and may withdraw from such care at any time. Patient verbally consented to receive this service via voice-only telephone call.    Billing Provider: BIANCA Cabral  Level of Service: CA OFFICE/OUTPT VISIT, EST LEVL IV, 30-39 MIN  Patient PCP Information       Provider PCP Type    BIANCA Cabral General            Reason for Visit / Chief Complaint: Diabetes (Lab results)       History of Present Illness / Problem Focused Workflow     Michael Stone is a 57 y.o. Black or  male for uncontrolled DM. PMH uncontrolled DM, HLD, HTN, mild CAD, Covid pneumonia (1/12/21), adrenal adenoma, vit d deficiency, morbid obesity, cervical spinal stenosis, MINA (Cpap returned d/t noncompliance), bilateral knee OA, tinea pedis and med non-compliance. Followed by [facility] cardio, ortho and wound clinics. Reported to ED on 10/25/22 with c/o neck pain. XR revealed multilevel degenerative disease. Is taking meloxicam and tizanidine with some improvement. Reports is taking meds as prescribed. Does not follow ADA diet as instructed; continues to consume sweets routinely. CBGs continue to vary ranging from high 100s to high 300s. Reports had reading of 77 last night which has been lowest. Has  "received CPAP on Monday and is wearing nightly. Labs reviewed with pt. Denies chest pain, shortness of breath, cough, fever, headache, dizziness, weakness, abdominal pain, nausea, vomiting, diarrhea, constipation, dysuria, depression, anxiety, SI/HI.      Review of Systems     Review of Systems   Constitutional: Negative.    HENT: Negative.     Eyes: Negative.    Respiratory: Negative.     Cardiovascular: Negative.    Gastrointestinal: Negative.    Endocrine: Negative.    Genitourinary: Negative.    Musculoskeletal:  Positive for neck pain.   Integumentary:  Negative.   Neurological: Negative.    Psychiatric/Behavioral: Negative.        Medical / Social / Family History     Past Medical History:   Diagnosis Date    Adrenal adenoma     Callus of foot 8/19/2022    Coronary artery disease     Diabetes mellitus     Hyperlipidemia     Hypertension     Spinal stenosis     Unspecified osteoarthritis, unspecified site        Past Surgical History:   Procedure Laterality Date    ANGIOGRAPHY      CHOLECYSTECTOMY      FOOT SURGERY Right        Social History  Mr. Stone reports that he has never smoked. He has never used smokeless tobacco. He reports that he does not drink alcohol and does not use drugs.    Family History  's family history includes Cancer in his sister and sister; Heart disease in his brother, brother, mother, sister, and sister; Hypertension in his mother; Stroke in his father.    Medications and Allergies     Medications  Medication List with Changes/Refills   New Medications    DULAGLUTIDE (TRULICITY) 1.5 MG/0.5 ML PEN INJECTOR    Inject 1.5 mg into the skin every 7 days.   Current Medications    ASPIRIN (ECOTRIN) 81 MG EC TABLET    Take 1 tablet (81 mg total) by mouth once daily.    ATORVASTATIN (LIPITOR) 80 MG TABLET    Take 1 tablet (80 mg total) by mouth nightly.    BD VEO INSULIN SYRINGE UF 1 ML 31 GAUGE X 15/64" SYRG    USE THREE TIMES DAILY AS DIRECTED    BLOOD SUGAR DIAGNOSTIC STRP   "    One Touch Ultra 2 Test Strips, See Instructions, Use to check CBG TID, # 100 EA, 11 Refill(s), Pharmacy: [facility], 168, cm, Height/Length Dosing, 01/21/22 10:22:00 CST, 116, kg, Weight Dosing, 01/21/22 10:22:00 CST    FUROSEMIDE (LASIX) 20 MG TABLET    Take 1 tablet (20 mg total) by mouth once daily.    HUMALOG U-100 INSULIN 100 UNIT/ML INJECTION    Inject 20 Units into the skin 3 (three) times daily before meals.    HYDROCHLOROTHIAZIDE (HYDRODIURIL) 50 MG TABLET    Take 2 tablets (100 mg total) by mouth once daily.    ID NOW COVID-19 TEST KIT KIT    TEST AS DIRECTED TODAY    INSULIN SYRINGES, DISPOSABLE, 1 ML SYRG    Inject 1 Syringe into the skin 3 (three) times daily before meals.    LOSARTAN (COZAAR) 100 MG TABLET    Take 1 tablet (100 mg total) by mouth once daily.    MELOXICAM (MOBIC) 15 MG TABLET    Take 1 tablet (15 mg total) by mouth once daily.    NIFEDIPINE (ADALAT CC) 90 MG TBSR    Take 1 tablet (90 mg total) by mouth once daily.    NITROGLYCERIN (NITROSTAT) 0.4 MG SL TABLET    Place 1 tablet (0.4 mg total) under the tongue every 5 (five) minutes as needed for Chest pain.    ONETOUCH DELICA LANCETS 33 GAUGE MISC    Apply topically 3 (three) times daily.    ONETOUCH ULTRA TEST STRP    USE THREE TIMES A DAY AS DIRECTED BY DOCTOR    POTASSIUM CHLORIDE (K-TAB) 20 MEQ    Take 1 tablet (20 mEq total) by mouth once daily.    TIZANIDINE (ZANAFLEX) 2 MG TABLET    Take 2 tablets (4 mg total) by mouth every evening. for 10 days   Changed and/or Refilled Medications    Modified Medication Previous Medication    INSULIN GLARGINE (LANTUS U-100 INSULIN) 100 UNIT/ML INJECTION insulin glargine (LANTUS U-100 INSULIN) 100 unit/mL injection       Inject 42 units subq qam and 20 units at bedtime daily.    Inject 42 units subq qam and 20 units at bedtime daily.    METFORMIN (GLUCOPHAGE) 1000 MG TABLET metFORMIN (GLUCOPHAGE) 1000 MG tablet       Take 1 tablet (1,000 mg total) by mouth 2 (two) times daily  with meals.    Take 1 tablet (1,000 mg total) by mouth 2 (two) times daily with meals.    NIFEDIPINE (PROCARDIA-XL) 30 MG (OSM) 24 HR TABLET NIFEdipine (PROCARDIA-XL) 30 MG (OSM) 24 hr tablet       Take 1 tablet (30 mg total) by mouth every evening.    Take 1 tablet (30 mg total) by mouth every evening.         Allergies  Review of patient's allergies indicates:  No Known Allergies    Physical Examination   Vital Signs  Pain Score: 0-No pain]    Physical Exam  Neurological:      Mental Status: He is alert and oriented to person, place, and time.   Psychiatric:         Mood and Affect: Mood normal.         Speech: Speech normal.         Behavior: Behavior normal. Behavior is cooperative.         Thought Content: Thought content normal.         Judgment: Judgment normal.       Results     Lab Results   Component Value Date    WBC 5.3 07/15/2022    RBC 4.39 (L) 07/15/2022    HGB 12.7 (L) 07/15/2022    HCT 38.8 (L) 07/15/2022    MCV 88.4 07/15/2022    MCH 28.9 07/15/2022    MCHC 32.7 (L) 07/15/2022    RDW 13.5 07/15/2022     07/15/2022    MPV 9.8 07/15/2022     Sodium Level   Date Value Ref Range Status   09/27/2022 146 (H) 136 - 145 mmol/L Final     Potassium Level   Date Value Ref Range Status   09/27/2022 3.2 (L) 3.5 - 5.1 mmol/L Final     Carbon Dioxide   Date Value Ref Range Status   09/27/2022 31 (H) 22 - 29 mmol/L Final     Blood Urea Nitrogen   Date Value Ref Range Status   09/27/2022 16.0 8.4 - 25.7 mg/dL Final     Creatinine   Date Value Ref Range Status   09/27/2022 1.00 0.73 - 1.18 mg/dL Final     Calcium Level Total   Date Value Ref Range Status   09/27/2022 10.0 8.4 - 10.2 mg/dL Final     Albumin Level   Date Value Ref Range Status   09/27/2022 3.7 3.5 - 5.0 gm/dL Final     Bilirubin Total   Date Value Ref Range Status   09/27/2022 0.6 <=1.5 mg/dL Final     Alkaline Phosphatase   Date Value Ref Range Status   09/27/2022 117 40 - 150 unit/L Final     Aspartate Aminotransferase   Date Value Ref Range  Status   09/27/2022 23 5 - 34 unit/L Final     Alanine Aminotransferase   Date Value Ref Range Status   09/27/2022 33 0 - 55 unit/L Final     Estimated GFR-Non    Date Value Ref Range Status   01/04/2022 69 (L) >>=90 mL/min/1.73 m2 Final     Lab Results   Component Value Date    CHOL 136 05/20/2022     Lab Results   Component Value Date    HDL 40 05/20/2022     No results found for: LDLCALC  Lab Results   Component Value Date    TRIG 145 (H) 05/20/2022     No results found for: CHOLHDL  Lab Results   Component Value Date    TSH 1.8340 09/07/2021     Lab Results   Component Value Date    PHUR 5.5 01/04/2022    PROTEINUA Negative 01/04/2022    GLUCUA Normal 01/04/2022    KETONESU Negative 01/04/2022    OCCULTUA Negative 01/04/2022    NITRITE Negative 01/04/2022    LEUKOCYTESUR Negative 01/04/2022     Lab Results   Component Value Date    HGBA1C 9.2 (H) 09/27/2022    HGBA1C 9.4 (H) 08/19/2022    HGBA1C 8.9 (H) 05/20/2022     No results found for: MICROALBUR, [id number]   No results found for this or any previous visit.     X-Ray Cervical Spine AP And Lateral  Order: [id number]  Status: Final result     Visible to patient: Yes (not seen)     Next appt: 11/21/2022 at 09:15 AM in Cardiology (Camille Gary, JAZZ)     Dx: Cervical pain (neck)     0 Result Notes  Details    Reading Physician Reading Date Result Priority   Kyler Espinal MD  [phone] 10/25/2022 STAT     Narrative & Impression  EXAMINATION:  Four images cervical spine     CLINICAL HISTORY:  Neck pain     COMPARISON:  04/27/2016     FINDINGS:  Multilevel degenerative endplate changes are present.  No fracture is identified.  Alignment is stable.     Impression:     No fracture identified        Electronically signed by: Kyler Espinal MD  Date:                                            10/25/2022  Time:                                           09:22       Assessment and Plan (including Health Maintenance)     Plan: RTC 3 months and prn.  Labs one week prior to appt.         Health Maintenance Due   Topic Date Due    Hepatitis C Screening  Never done    Pneumococcal Vaccines (Age 0-64) (1 - PCV) [birth year]/1972    Eye Exam  Never done    COVID-19 Vaccine (4 - Booster for Moderna series) 08/05/2022    Influenza Vaccine (1) 09/01/2022       Problem List Items Addressed This Visit          Neuro    DDD (degenerative disc disease), cervical    Current Assessment & Plan     Referral to PT to eval/treat.  Continue mobic and tizanidine prn.  Perform neck stretches daily.   Avoid activities than increase neck pain or stiffness.   Apply heating pad, ice pack, and BioFreeze as needed; alternate every 15-20 minutes.   Massage neck to loosen neck muscles.   Continue tylenol arthritis/NSAID/muscle relaxer as needed.           Relevant Orders    Ambulatory referral/consult to Physical/Occupational Therapy       Cardiac/Vascular    Primary hypertension    Current Assessment & Plan     At goal.  Continue procardia 90 mg qam and 30 mg qpm.  Continue HCTZ, losartan and lasix.  DASH diet and aerobic exercise encouraged.  Maintain healthy BMI.         Relevant Orders    Microalbumin/Creatinine Ratio, Urine    T4, Free    TSH    Urinalysis, Reflex to Urine Culture Urine, Clean Catch       Endocrine    Class 3 severe obesity due to excess calories with serious comorbidity and body mass index (BMI) of 40.0 to 44.9 in adult    Current Assessment & Plan     BMI 40. Goal BMI <30.  Aerobic exercise 150 minutes per week.  Avoid soda, simple sugars, sweets, excessive rice, pasta, potatoes or bread.   Choose brown options when available and portion control.  Limit fast foods and fried foods.   Choose complex carbs in moderation (ex: green, leafy vegetables, beans, oatmeal).  Eat plenty of fresh fruits and vegetables with lean meats daily.   Consider permanent healthy lifestyle changes.           Relevant Orders    Lipid Panel    Uncontrolled type 2 diabetes mellitus with hyperglycemia     Current Assessment & Plan     A1C 9.2 not at goal. Previous A1C 9.2  Continue lantus 42 units qam and 20 units at bedtime.  Continue metformin.  Continue humalog 20 units with meals TID.  Rx trulicity 1.5 mg weekly.  Has attended diabetes education.  Continue checking CBGs TID and prn.   Check feet daily.  Eye exam: ordered  Foot exam: 5/2/22         Relevant Medications    dulaglutide (TRULICITY) 1.5 mg/0.5 mL pen injector    insulin glargine (LANTUS U-100 INSULIN) 100 unit/mL injection    metFORMIN (GLUCOPHAGE) 1000 MG tablet    Other Relevant Orders    CBC Auto Differential    Comprehensive Metabolic Panel    Hemoglobin A1C    Lipid Panel    Microalbumin/Creatinine Ratio, Urine    Urinalysis, Reflex to Urine Culture Urine, Clean Catch       Other    MINA (obstructive sleep apnea) - Primary    Current Assessment & Plan     Received CPAP on Monday.  Has been using nightly since receiving.  Compliance discussed.  Will reassess at next visit.           Other Visit Diagnoses       Screening for malignant neoplasm of prostate        Relevant Orders    PSA, Screening            Health Maintenance Topics with due status: Not Due       Topic Last Completion Date    TETANUS VACCINE 03/06/2017    Diabetes Urine Screening 01/04/2022    Foot Exam 05/20/2022    Lipid Panel 05/20/2022    Colorectal Cancer Screening 05/24/2022    Hemoglobin A1c 09/27/2022    High Dose Statin 11/02/2022    Aspirin/Antiplatelet Therapy 11/02/2022       Future Appointments   Date Time Provider Department Center   11/21/2022  9:15 AM JAZZ Oropeza [facility] CARD [place]   11/22/2022 11:00 AM BIANCA Caceres [facility] DUCND [place]   12/15/2022  2:00 PM Gertrudis Isidro MD [facility] ORTHO [place]            Signature:  BIANCA Cabral  [facility]  [address] [id number]    Date of encounter: 11/2/22              This service was not originating from  a related E/M service provided within the previous 7 days nor will  to an E/M service or procedure within the next 24 hours or my soonest available appointment.  Prevailing standard of care was able to be met in this audio-only visit.

## 2022-11-02 NOTE — ASSESSMENT & PLAN NOTE
Received CPAP on Monday.  Has been using nightly since receiving.  Compliance discussed.  Will reassess at next visit.

## 2022-11-02 NOTE — ASSESSMENT & PLAN NOTE
Referral to PT to eval/treat.  Continue mobic and tizanidine prn.  Perform neck stretches daily.   Avoid activities than increase neck pain or stiffness.   Apply heating pad, ice pack, and BioFreeze as needed; alternate every 15-20 minutes.   Massage neck to loosen neck muscles.   Continue tylenol arthritis/NSAID/muscle relaxer as needed.

## 2022-11-02 NOTE — ASSESSMENT & PLAN NOTE
A1C 9.2 not at goal. Previous A1C 9.2  Continue lantus 42 units qam and 20 units at bedtime.  Continue metformin.  Continue humalog 20 units with meals TID.  Rx trulicity 1.5 mg weekly.  Has attended diabetes education.  Continue checking CBGs TID and prn.   Check feet daily.  Eye exam: ordered  Foot exam: 5/2/22

## 2022-11-02 NOTE — ASSESSMENT & PLAN NOTE
At goal.  Continue procardia 90 mg qam and 30 mg qpm.  Continue HCTZ, losartan and lasix.  DASH diet and aerobic exercise encouraged.  Maintain healthy BMI.

## 2022-11-02 NOTE — ASSESSMENT & PLAN NOTE
BMI 40. Goal BMI <30.  Aerobic exercise 150 minutes per week.  Avoid soda, simple sugars, sweets, excessive rice, pasta, potatoes or bread.   Choose brown options when available and portion control.  Limit fast foods and fried foods.   Choose complex carbs in moderation (ex: green, leafy vegetables, beans, oatmeal).  Eat plenty of fresh fruits and vegetables with lean meats daily.   Consider permanent healthy lifestyle changes.

## 2022-11-15 ENCOUNTER — LAB VISIT (OUTPATIENT)
Dept: LAB | Facility: HOSPITAL | Age: 57
End: 2022-11-15
Attending: NURSE PRACTITIONER
Payer: MEDICAID

## 2022-11-15 DIAGNOSIS — E66.01 CLASS 3 SEVERE OBESITY DUE TO EXCESS CALORIES WITH SERIOUS COMORBIDITY AND BODY MASS INDEX (BMI) OF 40.0 TO 44.9 IN ADULT: ICD-10-CM

## 2022-11-15 DIAGNOSIS — I10 PRIMARY HYPERTENSION: ICD-10-CM

## 2022-11-15 DIAGNOSIS — Z12.5 SCREENING FOR MALIGNANT NEOPLASM OF PROSTATE: ICD-10-CM

## 2022-11-15 DIAGNOSIS — E11.65 UNCONTROLLED TYPE 2 DIABETES MELLITUS WITH HYPERGLYCEMIA: ICD-10-CM

## 2022-11-15 LAB
ALBUMIN SERPL-MCNC: 3.6 GM/DL (ref 3.5–5)
ALBUMIN/GLOB SERPL: 0.9 RATIO (ref 1.1–2)
ALP SERPL-CCNC: 101 UNIT/L (ref 40–150)
ALT SERPL-CCNC: 37 UNIT/L (ref 0–55)
APPEARANCE UR: CLEAR
AST SERPL-CCNC: 27 UNIT/L (ref 5–34)
BACTERIA #/AREA URNS AUTO: NORMAL /HPF
BASOPHILS # BLD AUTO: 0.03 X10(3)/MCL (ref 0–0.2)
BASOPHILS NFR BLD AUTO: 0.4 %
BILIRUB UR QL STRIP.AUTO: NEGATIVE MG/DL
BILIRUBIN DIRECT+TOT PNL SERPL-MCNC: 0.5 MG/DL
BUN SERPL-MCNC: 18.8 MG/DL (ref 8.4–25.7)
CALCIUM SERPL-MCNC: 9.4 MG/DL (ref 8.4–10.2)
CHLORIDE SERPL-SCNC: 108 MMOL/L (ref 98–107)
CHOLEST SERPL-MCNC: 109 MG/DL
CHOLEST/HDLC SERPL: 3 {RATIO} (ref 0–5)
CO2 SERPL-SCNC: 28 MMOL/L (ref 22–29)
COLOR UR AUTO: YELLOW
CREAT SERPL-MCNC: 1.22 MG/DL (ref 0.73–1.18)
EOSINOPHIL # BLD AUTO: 0.2 X10(3)/MCL (ref 0–0.9)
EOSINOPHIL NFR BLD AUTO: 2.9 %
ERYTHROCYTE [DISTWIDTH] IN BLOOD BY AUTOMATED COUNT: 13.5 % (ref 11.5–17)
EST. AVERAGE GLUCOSE BLD GHB EST-MCNC: 177.2 MG/DL
GFR SERPLBLD CREATININE-BSD FMLA CKD-EPI: >60 MLS/MIN/1.73/M2
GLOBULIN SER-MCNC: 3.9 GM/DL (ref 2.4–3.5)
GLUCOSE SERPL-MCNC: 83 MG/DL (ref 74–100)
GLUCOSE UR QL STRIP.AUTO: NEGATIVE MG/DL
HBA1C MFR BLD: 7.8 %
HCT VFR BLD AUTO: 40.9 % (ref 42–52)
HDLC SERPL-MCNC: 38 MG/DL (ref 35–60)
HGB BLD-MCNC: 12.7 GM/DL (ref 14–18)
IMM GRANULOCYTES # BLD AUTO: 0.01 X10(3)/MCL (ref 0–0.04)
IMM GRANULOCYTES NFR BLD AUTO: 0.1 %
KETONES UR QL STRIP.AUTO: NEGATIVE MG/DL
LDLC SERPL CALC-MCNC: 58 MG/DL (ref 50–140)
LEUKOCYTE ESTERASE UR QL STRIP.AUTO: NEGATIVE UNIT/L
LYMPHOCYTES # BLD AUTO: 1.33 X10(3)/MCL (ref 0.6–4.6)
LYMPHOCYTES NFR BLD AUTO: 19.6 %
MCH RBC QN AUTO: 27.4 PG (ref 27–31)
MCHC RBC AUTO-ENTMCNC: 31.1 MG/DL (ref 33–36)
MCV RBC AUTO: 88.1 FL (ref 80–94)
MONOCYTES # BLD AUTO: 0.56 X10(3)/MCL (ref 0.1–1.3)
MONOCYTES NFR BLD AUTO: 8.2 %
NEUTROPHILS # BLD AUTO: 4.7 X10(3)/MCL (ref 2.1–9.2)
NEUTROPHILS NFR BLD AUTO: 68.8 %
NITRITE UR QL STRIP.AUTO: NEGATIVE
PH UR STRIP.AUTO: 5.5 [PH]
PLATELET # BLD AUTO: 292 X10(3)/MCL (ref 130–400)
PMV BLD AUTO: 9.2 FL (ref 7.4–10.4)
POTASSIUM SERPL-SCNC: 3.3 MMOL/L (ref 3.5–5.1)
PROT SERPL-MCNC: 7.5 GM/DL (ref 6.4–8.3)
PROT UR QL STRIP.AUTO: NEGATIVE MG/DL
PSA SERPL-MCNC: 2.57 NG/ML
RBC # BLD AUTO: 4.64 X10(6)/MCL (ref 4.7–6.1)
RBC #/AREA URNS AUTO: NORMAL /HPF
RBC UR QL AUTO: NEGATIVE UNIT/L
SODIUM SERPL-SCNC: 146 MMOL/L (ref 136–145)
SP GR UR STRIP.AUTO: 1.02
SQUAMOUS #/AREA URNS AUTO: NORMAL /HPF
T4 FREE SERPL-MCNC: 0.83 NG/DL (ref 0.7–1.48)
TRIGL SERPL-MCNC: 65 MG/DL (ref 34–140)
TSH SERPL-ACNC: 1.33 UIU/ML (ref 0.35–4.94)
UROBILINOGEN UR STRIP-ACNC: 0.2 MG/DL
VLDLC SERPL CALC-MCNC: 13 MG/DL
WBC # SPEC AUTO: 6.8 X10(3)/MCL (ref 4.5–11.5)
WBC #/AREA URNS AUTO: NORMAL /HPF

## 2022-11-15 PROCEDURE — 85025 COMPLETE CBC W/AUTO DIFF WBC: CPT

## 2022-11-15 PROCEDURE — 84439 ASSAY OF FREE THYROXINE: CPT

## 2022-11-15 PROCEDURE — 36415 COLL VENOUS BLD VENIPUNCTURE: CPT

## 2022-11-15 PROCEDURE — 80061 LIPID PANEL: CPT

## 2022-11-15 PROCEDURE — 83036 HEMOGLOBIN GLYCOSYLATED A1C: CPT

## 2022-11-15 PROCEDURE — 80053 COMPREHEN METABOLIC PANEL: CPT

## 2022-11-15 PROCEDURE — 81001 URINALYSIS AUTO W/SCOPE: CPT

## 2022-11-15 PROCEDURE — 84443 ASSAY THYROID STIM HORMONE: CPT

## 2022-11-15 PROCEDURE — 84153 ASSAY OF PSA TOTAL: CPT

## 2022-11-18 ENCOUNTER — PATIENT OUTREACH (OUTPATIENT)
Dept: EMERGENCY MEDICINE | Facility: HOSPITAL | Age: 57
End: 2022-11-18
Payer: MEDICAID

## 2022-11-21 ENCOUNTER — OFFICE VISIT (OUTPATIENT)
Dept: CARDIOLOGY | Facility: CLINIC | Age: 57
End: 2022-11-21
Payer: MEDICAID

## 2022-11-21 VITALS
TEMPERATURE: 100 F | OXYGEN SATURATION: 98 % | HEIGHT: 67 IN | SYSTOLIC BLOOD PRESSURE: 111 MMHG | BODY MASS INDEX: 44.86 KG/M2 | RESPIRATION RATE: 20 BRPM | WEIGHT: 285.81 LBS | DIASTOLIC BLOOD PRESSURE: 68 MMHG | HEART RATE: 68 BPM

## 2022-11-21 DIAGNOSIS — E66.01 MORBID OBESITY: ICD-10-CM

## 2022-11-21 DIAGNOSIS — I10 PRIMARY HYPERTENSION: ICD-10-CM

## 2022-11-21 DIAGNOSIS — E78.5 HYPERLIPIDEMIA LDL GOAL <70: ICD-10-CM

## 2022-11-21 DIAGNOSIS — G47.33 OSA (OBSTRUCTIVE SLEEP APNEA): ICD-10-CM

## 2022-11-21 DIAGNOSIS — E87.6 HYPOKALEMIA: ICD-10-CM

## 2022-11-21 DIAGNOSIS — I87.2 VENOUS INSUFFICIENCY: ICD-10-CM

## 2022-11-21 DIAGNOSIS — I25.10 MILD CAD: ICD-10-CM

## 2022-11-21 DIAGNOSIS — R06.09 DOE (DYSPNEA ON EXERTION): Primary | ICD-10-CM

## 2022-11-21 PROCEDURE — 3008F BODY MASS INDEX DOCD: CPT | Mod: CPTII,,, | Performed by: NURSE PRACTITIONER

## 2022-11-21 PROCEDURE — 99215 OFFICE O/P EST HI 40 MIN: CPT | Mod: PBBFAC | Performed by: NURSE PRACTITIONER

## 2022-11-21 PROCEDURE — 99214 PR OFFICE/OUTPT VISIT, EST, LEVL IV, 30-39 MIN: ICD-10-PCS | Mod: S$PBB,,, | Performed by: NURSE PRACTITIONER

## 2022-11-21 PROCEDURE — 1159F MED LIST DOCD IN RCRD: CPT | Mod: CPTII,,, | Performed by: NURSE PRACTITIONER

## 2022-11-21 PROCEDURE — 4010F PR ACE/ARB THEARPY RXD/TAKEN: ICD-10-PCS | Mod: CPTII,,, | Performed by: NURSE PRACTITIONER

## 2022-11-21 PROCEDURE — 4010F ACE/ARB THERAPY RXD/TAKEN: CPT | Mod: CPTII,,, | Performed by: NURSE PRACTITIONER

## 2022-11-21 PROCEDURE — 3066F PR DOCUMENTATION OF TREATMENT FOR NEPHROPATHY: ICD-10-PCS | Mod: CPTII,,, | Performed by: NURSE PRACTITIONER

## 2022-11-21 PROCEDURE — 3074F SYST BP LT 130 MM HG: CPT | Mod: CPTII,,, | Performed by: NURSE PRACTITIONER

## 2022-11-21 PROCEDURE — 3078F DIAST BP <80 MM HG: CPT | Mod: CPTII,,, | Performed by: NURSE PRACTITIONER

## 2022-11-21 PROCEDURE — 3061F PR NEG MICROALBUMINURIA RESULT DOCUMENTED/REVIEW: ICD-10-PCS | Mod: CPTII,,, | Performed by: NURSE PRACTITIONER

## 2022-11-21 PROCEDURE — 99214 OFFICE O/P EST MOD 30 MIN: CPT | Mod: S$PBB,,, | Performed by: NURSE PRACTITIONER

## 2022-11-21 PROCEDURE — 1160F RVW MEDS BY RX/DR IN RCRD: CPT | Mod: CPTII,,, | Performed by: NURSE PRACTITIONER

## 2022-11-21 PROCEDURE — 1160F PR REVIEW ALL MEDS BY PRESCRIBER/CLIN PHARMACIST DOCUMENTED: ICD-10-PCS | Mod: CPTII,,, | Performed by: NURSE PRACTITIONER

## 2022-11-21 PROCEDURE — 1159F PR MEDICATION LIST DOCUMENTED IN MEDICAL RECORD: ICD-10-PCS | Mod: CPTII,,, | Performed by: NURSE PRACTITIONER

## 2022-11-21 PROCEDURE — 3008F PR BODY MASS INDEX (BMI) DOCUMENTED: ICD-10-PCS | Mod: CPTII,,, | Performed by: NURSE PRACTITIONER

## 2022-11-21 PROCEDURE — 3078F PR MOST RECENT DIASTOLIC BLOOD PRESSURE < 80 MM HG: ICD-10-PCS | Mod: CPTII,,, | Performed by: NURSE PRACTITIONER

## 2022-11-21 PROCEDURE — 3066F NEPHROPATHY DOC TX: CPT | Mod: CPTII,,, | Performed by: NURSE PRACTITIONER

## 2022-11-21 PROCEDURE — 3074F PR MOST RECENT SYSTOLIC BLOOD PRESSURE < 130 MM HG: ICD-10-PCS | Mod: CPTII,,, | Performed by: NURSE PRACTITIONER

## 2022-11-21 PROCEDURE — 3061F NEG MICROALBUMINURIA REV: CPT | Mod: CPTII,,, | Performed by: NURSE PRACTITIONER

## 2022-11-21 NOTE — PROGRESS NOTES
CHIEF COMPLAINT:   Chief Complaint   Patient presents with    f/u denies chest pain st has sob                     Review of patient's allergies indicates:  No Known Allergies                                       HPI:  Michael Stone 57 y.o. male with a past medical history of uncontrolled diabetes, hyperlipidemia, hypertension, MINA on CPAP,  adrenal adenoma, and obesity presents for follow up and ongoing care.  He completed and Echocardiogram on 6.1.22 which revealed an EF of 59%.  See report below.  Patient completed SADE testing in December 2021 which revealed no evidence of significant arterial insufficiency. He also completed a right lower extremity venous reflux study in December 2021 which revealed reflux of 4.3 seconds in the GSV at the level of the upper calf and reflux of 4.8 seconds and a branch of the GSV at the level of the upper calf. Patient underwent left heart cath procedure on 8.20.21 which revealed very mild nonobstructive one-vessel disease. See report below. He previously completed a nuclear pharmacologic stress test on 6/24/2021 which showed medium sized area of moderate anterior and apical ischemia and no scar. Patient completed left lower extremity venous reflux studies on August 24, 2021 which revealed no DVT and no substantial reflux identified in the interrogated portions of the left leg deep system, GSV, or SSV.     She denies chest pain, palpitations, orthopnea, PND, or syncope.  He continues to endorse shortness of breath with exertion.  He also continues to endorse bilateral lower extremity and follows up with Dr. Gallo for the venous sufficiency.  He reports compliance with his medications.  Patient reports nightly compliance with the CPAP and feels he is benefitting from use.      Echocardiogram 6.1.22  The left ventricle is normal in size with mild concentric hypertrophy and normal systolic function.  The estimated ejection fraction is 59%.  Normal right ventricular size with  normal right ventricular systolic function.  Normal left ventricular diastolic function.  There is no pulmonary hypertension.  The estimated PA systolic pressure is 10 mmHg.  Normal central venous pressure (3 mmHg).      CORONARY ANGIOGRAM 8.20.21  Left Main: large vessel.   Left anterior descending: large vessel.   Diagonal 1: small vessel.   Ramus: medium size vessel. 30% ostial stenosis  Circumflex: large vessel.   Obtuse marginal 1: medium size vessel.   Right coronary artery: medium size vessel.   Posterior descending artery: Tiny vessel.     COMPLICATIONS  No immediate complications.    Closure of access site: Radial band  Estimated blood loss: less than 10 ml  Specimens obtained: none   SUMMARY  Very mild nonobstructive 1 vessel disease      Nuclear pharmacologic stress test 06/24/2021:  Rest EKG: Sinus rhythm  Stress EKG: No significant new EKG changes  Medium size area of moderate anterior and apical ischemia  No scar    TTE 4.16.21  Mild concentric left ventricular hypertrophy  Left ventricular ejection fraction is measured at approximately 50%  Structurally normal mitral valve  Trace mitral regurgitation  Structurally aortic valve is trileaflet  Trace tricuspid regurgitation with RVSP of 38 mmHg  No evidence of pulmonic regurgitation  The pulmonic valve was not well visualized  Mildly dilated left atrium  Borderline dilated right atrium  Normal right ventricular size  No evidence of pericardial effusion  No evidence of pleural effusion                                                                                                                                                                                                                                                                                                                                                                                                                                                                                              Patient Active Problem List   Diagnosis    Mild CAD    Disorder of tendon of shoulder region    Edema of lower extremity    History of severe acute respiratory syndrome coronavirus 2 (SARS-CoV-2) disease    Hyperlipidemia LDL goal <70    Primary hypertension    Class 3 severe obesity due to excess calories with serious comorbidity and body mass index (BMI) of 40.0 to 44.9 in adult    MINA (obstructive sleep apnea)    Onychogryposis    Primary osteoarthritis of both knees    Spinal stenosis of cervical region    Tinea pedis    Uncontrolled type 2 diabetes mellitus with hyperglycemia    JOSEPH (dyspnea on exertion)    Venous insufficiency    Other chest pain    Callus of foot    DDD (degenerative disc disease), cervical     Past Surgical History:   Procedure Laterality Date    ANGIOGRAPHY      CHOLECYSTECTOMY      FOOT SURGERY Right      Social History     Socioeconomic History    Marital status: Single   Tobacco Use    Smoking status: Never    Smokeless tobacco: Never   Substance and Sexual Activity    Alcohol use: Never    Drug use: Never    Sexual activity: Yes     Partners: Female     Birth control/protection: None     Social Determinants of Health     Financial Resource Strain: Low Risk     Difficulty of Paying Living Expenses: Not very hard   Food Insecurity: No Food Insecurity    Worried About Running Out of Food in the Last Year: Never true    Ran Out of Food in the Last Year: Never true   Transportation Needs: No Transportation Needs    Lack of Transportation (Medical): No    Lack of Transportation (Non-Medical): No   Physical Activity: Insufficiently Active    Days of Exercise per Week: 4 days    Minutes of Exercise per Session: 20 min   Stress: Stress Concern Present    Feeling of Stress : To some extent   Social Connections: Moderately Isolated    Frequency of Communication with Friends and Family: More than three times a week    Frequency of Social Gatherings with Friends and Family: More than three times  a week    Attends [employer]ical Services: 1 to 4 times per year    Active Member of Clubs or Organizations: No    Marital Status: Never    Housing Stability: Low Risk     Unable to Pay for Housing in the Last Year: No    Number of Places Lived in the Last Year: 1    Unstable Housing in the Last Year: No        Family History   Problem Relation Age of Onset    Hypertension Mother     Heart disease Mother     Stroke Father     Cancer Sister     Heart disease Sister     Cancer Sister     Heart disease Sister     Heart disease Brother     Heart disease Brother          Current Outpatient Medications:     aspirin (ECOTRIN) 81 MG EC tablet, Take 1 tablet (81 mg total) by mouth once daily., Disp: 90 tablet, Rfl: 3    atorvastatin (LIPITOR) 80 MG tablet, Take 1 tablet (80 mg total) by mouth nightly., Disp: 90 tablet, Rfl: 3    dulaglutide (TRULICITY) 1.5 mg/0.5 mL pen injector, Inject 1.5 mg into the skin every 7 days., Disp: 4 pen, Rfl: 11    furosemide (LASIX) 20 MG tablet, Take 1 tablet (20 mg total) by mouth once daily., Disp: 90 tablet, Rfl: 3    HUMALOG U-100 INSULIN 100 unit/mL injection, Inject 20 Units into the skin 3 (three) times daily before meals., Disp: 60 mL, Rfl: 1    hydroCHLOROthiazide (HYDRODIURIL) 50 MG tablet, Take 2 tablets (100 mg total) by mouth once daily., Disp: 180 tablet, Rfl: 3    insulin glargine (LANTUS U-100 INSULIN) 100 unit/mL injection, Inject 42 units subq qam and 20 units at bedtime daily., Disp: 60 mL, Rfl: 1    losartan (COZAAR) 100 MG tablet, Take 1 tablet (100 mg total) by mouth once daily., Disp: 90 tablet, Rfl: 3    meloxicam (MOBIC) 15 MG tablet, Take 1 tablet (15 mg total) by mouth once daily., Disp: 30 tablet, Rfl: 1    metFORMIN (GLUCOPHAGE) 1000 MG tablet, Take 1 tablet (1,000 mg total) by mouth 2 (two) times daily with meals., Disp: 180 tablet, Rfl: 3    NIFEdipine (ADALAT CC) 90 MG TbSR, Take 1 tablet (90 mg total) by mouth once daily., Disp: 90 tablet, Rfl: 3     "NIFEdipine (PROCARDIA-XL) 30 MG (OSM) 24 hr tablet, Take 1 tablet (30 mg total) by mouth every evening., Disp: 90 tablet, Rfl: 3    nitroGLYCERIN (NITROSTAT) 0.4 MG SL tablet, Place 1 tablet (0.4 mg total) under the tongue every 5 (five) minutes as needed for Chest pain., Disp: 25 tablet, Rfl: 6    potassium chloride (K-TAB) 20 mEq, Take 1 tablet (20 mEq total) by mouth once daily., Disp: 90 tablet, Rfl: 3    BD VEO INSULIN SYRINGE UF 1 mL 31 gauge x 15/64" Syrg, USE THREE TIMES DAILY AS DIRECTED, Disp: , Rfl:     blood sugar diagnostic Strp,  One Touch Ultra 2 Test Strips, See Instructions, Use to check CBG TID, # 100 EA, 11 Refill(s), Pharmacy: [facility], 168, cm, Height/Length Dosing, 01/21/22 10:22:00 CST, 116, kg, Weight Dosing, 01/21/22 10:22:00 CST, Disp: , Rfl:     ID NOW COVID-19 TEST KIT Kit, TEST AS DIRECTED TODAY, Disp: , Rfl:     insulin syringes, disposable, 1 mL Syrg, Inject 1 Syringe into the skin 3 (three) times daily before meals., Disp: 100 each, Rfl: 3    ONETOUCH DELICA LANCETS 33 gauge Misc, Apply topically 3 (three) times daily., Disp: , Rfl:     ONETOUCH ULTRA TEST Strp, USE THREE TIMES A DAY AS DIRECTED BY DOCTOR, Disp: , Rfl:      ROS:                                                                                                                                                                             Review of Systems   Constitutional: Negative.    Respiratory:  Positive for shortness of breath.    Cardiovascular:  Positive for leg swelling.   Gastrointestinal: Negative.    Musculoskeletal: Negative.    Skin: Negative.    Neurological: Negative.    Psychiatric/Behavioral: Negative.        Blood pressure 111/68, pulse 68, temperature 99.7 °F (37.6 °C), temperature source Oral, resp. rate 20, height 5' 7.32" (1.71 m), weight 129.6 kg (285 lb 12.8 oz), SpO2 98 %.   PE:  Physical Exam  Constitutional:       Appearance: Normal appearance.   HENT:      Head: Normocephalic. "   Eyes:      Extraocular Movements: Extraocular movements intact.   Cardiovascular:      Rate and Rhythm: Normal rate and regular rhythm.   Pulmonary:      Effort: Pulmonary effort is normal.   Abdominal:      Palpations: Abdomen is soft.   Musculoskeletal:         General: Normal range of motion.      Cervical back: Neck supple.      Right lower leg: Edema present.      Left lower leg: Edema present.      Comments: 2+ pitting edema BLE   Skin:     General: Skin is warm and dry.   Neurological:      General: No focal deficit present.      Mental Status: He is alert and oriented to person, place, and time.   Psychiatric:         Mood and Affect: Mood normal.      ASSESSMENT/PLAN:  Very Mild CAD  LVEF - 59% per Echo 6.1.22   Lexiscan stress test done on 6/21/2021 with medium sized area of moderate anterior and apical ischemia and no scar  Denies CP  Continue ASA, atorvastatin, losartan, and SL Nitro  Counseled on heart healthy diet    JOSEPH   EF 59% per Echo 6.1.22  Continue and HCTZ and Lasix    Will order PFTs due to continued c/o SOB    Venous Insufficiency - RLE  Continue Lasix and HCTZ  Recommended low-sodium diet, compression stocking therapy, and leg elevation  Right lower extremity venous reflux study in December 2021 which revealed reflux of 4.3 seconds in the GSV at the level of the upper calf and reflux of 4.8 seconds and a branch of the GSV at the level of the upper calf.   Left lower extremity venous reflux study revealed no substantial venous reflux and no DVT (Aug. 2021)  Follow up with Dr. Gallo as directed - has an appt on 12.2.22     HLD  LDL at goal - 58  Continue with Atorvastatin 80 mg po daily  Counseled patient on low-cholesterol diet and exercise as tolerated    HTN  BP at goal - 111/68  Continue Losartan, HCTZ, Procardia XL, Lasix    Counseled on low salt diet    Diabetes  Last A1C not at goal - 7.8  Continue management per PCP    MINA  Patient reports nightly compliance with the CPAP and feels he  is benefitting from use.    Obesity  Counseled on the importance of weight loss through diet and exercise    Hypokalemia  Instructed to take Potassium Chloride 40mEq daily x 2 days, then resume Potassium Chloride 20mEq daily  BMP in 1 week    PFT  BMP in 1 week  Follow up in Cardiology Clinic in 4 months  Follow up with PCP and Dr. Gallo as directed

## 2022-11-28 ENCOUNTER — LAB VISIT (OUTPATIENT)
Dept: LAB | Facility: HOSPITAL | Age: 57
End: 2022-11-28
Attending: NURSE PRACTITIONER
Payer: MEDICAID

## 2022-11-28 DIAGNOSIS — E87.6 HYPOKALEMIA: ICD-10-CM

## 2022-11-28 LAB
ANION GAP SERPL CALC-SCNC: 10 MEQ/L
BUN SERPL-MCNC: 22.9 MG/DL (ref 8.4–25.7)
CALCIUM SERPL-MCNC: 9.3 MG/DL (ref 8.4–10.2)
CHLORIDE SERPL-SCNC: 104 MMOL/L (ref 98–107)
CO2 SERPL-SCNC: 30 MMOL/L (ref 22–29)
CREAT SERPL-MCNC: 1.21 MG/DL (ref 0.73–1.18)
CREAT UR-MCNC: 83.4 MG/DL (ref 63–166)
CREAT/UREA NIT SERPL: 19
GFR SERPLBLD CREATININE-BSD FMLA CKD-EPI: >60 MLS/MIN/1.73/M2
GLUCOSE SERPL-MCNC: 184 MG/DL (ref 74–100)
MICROALBUMIN UR-MCNC: 7.8 UG/ML
MICROALBUMIN/CREAT RATIO PNL UR: 9.4 MG/GM CR (ref 0–30)
POTASSIUM SERPL-SCNC: 3.6 MMOL/L (ref 3.5–5.1)
SODIUM SERPL-SCNC: 144 MMOL/L (ref 136–145)

## 2022-11-28 PROCEDURE — 80048 BASIC METABOLIC PNL TOTAL CA: CPT

## 2022-11-28 PROCEDURE — 82043 UR ALBUMIN QUANTITATIVE: CPT | Performed by: NURSE PRACTITIONER

## 2022-11-28 PROCEDURE — 36415 COLL VENOUS BLD VENIPUNCTURE: CPT

## 2022-11-29 ENCOUNTER — HOSPITAL ENCOUNTER (OUTPATIENT)
Dept: WOUND CARE | Facility: HOSPITAL | Age: 57
Discharge: HOME | End: 2022-11-29
Attending: NURSE PRACTITIONER
Payer: MEDICAID

## 2022-11-29 VITALS
HEART RATE: 85 BPM | TEMPERATURE: 97 F | RESPIRATION RATE: 22 BRPM | SYSTOLIC BLOOD PRESSURE: 145 MMHG | DIASTOLIC BLOOD PRESSURE: 85 MMHG

## 2022-11-29 DIAGNOSIS — E11.65 UNCONTROLLED TYPE 2 DIABETES MELLITUS WITH HYPERGLYCEMIA: ICD-10-CM

## 2022-11-29 DIAGNOSIS — L60.2 OVERGROWN TOENAILS: Primary | ICD-10-CM

## 2022-11-29 DIAGNOSIS — L60.3 DYSTROPHIC NAIL: ICD-10-CM

## 2022-11-29 DIAGNOSIS — E66.01 MORBID OBESITY WITH BMI OF 40.0-44.9, ADULT: ICD-10-CM

## 2022-11-29 DIAGNOSIS — B35.3 TINEA PEDIS OF BOTH FEET: ICD-10-CM

## 2022-11-29 DIAGNOSIS — M79.674 PAIN IN RIGHT TOE(S): ICD-10-CM

## 2022-11-29 DIAGNOSIS — M79.675 TOE PAIN, LEFT: ICD-10-CM

## 2022-11-29 DIAGNOSIS — L84 CALLUS OF FOOT: ICD-10-CM

## 2022-11-29 PROCEDURE — 11056 PARNG/CUTG B9 HYPRKR LES 2-4: CPT

## 2022-11-29 PROCEDURE — 99211 OFF/OP EST MAY X REQ PHY/QHP: CPT

## 2022-11-29 PROCEDURE — 11719 PR TRIM NAIL(S): ICD-10-PCS | Mod: 51,,, | Performed by: NURSE PRACTITIONER

## 2022-11-29 PROCEDURE — 11719 TRIM NAIL(S) ANY NUMBER: CPT | Mod: 51,,, | Performed by: NURSE PRACTITIONER

## 2022-11-29 PROCEDURE — 11719 TRIM NAIL(S) ANY NUMBER: CPT

## 2022-11-29 PROCEDURE — 99499 NO LOS: ICD-10-PCS | Mod: ,,, | Performed by: NURSE PRACTITIONER

## 2022-11-29 PROCEDURE — 11056 PARNG/CUTG B9 HYPRKR LES 2-4: CPT | Mod: ,,, | Performed by: NURSE PRACTITIONER

## 2022-11-29 PROCEDURE — 99499 UNLISTED E&M SERVICE: CPT | Mod: ,,, | Performed by: NURSE PRACTITIONER

## 2022-11-29 PROCEDURE — 11056 PR TRIM BENIGN HYPERKERATOTIC SKIN LESION,2-4: ICD-10-PCS | Mod: ,,, | Performed by: NURSE PRACTITIONER

## 2022-11-29 RX ORDER — AMMONIUM LACTATE 12 G/100G
1 CREAM TOPICAL 2 TIMES DAILY
Qty: 385 G | Refills: 11 | Status: SHIPPED | OUTPATIENT
Start: 2022-11-29 | End: 2022-12-29

## 2022-11-29 NOTE — PROGRESS NOTES
TODAY'S VISIT NOTE WAS IMPORTED FROM LAST WOUND CLINIC VISIT OF 8/19/2022.  I ATTEST THAT I REVIEWED THE HPI, ROS, LABS, WOUND-RELATED IMAGING, PHYSICAL EXAMINATION, EVALUATION AND PLAN SECTIONS OF IMPORTED NOTE AND REVISED TODAY'S VISIT NOTE TO REFLECT TODAY'S NEW ASSESSMENT FINDINGS AND TODAY'S UPDATES TO WOUND TREATMENT PLAN.          Chief Complaint:  Routine diabetic foot care.       History of Present Illness:  58 yo Black male being seen today for follow-up of tinea pedis, toe calluses, and routine diabetic foot care. Continues applying gentian violet 1% between toes with Q-tip, as-needed, with good control of rash/moistness between toes.  Lesion to right dorsal foot biopsied by [facility] dermatology with negative findings (Sprin 2022).   Hx includes medical non-compliance, morbid obesity (BMI 44.33), uncontrolled Type 2 diabetes (insulin-dependent), HTN, HLD, adrenal adenoma, generalized DJD, bilateral 4th toeweb calluses,  and hx of COVID infection (1/12/2021). Has attended individual diabetes education on 12/18/20 and has been attempting to follow ADA/DASH diet. Underwent angiogram 8/20/2021 here @ [facility]; very mild nonobstructive one vessel disease. Followed by [facility] cardiology clinic.  RLE 12/2021 venous reflux study showed reflux of 4.3 seconds in the GSV at the level of the upper calf and reflux of 4.8 seconds and a branch of the GSV at the level of the upper calf. August 2021 LLE venous reflux study revealed no substantial venous reflux and no DVT.  Followed by Dr. Gallo's [facility] clinic for vascular.       Review of Most Recent Labs:  11/28/2022:  CR 1.21, with CR >60.    11/15/2022:   CBC unremarkable.  Chol 109.  HDL 38.  LDL 58.  Trig 65.  HgbA1C 7.8.  PSA 2.57.    8/19/202:  K+ 3.3.  CR 1.28.  HgbA1C 9.4.    5/15/2022:  CBC unremrakable.  CR 1.49.  eGFR >60.  Albumin 3.7.  Cholesterol 136, with LDL 67.  HgbA1C 8.9.    1/4/2022: eGFR 83, with CR 1.17. HgbA1C 8.5. PSA 1.19      Today 11/29/2022:  Having mild  "discomfort between bilateral 4th and 5th toes, where he has had calluses previously.  Has not been sanding calluses down with [employer] boards, as taught previously.  No rashes between toes.  Reports calf pain with walking (left > right) at times, cold feet, and numbness over all toes.  Ankles are always swollen.  Scheduled to see Dr. Gallo, vascular, 12/2/2022.  Has not been applying any kind of moisturizer to dry skin over bottoms of feet and lower legs.  No new rashes, lesions, or skin breakdown.     8/19/2022:  Has noticed tiny dry hole in space between both little toes, since last visit.   Went to [facility] [place] [place] to have toes looked at and was told there were no wounds there.  Denies pain in those areas.  Both little toes are more stiff than his other toes.  Denies numbness, pain, and tingling in all toes.  Not aware of any calluses.  Toenails need cut and filed.  Continues using gentian violet between toes, as needed.  No rashes between toes.  Wearing pair of shoes that used to belong to his wife; they protect toes and heels.  Seen by PCP earlier this morning and had labs drawn.  Denies weakness; no stomach problems.   Not wearing compression stockings.      5/20/2022:  Not wearing compression stockings today; scheduled to see cardiologist after today's visit.  Says he is weighing daily; however, believes his scale is not working.  Denies numbness, tingling, and pain in feet/toes.  Rash between toes is "doing good."  Continues with Gentian violet, as needed, with complete resolution of rash within a day or two.  Wears shoes that cover heels and toes at all times, when out of bed.     2/18/2022:  Mr. Stone presented without compression stockings on. Denies numbness, tingling, and pain in feet toes. Wears shoes at all times, while out of bed. Agreeable to being referred to [facility] dermatology to have lesion to right dorsal foot evaluated, and possibly biopsied. No new skin breakdown, rashes, or other skin issues. No rash or " moisture between toes. He uses Gentian Violet 1%, as needed, which resolves rash quickly.     11/19/2021:  Denies numbness, tingling, and pain in bilateral feet. No changes to lesion on middle of right foot since his last visit. No new skin breakdown, rashes, or lesions. Applying gentian violet 1% between toes daily.    8/18/2021:  Denies numbness, tingling, and pain in bilateral feet. No changes to lesion on middle of right foot since his last visit. No new skin breakdown, rashes, or lesions.       Review of Systems:  Except as stated in HPI, all other 10 body systems normal      Physical Exam Vitals & Measurements:    Vitals:    11/29/22 1045   BP: (!) 145/85   Pulse: 85   Resp: (!) 22   Temp: 97.3 °F (36.3 °C)         General:  Hypertensive, asymptomatic with; afebrile. Morbidly obese; in no acute distress.  Respiratory:   Breathing even, quiet, and unlabored. No coughing.   Cardiovascular:   Moderate non-pitting edema over bilateral medial and lateral ankles.  No hemosiderin staining or varicosities.  No hair distribution over BLE.   Feet and toes cool to touch. No temperature differences between BLE. Toenails thickened, yellow, and overgrown.  DP pulses palpated bilaterally.   PT pulses dopplered bilaterally.    Musculoskeletal:  Full range of motion of all extremities.  Bunion to left 1st met head.   Decreased dorsiflexion and flexion of bilateral ankles, and left hallux toe.  Loss of intrinsic muscle ROM in bilateral feet.  Bilateral pes planus.    Neurologic: A&O X 3; cranial nerves grossly intact.  Normal monofilament testing.  No loss of sensation.    Psychiatric: Calm, cooperative. Mood and affect normal. Responses appropriate  Integumentary:     Ten toenails all overgrown and dystrophic.     4th toeweb calluses bilaterally:  Recurrent calluses noted at bases of posterior toes, extending proximally into the medial side of the 5th toe.  Skin entirely intact.  Was able to gently sand calluses down to soft  underlying skin.      Generalized xerosis over bilateral feet:  No cracking or fissures noted.                      Assessment/Plan:    Morbid Obesity (BMI 44.33):  This has been identified as a co-factor towards foot health, and increased risk of calluses, and diabetic foot ulcers.   Being managed by PCP.     Type 2 Diabetes:  Poorly-controlled; however, improving.   Positive reinforcement given, with encouragement towards adherence with medications, diet, and keeping all appts with PCP, DERIC Giraldo.    11/28/2022:  CR 1.21, with CR >60.    11/15/2022:   CBC unremarkable.  Chol 109.  HDL 38.  LDL 58.  Trig 65.  HgbA1C 7.8.  PSA 2.57.    8/19/202:  CR 1.28.  HgbA1C 9.4.   Continued f/u with PCP, DERIC Nayak.     Overgrown toenails:  Procedure Today: cutting and filing all 10 dystrophic nails  Informed verbal consent obtained.    Using standard podiatry clippers and [place] boards, I cut, trimmed, filed/sanded all ten toenails. No bleeding or discomfort; Mr. Stone tolerated procedure without complications.     Calluses of Bilateral Toewebs:  Procedure Today:  Gentle sanding down of two 4th toe calluses  Rationale: Calluses can lead to pre-ulcerative calluses with resultant diabetic foot ulcers.  Also, Mr. Stone is reporting toe pain in bilateral feet from calluses.    Informed verbal consent obtained.    Using an tyesha board, I gently sanded two small calluses to healthy soft tissue. No bleeding or discomfort with procedure.      Tinea Pedis:  Stable with gentian violet 1%, as-needed.   CPM.     Xerosis of bilateral feet:  Teaching provided on underlying cause of condition, s/s of condition, complications, and treatment options of condition.    RX:  Lac-Hydrin 12%, followed by thin layer of Vaseline twice/day.    Teaching provided on new medication, expected outcomes of medication, how to administer medication, and possible s/e of medications.   Instructed not to put creams/lotions between toes, with  rationale.              RTC in three months. Instructed on s/s to call wound clinic for in between clinic visits.

## 2022-12-13 ENCOUNTER — HOSPITAL ENCOUNTER (OUTPATIENT)
Dept: PULMONOLOGY | Facility: HOSPITAL | Age: 57
Discharge: HOME | End: 2022-12-13
Attending: NURSE PRACTITIONER
Payer: MEDICAID

## 2022-12-13 DIAGNOSIS — R06.09 DOE (DYSPNEA ON EXERTION): ICD-10-CM

## 2022-12-13 LAB
DLCO SINGLE BREATH LLN: 20.49
DLCO SINGLE BREATH PRE REF: 76.4 %
DLCO SINGLE BREATH REF: 27.41
DLCOC SBVA LLN: 2.91
DLCOC SBVA REF: 4.21
DLCOC SINGLE BREATH LLN: 20.49
DLCOC SINGLE BREATH REF: 27.41
DLCOVA LLN: 2.91
DLCOVA PRE REF: 91.9 %
DLCOVA PRE: 3.86 ML/(MIN*MMHG*L) (ref 2.91–5.51)
DLCOVA REF: 4.21
ERV LLN: -16448.84
ERV PRE REF: 20.4 %
ERV REF: 1.16
FEF 25 75 CHG: 10.2 %
FEF 25 75 LLN: 1.15
FEF 25 75 POST REF: 74.5 %
FEF 25 75 PRE REF: 67.6 %
FEF 25 75 REF: 2.6
FET100 CHG: -39.6 %
FEV1 CHG: -0.9 %
FEV1 FVC CHG: 5.9 %
FEV1 FVC LLN: 68
FEV1 FVC POST REF: 98.2 %
FEV1 FVC PRE REF: 92.7 %
FEV1 FVC REF: 79
FEV1 LLN: 2.12
FEV1 POST REF: 80.3 %
FEV1 PRE REF: 81 %
FEV1 REF: 2.88
FRCPLETH LLN: 2.41
FRCPLETH PREREF: 108.1 %
FRCPLETH REF: 3.4
FVC CHG: -6.4 %
FVC LLN: 2.73
FVC POST REF: 81.8 %
FVC PRE REF: 87.3 %
FVC REF: 3.63
IVC PRE: 3.63 L (ref 2.73–4.55)
IVC SINGLE BREATH LLN: 2.73
IVC SINGLE BREATH PRE REF: 99.8 %
IVC SINGLE BREATH REF: 3.63
MVV LLN: 109
MVV PRE REF: 59.3 %
MVV REF: 128
PEF CHG: 14.4 %
PEF LLN: 5.42
PEF POST REF: 78.6 %
PEF PRE REF: 68.7 %
PEF REF: 7.86
POST FEF 25 75: 1.93 L/S (ref 1.15–4.63)
POST FET 100: 4.06 SEC
POST FEV1 FVC: 77.83 % (ref 68–89.03)
POST FEV1: 2.31 L (ref 2.12–3.6)
POST FVC: 2.97 L (ref 2.73–4.55)
POST PEF: 6.18 L/S (ref 5.42–10.29)
PRE DLCO: 20.95 ML/(MIN*MMHG) (ref 20.49–34.34)
PRE ERV: 0.24 L (ref -16448.84–16451.16)
PRE FEF 25 75: 1.76 L/S (ref 1.15–4.63)
PRE FET 100: 6.72 SEC
PRE FEV1 FVC: 73.49 % (ref 68–89.03)
PRE FEV1: 2.33 L (ref 2.12–3.6)
PRE FRC PL: 3.67 L (ref 2.41–4.38)
PRE FVC: 3.17 L (ref 2.73–4.55)
PRE MVV: 76.03 L/MIN (ref 108.93–147.37)
PRE PEF: 5.4 L/S (ref 5.42–10.29)
PRE RV: 3.44 L (ref 1.56–2.91)
PRE TLC: 6.96 L (ref 5.37–7.67)
RAW LLN: 3.06
RAW PRE REF: 67.3 %
RAW PRE: 2.06 CMH2O*S/L (ref 3.06–3.06)
RAW REF: 3.06
RV LLN: 1.56
RV PRE REF: 153.9 %
RV REF: 2.23
RVTLC LLN: 27
RVTLC PRE REF: 137.8 %
RVTLC PRE: 49.34 % (ref 26.82–44.78)
RVTLC REF: 36
TLC LLN: 5.37
TLC PRE REF: 106.8 %
TLC REF: 6.52
VA PRE: 5.42 L (ref 6.37–6.37)
VA SINGLE BREATH LLN: 6.37
VA SINGLE BREATH PRE REF: 85.1 %
VA SINGLE BREATH REF: 6.37
VC LLN: 2.73
VC PRE REF: 97.1 %
VC PRE: 3.53 L (ref 2.73–4.55)
VC REF: 3.63

## 2022-12-13 PROCEDURE — 94729 PR C02/MEMBANE DIFFUSE CAPACITY: ICD-10-PCS | Mod: 26,,, | Performed by: INTERNAL MEDICINE

## 2022-12-13 PROCEDURE — 94060 EVALUATION OF WHEEZING: CPT

## 2022-12-13 PROCEDURE — 94729 DIFFUSING CAPACITY: CPT

## 2022-12-13 PROCEDURE — 94726 PLETHYSMOGRAPHY LUNG VOLUMES: CPT

## 2022-12-13 PROCEDURE — 94729 DIFFUSING CAPACITY: CPT | Mod: 26,,, | Performed by: INTERNAL MEDICINE

## 2022-12-13 PROCEDURE — 94060 PR EVAL OF BRONCHOSPASM: ICD-10-PCS | Mod: 26,,, | Performed by: INTERNAL MEDICINE

## 2022-12-13 PROCEDURE — 94726 PLETHYSMOGRAPHY LUNG VOLUMES: CPT | Mod: 26,,, | Performed by: INTERNAL MEDICINE

## 2022-12-13 PROCEDURE — 94726 PULM FUNCT TST PLETHYSMOGRAP: ICD-10-PCS | Mod: 26,,, | Performed by: INTERNAL MEDICINE

## 2022-12-13 PROCEDURE — 94060 EVALUATION OF WHEEZING: CPT | Mod: 26,,, | Performed by: INTERNAL MEDICINE

## 2022-12-13 RX ORDER — ALBUTEROL SULFATE 0.83 MG/ML
2.5 SOLUTION RESPIRATORY (INHALATION) EVERY 4 HOURS PRN
Status: DISCONTINUED | OUTPATIENT
Start: 2022-12-13 | End: 2023-02-09

## 2022-12-13 RX ORDER — IPRATROPIUM BROMIDE 0.5 MG/2.5ML
0.5 SOLUTION RESPIRATORY (INHALATION) ONCE
Status: COMPLETED | OUTPATIENT
Start: 2022-12-13 | End: 2022-12-13

## 2022-12-13 RX ADMIN — IPRATROPIUM BROMIDE 0.5 MG: 0.5 SOLUTION RESPIRATORY (INHALATION) at 09:12

## 2022-12-13 RX ADMIN — ALBUTEROL SULFATE 2.5 MG: 0.83 SOLUTION RESPIRATORY (INHALATION) at 09:12

## 2022-12-15 ENCOUNTER — OFFICE VISIT (OUTPATIENT)
Dept: ORTHOPEDICS | Facility: CLINIC | Age: 57
End: 2022-12-15
Payer: MEDICAID

## 2022-12-15 VITALS
BODY MASS INDEX: 43.6 KG/M2 | RESPIRATION RATE: 20 BRPM | HEIGHT: 67 IN | SYSTOLIC BLOOD PRESSURE: 146 MMHG | OXYGEN SATURATION: 96 % | HEART RATE: 74 BPM | WEIGHT: 277.81 LBS | DIASTOLIC BLOOD PRESSURE: 88 MMHG

## 2022-12-15 DIAGNOSIS — E11.65 UNCONTROLLED TYPE 2 DIABETES MELLITUS WITH HYPERGLYCEMIA: ICD-10-CM

## 2022-12-15 DIAGNOSIS — M17.12 PRIMARY OSTEOARTHRITIS OF LEFT KNEE: Primary | ICD-10-CM

## 2022-12-15 DIAGNOSIS — E66.9 OBESITY, UNSPECIFIED CLASSIFICATION, UNSPECIFIED OBESITY TYPE, UNSPECIFIED WHETHER SERIOUS COMORBIDITY PRESENT: ICD-10-CM

## 2022-12-15 PROCEDURE — 3077F SYST BP >= 140 MM HG: CPT | Mod: CPTII,,, | Performed by: STUDENT IN AN ORGANIZED HEALTH CARE EDUCATION/TRAINING PROGRAM

## 2022-12-15 PROCEDURE — 1159F MED LIST DOCD IN RCRD: CPT | Mod: CPTII,,, | Performed by: STUDENT IN AN ORGANIZED HEALTH CARE EDUCATION/TRAINING PROGRAM

## 2022-12-15 PROCEDURE — 3008F BODY MASS INDEX DOCD: CPT | Mod: CPTII,,, | Performed by: STUDENT IN AN ORGANIZED HEALTH CARE EDUCATION/TRAINING PROGRAM

## 2022-12-15 PROCEDURE — 4010F ACE/ARB THERAPY RXD/TAKEN: CPT | Mod: CPTII,,, | Performed by: STUDENT IN AN ORGANIZED HEALTH CARE EDUCATION/TRAINING PROGRAM

## 2022-12-15 PROCEDURE — 3061F NEG MICROALBUMINURIA REV: CPT | Mod: CPTII,,, | Performed by: STUDENT IN AN ORGANIZED HEALTH CARE EDUCATION/TRAINING PROGRAM

## 2022-12-15 PROCEDURE — 1159F PR MEDICATION LIST DOCUMENTED IN MEDICAL RECORD: ICD-10-PCS | Mod: CPTII,,, | Performed by: STUDENT IN AN ORGANIZED HEALTH CARE EDUCATION/TRAINING PROGRAM

## 2022-12-15 PROCEDURE — 3077F PR MOST RECENT SYSTOLIC BLOOD PRESSURE >= 140 MM HG: ICD-10-PCS | Mod: CPTII,,, | Performed by: STUDENT IN AN ORGANIZED HEALTH CARE EDUCATION/TRAINING PROGRAM

## 2022-12-15 PROCEDURE — 1160F RVW MEDS BY RX/DR IN RCRD: CPT | Mod: CPTII,,, | Performed by: STUDENT IN AN ORGANIZED HEALTH CARE EDUCATION/TRAINING PROGRAM

## 2022-12-15 PROCEDURE — 4010F PR ACE/ARB THEARPY RXD/TAKEN: ICD-10-PCS | Mod: CPTII,,, | Performed by: STUDENT IN AN ORGANIZED HEALTH CARE EDUCATION/TRAINING PROGRAM

## 2022-12-15 PROCEDURE — 3079F PR MOST RECENT DIASTOLIC BLOOD PRESSURE 80-89 MM HG: ICD-10-PCS | Mod: CPTII,,, | Performed by: STUDENT IN AN ORGANIZED HEALTH CARE EDUCATION/TRAINING PROGRAM

## 2022-12-15 PROCEDURE — 3061F PR NEG MICROALBUMINURIA RESULT DOCUMENTED/REVIEW: ICD-10-PCS | Mod: CPTII,,, | Performed by: STUDENT IN AN ORGANIZED HEALTH CARE EDUCATION/TRAINING PROGRAM

## 2022-12-15 PROCEDURE — 3066F PR DOCUMENTATION OF TREATMENT FOR NEPHROPATHY: ICD-10-PCS | Mod: CPTII,,, | Performed by: STUDENT IN AN ORGANIZED HEALTH CARE EDUCATION/TRAINING PROGRAM

## 2022-12-15 PROCEDURE — 3079F DIAST BP 80-89 MM HG: CPT | Mod: CPTII,,, | Performed by: STUDENT IN AN ORGANIZED HEALTH CARE EDUCATION/TRAINING PROGRAM

## 2022-12-15 PROCEDURE — 99214 PR OFFICE/OUTPT VISIT, EST, LEVL IV, 30-39 MIN: ICD-10-PCS | Mod: S$PBB,,, | Performed by: STUDENT IN AN ORGANIZED HEALTH CARE EDUCATION/TRAINING PROGRAM

## 2022-12-15 PROCEDURE — 1160F PR REVIEW ALL MEDS BY PRESCRIBER/CLIN PHARMACIST DOCUMENTED: ICD-10-PCS | Mod: CPTII,,, | Performed by: STUDENT IN AN ORGANIZED HEALTH CARE EDUCATION/TRAINING PROGRAM

## 2022-12-15 PROCEDURE — 3066F NEPHROPATHY DOC TX: CPT | Mod: CPTII,,, | Performed by: STUDENT IN AN ORGANIZED HEALTH CARE EDUCATION/TRAINING PROGRAM

## 2022-12-15 PROCEDURE — 99215 OFFICE O/P EST HI 40 MIN: CPT | Mod: PBBFAC | Performed by: STUDENT IN AN ORGANIZED HEALTH CARE EDUCATION/TRAINING PROGRAM

## 2022-12-15 PROCEDURE — 99214 OFFICE O/P EST MOD 30 MIN: CPT | Mod: S$PBB,,, | Performed by: STUDENT IN AN ORGANIZED HEALTH CARE EDUCATION/TRAINING PROGRAM

## 2022-12-15 PROCEDURE — 3008F PR BODY MASS INDEX (BMI) DOCUMENTED: ICD-10-PCS | Mod: CPTII,,, | Performed by: STUDENT IN AN ORGANIZED HEALTH CARE EDUCATION/TRAINING PROGRAM

## 2022-12-15 NOTE — PROGRESS NOTES
"  Subjective:       Existing patient, last seen September 2022  Patient ID: Michael Stone is a 57 y.o. male. Non-smoker. Employment HX: [employer], currently employed.         Chief Complaint: Pain of the Left Knee    HPI:    56 Years Male presents to sports medicine clinic for follow up visit of bilateral knee pain, more pain in left knee.In Feb 2022, completed orthovisc series, last note reviewed. He he had significant relief after completing the Orthovisc series. Occasionally it swells and he applies arthritis cream with some relief. He has been having more and more pain in the right knee recently but was given meds by his PCP which improved his discomfort.    Onset:~Feb 2021. pain is worsening , trying to manage with tylenol and meloxicam.  Current pain level: 5/10 "rated as stable"  Quality described as: aching, throbbing, sharp  Modifying factors: worse with activity; stiffness after immobilization; stiffness improved with <30min of activity  Previous treatment: meloxicam, good relief. Completed PT in Aug 2021  Associated symptoms: crepitus/grinding; no numbness/tingling; weakness; intermittent swelling; no skin changes; mild decrease in ROM  Activity: sedentary, full ADLs; pain interferes with function/daily activity "mildly"      Note: Labs reviewed by myself, patient's hemoglobin A1c from Sept 2022 was found to be 9.2.      Current Outpatient Medications:     ammonium lactate 12 % Crea, Apply 1 application topically 2 (two) times a day. Apply thin layer of Vaseline over Lac-Hydrin twice a day.  NOTHING BETWEEN TOES., Disp: 385 g, Rfl: 11    aspirin (ECOTRIN) 81 MG EC tablet, Take 1 tablet (81 mg total) by mouth once daily., Disp: 90 tablet, Rfl: 3    atorvastatin (LIPITOR) 80 MG tablet, Take 1 tablet (80 mg total) by mouth nightly., Disp: 90 tablet, Rfl: 3    BD VEO INSULIN SYRINGE UF 1 mL 31 gauge x 15/64" Syrg, USE THREE TIMES DAILY AS DIRECTED, Disp: , Rfl:     blood sugar diagnostic Strp,  One Touch " Ultra 2 Test Strips, See Instructions, Use to check CBG TID, # 100 EA, 11 Refill(s), Pharmacy: [facility], 168, cm, Height/Length Dosing, 01/21/22 10:22:00 CST, 116, kg, Weight Dosing, 01/21/22 10:22:00 CST, Disp: , Rfl:     dulaglutide (TRULICITY) 1.5 mg/0.5 mL pen injector, Inject 1.5 mg into the skin every 7 days., Disp: 4 pen, Rfl: 11    furosemide (LASIX) 20 MG tablet, Take 1 tablet (20 mg total) by mouth once daily., Disp: 90 tablet, Rfl: 3    HUMALOG U-100 INSULIN 100 unit/mL injection, Inject 20 Units into the skin 3 (three) times daily before meals., Disp: 60 mL, Rfl: 1    hydroCHLOROthiazide (HYDRODIURIL) 50 MG tablet, Take 2 tablets (100 mg total) by mouth once daily., Disp: 180 tablet, Rfl: 3    insulin glargine (LANTUS U-100 INSULIN) 100 unit/mL injection, Inject 42 units subq qam and 20 units at bedtime daily., Disp: 60 mL, Rfl: 1    insulin syringes, disposable, 1 mL Syrg, Inject 1 Syringe into the skin 3 (three) times daily before meals., Disp: 100 each, Rfl: 3    losartan (COZAAR) 100 MG tablet, Take 1 tablet (100 mg total) by mouth once daily., Disp: 90 tablet, Rfl: 3    metFORMIN (GLUCOPHAGE) 1000 MG tablet, Take 1 tablet (1,000 mg total) by mouth 2 (two) times daily with meals., Disp: 180 tablet, Rfl: 3    NIFEdipine (ADALAT CC) 90 MG TbSR, Take 1 tablet (90 mg total) by mouth once daily., Disp: 90 tablet, Rfl: 3    NIFEdipine (PROCARDIA-XL) 30 MG (OSM) 24 hr tablet, Take 1 tablet (30 mg total) by mouth every evening., Disp: 90 tablet, Rfl: 3    nitroGLYCERIN (NITROSTAT) 0.4 MG SL tablet, Place 1 tablet (0.4 mg total) under the tongue every 5 (five) minutes as needed for Chest pain., Disp: 25 tablet, Rfl: 6    ONETOUCH DELICA LANCETS 33 gauge Misc, Apply topically 3 (three) times daily., Disp: , Rfl:     ONETOUCH ULTRA TEST Strp, USE THREE TIMES A DAY AS DIRECTED BY DOCTOR, Disp: , Rfl:     potassium chloride (K-TAB) 20 mEq, Take 1 tablet (20 mEq total) by mouth once daily., Disp:  "90 tablet, Rfl: 3    ID NOW COVID-19 TEST KIT Kit, TEST AS DIRECTED TODAY, Disp: , Rfl:     Current Facility-Administered Medications:     albuterol nebulizer solution 2.5 mg, 2.5 mg, Nebulization, Q4H PRN, Yelitza Roque, FNP, 2.5 mg at 12/13/22 0944    Review of patient's allergies indicates:  No Known Allergies    No results found for: LABA1C   Body mass index is 43.51 kg/m².   Vitals:    12/15/22 1443   BP: (!) 146/88   Pulse: 74   Resp: 20   SpO2: 96%   Weight: 126 kg (277 lb 12.8 oz)   Height: 5' 7" (1.702 m)   PainSc:   5        ROS   Review of Systems:  A ten-point review of systems was performed and is negative, except as mentioned above.        Objective:        General: well developed; well nourished; cooperative  PSYCH: alert and oriented with appropriate mood and affect  SKIN: inspection and palpation of skin and soft tissue normal;  CV: vascular integrity noted; +2 symmetrical pulses  Resp: Normal work of breathing, quiet breathing    Leftknee   Inspection:   Normal gait/station; full weight bearing; normal alignment; mild swelling; no erythema; no bruising; no atrophy or deconditioning noted   Palpation: non-tender; Crepitus: present  ROM:   Active, passive Flexion (0-140):130  Active, passive Extension : 0  Strength: Flexion 5/5, Extension 5/5  Special Tests:  Ballotable Effusion: Negative  Fluid Wave: Negative   Anterior Drawer: Negative  Lachman: Negative  Pivot Shift: Negative   Posterior Sag Sign: Negative  Posterior Drawer: Negative  Dial Test: not tested   Varus Stress: LCL stable at 0 and 30 degrees   Valgus Stress: MCL stable at 0 and 30 degrees   Patellar Grind: Negative   Patella Tracking: normal  Patella Crepitation: none  Micheal: Negative    Neurovascular: Intact; 2+ distal pulse, sensation intact to light touch  Reflexes: 2+          Imaging:  X-ray reviewed and independently interpreted by me.  Discussed with patient.    As per my interpretation of this patient's leftknee plain films " there are changes consistent with moderate DJD as evidenced by medial joint space narrowing, tricompartmental osteophyte presence and subchondral sclerosis. No fracture/dislocations noted.      Assessment:             1. Primary osteoarthritis of left knee    2. Obesity, unspecified classification, unspecified obesity type, unspecified whether serious comorbidity present    3. Uncontrolled type 2 diabetes mellitus with hyperglycemia            Plan:          57 Years old patient returning for evaluation of worsening left knee pain likely secondary to OA and obesity.      Moderate chronic exacerbation  Radiological studies interpreted by me, my independent interpretation attached, reviewed my findings with patient and showed them their images  Will hold off on injections until the pain and disability warrant  Activity as tolerated, behavior modification encouraged  Continue HEP daily, Ice/Heat prn, NSAIDs/Tylenol/topical anasthetics PRN, med precautions given,   Follow up 3 mo or sooner if condition worsens    2. Uncontrolled diabetes mellitus   Most recent hemoglobin A1c:  9.2 from September 2022, encouraged to continue following with PCP for management of diabetes,goal to keep  hemoglobin A1c <8  .   3. Obesity   BMI today:43  Goal BMI: <40    Exercise prescription given to patient to increase fitness and facilitate weight loss. Prescription to include goals of 150 to 300 minutes/week of moderate intensity exercise include activities like brisk walking, light biking or water exercise and/or vigorous activity 75 to 150 minutes/week to include activities like jogging, swimming, fast bicycling or tennis. They were given goal of 7,500-10,000 steps per day. Muscle strength training was also discussed and recommended goal of 2 to 3 days a week to include activities like activities with elastic bands, body weight exercises or dumbbells.        Gertrudis Isidro MD

## 2023-01-15 ENCOUNTER — HOSPITAL ENCOUNTER (EMERGENCY)
Facility: HOSPITAL | Age: 58
Discharge: HOME | End: 2023-01-15
Attending: EMERGENCY MEDICINE
Payer: MEDICAID

## 2023-01-15 VITALS
DIASTOLIC BLOOD PRESSURE: 81 MMHG | BODY MASS INDEX: 39.24 KG/M2 | RESPIRATION RATE: 20 BRPM | SYSTOLIC BLOOD PRESSURE: 129 MMHG | WEIGHT: 250 LBS | HEIGHT: 67 IN | HEART RATE: 75 BPM | OXYGEN SATURATION: 95 % | TEMPERATURE: 98 F

## 2023-01-15 DIAGNOSIS — M54.6 ACUTE LEFT-SIDED THORACIC BACK PAIN: Primary | ICD-10-CM

## 2023-01-15 LAB
ALBUMIN SERPL-MCNC: 3.6 G/DL (ref 3.5–5)
ALBUMIN/GLOB SERPL: 0.9 RATIO (ref 1.1–2)
ALP SERPL-CCNC: 106 UNIT/L (ref 40–150)
ALT SERPL-CCNC: 30 UNIT/L (ref 0–55)
APPEARANCE UR: CLEAR
AST SERPL-CCNC: 20 UNIT/L (ref 5–34)
BACTERIA #/AREA URNS AUTO: NORMAL /HPF
BASOPHILS # BLD AUTO: 0.01 X10(3)/MCL (ref 0–0.2)
BASOPHILS NFR BLD AUTO: 0.2 %
BILIRUB UR QL STRIP.AUTO: NEGATIVE MG/DL
BILIRUBIN DIRECT+TOT PNL SERPL-MCNC: 0.4 MG/DL
BUN SERPL-MCNC: 25.6 MG/DL (ref 8.4–25.7)
CALCIUM SERPL-MCNC: 9.4 MG/DL (ref 8.4–10.2)
CHLORIDE SERPL-SCNC: 104 MMOL/L (ref 98–107)
CO2 SERPL-SCNC: 28 MMOL/L (ref 22–29)
COLOR UR AUTO: YELLOW
CREAT SERPL-MCNC: 1.26 MG/DL (ref 0.73–1.18)
EOSINOPHIL # BLD AUTO: 0.12 X10(3)/MCL (ref 0–0.9)
EOSINOPHIL NFR BLD AUTO: 2 %
ERYTHROCYTE [DISTWIDTH] IN BLOOD BY AUTOMATED COUNT: 13.2 % (ref 11.5–17)
GFR SERPLBLD CREATININE-BSD FMLA CKD-EPI: >60 MLS/MIN/1.73/M2
GLOBULIN SER-MCNC: 3.8 GM/DL (ref 2.4–3.5)
GLUCOSE SERPL-MCNC: 245 MG/DL (ref 74–100)
GLUCOSE UR QL STRIP.AUTO: 250 MG/DL
HCT VFR BLD AUTO: 41.9 % (ref 42–52)
HGB BLD-MCNC: 12.9 GM/DL (ref 14–18)
IMM GRANULOCYTES # BLD AUTO: 0.01 X10(3)/MCL (ref 0–0.04)
IMM GRANULOCYTES NFR BLD AUTO: 0.2 %
KETONES UR QL STRIP.AUTO: NEGATIVE MG/DL
LEUKOCYTE ESTERASE UR QL STRIP.AUTO: NEGATIVE UNIT/L
LYMPHOCYTES # BLD AUTO: 1.44 X10(3)/MCL (ref 0.6–4.6)
LYMPHOCYTES NFR BLD AUTO: 24.4 %
MCH RBC QN AUTO: 26.5 PG
MCHC RBC AUTO-ENTMCNC: 30.8 MG/DL (ref 33–36)
MCV RBC AUTO: 86.2 FL (ref 80–94)
MONOCYTES # BLD AUTO: 0.36 X10(3)/MCL (ref 0.1–1.3)
MONOCYTES NFR BLD AUTO: 6.1 %
NEUTROPHILS # BLD AUTO: 3.95 X10(3)/MCL (ref 2.1–9.2)
NEUTROPHILS NFR BLD AUTO: 67.1 %
NITRITE UR QL STRIP.AUTO: NEGATIVE
PH UR STRIP.AUTO: 7 [PH]
PLATELET # BLD AUTO: 325 X10(3)/MCL (ref 130–400)
PMV BLD AUTO: 8.9 FL (ref 7.4–10.4)
POTASSIUM SERPL-SCNC: 3.6 MMOL/L (ref 3.5–5.1)
PROT SERPL-MCNC: 7.4 GM/DL (ref 6.4–8.3)
PROT UR QL STRIP.AUTO: NEGATIVE MG/DL
RBC # BLD AUTO: 4.86 X10(6)/MCL (ref 4.7–6.1)
RBC #/AREA URNS AUTO: NORMAL /HPF
RBC UR QL AUTO: ABNORMAL UNIT/L
SODIUM SERPL-SCNC: 144 MMOL/L (ref 136–145)
SP GR UR STRIP.AUTO: 1.02
SQUAMOUS #/AREA URNS AUTO: NORMAL /HPF
UROBILINOGEN UR STRIP-ACNC: 0.2 MG/DL
WBC # SPEC AUTO: 5.9 X10(3)/MCL (ref 4.5–11.5)
WBC #/AREA URNS AUTO: NORMAL /HPF

## 2023-01-15 PROCEDURE — 85025 COMPLETE CBC W/AUTO DIFF WBC: CPT | Performed by: EMERGENCY MEDICINE

## 2023-01-15 PROCEDURE — 80053 COMPREHEN METABOLIC PANEL: CPT | Performed by: EMERGENCY MEDICINE

## 2023-01-15 PROCEDURE — 99284 EMERGENCY DEPT VISIT MOD MDM: CPT

## 2023-01-15 PROCEDURE — 81001 URINALYSIS AUTO W/SCOPE: CPT | Performed by: EMERGENCY MEDICINE

## 2023-01-15 RX ORDER — METHOCARBAMOL 750 MG/1
1500 TABLET, FILM COATED ORAL 3 TIMES DAILY
Qty: 30 TABLET | Refills: 0 | Status: SHIPPED | OUTPATIENT
Start: 2023-01-15 | End: 2023-01-20

## 2023-01-15 RX ORDER — TRAMADOL HYDROCHLORIDE 50 MG/1
50 TABLET ORAL EVERY 6 HOURS PRN
Qty: 20 TABLET | Refills: 0 | Status: SHIPPED | OUTPATIENT
Start: 2023-01-15 | End: 2023-01-20

## 2023-01-15 NOTE — ED PROVIDER NOTES
Encounter Date: 1/15/2023       History     Chief Complaint   Patient presents with    Back Pain     L lower back pain x 2 days --denies injury      This 57-year-old man presents with complaints of left-sided back pain at the costovertebral angle.  He reports a history of a kidney mass this pressing on his kidney diagnosed 4-5 years ago.  He states the pain is constant and is a 9/10.  He denies any trouble urinating he denies blood in the urine. A chart review reveals that it is an adrenal mass but it is on the right side.    Review of patient's allergies indicates:  No Known Allergies  Past Medical History:   Diagnosis Date    Adrenal adenoma     Callus of foot 8/19/2022    Coronary artery disease     Diabetes mellitus     Hyperlipidemia     Hypertension     Spinal stenosis     Unspecified osteoarthritis, unspecified site      Past Surgical History:   Procedure Laterality Date    ANGIOGRAPHY      CHOLECYSTECTOMY      FOOT SURGERY Right      Family History   Problem Relation Age of Onset    Hypertension Mother     Heart disease Mother     Stroke Father     Cancer Sister     Heart disease Sister     Cancer Sister     Heart disease Sister     Heart disease Brother     Heart disease Brother      Social History     Tobacco Use    Smoking status: Never    Smokeless tobacco: Never   Substance Use Topics    Alcohol use: Never    Drug use: Never     Review of Systems   Constitutional:  Negative for fever.   HENT:  Negative for sore throat.    Respiratory:  Negative for shortness of breath.    Cardiovascular:  Negative for chest pain.   Gastrointestinal:  Negative for nausea.   Genitourinary:  Positive for flank pain. Negative for dysuria, hematuria and testicular pain.   Musculoskeletal:  Positive for back pain.   Skin:  Negative for rash.   Neurological:  Negative for weakness.   Hematological:  Does not bruise/bleed easily.     Physical Exam     Initial Vitals [01/15/23 1319]   BP Pulse Resp Temp SpO2   129/81 75 20 98.1  °F (36.7 °C) 95 %      MAP       --         Physical Exam    Constitutional: He appears well-developed and well-nourished.   HENT:   Head: Normocephalic and atraumatic.   Mouth/Throat: Mucous membranes are normal.   Eyes: EOM are normal. Pupils are equal, round, and reactive to light.   Neck: Neck supple.   Normal range of motion.  Cardiovascular:  Normal rate, regular rhythm, normal heart sounds and intact distal pulses.           Pulmonary/Chest: Breath sounds normal.   Abdominal: Abdomen is soft. Bowel sounds are normal.   Musculoskeletal:         General: Tenderness (left flank musculoskeletal tenderness reproducible) present. Normal range of motion.      Cervical back: Normal range of motion and neck supple.     Neurological: He is alert and oriented to person, place, and time. He has normal strength.   Skin: Skin is warm and dry. Capillary refill takes less than 2 seconds.   Psychiatric: He has a normal mood and affect. His behavior is normal. Judgment and thought content normal.       ED Course   Procedures  Labs Reviewed   COMPREHENSIVE METABOLIC PANEL - Abnormal; Notable for the following components:       Result Value    Glucose Level 245 (*)     Creatinine 1.26 (*)     Globulin 3.8 (*)     Albumin/Globulin Ratio 0.9 (*)     All other components within normal limits   URINALYSIS, REFLEX TO URINE CULTURE - Abnormal; Notable for the following components:    Glucose,  (*)     Blood, UA Trace-Intact (*)     All other components within normal limits   CBC WITH DIFFERENTIAL - Abnormal; Notable for the following components:    Hgb 12.9 (*)     Hct 41.9 (*)     MCHC 30.8 (*)     All other components within normal limits   URINALYSIS, MICROSCOPIC - Normal   CBC W/ AUTO DIFFERENTIAL    Narrative:     The following orders were created for panel order CBC auto differential.  Procedure                               Abnormality         Status                     ---------                               -----------          ------                     CBC with Differential[[id number]]        Abnormal            Final result                 Please view results for these tests on the individual orders.          Imaging Results    None          Medications - No data to display                           Clinical Impression:   Final diagnoses:  [M54.6] Acute left-sided thoracic back pain (Primary)        ED Disposition Condition    Discharge Stable          ED Prescriptions       Medication Sig Dispense Start Date End Date Auth. Provider    traMADoL (ULTRAM) 50 mg tablet Take 1 tablet (50 mg total) by mouth every 6 (six) hours as needed for Pain. 20 tablet 1/15/2023 1/20/2023 Lucio Greco MD    methocarbamoL (ROBAXIN) 750 MG Tab Take 2 tablets (1,500 mg total) by mouth 3 (three) times daily. for 5 days 30 tablet 1/15/2023 1/20/2023 Lucio Greco MD          Follow-up Information       Follow up With Specialties Details Why Contact Info    BIANCA Eli Nurse Practitioner Call   [address] [facility]  Internal Medicine Clinic  [facility] [address] [phone]               Lucio Greco MD  01/15/23 1978

## 2023-01-15 NOTE — ED NOTES
Pt c/o left sided flank pain, pt states a few years back he was dx with a benign tumor at [facility] on his left kidney, pt denies any injury, pt states that the past 3 days he has had increasing pain on that side.

## 2023-01-17 ENCOUNTER — HOSPITAL ENCOUNTER (EMERGENCY)
Facility: HOSPITAL | Age: 58
Discharge: HOME | End: 2023-01-17
Attending: INTERNAL MEDICINE
Payer: MEDICAID

## 2023-01-17 VITALS
HEART RATE: 81 BPM | OXYGEN SATURATION: 99 % | SYSTOLIC BLOOD PRESSURE: 132 MMHG | RESPIRATION RATE: 20 BRPM | DIASTOLIC BLOOD PRESSURE: 79 MMHG | BODY MASS INDEX: 42.56 KG/M2 | HEIGHT: 67 IN | WEIGHT: 271.19 LBS | TEMPERATURE: 98 F

## 2023-01-17 DIAGNOSIS — M54.50 LEFT LOW BACK PAIN, UNSPECIFIED CHRONICITY, UNSPECIFIED WHETHER SCIATICA PRESENT: Primary | ICD-10-CM

## 2023-01-17 PROCEDURE — 99284 EMERGENCY DEPT VISIT MOD MDM: CPT | Mod: 25

## 2023-01-17 RX ORDER — HYDROCODONE BITARTRATE AND ACETAMINOPHEN 7.5; 325 MG/1; MG/1
1 TABLET ORAL ONCE
Status: DISCONTINUED | OUTPATIENT
Start: 2023-01-17 | End: 2023-01-17 | Stop reason: HOSPADM

## 2023-01-17 NOTE — ED PROVIDER NOTES
Encounter Date: 1/17/2023       History     Chief Complaint   Patient presents with    Flank Pain     PT W CO LT SIDED FLANK/BACK PAIN > 1 WK.  SEEN AT [facility] ON Sunday BUT NO IMPROVEMENT W RX MEDS.  STATES HERE FOR FURTHER TESTING AND MRI.  NO DYSURIA, COUGH OR FEVER.      The history is provided by the patient.   Back Pain   This is a chronic problem. The current episode started several weeks ago. The problem occurs intermittently. The problem has been unchanged. The pain is present in the lumbar spine. The pain does not radiate. The pain is at a severity of 9/10. The symptoms are aggravated by certain positions. Pertinent negatives include no chest pain, no fever, no abdominal pain, no dysuria and no weakness. He has tried analgesics for the symptoms. Risk factors include obesity.     Review of patient's allergies indicates:  No Known Allergies  Past Medical History:   Diagnosis Date    Adrenal adenoma     Callus of foot 8/19/2022    Coronary artery disease     Diabetes mellitus     Hyperlipidemia     Hypertension     Spinal stenosis     Unspecified osteoarthritis, unspecified site      Past Surgical History:   Procedure Laterality Date    ANGIOGRAPHY      CHOLECYSTECTOMY      FOOT SURGERY Right      Family History   Problem Relation Age of Onset    Hypertension Mother     Heart disease Mother     Stroke Father     Cancer Sister     Heart disease Sister     Cancer Sister     Heart disease Sister     Heart disease Brother     Heart disease Brother      Social History     Tobacco Use    Smoking status: Never    Smokeless tobacco: Never   Substance Use Topics    Alcohol use: Never    Drug use: Never     Review of Systems   Constitutional:  Negative for fever.   Respiratory:  Negative for cough and shortness of breath.    Cardiovascular:  Negative for chest pain, palpitations and leg swelling.   Gastrointestinal:  Negative for abdominal pain, constipation, diarrhea, nausea and vomiting.   Genitourinary:  Negative for  dysuria and hematuria.   Musculoskeletal:  Positive for back pain.   Neurological:  Negative for weakness.   All other systems reviewed and are negative.    Physical Exam     Initial Vitals [01/17/23 0805]   BP Pulse Resp Temp SpO2   137/82 83 18 97.9 °F (36.6 °C) 98 %      MAP       --         Physical Exam    Nursing note and vitals reviewed.  Constitutional: He appears well-developed and well-nourished. He is not diaphoretic. No distress.   HENT:   Mouth/Throat: Oropharynx is clear and moist.   Eyes: Pupils are equal, round, and reactive to light. No scleral icterus.   Neck: No JVD present.   Normal range of motion.  Cardiovascular:  Normal rate and regular rhythm.           No murmur heard.  Pulmonary/Chest: No respiratory distress. He has no wheezes. He exhibits no tenderness.   Abdominal: There is no abdominal tenderness. There is no rebound and no guarding.   Musculoskeletal:         General: Normal range of motion.      Cervical back: Normal range of motion.      Lumbar back: Tenderness (paraspinal Left sided) present. No bony tenderness.     Lymphadenopathy:     He has no cervical adenopathy.   Neurological: He is oriented to person, place, and time. GCS score is 15. GCS eye subscore is 4. GCS verbal subscore is 5. GCS motor subscore is 6.   Skin: No erythema.       ED Course   Procedures  Labs Reviewed - No data to display       Imaging Results              CT Abdomen Pelvis  Without Contrast (Final result)  Result time 01/17/23 10:02:03      Final result by Erick Jimenez MD (01/17/23 10:02:03)                   Impression:      Stable benign adrenal nodule on the right side    Otherwise unremarkable      Electronically signed by: Erick Jimenez  Date:    01/17/2023  Time:    10:02               Narrative:    EXAMINATION:  CT ABDOMEN PELVIS WITHOUT CONTRAST    CLINICAL HISTORY:  Abdominal pain, acute, nonlocalized;hx of adrenal nodule;    TECHNIQUE:  Low dose axial images, sagittal and coronal  reformations were obtained from the lung bases to the pubic symphysis.  No contrast was administered.  Automatic exposure control is utilized to reduce patient radiation exposure.    COMPARISON:  05/31/2019    FINDINGS:  The lung bases are clear.    The liver appears normal.  No obvious liver mass or lesion is seen.    The patient is status post cholecystectomy.    The pancreas appears normal.  No inflammatory changes are seen in the pancreatic region.    The spleen appears normal and adrenal glands appear normal.    There is a nodule in the right adrenal gland.  It measures 4.6 by 3.6 cm.  Hounsfield units are -7.1 on average.  Findings are consistent with benign adenomatous/lipomatous lesion.  Findings are unchanged since the prior examination..    No nephrolithiasis is seen.  No hydronephrosis is seen.  No hydroureter is seen.  No ureteral stone is seen.    No colitis is seen.  No diverticulitis is seen.  No appendicitis is seen.    No free air seen.  No free fluid is seen.  The urinary bladder appears normal.    Bones show no acute abnormality.                                    X-Rays:   Independently Interpreted Readings:   Abdomen:   Abdomen and Pelvis without Contrast - Discussed with radiologist of 1.8x0.8cm radiopaque finding in RLQ suggestive of pill. No evidence of obstruction.    Medications - No data to display    Medical Decision Making:   Clinical Tests:   Lab Tests: Reviewed  The following lab test(s) were unremarkable: CBC, CMP and Urinalysis  Radiological Study: Ordered and Reviewed  ED Management:  Discussed with radiologist regarding appropriate radiological testing for adrenal nodule.           Attending Attestation:   Physician Attestation Statement for Resident:  As the supervising MD   Physician Attestation Statement: I have personally seen and examined this patient.   I agree with the above history.  -:   As the supervising MD I agree with the above PE.     As the supervising MD I agree  with the above treatment, course, plan, and disposition.    I have reviewed and agree with the residents interpretation of the following: lab data, CT scans and x-rays.  I have reviewed the following: old records at this facility and old CTs.            Attending ED Notes:   I performed a face to face evaluation of the patient Michael Stone and my history reveal right flank pain,acute on chronic, states Hx of a kidney tumor, the physical exam was remarkable for right flank tenderness.  I reviewed the history, physical exam and diagnostic studies performed by the resident Dr. Kevin and I concur with the diagnosis and assessment. This case was initially evaluated by Dr. Kevin  under my supervision and I agree with the documentation and plan.    Case discussed with radiology, CT ordered and mass appears to be a benign adrenal nodule stable in size on RT side, a FO noticed on Rt side, on ilium suggestive of a ingested tablet    Total teaching time: 12 min                     Clinical Impression:   Final diagnoses:  [M54.50] Left low back pain, unspecified chronicity, unspecified whether sciatica present (Primary)        ED Disposition Condition    Discharge Stable          ED Prescriptions    None       Follow-up Information       Follow up With Specialties Details Why Contact Info    BIANCA Eli Nurse Practitioner In 1 week  [address] [facility]  Internal Medicine Clinic  [facility] [address]  [phone]      [facility] Dept Emergency Medicine  As needed, If symptoms worsen [address] [facility] [address] [phone]             Ashely Kevin MD  Resident  01/17/23 1043       Eliceo Klein MD  01/17/23 3860

## 2023-01-19 ENCOUNTER — TELEPHONE (OUTPATIENT)
Dept: INTERNAL MEDICINE | Facility: CLINIC | Age: 58
End: 2023-01-19
Payer: MEDICAID

## 2023-01-19 NOTE — TELEPHONE ENCOUNTER
Pt called requesting to get a MRI order to get his back checked pt stated its the left side. Pt stated he went to ER on Sunday that just passed and pt went for pain in his back. Pt requesting a call back.

## 2023-02-07 ENCOUNTER — PATIENT OUTREACH (OUTPATIENT)
Dept: EMERGENCY MEDICINE | Facility: HOSPITAL | Age: 58
End: 2023-02-07
Payer: MEDICAID

## 2023-02-07 NOTE — PROGRESS NOTES
Called and spoke with patient,says his back pain has subsided for now. Has a scheduled PCP appointment next month 03/03/23 and he said he would ask about having an MRI of his back. Explained to patient that the MRI would not be approved by his insurance without having gone to formal physical therapy first and failing therapy. Advised patient to discuss possible PT with PCP at next visit. Reminded to check BS daily and keep a log for PCP. Denies any issues with bilateral knee pain,he does have a follow up in Ortho in April for re-evaluation. Advised to utilized urgent care for non acute issues.Voices understanding.

## 2023-02-09 ENCOUNTER — HOSPITAL ENCOUNTER (EMERGENCY)
Facility: HOSPITAL | Age: 58
Discharge: HOME | End: 2023-02-09
Attending: EMERGENCY MEDICINE
Payer: MEDICAID

## 2023-02-09 VITALS
OXYGEN SATURATION: 99 % | BODY MASS INDEX: 40.02 KG/M2 | HEART RATE: 72 BPM | WEIGHT: 255 LBS | HEIGHT: 67 IN | DIASTOLIC BLOOD PRESSURE: 92 MMHG | RESPIRATION RATE: 20 BRPM | SYSTOLIC BLOOD PRESSURE: 152 MMHG | TEMPERATURE: 98 F

## 2023-02-09 DIAGNOSIS — U07.1 COVID-19: Primary | ICD-10-CM

## 2023-02-09 LAB
FLUAV AG UPPER RESP QL IA.RAPID: NOT DETECTED
FLUBV AG UPPER RESP QL IA.RAPID: NOT DETECTED
SARS-COV-2 RNA RESP QL NAA+PROBE: DETECTED

## 2023-02-09 PROCEDURE — 99284 EMERGENCY DEPT VISIT MOD MDM: CPT | Mod: 25

## 2023-02-09 PROCEDURE — 0240U COVID/FLU A&B PCR: CPT | Performed by: EMERGENCY MEDICINE

## 2023-02-09 PROCEDURE — 36000 PLACE NEEDLE IN VEIN: CPT

## 2023-02-09 RX ORDER — DOXYCYCLINE 100 MG/1
100 CAPSULE ORAL 2 TIMES DAILY
Qty: 20 CAPSULE | Refills: 0 | Status: SHIPPED | OUTPATIENT
Start: 2023-02-09 | End: 2023-03-03 | Stop reason: ALTCHOICE

## 2023-02-09 RX ORDER — CODEINE PHOSPHATE AND GUAIFENESIN 10; 100 MG/5ML; MG/5ML
10 SOLUTION ORAL EVERY 8 HOURS PRN
Qty: 180 ML | Refills: 0 | Status: SHIPPED | OUTPATIENT
Start: 2023-02-09 | End: 2023-03-03 | Stop reason: ALTCHOICE

## 2023-02-09 RX ORDER — ALBUTEROL SULFATE 90 UG/1
2 AEROSOL, METERED RESPIRATORY (INHALATION) EVERY 6 HOURS PRN
Qty: 6.7 G | Refills: 2 | Status: SHIPPED | OUTPATIENT
Start: 2023-02-09 | End: 2024-02-16 | Stop reason: SDUPTHER

## 2023-02-09 NOTE — Clinical Note
"Michael Colon" Chase was seen and treated in our emergency department on 2/9/2023.  He may return to work on 02/14/2023.       If you have any questions or concerns, please don't hesitate to call.      BIANCA Cassidy"

## 2023-02-09 NOTE — DISCHARGE INSTRUCTIONS
Doxycycline twice a day  Albuterol inhaler 2 puffs every 6 hours as needed for wheezing  Cheratussin AC as needed for cough, do not take work/drive  Alternate Tylenol and Motrin every 4 hours as needed for pain/fever  Quarantine at home for 5 days

## 2023-02-09 NOTE — ED PROVIDER NOTES
Encounter Date: 2/9/2023       History     Chief Complaint   Patient presents with    Cough     Reports he has been coughing, runny nose for 2 days. Reports diarrhea x 3-4 days     57-YEAR-OLD MALE PRESENTS TO ER COMPLAINING OF COUGH, CONGESTION AND RUNNY NOSE FOR THE PAST 2 DAYS.  HE DOES REPORT HAVING SOME DIARRHEA FOR THE LAST 3-4 DAYS BUT NO ABDOMINAL PAIN OR VOMITING.  REPORTS NO SHORTNESS OF BREATH.  STATES HE DID HAVE 1 EPISODE OF POSTTUSSIVE EMESIS TODAY.  PATIENT DENIES ANY PURULENT MUCUS PRODUCTION.    The history is provided by the patient. No  was used.    Review of patient's allergies indicates:  No Known Allergies  Past Medical History:   Diagnosis Date    Adrenal adenoma     Callus of foot 8/19/2022    Coronary artery disease     Diabetes mellitus     Hyperlipidemia     Hypertension     Spinal stenosis     Unspecified osteoarthritis, unspecified site      Past Surgical History:   Procedure Laterality Date    ANGIOGRAPHY      CHOLECYSTECTOMY      FOOT SURGERY Right      Family History   Problem Relation Age of Onset    Hypertension Mother     Heart disease Mother     Stroke Father     Cancer Sister     Heart disease Sister     Cancer Sister     Heart disease Sister     Heart disease Brother     Heart disease Brother      Social History     Tobacco Use    Smoking status: Never    Smokeless tobacco: Never   Substance Use Topics    Alcohol use: Never    Drug use: Never     Review of Systems   Constitutional:  Negative for chills and fever.   HENT:  Positive for congestion.    Respiratory:  Positive for cough. Negative for shortness of breath.    Gastrointestinal:  Positive for diarrhea and vomiting. Negative for abdominal pain and nausea.   Musculoskeletal:  Positive for myalgias.   Neurological:  Negative for dizziness and light-headedness.   All other systems reviewed and are negative.    Physical Exam     Initial Vitals [02/09/23 1621]   BP Pulse Resp Temp SpO2   (!) 152/92 72 20  98.3 °F (36.8 °C) 99 %      MAP       --         Physical Exam    Constitutional: He appears well-developed and well-nourished.   HENT:   Head: Normocephalic.   Eyes: EOM are normal.   Neck: Neck supple.   Cardiovascular:  Normal rate, regular rhythm and normal heart sounds.           Pulmonary/Chest: He has wheezes.   Abdominal: Abdomen is soft. There is no abdominal tenderness. There is no guarding.   Musculoskeletal:         General: Normal range of motion.      Cervical back: Neck supple.     Neurological: He is alert and oriented to person, place, and time.   Skin: Skin is warm and dry. Capillary refill takes less than 2 seconds.   Psychiatric: He has a normal mood and affect.       ED Course   Procedures  Labs Reviewed   COVID/FLU A&B PCR - Abnormal; Notable for the following components:       Result Value    SARS-CoV-2 PCR Detected (*)     All other components within normal limits    Narrative:     The Xpert Xpress SARS-CoV-2/FLU/RSV plus is a rapid, multiplexed real-time PCR test intended for the simultaneous qualitative detection and differentiation of SARS-CoV-2, Influenza A, Influenza B, and respiratory syncytial virus (RSV) viral RNA in either nasopharyngeal swab or nasal swab specimens.                Imaging Results    None          Medications - No data to display  Medical Decision Making:   Differential Diagnosis:   COVID, influenza, pneumonia, bronchitis  Clinical Tests:   Lab Tests: Ordered and Reviewed       <> Summary of Lab: Patient positive for COVID  ED Management:  Patient has tested positive for COVID.  His oxygen saturations 90% on room air.  He does have mild wheezing in bilateral bases.  Given his hypertension, diabetes and hyperlipidemia, patient will be treated with antibiotics, inhaled steroids and cough medication.                        Clinical Impression:   Final diagnoses:  [U07.1] COVID-19 (Primary)        ED Disposition Condition    Discharge Stable          ED Prescriptions        Medication Sig Dispense Start Date End Date Auth. Provider    albuterol (PROVENTIL/VENTOLIN HFA) 90 mcg/actuation inhaler Inhale 2 puffs into the lungs every 6 (six) hours as needed for Wheezing or Shortness of Breath. 6.7 g 2/9/2023 2/9/2024 BIANCA Cassidy    guaiFENesin-codeine 100-10 mg/5 ml (TUSSI-ORGANIDIN NR)  mg/5 mL syrup Take 10 mLs by mouth every 8 (eight) hours as needed for Cough. 180 mL 2/9/2023 2/19/2023 BIANCA Cassidy    doxycycline (VIBRAMYCIN) 100 MG Cap Take 1 capsule (100 mg total) by mouth 2 (two) times daily. for 10 days 20 capsule 2/9/2023 2/19/2023 BIANCA Cassidy          Follow-up Information    None          BIANCA Cassidy  02/09/23 4801

## 2023-02-13 ENCOUNTER — PATIENT OUTREACH (OUTPATIENT)
Dept: EMERGENCY MEDICINE | Facility: HOSPITAL | Age: 58
End: 2023-02-13
Payer: MEDICAID

## 2023-02-13 NOTE — PROGRESS NOTES
Called and spoke with patient,states he is doing better. Tested + for COVID 02/09/23. Says he filled all Rxs and taking medications as ordered. His coughing had decreased,and he denies any SOB,fever ,no longer having loose stools. Verified he is using precautions with social distancing,good hand washing, wearing a mask. Advised to returned to urgent care and not the ED if symptoms should return. Reminded to utilize urgent care for less acute issues.

## 2023-02-27 ENCOUNTER — PATIENT OUTREACH (OUTPATIENT)
Dept: EMERGENCY MEDICINE | Facility: HOSPITAL | Age: 58
End: 2023-02-27
Payer: MEDICAID

## 2023-02-28 ENCOUNTER — HOSPITAL ENCOUNTER (OUTPATIENT)
Dept: WOUND CARE | Facility: HOSPITAL | Age: 58
Discharge: HOME | End: 2023-02-28
Attending: NURSE PRACTITIONER
Payer: MEDICAID

## 2023-02-28 VITALS
DIASTOLIC BLOOD PRESSURE: 92 MMHG | TEMPERATURE: 98 F | HEART RATE: 91 BPM | SYSTOLIC BLOOD PRESSURE: 130 MMHG | RESPIRATION RATE: 16 BRPM

## 2023-02-28 DIAGNOSIS — E11.9 ENCOUNTER FOR COMPREHENSIVE DIABETIC FOOT EXAMINATION, TYPE 2 DIABETES MELLITUS: Primary | ICD-10-CM

## 2023-02-28 DIAGNOSIS — E11.65 UNCONTROLLED TYPE 2 DIABETES MELLITUS WITH HYPERGLYCEMIA: ICD-10-CM

## 2023-02-28 DIAGNOSIS — L60.2 OVERGROWN TOENAILS: ICD-10-CM

## 2023-02-28 DIAGNOSIS — B35.3 TINEA PEDIS OF BOTH FEET: ICD-10-CM

## 2023-02-28 DIAGNOSIS — L60.3 DYSTROPHIC NAIL: ICD-10-CM

## 2023-02-28 PROCEDURE — 11719 TRIM NAIL(S) ANY NUMBER: CPT | Mod: ,,, | Performed by: NURSE PRACTITIONER

## 2023-02-28 PROCEDURE — 11719 PR TRIM NAIL(S): ICD-10-PCS | Mod: ,,, | Performed by: NURSE PRACTITIONER

## 2023-02-28 PROCEDURE — 99499 NO LOS: ICD-10-PCS | Mod: ,,, | Performed by: NURSE PRACTITIONER

## 2023-02-28 PROCEDURE — 11719 TRIM NAIL(S) ANY NUMBER: CPT

## 2023-02-28 PROCEDURE — 99499 UNLISTED E&M SERVICE: CPT | Mod: ,,, | Performed by: NURSE PRACTITIONER

## 2023-02-28 PROCEDURE — 99211 OFF/OP EST MAY X REQ PHY/QHP: CPT

## 2023-02-28 NOTE — PROGRESS NOTES
TODAY'S VISIT NOTE WAS IMPORTED FROM LAST WOUND CLINIC VISIT OF 11/29/2022.  I ATTEST THAT I REVIEWED THE HPI, ROS, LABS, WOUND-RELATED IMAGING, PHYSICAL EXAMINATION, EVALUATION AND PLAN SECTIONS OF IMPORTED NOTE AND REVISED TODAY'S VISIT NOTE TO REFLECT TODAY'S NEW ASSESSMENT FINDINGS AND TODAY'S UPDATES TO WOUND TREATMENT PLAN.          Chief Complaint:  Routine diabetic foot care.       History of Present Illness:  58 yo Black male being seen today for routine diabetic foot care.  Continues applying gentian violet 1% between toes with Q-tip, as-needed, with good control of rash/moistness between toes.  Lesion to right dorsal foot biopsied by [facility] dermatology with negative findings (Spring 2022).   Hx includes medical non-compliance, obesity (BMI 39.94), uncontrolled Type 2 diabetes (insulin-dependent), HTN, HLD, adrenal adenoma, generalized DJD, bilateral 4th toeweb calluses,  and hx of COVID infection (1/12/2021). Has attended individual diabetes education on 12/18/20 and has been attempting to follow ADA/DASH diet. Underwent angiogram 8/20/2021 here @ [facility]; very mild nonobstructive one vessel disease. Followed by [facility] cardiology clinic.  RLE 12/2021 venous reflux study showed reflux of 4.3 seconds in the GSV at the level of the upper calf and reflux of 4.8 seconds and a branch of the GSV at the level of the upper calf. August 2021 LLE venous reflux study revealed no substantial venous reflux and no DVT.  Followed by Dr. Gallo's [facility] clinic for vascular.   Followed by BIANCA Eli, for PCP; last visit with Ms. Roque was on 1/26/23.      Review of Most Recent Labs:  1/15/23:  CBC unremarkable.  Cr 1.26.    11/15/2022:   Chol 109.  HDL 38.  LDL 58.  Trig 65.  HgbA1C 7.8.  PSA 2.57.          Today 2/28/23:  Still having pain in calves with walking sometimes, along with cold feet, and numbness over all toes on both feet.  Ankles are always swollen.  Applying Lac-Hydrin and Vaseline to both feet, with  "improvement in dry skin.  Admits to walking barefoot in house.  Denies walking outside barefoot.     11/29/2022:  Having mild discomfort between bilateral 4th and 5th toes, where he has had calluses previously.  Has not been sanding calluses down with tyesha boards, as taught previously.  No rashes between toes.  Reports calf pain with walking (left > right) at times, cold feet, and numbness over all toes.  Ankles are always swollen.  Scheduled to see Dr. Gallo, vascular, 12/2/2022.  Has not been applying any kind of moisturizer to dry skin over bottoms of feet and lower legs.  No new rashes, lesions, or skin breakdown.     8/19/2022:  Has noticed tiny dry hole in space between both little toes, since last visit.   Went to [facility] to have toes looked at and was told there were no wounds there.  Denies pain in those areas.  Both little toes are more stiff than his other toes.  Denies numbness, pain, and tingling in all toes.  Not aware of any calluses.  Toenails need cut and filed.  Continues using gentian violet between toes, as needed.  No rashes between toes.  Wearing pair of shoes that used to belong to his wife; they protect toes and heels.  Seen by PCP earlier this morning and had labs drawn.  Denies weakness; no stomach problems.   Not wearing compression stockings.      5/20/2022:  Not wearing compression stockings today; scheduled to see cardiologist after today's visit.  Says he is weighing daily; however, believes his scale is not working.  Denies numbness, tingling, and pain in feet/toes.  Rash between toes is "doing good."  Continues with Gentian violet, as needed, with complete resolution of rash within a day or two.  Wears shoes that cover heels and toes at all times, when out of bed.     2/18/2022:  Mr. Stone presented without compression stockings on. Denies numbness, tingling, and pain in feet toes. Wears shoes at all times, while out of bed. Agreeable to being referred to [facility] dermatology to have " lesion to right dorsal foot evaluated, and possibly biopsied. No new skin breakdown, rashes, or other skin issues. No rash or moisture between toes. He uses Gentian Violet 1%, as needed, which resolves rash quickly.     11/19/2021:  Denies numbness, tingling, and pain in bilateral feet. No changes to lesion on middle of right foot since his last visit. No new skin breakdown, rashes, or lesions. Applying gentian violet 1% between toes daily.    8/18/2021:  Denies numbness, tingling, and pain in bilateral feet. No changes to lesion on middle of right foot since his last visit. No new skin breakdown, rashes, or lesions.       Review of Systems:  Except as stated in HPI, all other 10 body systems normal      Physical Exam Vitals & Measurements:    Vitals:    02/28/23 0920   BP: (!) 130/92   Pulse: 91   Resp: 16   Temp: 98 °F (36.7 °C)         General:  Hypertensive, asymptomatic with; afebrile. Morbidly obese; in no acute distress.  Respiratory:   Breathing even, quiet, and unlabored. No coughing.   Cardiovascular:   Moderate non-pitting edema over bilateral medial and lateral ankles.  No hemosiderin staining or varicosities.  No hair distribution over BLE.   Feet and toes cool to touch. No temperature differences between BLE. Toenails thickened, yellow, and overgrown.  DP pulses palpated bilaterally.   PT pulses dopplered bilaterally.    Musculoskeletal:  Full range of motion of all extremities.  Bunion to left 1st met head.   Decreased dorsiflexion and flexion of bilateral ankles, and left hallux toe.  Loss of intrinsic muscle ROM in bilateral feet.  Bilateral pes planus.    Neurologic: A&O X 3; cranial nerves grossly intact.  Normal monofilament testing.  No loss of sensation.    Psychiatric: Calm, cooperative. Mood and affect normal. Responses appropriate  Integumentary:     Ten toenails all overgrown and dystrophic.     Bilateral 4th toeweb calluses:  No calluses, rashes, or skin breakdown between toe  webs.    Bilateral plantar feet well moisturized today.                              Assessment/Plan:    Obesity, BMI 39.94:  This has been identified as a co-factor towards foot health, and increased risk of calluses, and diabetic foot ulcers.   Being managed by PCP.     Encounter for routine diabetic foot care, Type 2, insulin-dependent:  Last visit with PCP, DERIC Eli:  1/26/23  Poorly-controlled; however, improving.   Positive reinforcement given, with encouragement towards adherence with medications, diet, and keeping all appts with PCP, DERIC Giraldo.      Overgrown toenails:  Procedure Today: cutting and filing all 10 dystrophic nails  Informed verbal consent obtained.    Using standard podiatry clippers and Daniel boards, I cut, trimmed, filed/sanded all ten toenails. No bleeding or discomfort; Mr. Stone tolerated procedure without complications.     Calluses of Bilateral Toewebs:  Stable today; did not require sanding today.      Tinea Pedis:  Stable with gentian violet 1%, as-needed.   CPM.     Xerosis of bilateral feet:  Improved with current tx plan.   CPM   Prescribed Lac-Hydrin 12%, followed by thin layer of Vaseline twice/day.   Instructions reinforced to not put creams/lotions between toes, with rationale.              RTC in three months. Instructed on s/s to call wound clinic for in between clinic visits.

## 2023-03-02 ENCOUNTER — PATIENT OUTREACH (OUTPATIENT)
Dept: EMERGENCY MEDICINE | Facility: HOSPITAL | Age: 58
End: 2023-03-02
Payer: MEDICAID

## 2023-03-03 ENCOUNTER — TELEPHONE (OUTPATIENT)
Dept: INTERNAL MEDICINE | Facility: CLINIC | Age: 58
End: 2023-03-03
Payer: MEDICAID

## 2023-03-03 ENCOUNTER — CLINICAL SUPPORT (OUTPATIENT)
Dept: INTERNAL MEDICINE | Facility: CLINIC | Age: 58
End: 2023-03-03
Attending: NURSE PRACTITIONER
Payer: MEDICAID

## 2023-03-03 ENCOUNTER — OFFICE VISIT (OUTPATIENT)
Dept: INTERNAL MEDICINE | Facility: CLINIC | Age: 58
End: 2023-03-03
Payer: MEDICAID

## 2023-03-03 VITALS
DIASTOLIC BLOOD PRESSURE: 76 MMHG | BODY MASS INDEX: 41.28 KG/M2 | SYSTOLIC BLOOD PRESSURE: 128 MMHG | WEIGHT: 263 LBS | TEMPERATURE: 98 F | RESPIRATION RATE: 18 BRPM | HEART RATE: 76 BPM | HEIGHT: 67 IN

## 2023-03-03 DIAGNOSIS — Z13.5 SCREENING FOR DIABETIC RETINOPATHY: ICD-10-CM

## 2023-03-03 DIAGNOSIS — Z23 NEED FOR VACCINATION: Primary | ICD-10-CM

## 2023-03-03 DIAGNOSIS — G47.33 OSA (OBSTRUCTIVE SLEEP APNEA): Chronic | ICD-10-CM

## 2023-03-03 DIAGNOSIS — Z00.00 WELLNESS EXAMINATION: ICD-10-CM

## 2023-03-03 DIAGNOSIS — E11.65 UNCONTROLLED TYPE 2 DIABETES MELLITUS WITH HYPERGLYCEMIA: Chronic | ICD-10-CM

## 2023-03-03 DIAGNOSIS — E66.01 CLASS 3 SEVERE OBESITY DUE TO EXCESS CALORIES WITH SERIOUS COMORBIDITY AND BODY MASS INDEX (BMI) OF 40.0 TO 44.9 IN ADULT: Chronic | ICD-10-CM

## 2023-03-03 PROBLEM — I25.10 MILD CAD: Chronic | Status: ACTIVE | Noted: 2022-05-20

## 2023-03-03 PROBLEM — I10 PRIMARY HYPERTENSION: Chronic | Status: ACTIVE | Noted: 2022-05-20

## 2023-03-03 PROBLEM — M50.30 DDD (DEGENERATIVE DISC DISEASE), CERVICAL: Chronic | Status: ACTIVE | Noted: 2022-11-02

## 2023-03-03 PROBLEM — E78.5 HYPERLIPIDEMIA LDL GOAL <70: Chronic | Status: ACTIVE | Noted: 2022-05-20

## 2023-03-03 PROBLEM — I87.2 VENOUS INSUFFICIENCY: Chronic | Status: ACTIVE | Noted: 2022-05-20

## 2023-03-03 PROBLEM — M48.02 SPINAL STENOSIS OF CERVICAL REGION: Chronic | Status: ACTIVE | Noted: 2022-05-20

## 2023-03-03 PROBLEM — E66.813 CLASS 3 SEVERE OBESITY DUE TO EXCESS CALORIES WITH SERIOUS COMORBIDITY AND BODY MASS INDEX (BMI) OF 40.0 TO 44.9 IN ADULT: Chronic | Status: ACTIVE | Noted: 2022-05-20

## 2023-03-03 LAB — HBA1C MFR BLD: 8.9 %

## 2023-03-03 PROCEDURE — 1159F MED LIST DOCD IN RCRD: CPT | Mod: CPTII,,, | Performed by: NURSE PRACTITIONER

## 2023-03-03 PROCEDURE — 4010F ACE/ARB THERAPY RXD/TAKEN: CPT | Mod: CPTII,,, | Performed by: NURSE PRACTITIONER

## 2023-03-03 PROCEDURE — 3008F BODY MASS INDEX DOCD: CPT | Mod: CPTII,,, | Performed by: NURSE PRACTITIONER

## 2023-03-03 PROCEDURE — 99396 PR PREVENTIVE VISIT,EST,40-64: ICD-10-PCS | Mod: 25,S$PBB,, | Performed by: NURSE PRACTITIONER

## 2023-03-03 PROCEDURE — 3078F DIAST BP <80 MM HG: CPT | Mod: CPTII,,, | Performed by: NURSE PRACTITIONER

## 2023-03-03 PROCEDURE — 3074F PR MOST RECENT SYSTOLIC BLOOD PRESSURE < 130 MM HG: ICD-10-PCS | Mod: CPTII,,, | Performed by: NURSE PRACTITIONER

## 2023-03-03 PROCEDURE — 90677 PCV20 VACCINE IM: CPT | Mod: PBBFAC

## 2023-03-03 PROCEDURE — 3074F SYST BP LT 130 MM HG: CPT | Mod: CPTII,,, | Performed by: NURSE PRACTITIONER

## 2023-03-03 PROCEDURE — 1160F RVW MEDS BY RX/DR IN RCRD: CPT | Mod: CPTII,,, | Performed by: NURSE PRACTITIONER

## 2023-03-03 PROCEDURE — 90471 IMMUNIZATION ADMIN: CPT | Mod: PBBFAC

## 2023-03-03 PROCEDURE — 4010F PR ACE/ARB THEARPY RXD/TAKEN: ICD-10-PCS | Mod: CPTII,,, | Performed by: NURSE PRACTITIONER

## 2023-03-03 PROCEDURE — 1160F PR REVIEW ALL MEDS BY PRESCRIBER/CLIN PHARMACIST DOCUMENTED: ICD-10-PCS | Mod: CPTII,,, | Performed by: NURSE PRACTITIONER

## 2023-03-03 PROCEDURE — 3078F PR MOST RECENT DIASTOLIC BLOOD PRESSURE < 80 MM HG: ICD-10-PCS | Mod: CPTII,,, | Performed by: NURSE PRACTITIONER

## 2023-03-03 PROCEDURE — 90472 IMMUNIZATION ADMIN EACH ADD: CPT | Mod: PBBFAC

## 2023-03-03 PROCEDURE — 1159F PR MEDICATION LIST DOCUMENTED IN MEDICAL RECORD: ICD-10-PCS | Mod: CPTII,,, | Performed by: NURSE PRACTITIONER

## 2023-03-03 PROCEDURE — 83036 HEMOGLOBIN GLYCOSYLATED A1C: CPT | Mod: PBBFAC | Performed by: NURSE PRACTITIONER

## 2023-03-03 PROCEDURE — 99215 OFFICE O/P EST HI 40 MIN: CPT | Mod: PBBFAC | Performed by: NURSE PRACTITIONER

## 2023-03-03 PROCEDURE — 99396 PREV VISIT EST AGE 40-64: CPT | Mod: 25,S$PBB,, | Performed by: NURSE PRACTITIONER

## 2023-03-03 PROCEDURE — 3052F HG A1C>EQUAL 8.0%<EQUAL 9.0%: CPT | Mod: CPTII,,, | Performed by: NURSE PRACTITIONER

## 2023-03-03 PROCEDURE — 3052F PR MOST RECENT HEMOGLOBIN A1C LEVEL 8.0 - < 9.0%: ICD-10-PCS | Mod: CPTII,,, | Performed by: NURSE PRACTITIONER

## 2023-03-03 PROCEDURE — 3008F PR BODY MASS INDEX (BMI) DOCUMENTED: ICD-10-PCS | Mod: CPTII,,, | Performed by: NURSE PRACTITIONER

## 2023-03-03 RX ORDER — INSULIN GLARGINE 100 [IU]/ML
INJECTION, SOLUTION SUBCUTANEOUS
Qty: 85 ML | Refills: 1 | Status: SHIPPED | OUTPATIENT
Start: 2023-03-03 | End: 2023-04-04 | Stop reason: SDUPTHER

## 2023-03-03 RX ORDER — DULAGLUTIDE 3 MG/.5ML
3 INJECTION, SOLUTION SUBCUTANEOUS
Qty: 12 PEN | Refills: 1 | Status: SHIPPED | OUTPATIENT
Start: 2023-03-03 | End: 2023-06-09 | Stop reason: DRUGHIGH

## 2023-03-03 RX ORDER — INSULIN GLARGINE 100 [IU]/ML
INJECTION, SOLUTION SUBCUTANEOUS
COMMUNITY
Start: 2023-01-28 | End: 2023-03-03 | Stop reason: SDUPTHER

## 2023-03-03 NOTE — PROGRESS NOTES
"  Yelitza Roque, BIANCA   [facility]  [address] [facility] [id number]      PATIENT NAME: Michael Stone  : 1965  DATE: 3/3/23  MRN: [id number]      Reason for Visit / Chief Complaint: Diabetes (Wants Pneumo, flu vaccines)       History of Present Illness / Problem Focused Workflow     Michael Stone is a 57 y.o. Black or  male presents to the clinic for annual visit. PMH uncontrolled DM, HLD, HTN, mild CAD, Covid pneumonia (21), adrenal adenoma, vit d deficiency, morbid obesity, cervical spinal stenosis, MINA on CPAP, bilateral knee OA, tinea pedis and med non-compliance. Followed by [facility] cardio, ortho and wound clinics. Pt tested covid + on 23; symptoms have resolved. Has f/u with Dr. Gallo later his month. LV with wound clinic on 23.     Today, pt reports med compliance. CBGs running "high the last few days like in the 300s." Checks CBGs BID-TID and prn symptomatic. States is not eating as many sweets as before. Attempts to follow ADA/low sodium diet. Is wearing CPAP nightly as instructed. Amendable to flu and pneumo vaccines today. Interested in CGM; fingers are getting sensitive. Denies chest pain, shortness of breath, cough, fever, headache, dizziness, weakness, abdominal pain, nausea, vomiting, diarrhea, constipation, dysuria, depression, anxiety, SI/HI.      Review of Systems     Review of Systems     See HPI for details    Constitutional: Denies Change in appetite. Denies Chills. Denies Fever. Denies Night sweats.  Eye: Denies Blurred vision. Denies Discharge. Denies Eye pain.  ENT: Denies Decreased hearing. Denies Sore throat. Denies Swollen glands.  Respiratory: Denies Cough. Denies Shortness of breath. Denies Shortness of breath with exertion. Denies Wheezing.  Cardiovascular: DeniesChest pain at rest. Denies Chest pain with exertion. Denies Irregular heartbeat. Denies Palpitations. Denies " Edema.  Gastrointestinal: Denies Abdominal pain. Denies Diarrhea. Denies Nausea. Denies Vomiting. Denies Hematemesis or Hematochezia.  Genitourinary: Denies Dysuria. Denies Urinary frequency. Denies Urinary urgency. Denies Blood in urine.  Endocrine: Denies Cold intolerance. Denies Excessive thirst. Denies Heat intolerance. Denies Weight loss. Denies Weight gain.  Musculoskeletal: Denies Painful joints. Denies Weakness.  Integumentary: Denies Rash. Denies Itching. Denies Dry skin.  Neurologic: Denies Dizziness. Denies Fainting. Denies Headache.  Psychiatric: Denies Depression. Denies Anxiety. Denies Suicidal/Homicidal ideations.    All Other ROS: Negative except as stated in HPI.     Medical / Surgical / Social / Family History       ----------------------------  Adrenal adenoma  Callus of foot  Coronary artery disease  Diabetes mellitus  Hyperlipidemia  Hypertension  Spinal stenosis  Unspecified osteoarthritis, unspecified site     Past Surgical History:   Procedure Laterality Date    ANGIOGRAPHY      CHOLECYSTECTOMY      FOOT SURGERY Right        Social History     Socioeconomic History    Marital status: Single   Tobacco Use    Smoking status: Never    Smokeless tobacco: Never   Substance and Sexual Activity    Alcohol use: Never    Drug use: Never    Sexual activity: Yes     Partners: Female     Birth control/protection: None     Social Determinants of Health     Financial Resource Strain: Low Risk     Difficulty of Paying Living Expenses: Not very hard   Food Insecurity: No Food Insecurity    Worried About Running Out of Food in the Last Year: Never true    Ran Out of Food in the Last Year: Never true   Transportation Needs: No Transportation Needs    Lack of Transportation (Medical): No    Lack of Transportation (Non-Medical): No   Physical Activity: Insufficiently Active    Days of Exercise per Week: 4 days    Minutes of Exercise per Session: 20 min   Stress: Stress Concern Present    Feeling of Stress : To  "some extent   Social Connections: Moderately Isolated    Frequency of Communication with Friends and Family: More than three times a week    Frequency of Social Gatherings with Friends and Family: More than three times a week    Attends Sikh Services: 1 to 4 times per year    Active Member of Clubs or Organizations: No    Marital Status: Never    Housing Stability: Low Risk     Unable to Pay for Housing in the Last Year: No    Number of Places Lived in the Last Year: 1    Unstable Housing in the Last Year: No        Family History   Problem Relation Age of Onset    Hypertension Mother     Heart disease Mother     Stroke Father     Cancer Sister     Heart disease Sister     Cancer Sister     Heart disease Sister     Heart disease Brother     Heart disease Brother         Medications and Allergies     Medications  Current Outpatient Medications   Medication Instructions    albuterol (PROVENTIL/VENTOLIN HFA) 90 mcg/actuation inhaler 2 puffs, Inhalation, Every 6 hours PRN    aspirin (ECOTRIN) 81 mg, Oral, Daily    atorvastatin (LIPITOR) 80 mg, Oral, Nightly    BD VEO INSULIN SYRINGE UF 1 mL 31 gauge x 15/64" Syrg USE THREE TIMES DAILY AS DIRECTED    blood sugar diagnostic Strp   One Touch Ultra 2 Test Strips, See Instructions, Use to check CBG TID, # 100 EA, 11 Refill(s), Pharmacy: [facility], 168, cm, Height/Length Dosing, 01/21/22 10:22:00 CST, 116, kg, Weight Dosing, 01/21/22 10:22:00 CST    furosemide (LASIX) 20 mg, Oral, Daily    HumaLOG U-100 Insulin 20 Units, Subcutaneous, 3 times daily before meals    hydroCHLOROthiazide (HYDRODIURIL) 100 mg, Oral, Daily    ID NOW COVID-19 TEST KIT Kit TEST AS DIRECTED TODAY    insulin syringes, disposable, 1 mL Syrg 1 Syringe, Subcutaneous, 3 times daily before meals    LANTUS SOLOSTAR U-100 INSULIN glargine 100 units/mL SubQ pen Inject 52 units subq qam and 42 units subq at bedtime.    losartan (COZAAR) 100 mg, Oral, Daily    metFORMIN (GLUCOPHAGE) " "1,000 mg, Oral, 2 times daily with meals    NIFEdipine (ADALAT CC) 90 mg, Oral, Daily    NIFEdipine (PROCARDIA-XL) 30 mg, Oral, Nightly    nitroGLYCERIN (NITROSTAT) 0.4 mg, Sublingual, Every 5 min PRN    ONETOUCH DELICA LANCETS 33 gauge Misc Topical (Top), 3 times daily    ONETOUCH ULTRA TEST Strp USE THREE TIMES A DAY AS DIRECTED BY DOCTOR    potassium chloride (K-TAB) 20 mEq 20 mEq, Oral, Daily    TRULICITY 3 mg, Subcutaneous, Every 7 days         Allergies  Review of patient's allergies indicates:  No Known Allergies    Physical Examination     /76 (BP Location: Right arm, Patient Position: Sitting, BP Method: Large (Automatic))   Pulse 76   Temp 97.9 °F (36.6 °C) (Oral)   Resp 18   Ht 5' 7.01" (1.702 m)   Wt 119.3 kg (263 lb 0.1 oz)   BMI 41.18 kg/m²     Physical Exam  Constitutional:       Appearance: He is morbidly obese.       General: Alert and oriented, No acute distress.  Head: Normocephalic, Atraumatic.  Eye: Pupils are equal, round and reactive to light, Extraocular movements are intact, Sclera non-icteric.  Ears/Nose/Throat: Normal, Mucosa moist,Clear.  Neck/Thyroid: Supple, Non-tender, No carotid bruit, No lymphadenopathy, No JVD, Full range of motion.  Respiratory: Clear to auscultation bilaterally; No wheezes, rales or rhonchi,Non-labored respirations, Symmetrical chest wall expansion.  Cardiovascular: Regular rate and rhythm, S1/S2 normal, No murmurs, rubs or gallops.  Gastrointestinal: Soft, Non-tender, Non-distended, Normal bowel sounds, No palpable organomegaly.  Musculoskeletal: Normal range of motion.  Integumentary: Warm, Dry, Intact, No suspicious lesions or rashes.  Extremities: No clubbing, cyanosis or edema  Neurologic: No focal deficits, Cranial Nerves II-XII are grossly intact, Motor strength normal upper and lower extremities, Sensory exam intact.  Psychiatric: Normal interaction, Coherent speech, Euthymic mood, Appropriate affect         Results     Lab Results   Component " Value Date    WBC 5.9 01/15/2023    HGB 12.9 (L) 01/15/2023    HCT 41.9 (L) 01/15/2023     01/15/2023    CHOL 109 11/15/2022    TRIG 65 11/15/2022    ALT 30 01/15/2023    AST 20 01/15/2023     01/15/2023    K 3.6 01/15/2023    CREATININE 1.26 (H) 01/15/2023    BUN 25.6 01/15/2023    CO2 28 01/15/2023    TSH 1.3340 11/15/2022    PSA 2.57 11/15/2022    INR 0.97 08/03/2021    HGBA1C 7.8 (H) 11/15/2022         Assessment and Plan (including Health Maintenance)     Plan:     1. Wellness examination  Prostate Cancer Screening - 2.57. Follow up annually.  Colon Cancer Screening - Cologuard ordered. Follow up annually.  Eye Exam - Last Eye Exam today; unable to read. Pt referred to optho for full exam.  Dental Exam - Several years.   Vaccinations: Flu - 3/3/23 / Covid - 4/27/21, 5/25/21 & 6/10/22/ Pneumonia - 3/3/23 / Shingles - 10/6/21 & 5/20/22 / Tetanus - UTD (3/6/17)  Labwork - UTD      2. Need for vaccination    - Influenza - Quadrivalent (PF)  - Pneumococcal Conjugate Vaccine (20 Valent) (IM)    3. Uncontrolled type 2 diabetes mellitus with hyperglycemia  A1C 8.9 not at goal. Previous A1C 7.8.   Increase trulicity to 3 mg weekly.  Continue lantus - pt taking 52 units in am and 42 units in pm.  Continue humalog 20 units TIDWM.  Continue medications as prescribed.  Follow ADA diet.  Avoid soda, simple sweets, and limit rice/pasta/bread/starches and consume brown options when possible.   Maintain healthy weight with BMI goal <30.   Perform aerobic exercise for 150 minutes per week (or 5 days a week for 30 minutes each day).   Examine feet daily.   Obtain annual dilated eye exam.  Eye exam: ref to optho d/t quality poor with clinic fundus exam  Foot exam: 5/2/22    - POCT HEMOGLOBIN A1C  - Ambulatory referral/consult to Ophthalmology; Future  - dulaglutide (TRULICITY) 3 mg/0.5 mL pen injector; Inject 3 mg into the skin every 7 days.  Dispense: 12 pen; Refill: 1  - Hemoglobin A1C; Future  - Comprehensive  Metabolic Panel; Future  - Continuous Glucose Monitoring for Home Use (HME vendor/non-pharmacy)  - LANTUS SOLOSTAR U-100 INSULIN glargine 100 units/mL SubQ pen; Inject 52 units subq qam and 42 units subq at bedtime.  Dispense: 85 mL; Refill: 1    4. MINA (obstructive sleep apnea)  Reports compliance with wearing CPAP nightly.  Reports decreased EDS, snoring and fatigue.  Pt is benefiting from its use.  Expect lifetime use and decreased daytime sleepiness/fatigue.   Avoid excessive alcohol, smoking and overuse of sedatives at bedtime.  Weight management discussed.  Adjust sleep position as needed for increased comfort.      5. Class 3 severe obesity due to excess calories with serious comorbidity and body mass index (BMI) of 40.0 to 44.9 in adult  BMI 41. Goal BMI <30.  Aerobic exercise 150 minutes per week.  Avoid soda, simple sugars, sweets, excessive rice, pasta, potatoes or bread.   Choose brown options when available and portion control.  Limit fast foods and fried foods.   Choose complex carbs in moderation (ex: green, leafy vegetables, beans, oatmeal).  Eat plenty of fresh fruits and vegetables with lean meats daily.   Consider permanent healthy lifestyle changes.      6. Screening for diabetic retinopathy  Pupils too small; inadequate quality in clinic.  Referral to optho for exam.  - Diabetic Eye Screening Photo; Future  - Ambulatory referral/consult to Ophthalmology; Future      Problem List Items Addressed This Visit          Endocrine    Class 3 severe obesity due to excess calories with serious comorbidity and body mass index (BMI) of 40.0 to 44.9 in adult (Chronic)    Uncontrolled type 2 diabetes mellitus with hyperglycemia (Chronic)    Relevant Medications    dulaglutide (TRULICITY) 3 mg/0.5 mL pen injector    LANTUS SOLOSTAR U-100 INSULIN glargine 100 units/mL SubQ pen    Other Relevant Orders    POCT HEMOGLOBIN A1C (Completed)    Ambulatory referral/consult to Ophthalmology    Hemoglobin A1C     Comprehensive Metabolic Panel    Continuous Glucose Monitoring for Home Use (HME vendor/non-pharmacy)       Other    MINA (obstructive sleep apnea) (Chronic)    Wellness examination     Other Visit Diagnoses       Need for vaccination    -  Primary    Relevant Orders    Influenza - Quadrivalent (PF) (Completed)    Pneumococcal Conjugate Vaccine (20 Valent) (IM) (Completed)    Screening for diabetic retinopathy        Relevant Orders    Diabetic Eye Screening Photo (Completed)    Ambulatory referral/consult to Ophthalmology              Health Maintenance Due   Topic Date Due    Hepatitis C Screening  Never done    COVID-19 Vaccine (4 - Booster for Moderna series) 08/05/2022       Follow up in about 3 months (around 6/3/2023) for Follow up. Labs one week prior to appt..        Signature:  BIANCA Cabral  [facility]  [address] [facility] [id number]

## 2023-03-03 NOTE — PROGRESS NOTES
Michael Stone is a 57 y.o. male here for a diabetic eye screening with non-dilated fundus photos per BIANCA Eli.    Patient cooperative?: Yes  Small pupils?: Yes  Last eye exam: unknown    Images were low quality and inadequate for interpretation. Patient was referred to Opthalmology,.

## 2023-03-03 NOTE — TELEPHONE ENCOUNTER
Roney with [facility] in [place] called to see if you knew that Mr Rodriguez is taking Nifidipine 90 mg from Camille Gary at [facility] and Nifidipine 30 mg from you. Ms Carmen stated she just wanted to verify.

## 2023-03-06 NOTE — TELEPHONE ENCOUNTER
Spoke to Hitesh at [facility] in [facility], she will call and instruct pt to take 3 30 mg tabs daily until seen in Cardio on 3/21/23 for new Rx.

## 2023-03-20 ENCOUNTER — PATIENT OUTREACH (OUTPATIENT)
Dept: EMERGENCY MEDICINE | Facility: HOSPITAL | Age: 58
End: 2023-03-20
Payer: MEDICAID

## 2023-03-21 ENCOUNTER — OFFICE VISIT (OUTPATIENT)
Dept: CARDIOLOGY | Facility: CLINIC | Age: 58
End: 2023-03-21
Payer: MEDICAID

## 2023-03-21 VITALS
HEIGHT: 67 IN | DIASTOLIC BLOOD PRESSURE: 58 MMHG | BODY MASS INDEX: 40.81 KG/M2 | OXYGEN SATURATION: 100 % | TEMPERATURE: 98 F | SYSTOLIC BLOOD PRESSURE: 112 MMHG | WEIGHT: 260 LBS | RESPIRATION RATE: 18 BRPM | HEART RATE: 65 BPM

## 2023-03-21 DIAGNOSIS — G47.33 OSA (OBSTRUCTIVE SLEEP APNEA): Chronic | ICD-10-CM

## 2023-03-21 DIAGNOSIS — E78.5 HYPERLIPIDEMIA LDL GOAL <70: ICD-10-CM

## 2023-03-21 DIAGNOSIS — R06.09 DOE (DYSPNEA ON EXERTION): Primary | ICD-10-CM

## 2023-03-21 DIAGNOSIS — I87.2 VENOUS INSUFFICIENCY: Chronic | ICD-10-CM

## 2023-03-21 DIAGNOSIS — I25.10 MILD CAD: Chronic | ICD-10-CM

## 2023-03-21 DIAGNOSIS — I10 PRIMARY HYPERTENSION: Chronic | ICD-10-CM

## 2023-03-21 PROCEDURE — 3074F PR MOST RECENT SYSTOLIC BLOOD PRESSURE < 130 MM HG: ICD-10-PCS | Mod: CPTII,,, | Performed by: NURSE PRACTITIONER

## 2023-03-21 PROCEDURE — 99215 OFFICE O/P EST HI 40 MIN: CPT | Mod: PBBFAC | Performed by: NURSE PRACTITIONER

## 2023-03-21 PROCEDURE — 3078F PR MOST RECENT DIASTOLIC BLOOD PRESSURE < 80 MM HG: ICD-10-PCS | Mod: CPTII,,, | Performed by: NURSE PRACTITIONER

## 2023-03-21 PROCEDURE — 3052F HG A1C>EQUAL 8.0%<EQUAL 9.0%: CPT | Mod: CPTII,,, | Performed by: NURSE PRACTITIONER

## 2023-03-21 PROCEDURE — 1160F PR REVIEW ALL MEDS BY PRESCRIBER/CLIN PHARMACIST DOCUMENTED: ICD-10-PCS | Mod: CPTII,,, | Performed by: NURSE PRACTITIONER

## 2023-03-21 PROCEDURE — 4010F ACE/ARB THERAPY RXD/TAKEN: CPT | Mod: CPTII,,, | Performed by: NURSE PRACTITIONER

## 2023-03-21 PROCEDURE — 1159F MED LIST DOCD IN RCRD: CPT | Mod: CPTII,,, | Performed by: NURSE PRACTITIONER

## 2023-03-21 PROCEDURE — 3078F DIAST BP <80 MM HG: CPT | Mod: CPTII,,, | Performed by: NURSE PRACTITIONER

## 2023-03-21 PROCEDURE — 3008F BODY MASS INDEX DOCD: CPT | Mod: CPTII,,, | Performed by: NURSE PRACTITIONER

## 2023-03-21 PROCEDURE — 99213 PR OFFICE/OUTPT VISIT, EST, LEVL III, 20-29 MIN: ICD-10-PCS | Mod: S$PBB,,, | Performed by: NURSE PRACTITIONER

## 2023-03-21 PROCEDURE — 3052F PR MOST RECENT HEMOGLOBIN A1C LEVEL 8.0 - < 9.0%: ICD-10-PCS | Mod: CPTII,,, | Performed by: NURSE PRACTITIONER

## 2023-03-21 PROCEDURE — 3074F SYST BP LT 130 MM HG: CPT | Mod: CPTII,,, | Performed by: NURSE PRACTITIONER

## 2023-03-21 PROCEDURE — 1160F RVW MEDS BY RX/DR IN RCRD: CPT | Mod: CPTII,,, | Performed by: NURSE PRACTITIONER

## 2023-03-21 PROCEDURE — 3008F PR BODY MASS INDEX (BMI) DOCUMENTED: ICD-10-PCS | Mod: CPTII,,, | Performed by: NURSE PRACTITIONER

## 2023-03-21 PROCEDURE — 99213 OFFICE O/P EST LOW 20 MIN: CPT | Mod: S$PBB,,, | Performed by: NURSE PRACTITIONER

## 2023-03-21 PROCEDURE — 4010F PR ACE/ARB THEARPY RXD/TAKEN: ICD-10-PCS | Mod: CPTII,,, | Performed by: NURSE PRACTITIONER

## 2023-03-21 PROCEDURE — 1159F PR MEDICATION LIST DOCUMENTED IN MEDICAL RECORD: ICD-10-PCS | Mod: CPTII,,, | Performed by: NURSE PRACTITIONER

## 2023-03-21 RX ORDER — ATORVASTATIN CALCIUM 80 MG/1
80 TABLET, FILM COATED ORAL NIGHTLY
Qty: 90 TABLET | Refills: 3 | Status: SHIPPED | OUTPATIENT
Start: 2023-03-21 | End: 2024-01-16 | Stop reason: SDUPTHER

## 2023-03-21 RX ORDER — HYDROCHLOROTHIAZIDE 50 MG/1
100 TABLET ORAL DAILY
Qty: 180 TABLET | Refills: 3 | Status: SHIPPED | OUTPATIENT
Start: 2023-03-21 | End: 2024-01-16 | Stop reason: SDUPTHER

## 2023-03-21 RX ORDER — NIFEDIPINE 90 MG/1
90 TABLET, FILM COATED, EXTENDED RELEASE ORAL DAILY
Qty: 90 TABLET | Refills: 3 | Status: SHIPPED | OUTPATIENT
Start: 2023-03-21 | End: 2024-01-16 | Stop reason: SDUPTHER

## 2023-03-21 RX ORDER — FUROSEMIDE 20 MG/1
20 TABLET ORAL DAILY
Qty: 90 TABLET | Refills: 3 | Status: SHIPPED | OUTPATIENT
Start: 2023-03-21 | End: 2024-01-16 | Stop reason: SDUPTHER

## 2023-03-21 RX ORDER — POTASSIUM CHLORIDE 1500 MG/1
20 TABLET, EXTENDED RELEASE ORAL DAILY
Qty: 90 TABLET | Refills: 3 | Status: SHIPPED | OUTPATIENT
Start: 2023-03-21 | End: 2024-01-16 | Stop reason: SDUPTHER

## 2023-03-21 RX ORDER — LOSARTAN POTASSIUM 100 MG/1
100 TABLET ORAL DAILY
Qty: 90 TABLET | Refills: 3 | Status: SHIPPED | OUTPATIENT
Start: 2023-03-21 | End: 2024-01-16 | Stop reason: SDUPTHER

## 2023-03-21 RX ORDER — NIFEDIPINE 30 MG/1
30 TABLET, EXTENDED RELEASE ORAL NIGHTLY
Qty: 90 TABLET | Refills: 3 | Status: SHIPPED | OUTPATIENT
Start: 2023-03-21 | End: 2023-06-09 | Stop reason: DRUGHIGH

## 2023-03-21 NOTE — PROGRESS NOTES
CHIEF COMPLAINT:   Chief Complaint   Patient presents with    4mt f/u with PFT,lab.      Pt c/o bilat lower ext pain if he sits too long x 1 mth.                      Review of patient's allergies indicates:  No Known Allergies                                       HPI:  Michael Stone 57 y.o. male with a past medical history of uncontrolled diabetes, hyperlipidemia, hypertension, MINA on CPAP,  adrenal adenoma, and obesity presents for 4 month follow up and results of PFTs.  Patient completed pulmonary function test in December 2022 which were normal.  He completed and Echocardiogram on 6.1.22 which revealed an EF of 59%.  See report below.  Patient completed SADE testing in December 2021 which revealed no evidence of significant arterial insufficiency. He also completed a right lower extremity venous reflux study in December 2021 which revealed reflux of 4.3 seconds in the GSV at the level of the upper calf and reflux of 4.8 seconds and a branch of the GSV at the level of the upper calf. Patient underwent left heart cath procedure on 8.20.21 which revealed very mild nonobstructive one-vessel disease. See report below. He previously completed a nuclear pharmacologic stress test on 6/24/2021 which showed medium sized area of moderate anterior and apical ischemia and no scar. Patient completed left lower extremity venous reflux studies on August 24, 2021 which revealed no DVT and no substantial reflux identified in the interrogated portions of the left leg deep system, GSV, or SSV.     Patient reports occasional shortness of breath with exertion.  He denies chest pain, palpitations, or syncope.  He also continues to endorse bilateral lower extremity edema and follows up with Dr. Gallo for the venous sufficiency.  He states he does experience bilateral lower extremity pain when he sits for an extended period of time.  He reports compliance with his medications.  Patient reports nightly compliance with the CPAP and  feels he is benefitting from use.        Echocardiogram 6.1.22  The left ventricle is normal in size with mild concentric hypertrophy and normal systolic function.  The estimated ejection fraction is 59%.  Normal right ventricular size with normal right ventricular systolic function.  Normal left ventricular diastolic function.  There is no pulmonary hypertension.  The estimated PA systolic pressure is 10 mmHg.  Normal central venous pressure (3 mmHg).      CORONARY ANGIOGRAM 8.20.21  Left Main: large vessel.   Left anterior descending: large vessel.   Diagonal 1: small vessel.   Ramus: medium size vessel. 30% ostial stenosis  Circumflex: large vessel.   Obtuse marginal 1: medium size vessel.   Right coronary artery: medium size vessel.   Posterior descending artery: Tiny vessel.     COMPLICATIONS  No immediate complications.    Closure of access site: Radial band  Estimated blood loss: less than 10 ml  Specimens obtained: none   SUMMARY  Very mild nonobstructive 1 vessel disease      Nuclear pharmacologic stress test 06/24/2021:  Rest EKG: Sinus rhythm  Stress EKG: No significant new EKG changes  Medium size area of moderate anterior and apical ischemia  No scar    TTE 4.16.21  Mild concentric left ventricular hypertrophy  Left ventricular ejection fraction is measured at approximately 50%  Structurally normal mitral valve  Trace mitral regurgitation  Structurally aortic valve is trileaflet  Trace tricuspid regurgitation with RVSP of 38 mmHg  No evidence of pulmonic regurgitation  The pulmonic valve was not well visualized  Mildly dilated left atrium  Borderline dilated right atrium  Normal right ventricular size  No evidence of pericardial effusion  No evidence of pleural effusion                                                                                                                                                                                                                                                                                                                                                                                                                                                                                              Patient Active Problem List   Diagnosis    Mild CAD    Disorder of tendon of shoulder region    Edema of lower extremity    History of severe acute respiratory syndrome coronavirus 2 (SARS-CoV-2) disease    Hyperlipidemia LDL goal <70    Primary hypertension    Class 3 severe obesity due to excess calories with serious comorbidity and body mass index (BMI) of 40.0 to 44.9 in adult    MINA (obstructive sleep apnea)    Overgrown toenails    Primary osteoarthritis of both knees    Spinal stenosis of cervical region    Tinea pedis    Uncontrolled type 2 diabetes mellitus with hyperglycemia    JOSEPH (dyspnea on exertion)    Venous insufficiency    Other chest pain    Callus of foot    DDD (degenerative disc disease), cervical    Encounter for comprehensive diabetic foot examination, type 2 diabetes mellitus    Wellness examination     Past Surgical History:   Procedure Laterality Date    ANGIOGRAPHY      CHOLECYSTECTOMY      FOOT SURGERY Right      Social History     Socioeconomic History    Marital status: Single   Tobacco Use    Smoking status: Never    Smokeless tobacco: Never   Substance and Sexual Activity    Alcohol use: Never    Drug use: Never    Sexual activity: Yes     Partners: Female     Birth control/protection: None     Social Determinants of Health     Financial Resource Strain: Low Risk     Difficulty of Paying Living Expenses: Not very hard   Food Insecurity: No Food Insecurity    Worried About Running Out of Food in the Last Year: Never true    Ran Out of Food in the Last Year: Never true   Transportation Needs: No Transportation Needs    Lack of Transportation (Medical): No    Lack of Transportation (Non-Medical): No   Physical Activity: Insufficiently Active    Days of  Exercise per Week: 4 days    Minutes of Exercise per Session: 20 min   Stress: Stress Concern Present    Feeling of Stress : To some extent   Social Connections: Moderately Isolated    Frequency of Communication with Friends and Family: More than three times a week    Frequency of Social Gatherings with Friends and Family: More than three times a week    Attends Sabian[employer] Services: 1 to 4 times per year    Active Member of Clubs or Organizations: No    Marital Status: Never    Housing Stability: Low Risk     Unable to Pay for Housing in the Last Year: No    Number of Places Lived in the Last Year: 1    Unstable Housing in the Last Year: No        Family History   Problem Relation Age of Onset    Hypertension Mother     Heart disease Mother     Stroke Father     Cancer Sister     Heart disease Sister     Cancer Sister     Heart disease Sister     Heart disease Brother     Heart disease Brother          Current Outpatient Medications:     albuterol (PROVENTIL/VENTOLIN HFA) 90 mcg/actuation inhaler, Inhale 2 puffs into the lungs every 6 (six) hours as needed for Wheezing or Shortness of Breath., Disp: 6.7 g, Rfl: 2    aspirin (ECOTRIN) 81 MG EC tablet, Take 1 tablet (81 mg total) by mouth once daily., Disp: 90 tablet, Rfl: 3    atorvastatin (LIPITOR) 80 MG tablet, Take 1 tablet (80 mg total) by mouth nightly., Disp: 90 tablet, Rfl: 3    dulaglutide (TRULICITY) 3 mg/0.5 mL pen injector, Inject 3 mg into the skin every 7 days., Disp: 12 pen, Rfl: 1    furosemide (LASIX) 20 MG tablet, Take 1 tablet (20 mg total) by mouth once daily., Disp: 90 tablet, Rfl: 3    HUMALOG U-100 INSULIN 100 unit/mL injection, Inject 20 Units into the skin 3 (three) times daily before meals., Disp: 60 mL, Rfl: 1    hydroCHLOROthiazide (HYDRODIURIL) 50 MG tablet, Take 2 tablets (100 mg total) by mouth once daily., Disp: 180 tablet, Rfl: 3    LANTUS SOLOSTAR U-100 INSULIN glargine 100 units/mL SubQ pen, Inject 52 units subq qam and 42  "units subq at bedtime., Disp: 85 mL, Rfl: 1    losartan (COZAAR) 100 MG tablet, Take 1 tablet (100 mg total) by mouth once daily., Disp: 90 tablet, Rfl: 3    metFORMIN (GLUCOPHAGE) 1000 MG tablet, Take 1 tablet (1,000 mg total) by mouth 2 (two) times daily with meals., Disp: 180 tablet, Rfl: 3    NIFEdipine (ADALAT CC) 90 MG TbSR, Take 1 tablet (90 mg total) by mouth once daily., Disp: 90 tablet, Rfl: 3    NIFEdipine (PROCARDIA-XL) 30 MG (OSM) 24 hr tablet, Take 1 tablet (30 mg total) by mouth every evening., Disp: 90 tablet, Rfl: 3    nitroGLYCERIN (NITROSTAT) 0.4 MG SL tablet, Place 1 tablet (0.4 mg total) under the tongue every 5 (five) minutes as needed for Chest pain., Disp: 25 tablet, Rfl: 6    potassium chloride (K-TAB) 20 mEq, Take 1 tablet (20 mEq total) by mouth once daily., Disp: 90 tablet, Rfl: 3    BD VEO INSULIN SYRINGE UF 1 mL 31 gauge x 15/64" Syrg, USE THREE TIMES DAILY AS DIRECTED, Disp: , Rfl:     blood sugar diagnostic Strp,  One Touch Ultra 2 Test Strips, See Instructions, Use to check CBG TID, # 100 EA, 11 Refill(s), Pharmacy: [facility], 168, cm, Height/Length Dosing, 01/21/22 10:22:00 CST, 116, kg, Weight Dosing, 01/21/22 10:22:00 CST, Disp: , Rfl:     ID NOW COVID-19 TEST KIT Kit, TEST AS DIRECTED TODAY, Disp: , Rfl:     insulin syringes, disposable, 1 mL Syrg, Inject 1 Syringe into the skin 3 (three) times daily before meals., Disp: 100 each, Rfl: 3    ONETOUCH DELICA LANCETS 33 gauge Misc, CHECK BLOOD SUGAR THREE TIMES A DAY BEFORE MEALS AS DIRECTED BY DOCTOR, Disp: 100 each, Rfl: 11    ONETOUCH ULTRA TEST Strp, CHECK BLOOD SUGAR THREE TIMES A DAY BEFORE MEALS AS DIRECTED BY DOCTOR, Disp: 100 strip, Rfl: 11     ROS:                                                                                                                                                                             Review of Systems   Constitutional: Negative.    Respiratory:  Positive for shortness of " "breath.    Cardiovascular:  Positive for leg swelling.   Gastrointestinal: Negative.    Musculoskeletal: Negative.         BLE pain   Skin: Negative.    Neurological: Negative.    Psychiatric/Behavioral: Negative.        Blood pressure (!) 112/58, pulse 65, temperature 98.1 °F (36.7 °C), resp. rate 18, height 5' 6.93" (1.7 m), weight 117.9 kg (260 lb), SpO2 100 %.   PE:  Physical Exam  Constitutional:       Appearance: Normal appearance.   HENT:      Head: Normocephalic.   Eyes:      Extraocular Movements: Extraocular movements intact.   Cardiovascular:      Rate and Rhythm: Normal rate and regular rhythm.   Pulmonary:      Effort: Pulmonary effort is normal.   Abdominal:      Palpations: Abdomen is soft.   Musculoskeletal:         General: Normal range of motion.      Cervical back: Neck supple.      Comments: Mild BLE edema    Skin:     General: Skin is warm and dry.   Neurological:      General: No focal deficit present.      Mental Status: He is alert and oriented to person, place, and time.   Psychiatric:         Mood and Affect: Mood normal.      ASSESSMENT/PLAN:  Very Mild CAD  LVEF - 59% per Echo 6.1.22   Lexiscan stress test done on 6/21/2021 with medium sized area of moderate anterior and apical ischemia and no scar  Denies chest pain  Continue ASA, atorvastatin, losartan, and SL Nitro  Counseled on heart healthy diet    JOSEPH - unchanged  EF 59% per Echo 6.1.22  Continue and HCTZ and Lasix  Normal PFTs Dec. 2022      Venous Insufficiency - RLE  Continue Lasix and HCTZ  Recommended low-sodium diet, compression stocking therapy, and leg elevation  Follow up with Dr. Gallo as directed    HLD  LDL at goal - 58  Continue with Atorvastatin 80 mg po daily  Will repeat FLP prior to next visit  Counseled patient on low-cholesterol diet and exercise as tolerated    HTN  BP at goal - 112/58  Continue Losartan, HCTZ, Procardia XL, Lasix    Counseled on low salt diet    Diabetes  Last A1C not at goal - 8.9  Continue " management per PCP    MINA  Patient reports nightly compliance with the CPAP and feels he is benefitting from use.  Recommend continued nightly CPAP use    Obesity  Counseled on the importance of weight loss through diet and exercise    FLP  Follow up in Cardiology Clinic in 6 months  Follow up with PCP and Dr. Gallo as directed

## 2023-04-03 RX ORDER — LANCETS 33 GAUGE
EACH MISCELLANEOUS
Qty: 100 EACH | Refills: 11 | Status: SHIPPED | OUTPATIENT
Start: 2023-04-03

## 2023-04-03 RX ORDER — SYRING-NEEDL,DISP,INSUL,0.3 ML 31GX15/64"
SYRINGE, EMPTY DISPOSABLE MISCELLANEOUS
Qty: 100 EACH | Refills: 0 | Status: SHIPPED | OUTPATIENT
Start: 2023-04-03

## 2023-04-04 DIAGNOSIS — E11.65 UNCONTROLLED TYPE 2 DIABETES MELLITUS WITH HYPERGLYCEMIA: Chronic | ICD-10-CM

## 2023-04-04 RX ORDER — INSULIN GLARGINE 100 [IU]/ML
INJECTION, SOLUTION SUBCUTANEOUS
Qty: 85 ML | Refills: 1 | Status: SHIPPED | OUTPATIENT
Start: 2023-04-04 | End: 2023-11-14 | Stop reason: SDUPTHER

## 2023-04-18 ENCOUNTER — PATIENT OUTREACH (OUTPATIENT)
Dept: EMERGENCY MEDICINE | Facility: HOSPITAL | Age: 58
End: 2023-04-18
Payer: MEDICAID

## 2023-04-18 NOTE — PATIENT INSTRUCTIONS
Diabetic foot care  Infection precautions  Reports changes to provider   Topical Metronidazole Counseling: Metronidazole is a topical antibiotic medication. You may experience burning, stinging, redness, or allergic reactions.  Please call our office if you develop any problems from using this medication.

## 2023-04-25 ENCOUNTER — HOSPITAL ENCOUNTER (EMERGENCY)
Facility: HOSPITAL | Age: 58
Discharge: HOME | End: 2023-04-25
Attending: SPECIALIST
Payer: MEDICAID

## 2023-04-25 VITALS
SYSTOLIC BLOOD PRESSURE: 137 MMHG | TEMPERATURE: 99 F | HEIGHT: 67 IN | DIASTOLIC BLOOD PRESSURE: 86 MMHG | RESPIRATION RATE: 20 BRPM | OXYGEN SATURATION: 98 % | WEIGHT: 250 LBS | HEART RATE: 79 BPM | BODY MASS INDEX: 39.24 KG/M2

## 2023-04-25 DIAGNOSIS — J06.9 VIRAL URI WITH COUGH: Primary | ICD-10-CM

## 2023-04-25 DIAGNOSIS — R05.9 COUGH IN ADULT: ICD-10-CM

## 2023-04-25 LAB
FLUAV AG UPPER RESP QL IA.RAPID: NOT DETECTED
FLUBV AG UPPER RESP QL IA.RAPID: NOT DETECTED
SARS-COV-2 RNA RESP QL NAA+PROBE: NOT DETECTED

## 2023-04-25 PROCEDURE — 99283 EMERGENCY DEPT VISIT LOW MDM: CPT | Mod: 25

## 2023-04-25 PROCEDURE — 0240U COVID/FLU A&B PCR: CPT | Performed by: NURSE PRACTITIONER

## 2023-04-25 RX ORDER — CODEINE PHOSPHATE AND GUAIFENESIN 10; 100 MG/5ML; MG/5ML
10 SOLUTION ORAL EVERY 8 HOURS PRN
Qty: 180 ML | Refills: 0 | Status: SHIPPED | OUTPATIENT
Start: 2023-04-25 | End: 2023-05-05

## 2023-04-25 NOTE — Clinical Note
"Michael Colon" Chase was seen and treated in our emergency department on 4/25/2023.  He may return to work on 04/28/2023.       If you have any questions or concerns, please don't hesitate to call.      BIANCA Cassidy"

## 2023-04-25 NOTE — ED PROVIDER NOTES
Encounter Date: 4/25/2023       History     Chief Complaint   Patient presents with    Cough     Cough x 4 -5 days with feliciano rib pain      57-year-old male presents to [facility] of cough and congestion for 4-5 days with several episodes of posttussive emesis.  He also reports having some bilateral rib pain with coughing.  He reports dark sputum.  He denies any fever, chills or sweats.  He reports no abdominal pain.    The history is provided by the patient. No  was used.   Review of patient's allergies indicates:  No Known Allergies  Past Medical History:   Diagnosis Date    Adrenal adenoma     Callus of foot 8/19/2022    Coronary artery disease     Diabetes mellitus     Hyperlipidemia     Hypertension     Spinal stenosis     Unspecified osteoarthritis, unspecified site      Past Surgical History:   Procedure Laterality Date    ANGIOGRAPHY      CHOLECYSTECTOMY      FOOT SURGERY Right      Family History   Problem Relation Age of Onset    Hypertension Mother     Heart disease Mother     Stroke Father     Cancer Sister     Heart disease Sister     Cancer Sister     Heart disease Sister     Heart disease Brother     Heart disease Brother      Social History     Tobacco Use    Smoking status: Never    Smokeless tobacco: Never   Substance Use Topics    Alcohol use: Never    Drug use: Never     Review of Systems   Constitutional:  Negative for chills and fever.   HENT:  Positive for congestion.    Respiratory:  Positive for cough and wheezing.    Gastrointestinal:  Positive for vomiting. Negative for abdominal pain and nausea.   Musculoskeletal:  Positive for myalgias.   All other systems reviewed and are negative.    Physical Exam     Initial Vitals [04/25/23 1838]   BP Pulse Resp Temp SpO2   137/86 79 20 98.7 °F (37.1 °C) 98 %      MAP       --         Physical Exam    Constitutional: He appears well-developed and well-nourished.   HENT:   Head: Normocephalic.   Eyes: EOM are normal.   Neck: Neck supple.    Cardiovascular:  Normal rate, regular rhythm and normal heart sounds.           Pulmonary/Chest: No respiratory distress. He has wheezes.   Abdominal: He exhibits no distension. There is no abdominal tenderness.   Musculoskeletal:         General: Normal range of motion.      Cervical back: Neck supple.     Neurological: He is alert and oriented to person, place, and time.   Skin: Skin is warm and dry. Capillary refill takes less than 2 seconds.   Psychiatric: He has a normal mood and affect.       ED Course   Procedures  Labs Reviewed   COVID/FLU A&B PCR - Normal    Narrative:     The Xpert Xpress SARS-CoV-2/FLU/RSV plus is a rapid, multiplexed real-time PCR test intended for the simultaneous qualitative detection and differentiation of SARS-CoV-2, Influenza A, Influenza B, and respiratory syncytial virus (RSV) viral RNA in either nasopharyngeal swab or nasal swab specimens.                Imaging Results              X-Ray Chest PA And Lateral (In process)                      Medications - No data to display  Medical Decision Making:   Initial Assessment:   57-year-old male in no acute distress complaining of cough and congestion  Differential Diagnosis:   COVID, influenza, pneumonia, bronchitis, viral URI  Independently Interpreted Test(s):   I have ordered and independently interpreted X-rays - see summary below.       <> Summary of X-Ray Reading(s): No consolidation, effusion or pneumothorax  Clinical Tests:   Lab Tests: Ordered and Reviewed       <> Summary of Lab: COVID and influenza are negative  ED Management:  Patient with negative COVID and influenza.  Chest x-ray is clear of effusion, infiltrate or pneumothorax.  Patient's oxygen saturation remains 98% on room air.  Respirations are unlabored.  Will discharge home with cough medication as well as an inhaler.                         Clinical Impression:   Final diagnoses:  [R05.9] Cough in adult  [J06.9] Viral URI with cough (Primary)        ED  Disposition Condition    Discharge Stable          ED Prescriptions       Medication Sig Dispense Start Date End Date Auth. Provider    guaiFENesin-codeine 100-10 mg/5 ml (TUSSI-ORGANIDIN NR)  mg/5 mL syrup Take 10 mLs by mouth every 8 (eight) hours as needed for Cough. 180 mL 4/25/2023 5/5/2023 BIANCA Cassidy          Follow-up Information    None          BIANCA Cassidy  04/25/23 2030

## 2023-04-26 NOTE — DISCHARGE INSTRUCTIONS
Cheratussin AC as needed for cough, may cause drowsiness, do not take and drive  Use your inhaler as needed for wheezing  Tylenol or Motrin as needed for pain

## 2023-05-03 ENCOUNTER — TELEPHONE (OUTPATIENT)
Dept: CARDIOLOGY | Facility: CLINIC | Age: 58
End: 2023-05-03
Payer: MEDICAID

## 2023-05-03 NOTE — TELEPHONE ENCOUNTER
Called pt, gave all instructions, he verbalizes understanding.       ----- Message from BIANCA Colon sent at 5/3/2023  9:37 AM CDT -----  Regarding: RE: MED CLARIFICATION  Good morning    Patient is to continue with nifedipine 90 mg in the morning.  Please have patient monitor and log blood pressure and call with readings in 2 weeks.  If remains above goal, may plan to increase nifedipine to max dose of 120 mg daily.  Also, please advise on low-salt diet.    Thank you    Jewel VALENZUELA-[facility]  ----- Message -----  From: Noemy Viveros RN  Sent: 5/3/2023   8:53 AM CDT  To: BIANCA Colon  Subject: RE: MED CLARIFICATION                            Pt states he is on nifedipine 90mg in the AM, 30mg in the PM. It appears his last 2 clinic notes show both doses on his list. His cardio appt in July 2022 shows only nifedipine 90mg. He states he has occasional ankle swelling, went to ED recently with wheezing and was prescribed an inhaler. States  his BP has been controlled lately. No complaints of SOB. Next appt 09-.       ----- Message -----  From: BIANCA Colon  Sent: 5/2/2023   4:09 PM CDT  To: Priya Clifton MA, #  Subject: RE: MED CLARIFICATION                            The patient was last seen by Camille in March 2023.  Both doses of nifedipine are noted in her last clinic note.  Typically nifedipine is prescribed 90 mg once daily. However, please contact the patient to clarify his medications.      Thank you     Jewel   ----- Message -----  From: Priya Clifton MA  Sent: 5/2/2023   3:15 PM CDT  To: BIANCA Colon  Subject: MED CLARIFICATION                                PT PHARMACY CALLED TO GET CLARIFICATION ON NIFEDIPINE. DOES THE PT TAKE 90 MG IN THE MORNING AND 30 MG IN THE EVENING. THANK YOU

## 2023-05-10 ENCOUNTER — HOSPITAL ENCOUNTER (EMERGENCY)
Facility: HOSPITAL | Age: 58
Discharge: HOME | End: 2023-05-10
Attending: STUDENT IN AN ORGANIZED HEALTH CARE EDUCATION/TRAINING PROGRAM
Payer: MEDICAID

## 2023-05-10 VITALS
TEMPERATURE: 98 F | RESPIRATION RATE: 20 BRPM | HEIGHT: 67 IN | DIASTOLIC BLOOD PRESSURE: 77 MMHG | OXYGEN SATURATION: 97 % | SYSTOLIC BLOOD PRESSURE: 121 MMHG | HEART RATE: 69 BPM | BODY MASS INDEX: 39.24 KG/M2 | WEIGHT: 250 LBS

## 2023-05-10 DIAGNOSIS — G89.29 CHRONIC NECK PAIN: Primary | ICD-10-CM

## 2023-05-10 DIAGNOSIS — M54.2 CHRONIC NECK PAIN: Primary | ICD-10-CM

## 2023-05-10 PROCEDURE — 99282 EMERGENCY DEPT VISIT SF MDM: CPT

## 2023-05-10 RX ORDER — DICLOFENAC SODIUM 10 MG/G
2 GEL TOPICAL 4 TIMES DAILY
Qty: 100 G | Refills: 0 | Status: SHIPPED | OUTPATIENT
Start: 2023-05-10 | End: 2023-06-09 | Stop reason: SDUPTHER

## 2023-05-10 NOTE — ED PROVIDER NOTES
"Encounter Date: 5/10/2023       History     Chief Complaint   Patient presents with    Neck Pain     Right sided neck pain for the past month, described as a stiffness, difficult to move/ denies any injury/ Pt also noticed a hardened area to the base of R neck     Patient is a 57-year-old  male with a past medical history of hypertension, hyperlipidemia, diabetes who presented to the ER today due to right-sided neck pain.  Patient states been ongoing for several years but over the last 1 month it is worsened.  Patient states he was diagnosed with arthritis of his neck any forgot what medications worked for him in the past.  Patient states he has no neck rigidity just simply pain with movement.  Patient states he is taken nothing for it.  Patient also brought up that he has a "mass on the front of my neck. "Patient points to the insertion site of his clavicle.  Patient states this nontender to palpation.  Patient denies any other complaints.    Review of patient's allergies indicates:  No Known Allergies  Past Medical History:   Diagnosis Date    Adrenal adenoma     Callus of foot 8/19/2022    Coronary artery disease     Diabetes mellitus     Hyperlipidemia     Hypertension     Spinal stenosis     Unspecified osteoarthritis, unspecified site      Past Surgical History:   Procedure Laterality Date    ANGIOGRAPHY      CHOLECYSTECTOMY      FOOT SURGERY Right      Family History   Problem Relation Age of Onset    Hypertension Mother     Heart disease Mother     Stroke Father     Cancer Sister     Heart disease Sister     Cancer Sister     Heart disease Sister     Heart disease Brother     Heart disease Brother      Social History     Tobacco Use    Smoking status: Never    Smokeless tobacco: Never   Substance Use Topics    Alcohol use: Never    Drug use: Never     Review of Systems   Constitutional:  Negative for chills, fatigue and fever.   HENT:  Negative for congestion, sore throat and trouble " swallowing.    Eyes:  Negative for pain and visual disturbance.   Respiratory:  Negative for cough, shortness of breath and wheezing.    Cardiovascular:  Negative for chest pain and palpitations.   Gastrointestinal:  Negative for abdominal pain, blood in stool, constipation, diarrhea, nausea and vomiting.   Genitourinary:  Negative for dysuria and hematuria.   Musculoskeletal:  Positive for neck pain. Negative for back pain, myalgias and neck stiffness.   Skin:  Negative for rash and wound.   Neurological:  Negative for seizures, syncope and headaches.   Psychiatric/Behavioral:  Negative for confusion. The patient is not nervous/anxious.      Physical Exam     Initial Vitals [05/10/23 1211]   BP Pulse Resp Temp SpO2   121/77 69 20 97.7 °F (36.5 °C) 97 %      MAP       --         Physical Exam    Nursing note and vitals reviewed.  Constitutional: He appears well-developed and well-nourished. No distress.   HENT:   Head: Normocephalic and atraumatic.   Eyes: Conjunctivae and EOM are normal. Right eye exhibits no discharge. Left eye exhibits no discharge. No scleral icterus.   Neck: No tracheal deviation present.   Normal range of motion.  Cardiovascular:  Normal rate, regular rhythm and normal heart sounds.     Exam reveals no gallop and no friction rub.       No murmur heard.  Pulmonary/Chest: Breath sounds normal. No respiratory distress. He has no wheezes. He has no rhonchi. He has no rales.   Musculoskeletal:         General: No edema. Normal range of motion.      Cervical back: Normal range of motion.      Comments: There is no C, T, L-spine tenderness no step-off lesions.  Thickened hypertrophic skin to the posterior aspect of the nape of the neck consistent with acanthosis nigricans.  No active signs of infection.  There is no tenderness palpation over the clavicles bilaterally.  No supraclavicular lymph nodes were appreciated.  No abnormalities noted.     Neurological: He is alert.   Skin: Skin is warm and  dry. No rash and no abscess noted. No erythema. No pallor.   Psychiatric: His behavior is normal. Judgment normal.       ED Course   Procedures  Labs Reviewed - No data to display       Imaging Results    None          Medications - No data to display  Medical Decision Making:   Initial Assessment:   Overall well-appearing 57-year-old male no acute distress  Differential Diagnosis:   Chronic neck pain, lymphadenopathy, anterior chest wall mass  ED Management:  Vital signs stable patient is afebrile  Findings on physical exam do not seem consistent with a mass and instead the area that patient is pointing out is the clavicle itself.    It is nontender with no surrounding lymph nodes   Low suspicion for mass  Patient is a chronic history of neck pain that seems to have improved in the past with conservative management thus will advise him to apply Voltaren cream   Rice therapy also advised  Return precautions discussed, all questions answered in layman's terms  Follow-up with PCP is recommended                        Clinical Impression:   Final diagnoses:  [M54.2, G89.29] Chronic neck pain (Primary)        ED Disposition Condition    Discharge Stable          ED Prescriptions       Medication Sig Dispense Start Date End Date Auth. Provider    diclofenac sodium (VOLTAREN) 1 % Gel Apply 2 g topically 4 (four) times daily. for 7 days 100 g 5/10/2023 5/17/2023 Akash Calhoun MD          Follow-up Information       Follow up With Specialties Details Why Contact Info    [facility] - Emergency Dept Emergency Medicine  If symptoms worsen [address] [facility] [id number] [phone]    BIANCA Eli Nurse Practitioner Schedule an appointment as soon as possible for a visit   [address] [facility]  Internal Medicine Clinic  [facility] [address]  [phone]               Akash Calhoun MD  05/10/23 8412

## 2023-05-15 DIAGNOSIS — I25.10 MILD CAD: Chronic | ICD-10-CM

## 2023-05-15 RX ORDER — INSULIN LISPRO 100 [IU]/ML
INJECTION, SOLUTION INTRAVENOUS; SUBCUTANEOUS
Qty: 60 ML | Refills: 0 | OUTPATIENT
Start: 2023-05-15

## 2023-05-16 RX ORDER — LOSARTAN POTASSIUM 100 MG/1
100 TABLET ORAL DAILY
Qty: 90 TABLET | Refills: 3 | OUTPATIENT
Start: 2023-05-16 | End: 2024-05-15

## 2023-05-16 RX ORDER — INSULIN LISPRO 100 [IU]/ML
20 INJECTION, SOLUTION INTRAVENOUS; SUBCUTANEOUS
Qty: 30 ML | Refills: 1 | Status: SHIPPED | OUTPATIENT
Start: 2023-05-16 | End: 2023-06-09 | Stop reason: SDUPTHER

## 2023-06-05 ENCOUNTER — LAB VISIT (OUTPATIENT)
Dept: LAB | Facility: HOSPITAL | Age: 58
End: 2023-06-05
Attending: NURSE PRACTITIONER
Payer: MEDICAID

## 2023-06-05 DIAGNOSIS — E11.65 UNCONTROLLED TYPE 2 DIABETES MELLITUS WITH HYPERGLYCEMIA: Chronic | ICD-10-CM

## 2023-06-05 DIAGNOSIS — E78.5 HYPERLIPIDEMIA LDL GOAL <70: ICD-10-CM

## 2023-06-05 PROBLEM — Z00.00 WELLNESS EXAMINATION: Status: RESOLVED | Noted: 2023-03-03 | Resolved: 2023-06-05

## 2023-06-05 LAB
ALBUMIN SERPL-MCNC: 3.5 G/DL (ref 3.5–5)
ALBUMIN/GLOB SERPL: 1.2 RATIO (ref 1.1–2)
ALP SERPL-CCNC: 95 UNIT/L (ref 40–150)
ALT SERPL-CCNC: 24 UNIT/L (ref 0–55)
AST SERPL-CCNC: 19 UNIT/L (ref 5–34)
BILIRUBIN DIRECT+TOT PNL SERPL-MCNC: 0.5 MG/DL
BUN SERPL-MCNC: 21.7 MG/DL (ref 8.4–25.7)
CALCIUM SERPL-MCNC: 8.8 MG/DL (ref 8.4–10.2)
CHLORIDE SERPL-SCNC: 104 MMOL/L (ref 98–107)
CHOLEST SERPL-MCNC: 116 MG/DL
CHOLEST/HDLC SERPL: 3 {RATIO} (ref 0–5)
CO2 SERPL-SCNC: 28 MMOL/L (ref 22–29)
CREAT SERPL-MCNC: 1.01 MG/DL (ref 0.73–1.18)
EST. AVERAGE GLUCOSE BLD GHB EST-MCNC: 154.2 MG/DL
GFR SERPLBLD CREATININE-BSD FMLA CKD-EPI: >60 MLS/MIN/1.73/M2
GLOBULIN SER-MCNC: 3 GM/DL (ref 2.4–3.5)
GLUCOSE SERPL-MCNC: 144 MG/DL (ref 74–100)
HBA1C MFR BLD: 7 %
HDLC SERPL-MCNC: 39 MG/DL (ref 35–60)
LDLC SERPL CALC-MCNC: 57 MG/DL (ref 50–140)
POTASSIUM SERPL-SCNC: 3.3 MMOL/L (ref 3.5–5.1)
PROT SERPL-MCNC: 6.5 GM/DL (ref 6.4–8.3)
SODIUM SERPL-SCNC: 141 MMOL/L (ref 136–145)
TRIGL SERPL-MCNC: 98 MG/DL (ref 34–140)
VLDLC SERPL CALC-MCNC: 20 MG/DL

## 2023-06-05 PROCEDURE — 36415 COLL VENOUS BLD VENIPUNCTURE: CPT

## 2023-06-05 PROCEDURE — 80053 COMPREHEN METABOLIC PANEL: CPT

## 2023-06-05 PROCEDURE — 83036 HEMOGLOBIN GLYCOSYLATED A1C: CPT

## 2023-06-05 PROCEDURE — 80061 LIPID PANEL: CPT

## 2023-06-06 ENCOUNTER — PATIENT OUTREACH (OUTPATIENT)
Dept: EMERGENCY MEDICINE | Facility: HOSPITAL | Age: 58
End: 2023-06-06
Payer: MEDICAID

## 2023-06-07 ENCOUNTER — HOSPITAL ENCOUNTER (OUTPATIENT)
Dept: RADIOLOGY | Facility: HOSPITAL | Age: 58
Discharge: HOME | End: 2023-06-07
Attending: NURSE PRACTITIONER
Payer: MEDICAID

## 2023-06-07 ENCOUNTER — OFFICE VISIT (OUTPATIENT)
Dept: ORTHOPEDICS | Facility: CLINIC | Age: 58
End: 2023-06-07
Payer: MEDICAID

## 2023-06-07 VITALS — HEIGHT: 67 IN | RESPIRATION RATE: 20 BRPM | WEIGHT: 255 LBS | BODY MASS INDEX: 40.02 KG/M2

## 2023-06-07 DIAGNOSIS — M17.12 PRIMARY OSTEOARTHRITIS OF LEFT KNEE: Primary | ICD-10-CM

## 2023-06-07 DIAGNOSIS — M25.562 CHRONIC PAIN OF BOTH KNEES: ICD-10-CM

## 2023-06-07 DIAGNOSIS — M25.561 CHRONIC PAIN OF BOTH KNEES: ICD-10-CM

## 2023-06-07 DIAGNOSIS — G89.29 CHRONIC PAIN OF BOTH KNEES: ICD-10-CM

## 2023-06-07 PROCEDURE — 99215 OFFICE O/P EST HI 40 MIN: CPT | Mod: 25,S$PBB,, | Performed by: NURSE PRACTITIONER

## 2023-06-07 PROCEDURE — 1159F MED LIST DOCD IN RCRD: CPT | Mod: CPTII,,, | Performed by: NURSE PRACTITIONER

## 2023-06-07 PROCEDURE — 73564 X-RAY EXAM KNEE 4 OR MORE: CPT | Mod: TC,RT

## 2023-06-07 PROCEDURE — 3008F BODY MASS INDEX DOCD: CPT | Mod: CPTII,,, | Performed by: NURSE PRACTITIONER

## 2023-06-07 PROCEDURE — 99215 PR OFFICE/OUTPT VISIT, EST, LEVL V, 40-54 MIN: ICD-10-PCS | Mod: 25,S$PBB,, | Performed by: NURSE PRACTITIONER

## 2023-06-07 PROCEDURE — 3052F PR MOST RECENT HEMOGLOBIN A1C LEVEL 8.0 - < 9.0%: ICD-10-PCS | Mod: CPTII,,, | Performed by: NURSE PRACTITIONER

## 2023-06-07 PROCEDURE — 99215 OFFICE O/P EST HI 40 MIN: CPT | Mod: PBBFAC | Performed by: NURSE PRACTITIONER

## 2023-06-07 PROCEDURE — 4010F PR ACE/ARB THEARPY RXD/TAKEN: ICD-10-PCS | Mod: CPTII,,, | Performed by: NURSE PRACTITIONER

## 2023-06-07 PROCEDURE — 1159F PR MEDICATION LIST DOCUMENTED IN MEDICAL RECORD: ICD-10-PCS | Mod: CPTII,,, | Performed by: NURSE PRACTITIONER

## 2023-06-07 PROCEDURE — 1160F RVW MEDS BY RX/DR IN RCRD: CPT | Mod: CPTII,,, | Performed by: NURSE PRACTITIONER

## 2023-06-07 PROCEDURE — 1160F PR REVIEW ALL MEDS BY PRESCRIBER/CLIN PHARMACIST DOCUMENTED: ICD-10-PCS | Mod: CPTII,,, | Performed by: NURSE PRACTITIONER

## 2023-06-07 PROCEDURE — 3052F HG A1C>EQUAL 8.0%<EQUAL 9.0%: CPT | Mod: CPTII,,, | Performed by: NURSE PRACTITIONER

## 2023-06-07 PROCEDURE — 3008F PR BODY MASS INDEX (BMI) DOCUMENTED: ICD-10-PCS | Mod: CPTII,,, | Performed by: NURSE PRACTITIONER

## 2023-06-07 PROCEDURE — 4010F ACE/ARB THERAPY RXD/TAKEN: CPT | Mod: CPTII,,, | Performed by: NURSE PRACTITIONER

## 2023-06-07 PROCEDURE — 73564 X-RAY EXAM KNEE 4 OR MORE: CPT | Mod: TC,LT

## 2023-06-07 NOTE — PROGRESS NOTES
"  Subjective:   PATIENT ID: Michael Stone is a 57 y.o. male. Non-smoker. Employment HX: landscaping, currently employed.    Seen [facility] for same DX since 2022.   CHIEF COMPLAINT: Pain of the Left Knee and Pain of the Right Knee    HPI:    Bilateral L > R aching medial, lateral knee pain.   Injury: no known injury  Onset: several years ago fluctuates   Modifying Factors: worse with activity, improves with rest, stiffness after immobilization, and improves with less than 30 minutes activity  Associated Symptoms: crepitus, decreased ROM, and swelling   Activity: sedentary with light activity and pain moderately interferes with ADLs   Previous Treatments:  BMI reduction ongoing education, HEP withTheraBand, RX NSAIDs, RX PT completed 6 wks/ 2 xs per week d/c'd 8/2021, and VS injections since 2022 last series 2/2022  with some relief but symptoms returning  PMH: + CVD and + DM   Family History: + OA    NOTE: Existing patient, new to me last seen by [facility] Dr. Isidro who is no longer practicing in this area.  I will be assuming care.  Patient given VS injections 2/2022 with good relief.  Interested in surgical treatment options due to symptoms affecting ADLs and quality of life.    Current Outpatient Medications:     albuterol (PROVENTIL/VENTOLIN HFA) 90 mcg/actuation inhaler, Inhale 2 puffs into the lungs every 6 (six) hours as needed for Wheezing or Shortness of Breath., Disp: 6.7 g, Rfl: 2    aspirin (ECOTRIN) 81 MG EC tablet, Take 1 tablet (81 mg total) by mouth once daily., Disp: 90 tablet, Rfl: 3    atorvastatin (LIPITOR) 80 MG tablet, Take 1 tablet (80 mg total) by mouth nightly., Disp: 90 tablet, Rfl: 3    BD VEO INSULIN SYRINGE UF 1 mL 31 gauge x 15/64" Syrg, USE THREE TIMES DAILY AS DIRECTED, Disp: 100 each, Rfl: 0    blood sugar diagnostic (ONETOUCH ULTRA TEST) Strp, CHECK BLOOD SUGAR THREE TIMES A DAY BEFORE MEALS AS DIRECTED BY DOCTOR, Disp: 100 strip, Rfl: 11    blood sugar diagnostic Strp,  One Touch " Ultra 2 Test Strips, See Instructions, Use to check CBG TID, # 100 EA, 11 Refill(s), Pharmacy: [facility], 168, cm, Height/Length Dosing, 01/21/22 10:22:00 CST, 116, kg, Weight Dosing, 01/21/22 10:22:00 CST, Disp: , Rfl:     diclofenac sodium (VOLTAREN) 1 % Gel, Apply 2 g topically 4 (four) times daily. for 7 days, Disp: 100 g, Rfl: 0    dulaglutide (TRULICITY) 3 mg/0.5 mL pen injector, Inject 3 mg into the skin every 7 days., Disp: 12 pen, Rfl: 1    furosemide (LASIX) 20 MG tablet, Take 1 tablet (20 mg total) by mouth once daily., Disp: 90 tablet, Rfl: 3    HUMALOG U-100 INSULIN 100 unit/mL injection, Inject 20 Units into the skin 3 (three) times daily before meals., Disp: 30 mL, Rfl: 1    hydroCHLOROthiazide (HYDRODIURIL) 50 MG tablet, Take 2 tablets (100 mg total) by mouth once daily., Disp: 180 tablet, Rfl: 3    ID NOW COVID-19 TEST KIT Kit, TEST AS DIRECTED TODAY, Disp: , Rfl:     insulin syringes, disposable, 1 mL Syrg, Inject 1 Syringe into the skin 3 (three) times daily before meals., Disp: 100 each, Rfl: 11    lancets (ONETOUCH DELICA LANCETS) 33 gauge Misc, CHECK BLOOD SUGAR THREE TIMES A DAY BEFORE MEALS AS DIRECTED BY DOCTOR, Disp: 100 each, Rfl: 11    LANTUS SOLOSTAR U-100 INSULIN glargine 100 units/mL SubQ pen, Inject 52 units subq qam and 42 units subq at bedtime., Disp: 85 mL, Rfl: 1    losartan (COZAAR) 100 MG tablet, Take 1 tablet (100 mg total) by mouth once daily., Disp: 90 tablet, Rfl: 3    metFORMIN (GLUCOPHAGE) 1000 MG tablet, Take 1 tablet (1,000 mg total) by mouth 2 (two) times daily with meals., Disp: 180 tablet, Rfl: 3    NIFEdipine (ADALAT CC) 90 MG TbSR, Take 1 tablet (90 mg total) by mouth once daily., Disp: 90 tablet, Rfl: 3    NIFEdipine (PROCARDIA-XL) 30 MG (OSM) 24 hr tablet, Take 1 tablet (30 mg total) by mouth every evening., Disp: 90 tablet, Rfl: 3    nitroGLYCERIN (NITROSTAT) 0.4 MG SL tablet, Place 1 tablet (0.4 mg total) under the tongue every 5 (five) minutes  "as needed for Chest pain., Disp: 25 tablet, Rfl: 6    potassium chloride (K-TAB) 20 mEq, Take 1 tablet (20 mEq total) by mouth once daily., Disp: 90 tablet, Rfl: 3  Review of patient's allergies indicates:  No Known Allergies  Hemoglobin A1c   Date Value Ref Range Status   06/05/2023 7.0 <=7.0 % Final   11/15/2022 7.8 (H) <=7.0 % Final   09/27/2022 9.2 (H) <=7.0 % Final      Body mass index is 39.94 kg/m².   Vitals:    06/07/23 1306   Resp: 20   Weight: 115.7 kg (255 lb)   Height: 5' 7" (1.702 m)      REVIEW OF SYSTEMS:  A ten-point review of systems was performed and is negative, except as mentioned above   Objective:   MSK Bilateral Knee  General:  No apparent distress, no pain indicators, obesity   Inspection: limping gait, mild effusion, full weight bearing, no erythema, no contusion, mild quad deconditioning, varus deformity   Palpation: BILATERAL knees tenderness with palpation at medial joint line, peripatellar soft tissue, non-tender: patellar tendon, tibial plateau  ROM:    Active Flexion / Extension (0-140):  0 / 119 painful (R)  2 / 106 painful (L)  Strength:   Flexion     4 / 5 (R)  4 / 5 (L)  Extension     4 / 5 (R)  4 / 5 (L)  Special Testing:  IT Band Testing  Fay's Test:  -- (R)  -- (L)  Effusion Testing  Ballotable Effusion:   -- (R)  + (L)  Fluid Wave:    -- (R)  + (L)  Patellar Testing  Patellar Grind:    + (R)  + (L)  Patellar Facet Pain:   + (R)  + (L)  Apprehension:    -- (R)  -- (L)  Meniscal Testing  Micheal's test:    + (R)  + (L)  Thessaly's Test:    Not Tested (R)  Not Tested (L)  Ligament Testing  ACL Anterior Drawer:   -- (R) -- (L)  PCL Posterior Drawer:   -- (R) -- (L)  LCL Varus Test:    -- (R) -- (L)  MCL Valgus Test:    -- (R) -- (L)  Hip Exam normal  Ankle Exam normal  Neurovascular: Intact to light touch  Neuro/ Psych: Awake, alert, oriented, normal mood and affect  Lymphatic: No LAD  Skin/ Soft Tissue: no rash, skin intact  Assessment:   IMAGING: X-ray 4 views of right and left " knee ordered, reviewed and independently interpreted by me. Discussed with patient. Noted no acute findings, DJD noted, awaiting radiologist findings    EMR REVIEW: completed with noted [facility] visit 12/15/22 reviewed    DIAGNOSIS:  1. Primary osteoarthritis of left knee    2. BMI 39.0-39.9,adult       Plan:     Orders Placed This Encounter    X-Ray Knee Complete 4 Or More Views Right    X-Ray Knee Complete 4 or More Views Left     Ongoing education about DX and treatment recommendations including conservative treatments of daily HEP with TheraBand, BMI reduction goal 5-10% of body weight (240# overall goal), muscle strengthening with use of stationary bike (RPM set at 80 or > with slow progression to goal of 40 minutes 3-4 times per week as tolerated), adequate vit D/C, glucosamine 1500 mg/day and daily acetaminophen 1000 mg 3 times/ day if able to tolerate.    Treatment plan: Moderate exacerbation of chronic Bilateral L > R Severe knee arthritis Referral for TKA eval. to Dr. Felipe Egan [phone] Address: [address] [place] [address] for Left knee DJD with failed conservative treatments including: RX NSAID, formal RX PT, HEP > 3 months, and VS.    Procedure:  offered and declines due to interest in TKA eval  RX Medications: continue medications as RX per PCP .  RTC: PRN if symptoms worsen or return  NOTE: None    Nica Perez FNP  Procedure Note:   None    Timed Billing Note  Total Time Spent with Patient: 40 minutes Excludes Time Spent Performing Procedure and .  Visit Start Time: 1310  10 minutes spent prior to visit reviewing EMR, prior labs and x-rays.  20 minutes spent in visit with patient face-to-face time completing exam, obtaining history, educating on DX and treatment plan.  10 minutes spent after visit completing EMR documentation.   Visit End Time: 1350

## 2023-06-09 ENCOUNTER — OFFICE VISIT (OUTPATIENT)
Dept: INTERNAL MEDICINE | Facility: CLINIC | Age: 58
End: 2023-06-09
Payer: MEDICAID

## 2023-06-09 VITALS
RESPIRATION RATE: 20 BRPM | HEIGHT: 67 IN | SYSTOLIC BLOOD PRESSURE: 129 MMHG | HEART RATE: 72 BPM | TEMPERATURE: 98 F | BODY MASS INDEX: 38.8 KG/M2 | DIASTOLIC BLOOD PRESSURE: 76 MMHG | WEIGHT: 247.19 LBS

## 2023-06-09 DIAGNOSIS — I10 PRIMARY HYPERTENSION: Chronic | ICD-10-CM

## 2023-06-09 DIAGNOSIS — G47.33 OSA (OBSTRUCTIVE SLEEP APNEA): Chronic | ICD-10-CM

## 2023-06-09 DIAGNOSIS — Z11.3 ROUTINE SCREENING FOR STI (SEXUALLY TRANSMITTED INFECTION): ICD-10-CM

## 2023-06-09 DIAGNOSIS — E87.6 HYPOKALEMIA: Chronic | ICD-10-CM

## 2023-06-09 DIAGNOSIS — M17.0 PRIMARY OSTEOARTHRITIS OF BOTH KNEES: ICD-10-CM

## 2023-06-09 DIAGNOSIS — Z86.16 HISTORY OF SEVERE ACUTE RESPIRATORY SYNDROME CORONAVIRUS 2 (SARS-COV-2) DISEASE: ICD-10-CM

## 2023-06-09 DIAGNOSIS — Z12.11 SCREENING FOR MALIGNANT NEOPLASM OF COLON: Primary | ICD-10-CM

## 2023-06-09 DIAGNOSIS — I25.10 MILD CAD: ICD-10-CM

## 2023-06-09 DIAGNOSIS — E11.65 UNCONTROLLED TYPE 2 DIABETES MELLITUS WITH HYPERGLYCEMIA: Chronic | ICD-10-CM

## 2023-06-09 PROBLEM — E66.812 CLASS 2 SEVERE OBESITY DUE TO EXCESS CALORIES WITH SERIOUS COMORBIDITY AND BODY MASS INDEX (BMI) OF 38.0 TO 38.9 IN ADULT: Status: ACTIVE | Noted: 2022-05-20

## 2023-06-09 PROCEDURE — 1160F PR REVIEW ALL MEDS BY PRESCRIBER/CLIN PHARMACIST DOCUMENTED: ICD-10-PCS | Mod: CPTII,,, | Performed by: NURSE PRACTITIONER

## 2023-06-09 PROCEDURE — 3052F HG A1C>EQUAL 8.0%<EQUAL 9.0%: CPT | Mod: CPTII,,, | Performed by: NURSE PRACTITIONER

## 2023-06-09 PROCEDURE — 3008F BODY MASS INDEX DOCD: CPT | Mod: CPTII,,, | Performed by: NURSE PRACTITIONER

## 2023-06-09 PROCEDURE — 99215 OFFICE O/P EST HI 40 MIN: CPT | Mod: PBBFAC | Performed by: NURSE PRACTITIONER

## 2023-06-09 PROCEDURE — 1160F RVW MEDS BY RX/DR IN RCRD: CPT | Mod: CPTII,,, | Performed by: NURSE PRACTITIONER

## 2023-06-09 PROCEDURE — 4010F ACE/ARB THERAPY RXD/TAKEN: CPT | Mod: CPTII,,, | Performed by: NURSE PRACTITIONER

## 2023-06-09 PROCEDURE — 3078F DIAST BP <80 MM HG: CPT | Mod: CPTII,,, | Performed by: NURSE PRACTITIONER

## 2023-06-09 PROCEDURE — 99214 OFFICE O/P EST MOD 30 MIN: CPT | Mod: S$PBB,,, | Performed by: NURSE PRACTITIONER

## 2023-06-09 PROCEDURE — 3074F PR MOST RECENT SYSTOLIC BLOOD PRESSURE < 130 MM HG: ICD-10-PCS | Mod: CPTII,,, | Performed by: NURSE PRACTITIONER

## 2023-06-09 PROCEDURE — 3074F SYST BP LT 130 MM HG: CPT | Mod: CPTII,,, | Performed by: NURSE PRACTITIONER

## 2023-06-09 PROCEDURE — 3052F PR MOST RECENT HEMOGLOBIN A1C LEVEL 8.0 - < 9.0%: ICD-10-PCS | Mod: CPTII,,, | Performed by: NURSE PRACTITIONER

## 2023-06-09 PROCEDURE — 1159F MED LIST DOCD IN RCRD: CPT | Mod: CPTII,,, | Performed by: NURSE PRACTITIONER

## 2023-06-09 PROCEDURE — 1159F PR MEDICATION LIST DOCUMENTED IN MEDICAL RECORD: ICD-10-PCS | Mod: CPTII,,, | Performed by: NURSE PRACTITIONER

## 2023-06-09 PROCEDURE — 99214 PR OFFICE/OUTPT VISIT, EST, LEVL IV, 30-39 MIN: ICD-10-PCS | Mod: S$PBB,,, | Performed by: NURSE PRACTITIONER

## 2023-06-09 PROCEDURE — 4010F PR ACE/ARB THEARPY RXD/TAKEN: ICD-10-PCS | Mod: CPTII,,, | Performed by: NURSE PRACTITIONER

## 2023-06-09 PROCEDURE — 3008F PR BODY MASS INDEX (BMI) DOCUMENTED: ICD-10-PCS | Mod: CPTII,,, | Performed by: NURSE PRACTITIONER

## 2023-06-09 PROCEDURE — 3078F PR MOST RECENT DIASTOLIC BLOOD PRESSURE < 80 MM HG: ICD-10-PCS | Mod: CPTII,,, | Performed by: NURSE PRACTITIONER

## 2023-06-09 RX ORDER — ASPIRIN 81 MG/1
81 TABLET ORAL DAILY
Qty: 90 TABLET | Refills: 3 | Status: SHIPPED | OUTPATIENT
Start: 2023-06-09 | End: 2024-06-08

## 2023-06-09 RX ORDER — LANCETS 33 GAUGE
EACH MISCELLANEOUS
COMMUNITY
Start: 2023-05-11

## 2023-06-09 RX ORDER — INSULIN LISPRO 100 [IU]/ML
15 INJECTION, SOLUTION INTRAVENOUS; SUBCUTANEOUS
Qty: 25 ML | Refills: 1 | Status: SHIPPED | OUTPATIENT
Start: 2023-06-09 | End: 2023-11-14 | Stop reason: SDUPTHER

## 2023-06-09 RX ORDER — DULAGLUTIDE 4.5 MG/.5ML
4.5 INJECTION, SOLUTION SUBCUTANEOUS
Qty: 4 PEN | Refills: 3 | Status: SHIPPED | OUTPATIENT
Start: 2023-06-09 | End: 2023-09-12 | Stop reason: DRUGHIGH

## 2023-06-09 RX ORDER — AMMONIUM LACTATE 12 G/100G
CREAM TOPICAL
COMMUNITY
Start: 2023-05-23

## 2023-06-09 RX ORDER — DICLOFENAC SODIUM 10 MG/G
2 GEL TOPICAL 4 TIMES DAILY
Qty: 100 G | Refills: 0 | Status: SHIPPED | OUTPATIENT
Start: 2023-06-09 | End: 2024-02-16 | Stop reason: SDUPTHER

## 2023-06-09 NOTE — PROGRESS NOTES
Yelitza Roque, BIANCA   [facility]  [address] [facility] [address]      PATIENT NAME: Michael Stone  : 1965  DATE: 23  MRN: [id number]      Reason for Visit / Chief Complaint: Follow-up (Lab results)       History of Present Illness / Problem Focused Workflow     Michael Stone is a 57 y.o. Black or  male presents to the clinic for DM f/u. PMH uncontrolled DM, HLD, HTN, mild CAD, Covid pneumonia (21), adrenal adenoma, vit d deficiency, covid + 23, morbid obesity, cervical spinal stenosis, MINA on CPAP, bilateral knee OA, tinea pedis and med non-compliance. Followed by [facility] cardio, ortho and wound clinics and Dr. Gallo, vascular.    Pt saw ortho earlier this week and was referred to Dr. Egan for evaluation of TKA. Pt reports med compliance. Has been attempting ADA diet and CBGs improving with readings 85-200s. Is also wearing CPAP nightly as ordered. Has lost about 13 labs since last visit. Labs reviewed with pt. Denies chest pain, shortness of breath, cough, fever, headache, dizziness, weakness, abdominal pain, nausea, vomiting, diarrhea, constipation, dysuria, depression, anxiety, SI/HI.    Review of Systems     Review of Systems     See HPI for details    Constitutional: Denies Change in appetite. Denies Chills. Denies Fever. Denies Night sweats.  Eye: Denies Blurred vision. Denies Discharge. Denies Eye pain.  ENT: Denies Decreased hearing. Denies Sore throat. Denies Swollen glands.  Respiratory: Denies Cough. Denies Shortness of breath. Denies Shortness of breath with exertion. Denies Wheezing.  Cardiovascular: Denies Chest pain at rest. Denies Chest pain with exertion. Denies Irregular heartbeat. Denies Palpitations. Denies Edema.  Gastrointestinal: Denies Abdominal pain. Denies Diarrhea. Denies Nausea. Denies Vomiting. Denies Hematemesis or Hematochezia.  Genitourinary: Denies Dysuria. Denies Urinary  frequency. Denies Urinary urgency. Denies Blood in urine.  Endocrine: Denies Cold intolerance. Denies Excessive thirst. Denies Heat intolerance. Denies Weight loss. Denies Weight gain.  Musculoskeletal: Admits Painful joints. Denies Weakness.  Integumentary: Denies Rash. Denies Itching. Denies Dry skin.  Neurologic: Denies Dizziness. Denies Fainting. Denies Headache.  Psychiatric: Denies Depression. Denies Anxiety. Denies Suicidal/Homicidal ideations.    All Other ROS: Negative except as stated in HPI.     Medical / Surgical / Social / Family History       ----------------------------  Adrenal adenoma  Callus of foot  Coronary artery disease  Diabetes mellitus  Hyperlipidemia  Hypertension  Spinal stenosis  Unspecified osteoarthritis, unspecified site     Past Surgical History:   Procedure Laterality Date    ANGIOGRAPHY      CHOLECYSTECTOMY      FOOT SURGERY Right        Social History     Socioeconomic History    Marital status: Single   Tobacco Use    Smoking status: Never    Smokeless tobacco: Never   Substance and Sexual Activity    Alcohol use: Never    Drug use: Never    Sexual activity: Yes     Partners: Female     Birth control/protection: None     Social Determinants of Health     Financial Resource Strain: Low Risk     Difficulty of Paying Living Expenses: Not very hard   Food Insecurity: No Food Insecurity    Worried About Running Out of Food in the Last Year: Never true    Ran Out of Food in the Last Year: Never true   Transportation Needs: No Transportation Needs    Lack of Transportation (Medical): No    Lack of Transportation (Non-Medical): No   Physical Activity: Insufficiently Active    Days of Exercise per Week: 4 days    Minutes of Exercise per Session: 20 min   Stress: Stress Concern Present    Feeling of Stress : To some extent   Social Connections: Moderately Isolated    Frequency of Communication with Friends and Family: More than three times a week    Frequency of Social Gatherings with  "Friends and Family: More than three times a week    Attends [employer] Services: 1 to 4 times per year    Active Member of Clubs or Organizations: No    Marital Status: Never    Housing Stability: Low Risk     Unable to Pay for Housing in the Last Year: No    Number of Places Lived in the Last Year: 1    Unstable Housing in the Last Year: No        Family History   Problem Relation Age of Onset    Hypertension Mother     Heart disease Mother     Stroke Father     Cancer Sister     Heart disease Sister     Cancer Sister     Heart disease Sister     Heart disease Brother     Heart disease Brother         Medications and Allergies     Medications  Current Outpatient Medications   Medication Instructions    albuterol (PROVENTIL/VENTOLIN HFA) 90 mcg/actuation inhaler 2 puffs, Inhalation, Every 6 hours PRN    ammonium lactate 12 % Crea APPLY TWICE DAILY. APPLY A THIN LAYER OF VASELINE OVER LAC-HYDRIN TWICE DAILY. NOTHING BETWEEN TOES    aspirin (ECOTRIN) 81 mg, Oral, Daily    atorvastatin (LIPITOR) 80 mg, Oral, Nightly    BD VEO INSULIN SYRINGE UF 1 mL 31 gauge x 15/64" Syrg USE THREE TIMES DAILY AS DIRECTED    blood sugar diagnostic (ONETOUCH ULTRA TEST) Strp CHECK BLOOD SUGAR THREE TIMES A DAY BEFORE MEALS AS DIRECTED BY DOCTOR    blood sugar diagnostic Strp   One Touch Ultra 2 Test Strips, See Instructions, Use to check CBG TID, # 100 EA, 11 Refill(s), Pharmacy: [facility], 168, cm, Height/Length Dosing, 01/21/22 10:22:00 CST, 116, kg, Weight Dosing, 01/21/22 10:22:00 CST    diclofenac sodium (VOLTAREN) 2 g, Topical (Top), 4 times daily    furosemide (LASIX) 20 mg, Oral, Daily    HumaLOG U-100 Insulin 20 Units, Subcutaneous, 3 times daily before meals    hydroCHLOROthiazide (HYDRODIURIL) 100 mg, Oral, Daily    ID NOW COVID-19 TEST KIT Kit TEST AS DIRECTED TODAY    insulin syringes, disposable, 1 mL Syrg 1 Syringe, Subcutaneous, 3 times daily before meals    lancets (ONETOUCH DELICA LANCETS) 33 " "gauge Misc CHECK BLOOD SUGAR THREE TIMES A DAY BEFORE MEALS AS DIRECTED BY DOCTOR    KWAKU ENCINAS U-100 INSULIN glargine 100 units/mL SubQ pen Inject 52 units subq qam and 42 units subq at bedtime.    losartan (COZAAR) 100 mg, Oral, Daily    metFORMIN (GLUCOPHAGE) 1,000 mg, Oral, 2 times daily with meals    NIFEdipine (ADALAT CC) 90 mg, Oral, Daily    nitroGLYCERIN (NITROSTAT) 0.4 mg, Sublingual, Every 5 min PRN    ONETOUCH DELICA PLUS LANCET 30 gauge Misc CHECK BLOOD SUGAR THREE TIMES A DAY BEFORE MEALS AS DIRECTED BY DOCTOR    potassium chloride (K-TAB) 20 mEq 20 mEq, Oral, Daily         Allergies  Review of patient's allergies indicates:  No Known Allergies    Physical Examination     /76 (BP Location: Right arm, Patient Position: Sitting, BP Method: Large (Automatic))   Pulse 72   Temp 97.7 °F (36.5 °C) (Oral)   Resp 20   Ht 5' 7.01" (1.702 m)   Wt 112.1 kg (247 lb 3.2 oz)   BMI 38.71 kg/m²     Physical Exam  Constitutional:       Appearance: He is obese.       General: Alert and oriented, No acute distress.  Head: Normocephalic, Atraumatic.  Eye: Pupils are equal, round and reactive to light, Extraocular movements are intact, Sclera non-icteric.  Ears/Nose/Throat: Normal, Mucosa moist,Clear.  Neck/Thyroid: Supple, Non-tender, No carotid bruit, No lymphadenopathy, No JVD, Full range of motion.  Respiratory: Clear to auscultation bilaterally; No wheezes, rales or rhonchi,Non-labored respirations, Symmetrical chest wall expansion.  Cardiovascular: Regular rate and rhythm, S1/S2 normal, No murmurs, rubs or gallops.  Gastrointestinal: Soft, Non-tender, Non-distended, Normal bowel sounds, No palpable organomegaly.  Integumentary: Warm, Dry, Intact, No suspicious lesions or rashes.  Extremities: No clubbing, cyanosis or edema  Neurologic: No focal deficits, Cranial Nerves II-XII are grossly intact, Motor strength normal upper and lower extremities, Sensory exam intact.  Psychiatric: Normal interaction, " Coherent speech, Appropriate affect       Results     Lab Results   Component Value Date    WBC 5.9 01/15/2023    HGB 12.9 (L) 01/15/2023    HCT 41.9 (L) 01/15/2023     01/15/2023    CHOL 116 06/05/2023    TRIG 98 06/05/2023    ALT 24 06/05/2023    AST 19 06/05/2023     06/05/2023    K 3.3 (L) 06/05/2023    CREATININE 1.01 06/05/2023    BUN 21.7 06/05/2023    CO2 28 06/05/2023    TSH 1.3340 11/15/2022    PSA 2.57 11/15/2022    INR 0.97 08/03/2021    HGBA1C 7.0 06/05/2023         Assessment and Plan (including Health Maintenance)     Plan:     1. Uncontrolled type 2 diabetes mellitus with hyperglycemia  A1C 7.0 at goal. Previous A1C 7.8.   Decrease humalog to 15 units TIDWM.  Increase trulicity to 4.5 mg weekly.  Continue lantus, and metformin.  Encouraged and congratulated on achieving better glucose control and recent weight loss.  Pt to contact clinic if begins to persistent low readings.  Follow ADA diet.  Avoid soda, simple sweets, and limit rice/pasta/bread/starches and consume brown options when possible.   Maintain healthy weight with BMI goal <30.   Perform aerobic exercise for 150 minutes per week (or 5 days a week for 30 minutes each day).   Examine feet daily.   Obtain annual dilated eye exam.  Eye exam: scheduled optho 6/26/23  Foot exam: 3/3/23    - Comprehensive Metabolic Panel; Future  - Hemoglobin A1C; Future  - CBC Auto Differential; Future    2. Primary hypertension  At goal.  Continue losartan, HCTZ, procardia and Lasix.  Follow a low sodium (less than 2 grams of sodium per day), DASH diet.   Continue medications as prescribed.  Monitor blood pressure and report any consistent values greater than 140/90 and keep a log.  Maintain healthy weight with a BMI goal of <30.   Aerobic exercise for 150 minutes per week (or 5 days a week for 30 minutes each day).      3. Primary osteoarthritis of both knees  Keep appt with Dr. Egan when scheduled.  Congratulated on 10 lbs weight  loss.  Perform knee exercises daily.   Avoid activities than increase knee pain or stiffness.   Apply heating pad, ice pack, and BioFreeze/topical capsaicin as needed; alternate every 15-20 minutes.   Continue tylenol arthritis as needed.      4. Hypokalemia  Continue Kdur.  K 3.3; pt asymptomatic.  Take meds as prescribed.  Incorporated potassium rich foods into you diet such as nuts, seeds, peas, beans (dried), whole grains, fresh fruits and vegetables, lean red meat and yogurt.  Education provided on additional sources of potassium-rich foods including: carrots, broccoli, potatoes, celery, spinach, squash, tomatoes, avocados, apricots, cantaloupe, bananas, nectarines, prunes, figs and raisins.  Seek health care assistance if you experience chest pain, shortness of breath, vomiting or diarrhea lasting more than 2 days, fainting, palpitations, or weakness worsens.      5. MINA (obstructive sleep apnea)  Reports compliance with wearing CPAP nightly.  Reports decreased EDS, snoring and fatigue.  Pt is benefiting from its use.  Expect lifetime use and decreased daytime sleepiness/fatigue.   Avoid excessive alcohol, smoking and overuse of sedatives at bedtime.  Weight management discussed.  Adjust sleep position as needed for increased comfort.      6. Routine screening for STI (sexually transmitted infection)  - Hepatitis Panel, Acute; Future    7. Screening for malignant neoplasm of colon  - Cologuard Screening (Multitarget Stool DNA); Future  - Cologuard Screening (Multitarget Stool DNA)      Problem List Items Addressed This Visit          Cardiac/Vascular    Mild CAD (Chronic)    Overview     Followed by [facility] cardio.         Primary hypertension (Chronic)       Renal/    Hypokalemia (Chronic)       ID    History of severe acute respiratory syndrome coronavirus 2 (SARS-CoV-2) disease       Endocrine    Uncontrolled type 2 diabetes mellitus with hyperglycemia (Chronic)    Relevant Orders    Comprehensive Metabolic  Panel    Hemoglobin A1C    CBC Auto Differential       Orthopedic    Primary osteoarthritis of both knees       Other    MINA (obstructive sleep apnea) (Chronic)     Other Visit Diagnoses       Screening for malignant neoplasm of colon    -  Primary    Relevant Orders    Cologuard Screening (Multitarget Stool DNA)    Routine screening for STI (sexually transmitted infection)        Relevant Orders    Hepatitis Panel, Acute              Health Maintenance Due   Topic Date Due    Hepatitis C Screening  Never done    COVID-19 Vaccine (4 - Moderna series) 08/05/2022    Eye Exam  05/17/2023    Colorectal Cancer Screening  05/24/2023       Follow up in about 3 months (around 9/9/2023) for Follow up with labs one week prior to appt. .        Signature:  BIANCA Cabral  [facility]  [address] [id number]

## 2023-06-12 NOTE — PROGRESS NOTES
Spoke with patient confirmed his [facility] appointment tomorrow with NP . Advised patient to discuss his back issues with his PCP at his next follow up visit 06/09/23. Reminded him an MRI would probably not be approved by his insurance without any form of physical therapy being completed before hand. Instructed to continue monitoring his B/P and BS at home and keeping a log for doctor appointments. Verified he has been compliant in taking Rx medications. Stressed the importance of  low salt diet restrictions. Advised to utilized urgent care for non acute issues.Voices understanding.

## 2023-06-16 ENCOUNTER — PATIENT OUTREACH (OUTPATIENT)
Dept: EMERGENCY MEDICINE | Facility: HOSPITAL | Age: 58
End: 2023-06-16
Payer: MEDICAID

## 2023-06-16 ENCOUNTER — HOSPITAL ENCOUNTER (OUTPATIENT)
Dept: WOUND CARE | Facility: HOSPITAL | Age: 58
Discharge: HOME | End: 2023-06-16
Attending: NURSE PRACTITIONER
Payer: MEDICAID

## 2023-06-16 VITALS
SYSTOLIC BLOOD PRESSURE: 123 MMHG | OXYGEN SATURATION: 98 % | DIASTOLIC BLOOD PRESSURE: 77 MMHG | HEART RATE: 72 BPM | RESPIRATION RATE: 18 BRPM | TEMPERATURE: 98 F

## 2023-06-16 DIAGNOSIS — L60.2 OVERGROWN TOENAILS: ICD-10-CM

## 2023-06-16 DIAGNOSIS — L60.3 DYSTROPHIC NAIL: ICD-10-CM

## 2023-06-16 DIAGNOSIS — E11.628 TYPE 2 DIABETES MELLITUS WITH OTHER SKIN COMPLICATION, WITH LONG-TERM CURRENT USE OF INSULIN: ICD-10-CM

## 2023-06-16 DIAGNOSIS — B35.3 TINEA PEDIS OF BOTH FEET: ICD-10-CM

## 2023-06-16 DIAGNOSIS — Z79.4 TYPE 2 DIABETES MELLITUS WITH OTHER SKIN COMPLICATION, WITH LONG-TERM CURRENT USE OF INSULIN: ICD-10-CM

## 2023-06-16 DIAGNOSIS — M79.675 TOE PAIN, LEFT: ICD-10-CM

## 2023-06-16 DIAGNOSIS — L84 CALLUS OF FOOT: ICD-10-CM

## 2023-06-16 DIAGNOSIS — M79.674 PAIN IN RIGHT TOE(S): ICD-10-CM

## 2023-06-16 DIAGNOSIS — E11.9 ENCOUNTER FOR COMPREHENSIVE DIABETIC FOOT EXAMINATION, TYPE 2 DIABETES MELLITUS: Primary | ICD-10-CM

## 2023-06-16 PROCEDURE — 11719 TRIM NAIL(S) ANY NUMBER: CPT

## 2023-06-16 PROCEDURE — 11056 PR TRIM BENIGN HYPERKERATOTIC SKIN LESION,2-4: ICD-10-PCS | Mod: ,,, | Performed by: NURSE PRACTITIONER

## 2023-06-16 PROCEDURE — 27000999 HC MEDICAL RECORD PHOTO DOCUMENTATION

## 2023-06-16 PROCEDURE — 11719 PR TRIM NAIL(S): ICD-10-PCS | Mod: 59,,, | Performed by: NURSE PRACTITIONER

## 2023-06-16 PROCEDURE — 99212 PR OFFICE/OUTPT VISIT, EST, LEVL II, 10-19 MIN: ICD-10-PCS | Mod: 25,,, | Performed by: NURSE PRACTITIONER

## 2023-06-16 PROCEDURE — 11056 PARNG/CUTG B9 HYPRKR LES 2-4: CPT

## 2023-06-16 PROCEDURE — 11719 TRIM NAIL(S) ANY NUMBER: CPT | Mod: 59,,, | Performed by: NURSE PRACTITIONER

## 2023-06-16 PROCEDURE — G0127 TRIM NAIL(S): HCPCS

## 2023-06-16 PROCEDURE — 99212 OFFICE O/P EST SF 10 MIN: CPT | Mod: 25,,, | Performed by: NURSE PRACTITIONER

## 2023-06-16 PROCEDURE — 11056 PARNG/CUTG B9 HYPRKR LES 2-4: CPT | Mod: ,,, | Performed by: NURSE PRACTITIONER

## 2023-06-16 NOTE — PROGRESS NOTES
TODAY'S VISIT NOTE WAS IMPORTED FROM LAST WOUND CLINIC VISIT OF 2/28/23.  I ATTEST THAT I REVIEWED THE HPI, ROS, LABS, WOUND-RELATED IMAGING, PHYSICAL EXAMINATION, EVALUATION AND PLAN SECTIONS OF IMPORTED NOTE AND REVISED TODAY'S VISIT NOTE TO REFLECT TODAY'S NEW ASSESSMENT FINDINGS AND TODAY'S UPDATES TO WOUND TREATMENT PLAN.          Chief Complaint:  Routine diabetic foot care.       History of Present Illness:  56 yo Black male being seen today for routine diabetic foot care.  Continues applying gentian violet 1% between toes with Q-tip, as-needed, with good control of rash/moistness between toes.  Lesion to right dorsal foot biopsied by [facility] dermatology with negative findings (Spring 2022).   Hx includes medical non-compliance, obesity (BMI 38.71), Type 2 diabetes (insulin-dependent), HTN, HLD, adrenal adenoma, generalized DJD, bilateral 4th toeweb calluses,  and hx of COVID infection (1/12/2021). Has attended individual diabetes education on 12/18/20 and has been attempting to follow ADA/DASH diet. Underwent angiogram 8/20/2021 here @ [facility]; very mild nonobstructive one vessel disease. Followed by [facility] cardiology clinic.  RLE 12/2021 venous reflux study showed reflux of 4.3 seconds in the GSV at the level of the upper calf and reflux of 4.8 seconds and a branch of the GSV at the level of the upper calf. August 2021 LLE venous reflux study revealed no substantial venous reflux and no DVT.  Followed by Dr. Gallo's [facility] PVD clinic for vascular.   Followed by BIANCA Eli, for PCP; last visit with Ms. Roque was on 6/9/23.      Review of Most Recent Labs:  6/5/23:  CR 1.01.  Chol 116. HDL 39.  LDL 57.  Trig 98.  HgbA1C 7.0.   1/15/23:  CBC unremarkable.  Cr 1.26.    11/15/2022:   Chol 109.  HDL 38.  LDL 58.  Trig 65.  HgbA1C 7.8.  PSA 2.57.          Today 6/16/23:  Very happy that he got diabetes at goal.  Trying to eat healthier and lose some weight, as well.  Checking feet/toes daily.  Not aware of any rashes  "between toes.  Has not used gentian violet in a long time.  Reporting toes in both feet "are hurting real bad" with nerve pain of diabetes.  No new rashes, lesions, or skin breakdown.  Continues with twice/day Lac-Hydrin and Vaseline to feet for moisturizing.      2/28/23:  Still having pain in calves with walking sometimes, along with cold feet, and numbness over all toes on both feet.  Ankles are always swollen.  Applying Lac-Hydrin and Vaseline to both feet, with improvement in dry skin.  Admits to walking barefoot in house.  Denies walking outside barefoot.     11/29/2022:  Having mild discomfort between bilateral 4th and 5th toes, where he has had calluses previously.  Has not been sanding calluses down with [employer] boards, as taught previously.  No rashes between toes.  Reports calf pain with walking (left > right) at times, cold feet, and numbness over all toes.  Ankles are always swollen.  Scheduled to see Dr. Gallo, vascular, 12/2/2022.  Has not been applying any kind of moisturizer to dry skin over bottoms of feet and lower legs.  No new rashes, lesions, or skin breakdown.     8/19/2022:  Has noticed tiny dry hole in space between both little toes, since last visit.   Went to [facility] to have toes looked at and was told there were no wounds there.  Denies pain in those areas.  Both little toes are more stiff than his other toes.  Denies numbness, pain, and tingling in all toes.  Not aware of any calluses.  Toenails need cut and filed.  Continues using gentian violet between toes, as needed.  No rashes between toes.  Wearing pair of shoes that used to belong to his wife; they protect toes and heels.  Seen by PCP earlier this morning and had labs drawn.  Denies weakness; no stomach problems.   Not wearing compression stockings.      5/20/2022:  Not wearing compression stockings today; scheduled to see cardiologist after today's visit.  Says he is weighing daily; however, believes his scale is not working.  Denies " "numbness, tingling, and pain in feet/toes.  Rash between toes is "doing good."  Continues with Gentian violet, as needed, with complete resolution of rash within a day or two.  Wears shoes that cover heels and toes at all times, when out of bed.     2/18/2022:  Mr. Stone presented without compression stockings on. Denies numbness, tingling, and pain in feet toes. Wears shoes at all times, while out of bed. Agreeable to being referred to [facility] dermatology to have lesion to right dorsal foot evaluated, and possibly biopsied. No new skin breakdown, rashes, or other skin issues. No rash or moisture between toes. He uses Gentian Violet 1%, as needed, which resolves rash quickly.     11/19/2021:  Denies numbness, tingling, and pain in bilateral feet. No changes to lesion on middle of right foot since his last visit. No new skin breakdown, rashes, or lesions. Applying gentian violet 1% between toes daily.    8/18/2021:  Denies numbness, tingling, and pain in bilateral feet. No changes to lesion on middle of right foot since his last visit. No new skin breakdown, rashes, or lesions.       Review of Systems:  Except as stated in HPI, all other 10 body systems normal      Physical Exam Vitals & Measurements:    Vitals:    06/16/23 1253   BP: 123/77   Pulse: 72   Resp: 18   Temp: 97.8 °F (36.6 °C)         General:  VSS,  afebrile.    Obese; in no acute distress.  Respiratory:   Breathing even, quiet, and unlabored. No coughing.   Cardiovascular:   Moderate non-pitting edema over bilateral medial and lateral ankles.  No hemosiderin staining or varicosities.  No hair distribution over BLE.   Toenails dystrophic and overgrown.  DP pulses palpated bilaterally.   PT pulses dopplered bilaterally.    Musculoskeletal:  Full range of motion of all extremities.  Bunion to left 1st met head.   Decreased dorsiflexion and flexion of bilateral ankles, and left hallux toe.  Loss of intrinsic muscle ROM in bilateral feet.  Bilateral pes " planus.    Neurologic: A&O X 3; cranial nerves grossly intact.  Normal monofilament testing.  No loss of sensation.    Psychiatric: Calm, cooperative. Mood and affect normal. Responses appropriate  Integumentary:     Ten toenails all overgrown and dystrophic.     Linear calluses to left lateral hallux and left 2nd medial toe.                                Assessment/Plan:    Obesity, BMI 38.71:  This has been identified as a co-factor towards foot health, and increased risk of calluses, and diabetic foot ulcers.   Being managed by PCP.     Encounter for routine diabetic foot care, Type 2, insulin-dependent:  Last visit with PCP, Yelitza Roque, APRN:  6/9/23  Diabetes at goal, per last HgbA1C.     Diabetic foot care principles reinforced.      Overgrown toenails:  Procedure Today: cutting and filing of 10 toenails  Informed verbal consent obtained.    Using standard podiatry clippers and Tyesha boards, I cut, trimmed, filed/sanded all ten toenails. No bleeding or discomfort; Mr. Stone tolerated procedure without complications.     Calluses of left hallux toe and left 2nd toe:  Procedure Today: shaving of 2 toe calluses  Rationale:  Calluses can lead to diabetic foot wounds, cellulitis, osteomyelitis, and lower limb amputations.     Informed verbal consent obtained.   Using #4 dermal curette and tyesha boards, I pared and sanded two calluses to healthy soft tissue. No bleeding or discomfort with procedure.     Tinea Pedis:  Mild white rash between left 4th toe web.  Previously treated with gentian violet 1%, as-needed.  Instructed him to discontinue the gentian violet and I will send in script to [facility] for CMPD anti-infectives.  RX:  CMPD Tobra 12%, Vanc 15%, Voricon 3% ointment to affected areas twice/day.   Teaching provided on new medication, expected outcomes of medication, how to administer medication, and possible s/e of medications.      Xerosis of bilateral feet:  Improved  with current tx plan.   CPM   Prescribed Lac-Hydrin 12%, followed by thin layer of Vaseline twice/day.   Instructions reinforced to not put creams/lotions between toes, with rationale.                RTC in three months. Instructed on s/s to call wound clinic for in between clinic visits.

## 2023-06-16 NOTE — PROGRESS NOTES
Spoke with patient,no complaints at this time. Recent follow up visit 06/09/23 in Orthopedic Clinic for chronic bilateral knee pain and he was referred to Dr Fleipe Egan an Orthopedic doctor in [place]. He says he has an scheduled 07/10/23 with Dr Egan to discuss a total knee replacement surgery. He was told his left knee is worse according to x-rays. Verified he continues to monitor his B/P and BS at home and trying to stick with his recommended DM diet. Reminded to utilized urgent care for less acute issues instead of the ED. Voices understanding.

## 2023-06-21 LAB — NONINV COLON CA DNA+OCC BLD SCRN STL QL: NORMAL

## 2023-06-26 ENCOUNTER — OFFICE VISIT (OUTPATIENT)
Dept: OPHTHALMOLOGY | Facility: CLINIC | Age: 58
End: 2023-06-26
Payer: MEDICAID

## 2023-06-26 VITALS — WEIGHT: 247.13 LBS | BODY MASS INDEX: 38.79 KG/M2 | HEIGHT: 67 IN

## 2023-06-26 DIAGNOSIS — Z13.5 SCREENING FOR DIABETIC RETINOPATHY: ICD-10-CM

## 2023-06-26 DIAGNOSIS — E11.65 UNCONTROLLED TYPE 2 DIABETES MELLITUS WITH HYPERGLYCEMIA: Chronic | ICD-10-CM

## 2023-06-26 PROCEDURE — 99214 OFFICE O/P EST MOD 30 MIN: CPT | Mod: PBBFAC,PO | Performed by: STUDENT IN AN ORGANIZED HEALTH CARE EDUCATION/TRAINING PROGRAM

## 2023-06-26 PROCEDURE — 92250 FUNDUS PHOTOGRAPHY W/I&R: CPT | Mod: PBBFAC,PO | Performed by: STUDENT IN AN ORGANIZED HEALTH CARE EDUCATION/TRAINING PROGRAM

## 2023-07-07 LAB — NONINV COLON CA DNA+OCC BLD SCRN STL QL: NEGATIVE

## 2023-07-15 ENCOUNTER — HOSPITAL ENCOUNTER (EMERGENCY)
Facility: HOSPITAL | Age: 58
Discharge: HOME | End: 2023-07-15
Attending: INTERNAL MEDICINE
Payer: MEDICAID

## 2023-07-15 VITALS
HEIGHT: 67 IN | RESPIRATION RATE: 18 BRPM | HEART RATE: 63 BPM | DIASTOLIC BLOOD PRESSURE: 82 MMHG | OXYGEN SATURATION: 98 % | TEMPERATURE: 98 F | WEIGHT: 237.44 LBS | BODY MASS INDEX: 37.27 KG/M2 | SYSTOLIC BLOOD PRESSURE: 128 MMHG

## 2023-07-15 DIAGNOSIS — E11.65 UNCONTROLLED TYPE 2 DIABETES MELLITUS WITH HYPERGLYCEMIA: Primary | ICD-10-CM

## 2023-07-15 DIAGNOSIS — N17.9 AKI (ACUTE KIDNEY INJURY): ICD-10-CM

## 2023-07-15 LAB
ANION GAP SERPL CALC-SCNC: 15 MEQ/L
APPEARANCE UR: CLEAR
BACTERIA #/AREA URNS AUTO: ABNORMAL /HPF
BILIRUB UR QL STRIP.AUTO: NEGATIVE
BUN SERPL-MCNC: 19.7 MG/DL (ref 8.4–25.7)
CALCIUM SERPL-MCNC: 9.8 MG/DL (ref 8.4–10.2)
CHLORIDE SERPL-SCNC: 89 MMOL/L (ref 98–107)
CO2 SERPL-SCNC: 25 MMOL/L (ref 22–29)
COLOR UR: COLORLESS
CREAT SERPL-MCNC: 1.73 MG/DL (ref 0.73–1.18)
CREAT/UREA NIT SERPL: 11
GFR SERPLBLD CREATININE-BSD FMLA CKD-EPI: 46 MLS/MIN/1.73/M2
GLUCOSE SERPL-MCNC: 870 MG/DL (ref 74–100)
GLUCOSE UR QL STRIP.AUTO: ABNORMAL
HYALINE CASTS #/AREA URNS LPF: ABNORMAL /LPF
KETONES UR QL STRIP.AUTO: NEGATIVE
LEUKOCYTE ESTERASE UR QL STRIP.AUTO: NEGATIVE
NITRITE UR QL STRIP.AUTO: NEGATIVE
PH UR STRIP.AUTO: 6.5 [PH]
POCT GLUCOSE: 469 MG/DL (ref 70–110)
POCT GLUCOSE: >500 MG/DL (ref 70–110)
POCT GLUCOSE: >500 MG/DL (ref 70–110)
POTASSIUM SERPL-SCNC: 3.7 MMOL/L (ref 3.5–5.1)
PROT UR QL STRIP.AUTO: NEGATIVE
RBC #/AREA URNS AUTO: ABNORMAL /HPF
RBC UR QL AUTO: NEGATIVE
SODIUM SERPL-SCNC: 129 MMOL/L (ref 136–145)
SP GR UR STRIP.AUTO: 1.02
SQUAMOUS #/AREA URNS LPF: ABNORMAL /HPF
UROBILINOGEN UR STRIP-ACNC: NORMAL
WBC #/AREA URNS AUTO: ABNORMAL /HPF

## 2023-07-15 PROCEDURE — 96372 THER/PROPH/DIAG INJ SC/IM: CPT | Mod: 59

## 2023-07-15 PROCEDURE — 99284 EMERGENCY DEPT VISIT MOD MDM: CPT | Mod: 25

## 2023-07-15 PROCEDURE — 25000003 PHARM REV CODE 250

## 2023-07-15 PROCEDURE — 81001 URINALYSIS AUTO W/SCOPE: CPT

## 2023-07-15 PROCEDURE — 96361 HYDRATE IV INFUSION ADD-ON: CPT

## 2023-07-15 PROCEDURE — 63600175 PHARM REV CODE 636 W HCPCS

## 2023-07-15 PROCEDURE — 80048 BASIC METABOLIC PNL TOTAL CA: CPT

## 2023-07-15 PROCEDURE — 96374 THER/PROPH/DIAG INJ IV PUSH: CPT

## 2023-07-15 PROCEDURE — 82962 GLUCOSE BLOOD TEST: CPT

## 2023-07-15 RX ORDER — INSULIN ASPART 100 [IU]/ML
5 INJECTION, SOLUTION INTRAVENOUS; SUBCUTANEOUS
Status: COMPLETED | OUTPATIENT
Start: 2023-07-15 | End: 2023-07-15

## 2023-07-15 RX ORDER — INSULIN ASPART 100 [IU]/ML
12 INJECTION, SOLUTION INTRAVENOUS; SUBCUTANEOUS
Status: COMPLETED | OUTPATIENT
Start: 2023-07-15 | End: 2023-07-15

## 2023-07-15 RX ORDER — POTASSIUM CHLORIDE 20 MEQ/1
20 TABLET, EXTENDED RELEASE ORAL ONCE
Status: COMPLETED | OUTPATIENT
Start: 2023-07-15 | End: 2023-07-15

## 2023-07-15 RX ADMIN — INSULIN ASPART 12 UNITS: 100 INJECTION, SOLUTION INTRAVENOUS; SUBCUTANEOUS at 09:07

## 2023-07-15 RX ADMIN — HUMAN INSULIN 5 UNITS: 100 INJECTION, SOLUTION SUBCUTANEOUS at 10:07

## 2023-07-15 RX ADMIN — POTASSIUM CHLORIDE 20 MEQ: 1500 TABLET, EXTENDED RELEASE ORAL at 11:07

## 2023-07-15 RX ADMIN — SODIUM CHLORIDE, POTASSIUM CHLORIDE, SODIUM LACTATE AND CALCIUM CHLORIDE 500 ML: 600; 310; 30; 20 INJECTION, SOLUTION INTRAVENOUS at 10:07

## 2023-07-15 RX ADMIN — SODIUM CHLORIDE, POTASSIUM CHLORIDE, SODIUM LACTATE AND CALCIUM CHLORIDE 1000 ML: 600; 310; 30; 20 INJECTION, SOLUTION INTRAVENOUS at 09:07

## 2023-07-15 RX ADMIN — INSULIN ASPART 5 UNITS: 100 INJECTION, SOLUTION INTRAVENOUS; SUBCUTANEOUS at 10:07

## 2023-07-15 NOTE — ED PROVIDER NOTES
Encounter Date: 7/15/2023       History     Chief Complaint   Patient presents with    Hyperglycemia     Pt states he took his BS at home and his meter read HI. CBG in triage >500     Patient with a PMH of uncontrolled T2DM with hyperglcemia, HTN, and CAD presents to the ED after having several blood glucose reading at home >500. Patient states that his last glucose reading was last night. He states that he stopped his insulin use about 2 days ago due to the inability to administer it himself properly. His niece usually administers his insulin. Patient states that his current insulin dosage is Lantus 52U in the AM and 42U at night and Humalog 15U TID. Patient denies any associated symptoms such as confusion, tachypnea, N/V, and abdominal pain. Patient reports frequent urination without other urinary symptoms.    The history is provided by the patient.   Review of patient's allergies indicates:  No Known Allergies  Past Medical History:   Diagnosis Date    Adrenal adenoma     Callus of foot 8/19/2022    Coronary artery disease     Diabetes mellitus     Hyperlipidemia     Hypertension     Spinal stenosis     Unspecified osteoarthritis, unspecified site      Past Surgical History:   Procedure Laterality Date    ANGIOGRAPHY      CHOLECYSTECTOMY      FOOT SURGERY Right      Family History   Problem Relation Age of Onset    Hypertension Mother     Heart disease Mother     Stroke Father     Cancer Sister     Heart disease Sister     Cancer Sister     Heart disease Sister     Heart disease Brother     Heart disease Brother      Social History     Tobacco Use    Smoking status: Never    Smokeless tobacco: Never   Substance Use Topics    Alcohol use: Never    Drug use: Never     Review of Systems   Constitutional:  Negative for chills, fatigue and fever.   Respiratory:  Negative for cough, chest tightness, shortness of breath and wheezing.    Cardiovascular:  Negative for chest pain, palpitations and leg swelling.    Gastrointestinal:  Negative for abdominal pain, constipation, diarrhea, nausea and vomiting.   Genitourinary:  Positive for frequency. Negative for difficulty urinating, dysuria, flank pain and hematuria.   Musculoskeletal:  Negative for back pain, gait problem, joint swelling and myalgias.   Skin:  Negative for color change, pallor, rash and wound.   Neurological:  Negative for dizziness, tremors, weakness, numbness and headaches.   Psychiatric/Behavioral:  Negative for behavioral problems and confusion. The patient is not nervous/anxious and is not hyperactive.      Physical Exam     Initial Vitals [07/15/23 0816]   BP Pulse Resp Temp SpO2   116/74 68 20 97.5 °F (36.4 °C) 100 %      MAP       --         Physical Exam    Nursing note and vitals reviewed.  Constitutional: He appears well-developed and well-nourished.   HENT:   Head: Normocephalic and atraumatic.   Eyes: Conjunctivae and EOM are normal. Pupils are equal, round, and reactive to light.   Neck: Neck supple. No tracheal deviation present. No JVD present.   Normal range of motion.  Cardiovascular:  Normal rate, regular rhythm and normal heart sounds.     Exam reveals no gallop and no friction rub.       No murmur heard.  Pulmonary/Chest: He has no wheezes. He has no rhonchi. He has no rales.   Abdominal: Abdomen is soft. Bowel sounds are normal. He exhibits no distension. There is no abdominal tenderness. There is no rebound and no guarding.   Musculoskeletal:         General: No tenderness or edema. Normal range of motion.      Cervical back: Normal range of motion and neck supple.     Neurological: He is alert and oriented to person, place, and time. GCS score is 15. GCS eye subscore is 4. GCS verbal subscore is 5. GCS motor subscore is 6.   Skin: Skin is warm and dry. Capillary refill takes less than 2 seconds.   Psychiatric: He has a normal mood and affect. His behavior is normal. Judgment and thought content normal.       ED Course    Procedures  Labs Reviewed   BASIC METABOLIC PANEL - Abnormal; Notable for the following components:       Result Value    Sodium Level 129 (*)     Chloride 89 (*)     Glucose Level 870 (*)     Creatinine 1.73 (*)     All other components within normal limits   URINALYSIS - Abnormal; Notable for the following components:    Color, UA Colorless (*)     Glucose, UA 4+ (*)     Squamous Epithelial Cells, UA Trace (*)     All other components within normal limits   POCT GLUCOSE - Abnormal; Notable for the following components:    POCT Glucose >500 (*)     All other components within normal limits   POCT GLUCOSE - Abnormal; Notable for the following components:    POCT Glucose 469 (*)     All other components within normal limits   POCT GLUCOSE - Abnormal; Notable for the following components:    POCT Glucose >500 (*)     All other components within normal limits   EXTRA TUBES    Narrative:     The following orders were created for panel order EXTRA TUBES.  Procedure                               Abnormality         Status                     ---------                               -----------         ------                     Light Blue Top Hold[[id number]]                              In process                 Red Top Hold[[id number]]                                     In process                 Lavender Top Hold[[id number]]                                In process                 Gold Top Hold[[id number]]                                    In process                   Please view results for these tests on the individual orders.   LIGHT BLUE TOP HOLD   RED TOP HOLD   LAVENDER TOP HOLD   GOLD TOP HOLD          Imaging Results    None          Medications   lactated ringers bolus 1,000 mL (0 mLs Intravenous Stopped 7/15/23 1022)   insulin aspart U-100 injection 12 Units (12 Units Subcutaneous Given 7/15/23 0925)   insulin aspart U-100 injection 5 Units (5 Units Subcutaneous Given 7/15/23 1054)   insulin regular  injection 5 Units 0.05 mL (5 Units Intravenous Given 7/15/23 1054)   potassium chloride SA CR tablet 20 mEq (20 mEq Oral Given 7/15/23 1108)   lactated ringers bolus 500 mL (0 mLs Intravenous Stopped 7/15/23 1153)     Medical Decision Making:   Initial Assessment:   Patient presents after receiving several fingerstick glucose readings >500 at home. Patient states that he has not been using his insulin for 2 days due to difficulties administering it to himself. BMP and UA ordered.   Differential Diagnosis:   1) Poorly controlled Type 2 DM  2) Asymptomatic hyperglycemia  3) HHS  Clinical Tests:   Lab Tests: Ordered and Reviewed  The following lab test(s) were unremarkable: BMP and Urinalysis       <> Summary of Lab: BMP displays glucose of 870. AG 15.   UA displays 4+ glucose but no evidence of UTI.  ED Management:  9:13 AM  Patient reports no development in symptoms. Patient will be given 12U Novalog, 1L LR, Kcl 20 mEq. Will reassess patient and check blood glucose in 1hr.     10:00 AM  Patient reports no development in symptoms and states that he is feeling well.     10:46 AM  Pt's glucose remains >500. 5U subQ and 5U IV ordered. 500 mL LR ordered.     11:53 AM   Fingerstick glucose repeat 469. Discussed with the patient the importance of adhering to his insulin dose and schedule. Advised him to seek assistance with insulin administration if needed. Discussed the potential consequences of both short-term and long-term mismanagement. Also discussed the importance of close follow up with his PCP. He voiced understanding               Attending Attestation:   Physician Attestation Statement for Resident:  As the supervising MD   Physician Attestation Statement: I have personally seen and examined this patient.   I agree with the above history.  -:   As the supervising MD I agree with the above PE.     As the supervising MD I agree with the above treatment, course, plan, and disposition.    I have reviewed and agree  with the residents interpretation of the following: lab data.                            Clinical Impression:   Final diagnoses:  [N17.9] JACK (acute kidney injury)  [E11.65] Uncontrolled type 2 diabetes mellitus with hyperglycemia (Primary)        ED Disposition Condition    Discharge Stable          ED Prescriptions    None       Follow-up Information       Follow up With Specialties Details Why Contact Info    BIANCA Eli Nurse Practitioner In 2 weeks  [address] [facility]  Internal Medicine Clinic  [facility] [address]  [phone]      [facility] Emergency Medicine  If symptoms worsen [address] [facility] [id number] [phone]             Eliceo Klein MD  07/15/23 1946

## 2023-07-17 ENCOUNTER — PATIENT OUTREACH (OUTPATIENT)
Dept: EMERGENCY MEDICINE | Facility: HOSPITAL | Age: 58
End: 2023-07-17
Payer: MEDICAID

## 2023-07-17 ENCOUNTER — HOSPITAL ENCOUNTER (EMERGENCY)
Facility: HOSPITAL | Age: 58
Discharge: HOME | End: 2023-07-17
Attending: SPECIALIST
Payer: MEDICAID

## 2023-07-17 VITALS
BODY MASS INDEX: 38.45 KG/M2 | SYSTOLIC BLOOD PRESSURE: 126 MMHG | TEMPERATURE: 98 F | RESPIRATION RATE: 16 BRPM | HEART RATE: 84 BPM | WEIGHT: 245 LBS | OXYGEN SATURATION: 97 % | DIASTOLIC BLOOD PRESSURE: 87 MMHG | HEIGHT: 67 IN

## 2023-07-17 DIAGNOSIS — K52.9 GASTROENTERITIS: Primary | ICD-10-CM

## 2023-07-17 PROCEDURE — 99284 EMERGENCY DEPT VISIT MOD MDM: CPT

## 2023-07-17 PROCEDURE — 25000003 PHARM REV CODE 250: Performed by: SPECIALIST

## 2023-07-17 RX ORDER — DIPHENOXYLATE HYDROCHLORIDE AND ATROPINE SULFATE 2.5; .025 MG/1; MG/1
1 TABLET ORAL 4 TIMES DAILY PRN
Qty: 12 TABLET | Refills: 0 | Status: SHIPPED | OUTPATIENT
Start: 2023-07-17 | End: 2023-07-27

## 2023-07-17 RX ORDER — ONDANSETRON 4 MG/1
4 TABLET, ORALLY DISINTEGRATING ORAL EVERY 6 HOURS PRN
Qty: 6 TABLET | Refills: 0 | Status: SHIPPED | OUTPATIENT
Start: 2023-07-17 | End: 2023-07-20 | Stop reason: SDUPTHER

## 2023-07-17 RX ORDER — DIPHENOXYLATE HYDROCHLORIDE AND ATROPINE SULFATE 2.5; .025 MG/1; MG/1
1 TABLET ORAL
Status: COMPLETED | OUTPATIENT
Start: 2023-07-17 | End: 2023-07-17

## 2023-07-17 RX ORDER — ONDANSETRON 4 MG/1
8 TABLET, ORALLY DISINTEGRATING ORAL
Status: COMPLETED | OUTPATIENT
Start: 2023-07-17 | End: 2023-07-17

## 2023-07-17 RX ADMIN — DIPHENOXYLATE HYDROCHLORIDE AND ATROPINE SULFATE 1 TABLET: .025; 2.5 TABLET ORAL at 09:07

## 2023-07-17 RX ADMIN — ONDANSETRON 8 MG: 4 TABLET, ORALLY DISINTEGRATING ORAL at 09:07

## 2023-07-17 NOTE — PROGRESS NOTES
Called and spoke with patient, denies any issues at this time. Recent ED visit 07/15/23 due to elevated BS (500). Says his BS this AM was 174. He says he is unable to give himself his insulin shots. Normally his wife gives his shots but she was out of town and his niece has been filling in giving his shots. She was unavailable for a couple of days due to her work schedule and the patient had not taken any of  his insulin causing his BS to increase. Stressed to patient the importance of not missing his insulin shots and to have another back up who can give his shots to prevent this from happening again.Verified he continues to monitor both his B/P and BS at home daily with maintaining his diet restrictions.During our last phone call he mentioned he had a Orthopedic appointment in . He did see  Dr Egan 07/10/23 in [place] to discuss total knee replacement. Patient stated after being examined by Dr Egan it was determined no surgery is needed at this time and he was referred to physical therapy. Says he has not started PT yet and he plans on going back to [facility] and will call this week. Reminded again what can happen when he is non-compliant when he misses his daily insulin.doses.Voices understanding.

## 2023-07-18 NOTE — ED PROVIDER NOTES
Encounter Date: 7/17/2023       History     Chief Complaint   Patient presents with    Abdominal Pain     Pt c/o abd pain and n/v/d that started today.      Patient with generalized abdominal cramping with nausea, vomiting and loose bowel movements starting earlier today; no fever, no blood in his stool, no dysuria    The history is provided by the patient.   Review of patient's allergies indicates:  No Known Allergies  Past Medical History:   Diagnosis Date    Adrenal adenoma     Callus of foot 8/19/2022    Coronary artery disease     Diabetes mellitus     Hyperlipidemia     Hypertension     Spinal stenosis     Unspecified osteoarthritis, unspecified site      Past Surgical History:   Procedure Laterality Date    ANGIOGRAPHY      CHOLECYSTECTOMY      FOOT SURGERY Right      Family History   Problem Relation Age of Onset    Hypertension Mother     Heart disease Mother     Stroke Father     Cancer Sister     Heart disease Sister     Cancer Sister     Heart disease Sister     Heart disease Brother     Heart disease Brother      Social History     Tobacco Use    Smoking status: Never    Smokeless tobacco: Never   Substance Use Topics    Alcohol use: Never    Drug use: Never     Review of Systems   Constitutional: Negative.    HENT: Negative.     Respiratory: Negative.     Cardiovascular: Negative.    Gastrointestinal: Negative.    Genitourinary: Negative.    Musculoskeletal: Negative.    Neurological: Negative.      Physical Exam     Initial Vitals [07/17/23 2013]   BP Pulse Resp Temp SpO2   126/87 84 18 97.9 °F (36.6 °C) 97 %      MAP       --         Physical Exam    Nursing note and vitals reviewed.  Constitutional: He appears well-developed and well-nourished.   HENT:   Head: Normocephalic and atraumatic.   Eyes: EOM are normal. Pupils are equal, round, and reactive to light.   Neck: Neck supple.   Normal range of motion.  Cardiovascular:  Normal rate, regular rhythm and normal heart sounds.          "  Pulmonary/Chest: Breath sounds normal.   Abdominal: Abdomen is soft. Bowel sounds are normal. He exhibits no distension. There is no abdominal tenderness. There is no rebound and no guarding.   Musculoskeletal:         General: Normal range of motion.      Cervical back: Normal range of motion and neck supple.     Neurological: He is alert and oriented to person, place, and time.   Skin: Skin is warm and dry.       ED Course   Procedures  Labs Reviewed - No data to display       Imaging Results    None          Medications   ondansetron disintegrating tablet 8 mg (8 mg Oral Given 7/17/23 2123)   diphenoxylate-atropine 2.5-0.025 mg per tablet 1 tablet (1 tablet Oral Given 7/17/23 2123)     Medical Decision Making:   Initial Assessment:   Patient with generalized abdominal cramping with nausea, vomiting and loose bowel movements starting earlier today; no fever, no blood in his stool, no dysuria; patient is in no acute distress  Differential Diagnosis:   Viral gastroenteritis, food toxicity  ED Management:  Patient given Zofran 8 mg sublingual and Lomotil 1 tablet; he felt much improved prior to discharge; patient is hemodynamically stable; prescription for Zofran and Lomotil sent to his pharmacy               Patient Vitals for the past 24 hrs:   BP Temp Temp src Pulse Resp SpO2 Height Weight   07/17/23 2136 126/87 97.9 °F (36.6 °C) -- 84 16 97 % -- --   07/17/23 2013 126/87 97.9 °F (36.6 °C) Oral 84 18 97 % 5' 7" (1.702 m) 111.1 kg (245 lb)     The patient is resting comfortably and in no acute distress.   He states that his symptoms have improved after treatment in Emergency Department. I personally discussed his test results and treatment plan.  Gave strict ED precautions.  Specific conditions for return to the emergency department and importance of follow up with his primary care provided or the physician listed on the discharge instructions.  Patient voices understanding and agrees to the plan discussed. All " patients' questions have been answered at this time.   He has remained hemodynamically stable throughout entire stay in ED and is stable for discharge home.          Clinical Impression:   Final diagnoses:  [K52.9] Gastroenteritis (Primary)        ED Disposition Condition    Discharge Stable          ED Prescriptions       Medication Sig Dispense Start Date End Date Auth. Provider    ondansetron (ZOFRAN-ODT) 4 MG TbDL Take 1 tablet (4 mg total) by mouth every 6 (six) hours as needed (Nausea). 6 tablet 7/17/2023 -- Bin Frederick MD    diphenoxylate-atropine 2.5-0.025 mg (LOMOTIL) 2.5-0.025 mg per tablet Take 1 tablet by mouth 4 (four) times daily as needed for Diarrhea. 12 tablet 7/17/2023 7/27/2023 Bin Frederick MD          Follow-up Information       Follow up With Specialties Details Why Contact Info    BIANCA Eli Nurse Practitioner In 3 days As needed [address] [facility]  Internal Medicine Clinic  [facility] [address] [phone]      [facility] Emergency Dept Emergency Medicine  As needed [address] [facility] [address] [phone]             Bin Frederick MD  07/18/23 0123

## 2023-07-20 ENCOUNTER — HOSPITAL ENCOUNTER (EMERGENCY)
Facility: HOSPITAL | Age: 58
Discharge: HOME | End: 2023-07-20
Attending: STUDENT IN AN ORGANIZED HEALTH CARE EDUCATION/TRAINING PROGRAM
Payer: MEDICAID

## 2023-07-20 VITALS
SYSTOLIC BLOOD PRESSURE: 131 MMHG | BODY MASS INDEX: 36.34 KG/M2 | RESPIRATION RATE: 14 BRPM | HEIGHT: 67 IN | HEART RATE: 70 BPM | TEMPERATURE: 98 F | WEIGHT: 231.5 LBS | OXYGEN SATURATION: 97 % | DIASTOLIC BLOOD PRESSURE: 72 MMHG

## 2023-07-20 DIAGNOSIS — R73.9 HYPERGLYCEMIA: ICD-10-CM

## 2023-07-20 DIAGNOSIS — N18.9 CHRONIC KIDNEY DISEASE, UNSPECIFIED CKD STAGE: ICD-10-CM

## 2023-07-20 DIAGNOSIS — K52.9 ENTERITIS: Primary | ICD-10-CM

## 2023-07-20 LAB
ALBUMIN SERPL-MCNC: 3.3 G/DL (ref 3.5–5)
ALBUMIN/GLOB SERPL: 0.9 RATIO (ref 1.1–2)
ALP SERPL-CCNC: 146 UNIT/L (ref 40–150)
ALT SERPL-CCNC: 37 UNIT/L (ref 0–55)
APPEARANCE UR: CLEAR
AST SERPL-CCNC: 32 UNIT/L (ref 5–34)
BACTERIA #/AREA URNS AUTO: ABNORMAL /HPF
BASOPHILS # BLD AUTO: 0.03 X10(3)/MCL
BASOPHILS NFR BLD AUTO: 0.5 %
BILIRUB UR QL STRIP.AUTO: NEGATIVE
BILIRUBIN DIRECT+TOT PNL SERPL-MCNC: 0.8 MG/DL
BUN SERPL-MCNC: 29.1 MG/DL (ref 8.4–25.7)
CALCIUM SERPL-MCNC: 9 MG/DL (ref 8.4–10.2)
CHLORIDE SERPL-SCNC: 99 MMOL/L (ref 98–107)
CO2 SERPL-SCNC: 26 MMOL/L (ref 22–29)
COLOR UR: COLORLESS
CREAT SERPL-MCNC: 1.97 MG/DL (ref 0.73–1.18)
EOSINOPHIL # BLD AUTO: 0.1 X10(3)/MCL (ref 0–0.9)
EOSINOPHIL NFR BLD AUTO: 1.7 %
EPITH CASTS #/AREA URNS: ABNORMAL /LPF
ERYTHROCYTE [DISTWIDTH] IN BLOOD BY AUTOMATED COUNT: 13 % (ref 11.5–17)
FLUAV AG UPPER RESP QL IA.RAPID: NOT DETECTED
FLUBV AG UPPER RESP QL IA.RAPID: NOT DETECTED
GFR SERPLBLD CREATININE-BSD FMLA CKD-EPI: 39 MLS/MIN/1.73/M2
GLOBULIN SER-MCNC: 3.8 GM/DL (ref 2.4–3.5)
GLUCOSE SERPL-MCNC: 311 MG/DL (ref 74–100)
GLUCOSE UR QL STRIP.AUTO: ABNORMAL
HCT VFR BLD AUTO: 42.4 % (ref 42–52)
HGB BLD-MCNC: 14.2 G/DL (ref 14–18)
HYALINE CASTS #/AREA URNS LPF: ABNORMAL /LPF
IMM GRANULOCYTES # BLD AUTO: 0.01 X10(3)/MCL (ref 0–0.04)
IMM GRANULOCYTES NFR BLD AUTO: 0.2 %
KETONES UR QL STRIP.AUTO: NEGATIVE
LACTATE SERPL-SCNC: 1.7 MMOL/L (ref 0.5–2.2)
LEUKOCYTE ESTERASE UR QL STRIP.AUTO: NEGATIVE
LIPASE SERPL-CCNC: 29 U/L
LYMPHOCYTES # BLD AUTO: 1.27 X10(3)/MCL (ref 0.6–4.6)
LYMPHOCYTES NFR BLD AUTO: 21.7 %
MCH RBC QN AUTO: 28.7 PG (ref 27–31)
MCHC RBC AUTO-ENTMCNC: 33.5 G/DL (ref 33–36)
MCV RBC AUTO: 85.7 FL (ref 80–94)
MONOCYTES # BLD AUTO: 0.4 X10(3)/MCL (ref 0.1–1.3)
MONOCYTES NFR BLD AUTO: 6.8 %
MUCOUS THREADS URNS QL MICRO: ABNORMAL /LPF
NEUTROPHILS # BLD AUTO: 4.05 X10(3)/MCL (ref 2.1–9.2)
NEUTROPHILS NFR BLD AUTO: 69.1 %
NITRITE UR QL STRIP.AUTO: NEGATIVE
NRBC BLD AUTO-RTO: 0 %
PH UR STRIP.AUTO: 6.5 [PH]
PLATELET # BLD AUTO: 275 X10(3)/MCL (ref 130–400)
PMV BLD AUTO: 9.9 FL (ref 7.4–10.4)
POTASSIUM SERPL-SCNC: 3.2 MMOL/L (ref 3.5–5.1)
PROT SERPL-MCNC: 7.1 GM/DL (ref 6.4–8.3)
PROT UR QL STRIP.AUTO: NEGATIVE
RBC # BLD AUTO: 4.95 X10(6)/MCL (ref 4.7–6.1)
RBC #/AREA URNS AUTO: ABNORMAL /HPF
RBC UR QL AUTO: NEGATIVE
RSV A 5' UTR RNA NPH QL NAA+PROBE: NOT DETECTED
SARS-COV-2 RNA RESP QL NAA+PROBE: NOT DETECTED
SODIUM SERPL-SCNC: 137 MMOL/L (ref 136–145)
SP GR UR STRIP.AUTO: 1.07
SQUAMOUS #/AREA URNS LPF: ABNORMAL /HPF
UROBILINOGEN UR STRIP-ACNC: NORMAL
WAXY CASTS #/AREA URNS LPF: ABNORMAL /LPF
WBC # SPEC AUTO: 5.86 X10(3)/MCL (ref 4.5–11.5)
WBC #/AREA URNS AUTO: ABNORMAL /HPF

## 2023-07-20 PROCEDURE — 99285 EMERGENCY DEPT VISIT HI MDM: CPT | Mod: 25

## 2023-07-20 PROCEDURE — 25500020 PHARM REV CODE 255: Performed by: STUDENT IN AN ORGANIZED HEALTH CARE EDUCATION/TRAINING PROGRAM

## 2023-07-20 PROCEDURE — 83605 ASSAY OF LACTIC ACID: CPT | Performed by: STUDENT IN AN ORGANIZED HEALTH CARE EDUCATION/TRAINING PROGRAM

## 2023-07-20 PROCEDURE — 83690 ASSAY OF LIPASE: CPT | Performed by: STUDENT IN AN ORGANIZED HEALTH CARE EDUCATION/TRAINING PROGRAM

## 2023-07-20 PROCEDURE — 80053 COMPREHEN METABOLIC PANEL: CPT | Performed by: STUDENT IN AN ORGANIZED HEALTH CARE EDUCATION/TRAINING PROGRAM

## 2023-07-20 PROCEDURE — 63600175 PHARM REV CODE 636 W HCPCS: Performed by: STUDENT IN AN ORGANIZED HEALTH CARE EDUCATION/TRAINING PROGRAM

## 2023-07-20 PROCEDURE — 96374 THER/PROPH/DIAG INJ IV PUSH: CPT

## 2023-07-20 PROCEDURE — 0241U COVID/RSV/FLU A&B PCR: CPT | Performed by: STUDENT IN AN ORGANIZED HEALTH CARE EDUCATION/TRAINING PROGRAM

## 2023-07-20 PROCEDURE — 81001 URINALYSIS AUTO W/SCOPE: CPT | Performed by: STUDENT IN AN ORGANIZED HEALTH CARE EDUCATION/TRAINING PROGRAM

## 2023-07-20 PROCEDURE — 93005 ELECTROCARDIOGRAM TRACING: CPT

## 2023-07-20 PROCEDURE — 25000003 PHARM REV CODE 250: Performed by: STUDENT IN AN ORGANIZED HEALTH CARE EDUCATION/TRAINING PROGRAM

## 2023-07-20 PROCEDURE — 85025 COMPLETE CBC W/AUTO DIFF WBC: CPT | Performed by: STUDENT IN AN ORGANIZED HEALTH CARE EDUCATION/TRAINING PROGRAM

## 2023-07-20 PROCEDURE — 96361 HYDRATE IV INFUSION ADD-ON: CPT

## 2023-07-20 RX ORDER — DICYCLOMINE HYDROCHLORIDE 20 MG/1
20 TABLET ORAL 2 TIMES DAILY
Qty: 20 TABLET | Refills: 0 | Status: SHIPPED | OUTPATIENT
Start: 2023-07-20 | End: 2023-07-30

## 2023-07-20 RX ORDER — AMOXICILLIN AND CLAVULANATE POTASSIUM 875; 125 MG/1; MG/1
1 TABLET, FILM COATED ORAL 2 TIMES DAILY
Qty: 20 TABLET | Refills: 0 | Status: SHIPPED | OUTPATIENT
Start: 2023-07-20 | End: 2023-07-30

## 2023-07-20 RX ORDER — ONDANSETRON 2 MG/ML
4 INJECTION INTRAMUSCULAR; INTRAVENOUS
Status: COMPLETED | OUTPATIENT
Start: 2023-07-20 | End: 2023-07-20

## 2023-07-20 RX ORDER — ONDANSETRON 4 MG/1
4 TABLET, ORALLY DISINTEGRATING ORAL EVERY 6 HOURS PRN
Qty: 14 TABLET | Refills: 0 | Status: SHIPPED | OUTPATIENT
Start: 2023-07-20

## 2023-07-20 RX ADMIN — IOHEXOL 100 ML: 350 INJECTION, SOLUTION INTRAVENOUS at 10:07

## 2023-07-20 RX ADMIN — SODIUM CHLORIDE 1000 ML: 9 INJECTION, SOLUTION INTRAVENOUS at 08:07

## 2023-07-20 RX ADMIN — SODIUM CHLORIDE 1000 ML: 9 INJECTION, SOLUTION INTRAVENOUS at 10:07

## 2023-07-20 RX ADMIN — ONDANSETRON 4 MG: 2 INJECTION INTRAMUSCULAR; INTRAVENOUS at 08:07

## 2023-07-20 NOTE — ED PROVIDER NOTES
Encounter Date: 7/20/2023       History     Chief Complaint   Patient presents with    Abdominal Pain    Vomiting    Nausea    Diarrhea     C/o n/v/d  abdominal pain since Monday. States went to [facility] Monday but meds not giving any relief. Still having diarrhea, vomiting multiple times a day     57-year-old male presents to ED for 4 days nausea vomiting diarrhea with abdominal discomfort.  States his symptoms started on Monday.  Was recently diagnosed with a viral GI bug. States no labs/imagine was performed.  Placed on a motility agent and Zofran.  Has reported mild relief.  Does report continued nausea vomiting as well as loose stools. Denies any pain or bleeding with defecation.  No urinary changes, no flank pains. states the abdominal discomfort was initially in his right upper quadrant now bilateral upper quadrants.  History of cholecystectomy.  No trauma.  No other complaints or concerns at this time.    Review of patient's allergies indicates:  No Known Allergies  Past Medical History:   Diagnosis Date    Adrenal adenoma     Callus of foot 8/19/2022    Coronary artery disease     Diabetes mellitus     Hyperlipidemia     Hypertension     Spinal stenosis     Unspecified osteoarthritis, unspecified site      Past Surgical History:   Procedure Laterality Date    ANGIOGRAPHY      CHOLECYSTECTOMY      FOOT SURGERY Right      Family History   Problem Relation Age of Onset    Hypertension Mother     Heart disease Mother     Stroke Father     Cancer Sister     Heart disease Sister     Cancer Sister     Heart disease Sister     Heart disease Brother     Heart disease Brother      Social History     Tobacco Use    Smoking status: Never    Smokeless tobacco: Never   Substance Use Topics    Alcohol use: Never    Drug use: Never     Review of Systems   Constitutional:  Negative for chills and fever.   HENT:  Negative for congestion, rhinorrhea and sore throat.    Eyes:  Negative for pain,  discharge and itching.   Respiratory:  Negative for chest tightness and shortness of breath.    Cardiovascular:  Negative for chest pain and palpitations.   Gastrointestinal:  Positive for abdominal pain, diarrhea, nausea and vomiting. Negative for abdominal distention, anal bleeding, blood in stool, constipation and rectal pain.   Genitourinary:  Negative for dysuria and hematuria.   Musculoskeletal:  Negative for myalgias and neck pain.   Skin:  Negative for color change and rash.   Neurological:  Negative for dizziness, weakness and headaches.   Psychiatric/Behavioral:  Negative for confusion. The patient is not hyperactive.      Physical Exam     Initial Vitals [07/20/23 0839]   BP Pulse Resp Temp SpO2   108/71 76 20 98.1 °F (36.7 °C) 100 %      MAP       --         Physical Exam    Constitutional: He appears well-developed and well-nourished. He is not diaphoretic. No distress.   HENT:   Head: Normocephalic and atraumatic.   Eyes: Conjunctivae and EOM are normal. Pupils are equal, round, and reactive to light.   Neck: Neck supple. No tracheal deviation present.   Normal range of motion.  Cardiovascular:  Normal rate, regular rhythm and normal heart sounds.           Pulmonary/Chest: Breath sounds normal. No respiratory distress.   Abdominal: Abdomen is soft. There is no abdominal tenderness.   No right CVA tenderness.  No left CVA tenderness. There is no rebound, no guarding, no tenderness at McBurney's point and negative Triplett's sign. negative Rovsing's sign  Musculoskeletal:         General: No tenderness. Normal range of motion.      Cervical back: Normal range of motion and neck supple.     Neurological: He is alert and oriented to person, place, and time. He has normal strength. GCS score is 15. GCS eye subscore is 4. GCS verbal subscore is 5. GCS motor subscore is 6.   Skin: Skin is warm and dry. Capillary refill takes less than 2 seconds. No rash noted.   Psychiatric: He has a normal mood and affect.  His behavior is normal. Judgment and thought content normal.       ED Course   Procedures  Labs Reviewed   COMPREHENSIVE METABOLIC PANEL - Abnormal; Notable for the following components:       Result Value    Potassium Level 3.2 (*)     Glucose Level 311 (*)     Blood Urea Nitrogen 29.1 (*)     Creatinine 1.97 (*)     Albumin Level 3.3 (*)     Globulin 3.8 (*)     Albumin/Globulin Ratio 0.9 (*)     All other components within normal limits   URINALYSIS, REFLEX TO URINE CULTURE - Abnormal; Notable for the following components:    Color, UA Colorless (*)     Glucose, UA 3+ (*)     Squamous Epithelial Cells, UA Trace (*)     Mucous, UA Trace (*)     Hyaline Casts, UA 11-20 (*)     Epithelial Cast, UA 11-20 (*)     Waxy Casts, UA 0-2 (*)     All other components within normal limits   LIPASE - Normal   LACTIC ACID, PLASMA - Normal   COVID/RSV/FLU A&B PCR - Normal    Narrative:     The Xpert Xpress SARS-CoV-2/FLU/RSV plus is a rapid, multiplexed real-time PCR test intended for the simultaneous qualitative detection and differentiation of SARS-CoV-2, Influenza A, Influenza B, and respiratory syncytial virus (RSV) viral RNA in either nasopharyngeal swab or nasal swab specimens.         CBC W/ AUTO DIFFERENTIAL    Narrative:     The following orders were created for panel order CBC auto differential.  Procedure                               Abnormality         Status                     ---------                               -----------         ------                     CBC with Differential[[id number]]                            Final result                 Please view results for these tests on the individual orders.   CBC WITH DIFFERENTIAL   EXTRA TUBES    Narrative:     The following orders were created for panel order EXTRA TUBES.  Procedure                               Abnormality         Status                     ---------                               -----------         ------                     Light Blue Top  Hold[[id number]]                              In process                 Gold Top Hold[[id number]]                                    In process                 Pink Top Hold[[id number]]                                    In process                   Please view results for these tests on the individual orders.   LIGHT BLUE TOP HOLD   GOLD TOP HOLD   PINK TOP HOLD     EKG Readings: (Independently Interpreted)   Initial Reading: No STEMI. Rhythm: Normal Sinus Rhythm. Ectopy: No Ectopy. Conduction: Normal. Axis: Normal. Clinical Impression: Normal Sinus Rhythm   ECG Results              EKG 12-lead (Final result)  Result time 07/20/23 11:31:56      Final result by Interface, Lab In [facility] (07/20/23 11:31:56)                   Narrative:    Test Reason : R11.10,    Vent. Rate : 072 BPM     Atrial Rate : 072 BPM     P-R Int : 148 ms          QRS Dur : 096 ms      QT Int : 424 ms       P-R-T Axes : 062 082 026 degrees     QTc Int : 464 ms    Normal sinus rhythm  Normal ECG    Confirmed by Cal Sung MD ([id number]) on 7/20/2023 11:31:47 AM    Referred By: [email or web address]   SELF           Confirmed By:Cal Sung MD                                  Imaging Results              CT Abdomen Pelvis With Contrast (Final result)  Result time 07/20/23 11:17:39      Final result by Erick Jimenez MD (07/20/23 11:17:39)                   Impression:      Mildly dilated loops of jejunum with some mild fold thickening seen in the jejunum possibly representing mild enteritis    Stable right adrenal nodule the      Electronically signed by: Erick Jimenez  Date:    07/20/2023  Time:    11:17               Narrative:    EXAMINATION:  CT ABDOMEN PELVIS WITH CONTRAST    CLINICAL HISTORY:  Persistent worsening abdominal pain, right and left upper quadrants, nausea vomiting diarrhea 1 week;    TECHNIQUE:  Low dose axial images, sagittal and coronal reformations were obtained from the lung bases to the pubic symphysis following the IV  administration of contrast. Automatic exposure control (AEC) is utilized to reduce patient radiation exposure.    COMPARISON:  01/17/2023    FINDINGS:  The lung bases are clear.    The liver appears normal.  No liver mass or lesion is seen.  Portal and hepatic veins appear normal.    The patient is status post cholecystectomy.    The pancreas appears normal.  No pancreatic mass or lesion is seen.    The spleen shows no acute abnormality.    The adrenal glands appear normal.    There is an adrenal nodule seen on the right side.  It measures 4.5 x 3.2 cm.  This was seen on the prior examination as well and was previously shown to be a benign adenomatous/lipomatous lesion.  No.    The kidneys appear normal.  There is a cyst in the left kidney in the lower pole.  No hydronephrosis is seen.  No hydroureter is seen.  No nephrolithiasis is seen.  No obvious ureteral stones are seen.    Urinary bladder appears grossly unremarkable.    There are mildly dilated loops of proximal small small-bowel and minimal fold thickening seen in the jejunum.  Findings could represent a mild enteritis.  There is a radiopaque foreign body seen in the proximal jejunum possibly representing ingested tablets.  It is seen on image number 84 series 2.    No colitis is seen.  No diverticulitis is seen.  No obvious colonic mass or lesion is seen.  The appendix appears normal.    No free air is seen.  No free fluid is seen.                                       Medications   sodium chloride 0.9% bolus 1,000 mL 1,000 mL (0 mLs Intravenous Stopped 7/20/23 1034)   ondansetron injection 4 mg (4 mg Intravenous Given 7/20/23 0852)   sodium chloride 0.9% bolus 1,000 mL 1,000 mL (0 mLs Intravenous Stopped 7/20/23 1225)   iohexoL (OMNIPAQUE 350) injection 100 mL (100 mLs Intravenous Given 7/20/23 1050)     Medical Decision Making:   History:   Old Medical Records: I decided to obtain old medical records.  Initial Assessment:   57-year-old male presents to ED  for mild abdominal discomfort with several days nausea vomiting diarrhea  Differential Diagnosis:   Viral versus bacterial gastroenteritis  Diverticulitis  Food poisoning  Laxative abuse  Crohn's versus ulcerative colitis  Pancreatitis  Clinical Tests:   Lab Tests: Reviewed and Ordered  Radiological Study: Ordered and Reviewed  Medical Tests: Reviewed and Ordered  ED Management:  Labs obtained and grossly unremarkable.  No significant leukocytosis.  Urinalysis negative for bacteria.  Has no flank pain and abdomen soft without rebound guarding or rigidity.  Given age and several days persistent symptoms CT obtained which demonstrated enteritis.  Already prescribed antiemetics.  Medication for abdominal spasms provided as well as elected for antibiotic therapy given ongoing duration.  Is a follow up closely in the outpatient settings and return immediately with any worsening of symptoms.  Given age I did perform cardiac workup that was negative acute.  Ultimately stable for discharge.  Will continue symptomatic and supportive care.  Multiple discharge instructions provided.  Pt aware of his CKD has close follow-up.  Released. (Ayo)           ED Course as of 07/22/23 2356   Thu Jul 20, 2023   1215 Patient reports that he would not take his insulin today since he was not feeling well.  Will take it upon getting home.  Aware of his CKD. Understands discharge instructions. [RZ]      ED Course User Index  [RZ] Kaiser Rollins MD                 Clinical Impression:   Final diagnoses:  [K52.9] Enteritis (Primary)  [R73.9] Hyperglycemia  [N18.9] Chronic kidney disease, unspecified CKD stage        ED Disposition Condition    Discharge Stable          ED Prescriptions       Medication Sig Dispense Start Date End Date Auth. Provider    dicyclomine (BENTYL) 20 mg tablet Take 1 tablet (20 mg total) by mouth 2 (two) times daily. for 10 days 20 tablet 7/20/2023 7/30/2023 Kaiser Rollins MD    amoxicillin-clavulanate 875-125mg  (AUGMENTIN) 875-125 mg per tablet Take 1 tablet by mouth 2 (two) times daily. for 10 days 20 tablet 7/20/2023 7/30/2023 Kaiser Rollins MD    ondansetron (ZOFRAN-ODT) 4 MG TbDL Take 1 tablet (4 mg total) by mouth every 6 (six) hours as needed (Nausea). 14 tablet 7/20/2023 -- Kaiser Rollins MD          Follow-up Information       Follow up With Specialties Details Why Contact Info    BIANCA Eli Nurse Practitioner Schedule an appointment as soon as possible for a visit in 3 days  [address] [facility]  Internal Medicine Clinic  [facility] [address]  [phone]      [facility] Emergency Medicine  As needed, If symptoms worsen [address] [facility] [address] [phone]             Kaiser Rollins MD  07/23/23 0002

## 2023-08-10 ENCOUNTER — OFFICE VISIT (OUTPATIENT)
Dept: INTERNAL MEDICINE | Facility: CLINIC | Age: 58
End: 2023-08-10
Payer: MEDICAID

## 2023-08-10 ENCOUNTER — LAB VISIT (OUTPATIENT)
Dept: LAB | Facility: HOSPITAL | Age: 58
End: 2023-08-10
Attending: NURSE PRACTITIONER
Payer: MEDICAID

## 2023-08-10 VITALS
SYSTOLIC BLOOD PRESSURE: 121 MMHG | WEIGHT: 232.38 LBS | HEART RATE: 64 BPM | DIASTOLIC BLOOD PRESSURE: 77 MMHG | HEIGHT: 67 IN | BODY MASS INDEX: 36.47 KG/M2 | TEMPERATURE: 98 F | RESPIRATION RATE: 20 BRPM

## 2023-08-10 DIAGNOSIS — E11.65 HYPERGLYCEMIA DUE TO DIABETES MELLITUS: ICD-10-CM

## 2023-08-10 DIAGNOSIS — E11.65 UNCONTROLLED TYPE 2 DIABETES MELLITUS WITH HYPERGLYCEMIA: ICD-10-CM

## 2023-08-10 DIAGNOSIS — E11.65 HYPERGLYCEMIA DUE TO DIABETES MELLITUS: Primary | ICD-10-CM

## 2023-08-10 DIAGNOSIS — Z11.3 ROUTINE SCREENING FOR STI (SEXUALLY TRANSMITTED INFECTION): ICD-10-CM

## 2023-08-10 DIAGNOSIS — E11.65 UNCONTROLLED TYPE 2 DIABETES MELLITUS WITH HYPERGLYCEMIA: Primary | Chronic | ICD-10-CM

## 2023-08-10 DIAGNOSIS — K29.00 ACUTE GASTRITIS WITHOUT HEMORRHAGE, UNSPECIFIED GASTRITIS TYPE: ICD-10-CM

## 2023-08-10 DIAGNOSIS — N17.9 AKI (ACUTE KIDNEY INJURY): ICD-10-CM

## 2023-08-10 LAB
ALBUMIN SERPL-MCNC: 3.7 G/DL (ref 3.5–5)
ALBUMIN/GLOB SERPL: 1.1 RATIO (ref 1.1–2)
ALP SERPL-CCNC: 109 UNIT/L (ref 40–150)
ALT SERPL-CCNC: 27 UNIT/L (ref 0–55)
AST SERPL-CCNC: 18 UNIT/L (ref 5–34)
BASOPHILS # BLD AUTO: 0.03 X10(3)/MCL
BASOPHILS NFR BLD AUTO: 0.4 %
BILIRUB SERPL-MCNC: 0.6 MG/DL
BUN SERPL-MCNC: 31.2 MG/DL (ref 8.4–25.7)
CALCIUM SERPL-MCNC: 9.5 MG/DL (ref 8.4–10.2)
CHLORIDE SERPL-SCNC: 105 MMOL/L (ref 98–107)
CO2 SERPL-SCNC: 27 MMOL/L (ref 22–29)
CREAT SERPL-MCNC: 1.33 MG/DL (ref 0.73–1.18)
EOSINOPHIL # BLD AUTO: 0.15 X10(3)/MCL (ref 0–0.9)
EOSINOPHIL NFR BLD AUTO: 2.1 %
ERYTHROCYTE [DISTWIDTH] IN BLOOD BY AUTOMATED COUNT: 13.4 % (ref 11.5–17)
EST. AVERAGE GLUCOSE BLD GHB EST-MCNC: 211.6 MG/DL
GFR SERPLBLD CREATININE-BSD FMLA CKD-EPI: >60 MLS/MIN/1.73/M2
GLOBULIN SER-MCNC: 3.4 GM/DL (ref 2.4–3.5)
GLUCOSE SERPL-MCNC: 121 MG/DL (ref 74–100)
HAV IGM SERPL QL IA: NONREACTIVE
HBA1C MFR BLD: 9 %
HBV CORE IGM SERPL QL IA: NONREACTIVE
HBV SURFACE AG SERPL QL IA: NONREACTIVE
HCT VFR BLD AUTO: 40.7 % (ref 42–52)
HCV AB SERPL QL IA: NONREACTIVE
HGB BLD-MCNC: 13.7 G/DL (ref 14–18)
IMM GRANULOCYTES # BLD AUTO: 0.01 X10(3)/MCL (ref 0–0.04)
IMM GRANULOCYTES NFR BLD AUTO: 0.1 %
LYMPHOCYTES # BLD AUTO: 1.81 X10(3)/MCL (ref 0.6–4.6)
LYMPHOCYTES NFR BLD AUTO: 25.9 %
MCH RBC QN AUTO: 28.8 PG (ref 27–31)
MCHC RBC AUTO-ENTMCNC: 33.7 G/DL (ref 33–36)
MCV RBC AUTO: 85.7 FL (ref 80–94)
MONOCYTES # BLD AUTO: 0.6 X10(3)/MCL (ref 0.1–1.3)
MONOCYTES NFR BLD AUTO: 8.6 %
NEUTROPHILS # BLD AUTO: 4.39 X10(3)/MCL (ref 2.1–9.2)
NEUTROPHILS NFR BLD AUTO: 62.9 %
PLATELET # BLD AUTO: 294 X10(3)/MCL (ref 130–400)
PMV BLD AUTO: 9 FL (ref 7.4–10.4)
POTASSIUM SERPL-SCNC: 3.6 MMOL/L (ref 3.5–5.1)
PROT SERPL-MCNC: 7.1 GM/DL (ref 6.4–8.3)
RBC # BLD AUTO: 4.75 X10(6)/MCL (ref 4.7–6.1)
SODIUM SERPL-SCNC: 144 MMOL/L (ref 136–145)
WBC # SPEC AUTO: 6.99 X10(3)/MCL (ref 4.5–11.5)

## 2023-08-10 PROCEDURE — 99213 OFFICE O/P EST LOW 20 MIN: CPT | Mod: S$PBB,,, | Performed by: NURSE PRACTITIONER

## 2023-08-10 PROCEDURE — 99215 OFFICE O/P EST HI 40 MIN: CPT | Mod: PBBFAC | Performed by: NURSE PRACTITIONER

## 2023-08-10 PROCEDURE — 3074F PR MOST RECENT SYSTOLIC BLOOD PRESSURE < 130 MM HG: ICD-10-PCS | Mod: CPTII,,, | Performed by: NURSE PRACTITIONER

## 2023-08-10 PROCEDURE — 3052F PR MOST RECENT HEMOGLOBIN A1C LEVEL 8.0 - < 9.0%: ICD-10-PCS | Mod: CPTII,,, | Performed by: NURSE PRACTITIONER

## 2023-08-10 PROCEDURE — 80053 COMPREHEN METABOLIC PANEL: CPT

## 2023-08-10 PROCEDURE — 3074F SYST BP LT 130 MM HG: CPT | Mod: CPTII,,, | Performed by: NURSE PRACTITIONER

## 2023-08-10 PROCEDURE — 4010F ACE/ARB THERAPY RXD/TAKEN: CPT | Mod: CPTII,,, | Performed by: NURSE PRACTITIONER

## 2023-08-10 PROCEDURE — 3008F PR BODY MASS INDEX (BMI) DOCUMENTED: ICD-10-PCS | Mod: CPTII,,, | Performed by: NURSE PRACTITIONER

## 2023-08-10 PROCEDURE — 36415 COLL VENOUS BLD VENIPUNCTURE: CPT

## 2023-08-10 PROCEDURE — 4010F PR ACE/ARB THEARPY RXD/TAKEN: ICD-10-PCS | Mod: CPTII,,, | Performed by: NURSE PRACTITIONER

## 2023-08-10 PROCEDURE — 83036 HEMOGLOBIN GLYCOSYLATED A1C: CPT

## 2023-08-10 PROCEDURE — 3008F BODY MASS INDEX DOCD: CPT | Mod: CPTII,,, | Performed by: NURSE PRACTITIONER

## 2023-08-10 PROCEDURE — 99213 PR OFFICE/OUTPT VISIT, EST, LEVL III, 20-29 MIN: ICD-10-PCS | Mod: S$PBB,,, | Performed by: NURSE PRACTITIONER

## 2023-08-10 PROCEDURE — 3078F PR MOST RECENT DIASTOLIC BLOOD PRESSURE < 80 MM HG: ICD-10-PCS | Mod: CPTII,,, | Performed by: NURSE PRACTITIONER

## 2023-08-10 PROCEDURE — 1160F RVW MEDS BY RX/DR IN RCRD: CPT | Mod: CPTII,,, | Performed by: NURSE PRACTITIONER

## 2023-08-10 PROCEDURE — 80074 ACUTE HEPATITIS PANEL: CPT

## 2023-08-10 PROCEDURE — 85025 COMPLETE CBC W/AUTO DIFF WBC: CPT

## 2023-08-10 PROCEDURE — 3078F DIAST BP <80 MM HG: CPT | Mod: CPTII,,, | Performed by: NURSE PRACTITIONER

## 2023-08-10 PROCEDURE — 1160F PR REVIEW ALL MEDS BY PRESCRIBER/CLIN PHARMACIST DOCUMENTED: ICD-10-PCS | Mod: CPTII,,, | Performed by: NURSE PRACTITIONER

## 2023-08-10 PROCEDURE — 1159F MED LIST DOCD IN RCRD: CPT | Mod: CPTII,,, | Performed by: NURSE PRACTITIONER

## 2023-08-10 PROCEDURE — 3052F HG A1C>EQUAL 8.0%<EQUAL 9.0%: CPT | Mod: CPTII,,, | Performed by: NURSE PRACTITIONER

## 2023-08-10 PROCEDURE — 1159F PR MEDICATION LIST DOCUMENTED IN MEDICAL RECORD: ICD-10-PCS | Mod: CPTII,,, | Performed by: NURSE PRACTITIONER

## 2023-08-10 RX ORDER — DULAGLUTIDE 3 MG/.5ML
INJECTION, SOLUTION SUBCUTANEOUS
COMMUNITY
Start: 2023-08-06 | End: 2023-09-12 | Stop reason: DRUGHIGH

## 2023-08-10 RX ORDER — COLISTIMETHATE SODIUM 150 MG/1
INJECTION, POWDER, LYOPHILIZED, FOR SOLUTION INTRAMUSCULAR; INTRAVENOUS
COMMUNITY
Start: 2023-06-19

## 2023-08-10 NOTE — PROGRESS NOTES
Yelitza Roque, BIANCA   [facility]  [address] [facility] [address]      PATIENT NAME: Michael Stone  : 1965  DATE: 8/10/23  MRN: [id number]      Reason for Visit / Chief Complaint: Diabetes (ED follow up )       History of Present Illness / Problem Focused Workflow     Michael Stone is a 57 y.o. Black or  male presents to the clinic for ED f/u. PMH uncontrolled DM, HLD, HTN, mild CAD, Covid pneumonia (21), adrenal adenoma, vit d deficiency, covid + 23, morbid obesity, cervical spinal stenosis, MINA on CPAP, bilateral knee OA, tinea pedis and med non-compliance. Followed by [facility] cardio, ortho and wound clinics and Dr. Gallo, vascular.    Pt reported to ED in July with abd cramping, N/V and diarrhea with associated hyperglycemia after niece had not been available to administer insulin. Pt was treated with IVFs, lomotil and zofran. Pt reported to ED again a few days later with continued symptoms and associated JACK. Pt was treated with IVFs, IV zofran and symptoms improved. Was discharged with augmentin, bentyl and zofran. Today, reports tolerating oral intake and meds as prescribed after completing abx. Reports cardio stopped his procardia and increased his HCTZ to 100 mg daily which he is taking. Denies chest pain, shortness of breath, cough, fever, headache, dizziness, weakness, abdominal pain, nausea, vomiting, diarrhea, constipation, dysuria, depression, anxiety, SI/HI.    Review of Systems     Review of Systems     See HPI for details    Constitutional: Denies Change in appetite. Denies Chills. Denies Fever. Denies Night sweats.  Eye: Denies Blurred vision. Denies Discharge. Denies Eye pain.  ENT: Denies Decreased hearing. Denies Sore throat. Denies Swollen glands.  Respiratory: Denies Cough. Denies Shortness of breath. Denies Shortness of breath with exertion. Denies Wheezing.  Cardiovascular: Denies Chest  pain at rest. Denies Chest pain with exertion. Denies Irregular heartbeat. Denies Palpitations. Denies Edema.  Gastrointestinal: Denies Abdominal pain. Denies Diarrhea. Denies Nausea. Denies Vomiting. Denies Hematemesis or Hematochezia.  Genitourinary: Denies Dysuria. Denies Urinary frequency. Denies Urinary urgency. Denies Blood in urine.  Endocrine: Denies Cold intolerance. Denies Excessive thirst. Denies Heat intolerance. Denies Weight loss. Denies Weight gain.  Musculoskeletal: Denies Painful joints. Denies Weakness.  Integumentary: Denies Rash. Denies Itching. Denies Dry skin.  Neurologic: Denies Dizziness. Denies Fainting. Denies Headache.  Psychiatric: Denies Depression. Denies Anxiety. Denies Suicidal/Homicidal ideations.    All Other ROS: Negative except as stated in HPI.     Medical / Surgical / Social / Family History       ----------------------------  Adrenal adenoma  Callus of foot  Coronary artery disease  Diabetes mellitus  Hyperlipidemia  Hypertension  Spinal stenosis  Unspecified osteoarthritis, unspecified site     Past Surgical History:   Procedure Laterality Date    ANGIOGRAPHY      CHOLECYSTECTOMY      FOOT SURGERY Right        Social History     Socioeconomic History    Marital status: Single   Tobacco Use    Smoking status: Never    Smokeless tobacco: Never   Substance and Sexual Activity    Alcohol use: Never    Drug use: Never    Sexual activity: Yes     Partners: Female     Birth control/protection: None     Social Determinants of Health     Financial Resource Strain: Low Risk  (6/9/2022)    Overall Financial Resource Strain (CARDIA)     Difficulty of Paying Living Expenses: Not very hard   Food Insecurity: No Food Insecurity (7/14/2022)    Hunger Vital Sign     Worried About Running Out of Food in the Last Year: Never true     Ran Out of Food in the Last Year: Never true   Transportation Needs: No Transportation Needs (7/14/2022)    PRAPARE - Transportation     Lack of Transportation  "(Medical): No     Lack of Transportation (Non-Medical): No   Physical Activity: Insufficiently Active (6/9/2022)    Exercise Vital Sign     Days of Exercise per Week: 4 days     Minutes of Exercise per Session: 20 min   Stress: Stress Concern Present (7/14/2022)    [employer] [place] [employer] - Occupational Stress Questionnaire     Feeling of Stress : To some extent   Social Connections: Moderately Isolated (6/9/2022)    Social Connection and Isolation Panel [NHANES]     Frequency of Communication with Friends and Family: More than three times a week     Frequency of Social Gatherings with Friends and Family: More than three times a week     Attends Confucianism Services: 1 to 4 times per year     Active Member of Clubs or Organizations: No     Marital Status: Never    Housing Stability: Low Risk  (7/14/2022)    Housing Stability Vital Sign     Unable to Pay for Housing in the Last Year: No     Number of Places Lived in the Last Year: 1     Unstable Housing in the Last Year: No        Family History   Problem Relation Age of Onset    Hypertension Mother     Heart disease Mother     Stroke Father     Cancer Sister     Heart disease Sister     Cancer Sister     Heart disease Sister     Heart disease Brother     Heart disease Brother         Medications and Allergies     Medications  Current Outpatient Medications   Medication Instructions    albuterol (PROVENTIL/VENTOLIN HFA) 90 mcg/actuation inhaler 2 puffs, Inhalation, Every 6 hours PRN    ammonium lactate 12 % Crea APPLY TWICE DAILY. APPLY A THIN LAYER OF VASELINE OVER LAC-HYDRIN TWICE DAILY. NOTHING BETWEEN TOES    aspirin (ECOTRIN) 81 mg, Oral, Daily    atorvastatin (LIPITOR) 80 mg, Oral, Nightly    BD VEO INSULIN SYRINGE UF 1 mL 31 gauge x 15/64" Syrg USE THREE TIMES DAILY AS DIRECTED    blood sugar diagnostic (ONETOUCH ULTRA TEST) Strp CHECK BLOOD SUGAR THREE TIMES A DAY BEFORE MEALS AS DIRECTED BY DOCTOR    blood sugar diagnostic Strp   One " "Touch Ultra 2 Test Strips, See Instructions, Use to check CBG TID, # 100 EA, 11 Refill(s), Pharmacy: [facility], 168, cm, Height/Length Dosing, 01/21/22 10:22:00 CST, 116, kg, Weight Dosing, 01/21/22 10:22:00 CST    colistimethate (COLYMYCIN) 150 mg injection SMARTSIG:Topical    diclofenac sodium (VOLTAREN) 2 g, Topical (Top), 4 times daily    furosemide (LASIX) 20 mg, Oral, Daily    HumaLOG U-100 Insulin 15 Units, Subcutaneous, 3 times daily before meals    hydroCHLOROthiazide (HYDRODIURIL) 100 mg, Oral, Daily    ID NOW COVID-19 TEST KIT Kit TEST AS DIRECTED TODAY    insulin syringes, disposable, 1 mL Syrg 1 Syringe, Subcutaneous, 3 times daily before meals    lancets (ONETOUCH DELICA LANCETS) 33 gauge Misc CHECK BLOOD SUGAR THREE TIMES A DAY BEFORE MEALS AS DIRECTED BY DOCTOR    KWAKU ENCINAS U-100 INSULIN glargine 100 units/mL SubQ pen Inject 52 units subq qam and 42 units subq at bedtime.    losartan (COZAAR) 100 mg, Oral, Daily    metFORMIN (GLUCOPHAGE) 1,000 mg, Oral, 2 times daily with meals    NIFEdipine (ADALAT CC) 90 mg, Oral, Daily    nitroGLYCERIN (NITROSTAT) 0.4 mg, Sublingual, Every 5 min PRN    ondansetron (ZOFRAN-ODT) 4 mg, Oral, Every 6 hours PRN    ONETOUCH DELICA PLUS LANCET 30 gauge Misc CHECK BLOOD SUGAR THREE TIMES A DAY BEFORE MEALS AS DIRECTED BY DOCTOR    potassium chloride (K-TAB) 20 mEq 20 mEq, Oral, Daily    TRULICITY 3 mg/0.5 mL pen injector Subcutaneous    TRULICITY 4.5 mg, Subcutaneous, Every 7 days         Allergies  Review of patient's allergies indicates:  No Known Allergies    Physical Examination     /77 (BP Location: Right arm, Patient Position: Sitting, BP Method: Large (Automatic))   Pulse 64   Temp 98.2 °F (36.8 °C) (Oral)   Resp 20   Ht 5' 7.01" (1.702 m)   Wt 105.4 kg (232 lb 6.4 oz)   BMI 36.39 kg/m²     Physical Exam  Constitutional:       Appearance: He is obese.       General: Alert and oriented, No acute distress.  Head: Normocephalic, " Atraumatic.  Eye: Pupils are equal, round and reactive to light, Extraocular movements are intact, Sclera non-icteric.  Ears/Nose/Throat: Normal, Mucosa moist,Clear.  Neck/Thyroid: Supple, Non-tender, No carotid bruit, No lymphadenopathy, No JVD, Full range of motion.  Respiratory: Clear to auscultation bilaterally; No wheezes, rales or rhonchi,Non-labored respirations, Symmetrical chest wall expansion.  Cardiovascular: Regular rate and rhythm, S1/S2 normal, No murmurs, rubs or gallops.  Gastrointestinal: Soft, Non-tender, Non-distended, Normal bowel sounds, No palpable organomegaly.  Musculoskeletal: Normal range of motion.  Integumentary: Warm, Dry, Intact, No suspicious lesions or rashes.  Extremities: No clubbing, cyanosis or edema  Neurologic: No focal deficits, Cranial Nerves II-XII are grossly intact, Motor strength normal upper and lower extremities, Sensory exam intact.  Psychiatric: Normal interaction, Coherent speech, Appropriate affect       Results     Lab Results   Component Value Date    WBC 6.99 08/10/2023    HGB 13.7 (L) 08/10/2023    HCT 40.7 (L) 08/10/2023     08/10/2023    CHOL 116 06/05/2023    TRIG 98 06/05/2023    ALT 27 08/10/2023    AST 18 08/10/2023     08/10/2023    K 3.6 08/10/2023    CREATININE 1.33 (H) 08/10/2023    BUN 31.2 (H) 08/10/2023    CO2 27 08/10/2023    TSH 1.3340 11/15/2022    PSA 2.57 11/15/2022    INR 0.97 08/03/2021    HGBA1C 9.0 (H) 08/10/2023         Assessment and Plan (including Health Maintenance)     Plan:     1. JACK (acute kidney injury)  K improved to 3.6 from 3.2.  GFR improved to >60 from 39.  Low Sodium Diet (less than 2 grams per day).  -Control high blood pressure ( goal BP < 130/80, please record BP at home every day and bring log to next office visit)  -Exercise at least 30 minutes a day, 5 days a week.  -Maintain healthy weight.  -Decrease or stop alcohol use.  -Do not smoke.  -Stay well hydrated.  -Receive Pneumovax, Flu, and HBV vaccines if  indicated.  -Do not take NSAIDs (Ibuprofen, Naproxen, Aleve, Advil, Toradol, Mobic), may take only Tylenol as needed for pain/headaches.  -Take cholesterol-lowering medications as prescribed (LDL goal <100).    - Comprehensive Metabolic Panel; Future    2. Acute gastritis without hemorrhage, unspecified gastritis type  Resolved.       Problem List Items Addressed This Visit          Renal/    JACK (acute kidney injury)    Relevant Orders    Comprehensive Metabolic Panel       Endocrine    Uncontrolled type 2 diabetes mellitus with hyperglycemia - Primary (Chronic)    Relevant Medications    TRULICITY 3 mg/0.5 mL pen injector    Other Relevant Orders    Comprehensive Metabolic Panel    Hemoglobin A1C       GI    Acute gastritis         Health Maintenance Due   Topic Date Due    Hepatitis C Screening  Never done    COVID-19 Vaccine (4 - Moderna series) 08/05/2022       Follow up if symptoms worsen or fail to improve, for Keep Follow up as scheduled on 9/10/23 with labs a couple days prior.        Signature:  BIANCA Cabral  [facility]  [address] [facility] [address]

## 2023-08-14 LAB — PATH REV: NORMAL

## 2023-09-11 ENCOUNTER — LAB VISIT (OUTPATIENT)
Dept: LAB | Facility: HOSPITAL | Age: 58
End: 2023-09-11
Attending: NURSE PRACTITIONER
Payer: MEDICAID

## 2023-09-11 DIAGNOSIS — E11.65 UNCONTROLLED TYPE 2 DIABETES MELLITUS WITH HYPERGLYCEMIA: Chronic | ICD-10-CM

## 2023-09-11 DIAGNOSIS — N17.9 AKI (ACUTE KIDNEY INJURY): ICD-10-CM

## 2023-09-11 LAB
ALBUMIN SERPL-MCNC: 3.4 G/DL (ref 3.5–5)
ALBUMIN/GLOB SERPL: 1.1 RATIO (ref 1.1–2)
ALP SERPL-CCNC: 127 UNIT/L (ref 40–150)
ALT SERPL-CCNC: 17 UNIT/L (ref 0–55)
AST SERPL-CCNC: 12 UNIT/L (ref 5–34)
BILIRUB SERPL-MCNC: 0.6 MG/DL
BUN SERPL-MCNC: 26.9 MG/DL (ref 8.4–25.7)
CALCIUM SERPL-MCNC: 8.7 MG/DL (ref 8.4–10.2)
CHLORIDE SERPL-SCNC: 106 MMOL/L (ref 98–107)
CO2 SERPL-SCNC: 27 MMOL/L (ref 22–29)
CREAT SERPL-MCNC: 1.46 MG/DL (ref 0.73–1.18)
EST. AVERAGE GLUCOSE BLD GHB EST-MCNC: 191.5 MG/DL
GFR SERPLBLD CREATININE-BSD FMLA CKD-EPI: 56 MLS/MIN/1.73/M2
GLOBULIN SER-MCNC: 3 GM/DL (ref 2.4–3.5)
GLUCOSE SERPL-MCNC: 264 MG/DL (ref 74–100)
HBA1C MFR BLD: 8.3 %
POTASSIUM SERPL-SCNC: 3.6 MMOL/L (ref 3.5–5.1)
PROT SERPL-MCNC: 6.4 GM/DL (ref 6.4–8.3)
SODIUM SERPL-SCNC: 143 MMOL/L (ref 136–145)

## 2023-09-11 PROCEDURE — 83036 HEMOGLOBIN GLYCOSYLATED A1C: CPT

## 2023-09-11 PROCEDURE — 36415 COLL VENOUS BLD VENIPUNCTURE: CPT

## 2023-09-11 PROCEDURE — 80053 COMPREHEN METABOLIC PANEL: CPT

## 2023-09-11 NOTE — PROGRESS NOTES
CHIEF COMPLAINT:   Chief Complaint   Patient presents with    6 month follow up      Complaints of SOB                      Review of patient's allergies indicates:  No Known Allergies                                       HPI:  Michael Stone 58 y.o. male with a past medical history of uncontrolled diabetes, hyperlipidemia, hypertension, MINA on CPAP, venous insufficiency,  adrenal adenoma, and obesity presents for routine follow up and ongoing care. Latest Echocardiogram obtained on 6.1.22 revealed an EF of 59%.  Patient had abnormal nuclear pharmacologic stress test on 6.24.21 which showed medium-sized area of moderate anterior and apical ischemia.  He underwent a subsequent left heart catheterization on 8.20.21 which revealed very mild nonobstructive one-vessel disease. (See reports below).  At his last clinic visit, the patient endorse occasional shortness of breath with exertion.     Patient presents to clinic today continuing to endorse shortness of breath with exertion, noting some increase in symptoms.  He states he is able to perform his normal ADLs but reports increased exertional dyspnea with moderate activity.  He denies any chest pain, palpitations, orthopnea, PND, claudication or syncope.  He denies any acute lower extremity edema but states that he follows with Dr. Gallo in January for his routine appointment.  He reports compliance with his current medications and is tolerating without issue.  Of note, the patient reports that he does not follow a low-salt diet and is consuming a pack of chips while in clinic today.  However, he states that he will now make dietary changes.  He reports nightly compliance with CPAP and feels he is benefitting from use.      CARDIAC TESTING   Echocardiogram 6.1.22  The left ventricle is normal in size with mild concentric hypertrophy and normal systolic function.  The estimated ejection fraction is 59%.  Normal right ventricular size with normal right ventricular  systolic function.  Normal left ventricular diastolic function.  There is no pulmonary hypertension.  The estimated PA systolic pressure is 10 mmHg.  Normal central venous pressure (3 mmHg).      CORONARY ANGIOGRAM 8.20.21  Left Main: large vessel.   Left anterior descending: large vessel.   Diagonal 1: small vessel.   Ramus: medium size vessel. 30% ostial stenosis  Circumflex: large vessel.   Obtuse marginal 1: medium size vessel.   Right coronary artery: medium size vessel.   Posterior descending artery: Tiny vessel.     COMPLICATIONS  No immediate complications.    Closure of access site: Radial band  Estimated blood loss: less than 10 ml  Specimens obtained: none   SUMMARY  Very mild nonobstructive 1 vessel disease      Nuclear pharmacologic stress test 06/24/2021:  Rest EKG: Sinus rhythm  Stress EKG: No significant new EKG changes  Medium size area of moderate anterior and apical ischemia  No scar    TTE 4.16.21  Mild concentric left ventricular hypertrophy  Left ventricular ejection fraction is measured at approximately 50%  Structurally normal mitral valve  Trace mitral regurgitation  Structurally aortic valve is trileaflet  Trace tricuspid regurgitation with RVSP of 38 mmHg  No evidence of pulmonic regurgitation  The pulmonic valve was not well visualized  Mildly dilated left atrium  Borderline dilated right atrium  Normal right ventricular size  No evidence of pericardial effusion  No evidence of pleural effusion     SADE testing- December 2021 - revealed no evidence of significant arterial insufficiency                                                                                                                                                                                   Right lower extremity venous reflux study December 2021 - revealed reflux of 4.3 seconds in the GSV at the level of the upper calf and reflux of 4.8 seconds and a branch of the GSV at the level of the upper calf.         Left lower  extremity venous reflux studies - August 24, 2021- revealed no DVT and no substantial reflux identified in the interrogated portions of the left leg deep system, GSV, or SSV.                                                                                                                                                                                                                                                                                            Patient Active Problem List   Diagnosis    Mild CAD    Disorder of tendon of shoulder region    Edema of lower extremity    History of severe acute respiratory syndrome coronavirus 2 (SARS-CoV-2) disease    Hyperlipidemia LDL goal <70    Primary hypertension    Class 2 severe obesity due to excess calories with serious comorbidity and body mass index (BMI) of 38.0 to 38.9 in adult    MINA (obstructive sleep apnea)    Overgrown toenails    Primary osteoarthritis of both knees    Spinal stenosis of cervical region    Tinea pedis    Uncontrolled type 2 diabetes mellitus with hyperglycemia    JOSEPH (dyspnea on exertion)    Venous insufficiency    Other chest pain    Callus of foot    DDD (degenerative disc disease), cervical    Encounter for comprehensive diabetic foot examination, type 2 diabetes mellitus    Hypokalemia    Acute gastritis    JACK (acute kidney injury)    Class 2 drug-induced obesity with serious comorbidity and body mass index (BMI) of 37.0 to 37.9 in adult     Past Surgical History:   Procedure Laterality Date    ANGIOGRAPHY      CHOLECYSTECTOMY      FOOT SURGERY Right      Social History     Socioeconomic History    Marital status: Single   Tobacco Use    Smoking status: Never     Passive exposure: Never    Smokeless tobacco: Never   Substance and Sexual Activity    Alcohol use: Never    Drug use: Never    Sexual activity: Yes     Partners: Female     Birth control/protection: None     Social Determinants of Health     Financial Resource Strain: Low Risk   (6/9/2022)    Overall Financial Resource Strain (CARDIA)     Difficulty of Paying Living Expenses: Not very hard   Food Insecurity: No Food Insecurity (7/14/2022)    Hunger Vital Sign     Worried About Running Out of Food in the Last Year: Never true     Ran Out of Food in the Last Year: Never true   Transportation Needs: No Transportation Needs (7/14/2022)    PRAPARE - Transportation     Lack of Transportation (Medical): No     Lack of Transportation (Non-Medical): No   Physical Activity: Insufficiently Active (6/9/2022)    Exercise Vital Sign     Days of Exercise per Week: 4 days     Minutes of Exercise per Session: 20 min   Stress: Stress Concern Present (7/14/2022)    [employer] [place] [employer] - Occupational Stress Questionnaire     Feeling of Stress : To some extent   Social Connections: Moderately Isolated (6/9/2022)    Social Connection and Isolation Panel [NHANES]     Frequency of Communication with Friends and Family: More than three times a week     Frequency of Social Gatherings with Friends and Family: More than three times a week     Attends Synagogue Services: 1 to 4 times per year     Active Member of Clubs or Organizations: No     Marital Status: Never    Housing Stability: Low Risk  (7/14/2022)    Housing Stability Vital Sign     Unable to Pay for Housing in the Last Year: No     Number of Places Lived in the Last Year: 1     Unstable Housing in the Last Year: No        Family History   Problem Relation Age of Onset    Hypertension Mother     Heart disease Mother     Stroke Father     Cancer Sister     Heart disease Sister     Cancer Sister     Heart disease Sister     Heart disease Brother     Heart disease Brother          Current Outpatient Medications:     albuterol (PROVENTIL/VENTOLIN HFA) 90 mcg/actuation inhaler, Inhale 2 puffs into the lungs every 6 (six) hours as needed for Wheezing or Shortness of Breath., Disp: 6.7 g, Rfl: 2    aspirin (ECOTRIN) 81 MG EC tablet, Take  1 tablet (81 mg total) by mouth once daily., Disp: 90 tablet, Rfl: 3    atorvastatin (LIPITOR) 80 MG tablet, Take 1 tablet (80 mg total) by mouth nightly., Disp: 90 tablet, Rfl: 3    colistimethate (COLYMYCIN) 150 mg injection, SMARTSIG:Topical, Disp: , Rfl:     diclofenac sodium (VOLTAREN) 1 % Gel, Apply 2 g topically 4 (four) times daily. for 7 days, Disp: 100 g, Rfl: 0    dulaglutide (TRULICITY) 4.5 mg/0.5 mL pen injector, Inject 4.5 mg into the skin every 7 days., Disp: 4 pen , Rfl: 3    furosemide (LASIX) 20 MG tablet, Take 1 tablet (20 mg total) by mouth once daily., Disp: 90 tablet, Rfl: 3    HUMALOG U-100 INSULIN 100 unit/mL injection, Inject 15 Units into the skin 3 (three) times daily before meals., Disp: 25 mL, Rfl: 1    hydroCHLOROthiazide (HYDRODIURIL) 50 MG tablet, Take 2 tablets (100 mg total) by mouth once daily., Disp: 180 tablet, Rfl: 3    LANTUS SOLOSTAR U-100 INSULIN glargine 100 units/mL SubQ pen, Inject 52 units subq qam and 42 units subq at bedtime., Disp: 85 mL, Rfl: 1    losartan (COZAAR) 100 MG tablet, Take 1 tablet (100 mg total) by mouth once daily., Disp: 90 tablet, Rfl: 3    metFORMIN (GLUCOPHAGE) 1000 MG tablet, Take 1 tablet (1,000 mg total) by mouth 2 (two) times daily with meals., Disp: 180 tablet, Rfl: 3    NIFEdipine (ADALAT CC) 90 MG TbSR, Take 1 tablet (90 mg total) by mouth once daily., Disp: 90 tablet, Rfl: 3    nitroGLYCERIN (NITROSTAT) 0.4 MG SL tablet, Place 1 tablet (0.4 mg total) under the tongue every 5 (five) minutes as needed for Chest pain., Disp: 25 tablet, Rfl: 6    ondansetron (ZOFRAN-ODT) 4 MG TbDL, Take 1 tablet (4 mg total) by mouth every 6 (six) hours as needed (Nausea)., Disp: 14 tablet, Rfl: 0    potassium chloride (K-TAB) 20 mEq, Take 1 tablet (20 mEq total) by mouth once daily., Disp: 90 tablet, Rfl: 3    ammonium lactate 12 % Crea, APPLY TWICE DAILY. APPLY A THIN LAYER OF VASELINE OVER LAC-HYDRIN TWICE DAILY. NOTHING BETWEEN TOES, Disp: , Rfl:     BD VEO  "INSULIN SYRINGE UF 1 mL 31 gauge x 15/64" Syrg, USE THREE TIMES DAILY AS DIRECTED, Disp: 100 each, Rfl: 0    blood sugar diagnostic (ONETOUCH ULTRA TEST) Strp, CHECK BLOOD SUGAR THREE TIMES A DAY BEFORE MEALS AS DIRECTED BY DOCTOR, Disp: 100 strip, Rfl: 11    blood sugar diagnostic Strp,  One Touch Ultra 2 Test Strips, See Instructions, Use to check CBG TID, # 100 EA, 11 Refill(s), Pharmacy: [facility], 168, cm, Height/Length Dosing, 01/21/22 10:22:00 CST, 116, kg, Weight Dosing, 01/21/22 10:22:00 CST, Disp: , Rfl:     ID NOW COVID-19 TEST KIT Kit, TEST AS DIRECTED TODAY, Disp: , Rfl:     insulin syringes, disposable, 1 mL Syrg, Inject 1 Syringe into the skin 3 (three) times daily before meals., Disp: 100 each, Rfl: 11    lancets (ONETOUCH DELICA LANCETS) 33 gauge Misc, CHECK BLOOD SUGAR THREE TIMES A DAY BEFORE MEALS AS DIRECTED BY DOCTOR, Disp: 100 each, Rfl: 11    ONETOUCH DELICA PLUS LANCET 30 gauge Misc, CHECK BLOOD SUGAR THREE TIMES A DAY BEFORE MEALS AS DIRECTED BY DOCTOR, Disp: , Rfl:      ROS:                                                                                                                                                                             Review of Systems   Constitutional: Negative.    Respiratory:  Positive for shortness of breath.    Gastrointestinal: Negative.    Musculoskeletal: Negative.         BLE pain   Skin: Negative.    Neurological: Negative.    Psychiatric/Behavioral: Negative.          Blood pressure 108/80, pulse 69, temperature 97.7 °F (36.5 °C), resp. rate 18, height 5' 6.93" (1.7 m), weight 104 kg (229 lb 3.2 oz), SpO2 100 %.   PE:  Physical Exam  Constitutional:       Appearance: Normal appearance.   HENT:      Head: Normocephalic.   Eyes:      Extraocular Movements: Extraocular movements intact.   Cardiovascular:      Rate and Rhythm: Normal rate and regular rhythm.   Pulmonary:      Effort: Pulmonary effort is normal.   Abdominal:      Palpations: " Abdomen is soft.   Musculoskeletal:         General: Normal range of motion.      Cervical back: Neck supple.      Comments: Mild BLE edema    Skin:     General: Skin is warm and dry.   Neurological:      General: No focal deficit present.      Mental Status: He is alert and oriented to person, place, and time.   Psychiatric:         Mood and Affect: Mood normal.        ASSESSMENT/PLAN:  Very Mild CAD per [facility] 8.20.21  - Lexiscan stress test -medium sized area of moderate anterior and apical ischemia and no scar (6.21.21)  - Denies chest pain  - Continue ASA, atorvastatin, losartan, and SL Nitro  - Counseled on heart healthy, low cholesterol diet and exercise as tolerated    JOSEPH   - EF 59% per Echo 6.1.22  - Reports increased exertional dyspnea  - Continue and HCTZ and Lasix  - Normal PFTs Dec. 2022    - Will repeat Echocardiogram to reassess LV function and check for any valvular abnormalities with increased exertional dyspnea    Venous Insufficiency  - Continue Lasix and HCTZ  - Recommended low-sodium diet, compression stocking therapy, and leg elevation  - Follow up with Dr. Gallo as directed    HLD  - LDL at goal - 58  - Continue with Atorvastatin 80 mg po daily   - Counseled patient on low-cholesterol diet and exercise as tolerated    HTN  - BP at goal   - Continue Losartan, HCTZ, Procardia XL, Lasix    - Counseled on low salt diet    Diabetes  - Last A1C not at goal - 8.3  - Continue management per PCP    MINA  - Patient reports nightly compliance with the CPAP and feels he is benefitting from use.  - Recommend continued nightly CPAP use    Obesity  Counseled on the importance of weight loss through diet and exercise    ECHO  Follow up in Cardiology Clinic in 4 months or sooner pending results   Follow up with PCP and Dr. Gallo as directed

## 2023-09-12 ENCOUNTER — OFFICE VISIT (OUTPATIENT)
Dept: INTERNAL MEDICINE | Facility: CLINIC | Age: 58
End: 2023-09-12
Payer: MEDICAID

## 2023-09-12 VITALS
TEMPERATURE: 98 F | HEART RATE: 65 BPM | HEIGHT: 67 IN | BODY MASS INDEX: 37.17 KG/M2 | SYSTOLIC BLOOD PRESSURE: 134 MMHG | WEIGHT: 236.81 LBS | DIASTOLIC BLOOD PRESSURE: 78 MMHG | RESPIRATION RATE: 20 BRPM

## 2023-09-12 DIAGNOSIS — E11.65 UNCONTROLLED TYPE 2 DIABETES MELLITUS WITH HYPERGLYCEMIA: Chronic | ICD-10-CM

## 2023-09-12 DIAGNOSIS — Z23 NEED FOR VACCINATION: Primary | ICD-10-CM

## 2023-09-12 DIAGNOSIS — E66.1 CLASS 2 DRUG-INDUCED OBESITY WITH SERIOUS COMORBIDITY AND BODY MASS INDEX (BMI) OF 37.0 TO 37.9 IN ADULT: Chronic | ICD-10-CM

## 2023-09-12 PROBLEM — E66.812 CLASS 2 DRUG-INDUCED OBESITY WITH SERIOUS COMORBIDITY AND BODY MASS INDEX (BMI) OF 37.0 TO 37.9 IN ADULT: Chronic | Status: ACTIVE | Noted: 2023-09-12

## 2023-09-12 PROCEDURE — 3078F DIAST BP <80 MM HG: CPT | Mod: CPTII,,, | Performed by: NURSE PRACTITIONER

## 2023-09-12 PROCEDURE — 3008F BODY MASS INDEX DOCD: CPT | Mod: CPTII,,, | Performed by: NURSE PRACTITIONER

## 2023-09-12 PROCEDURE — 1159F MED LIST DOCD IN RCRD: CPT | Mod: CPTII,,, | Performed by: NURSE PRACTITIONER

## 2023-09-12 PROCEDURE — 99214 PR OFFICE/OUTPT VISIT, EST, LEVL IV, 30-39 MIN: ICD-10-PCS | Mod: S$PBB,,, | Performed by: NURSE PRACTITIONER

## 2023-09-12 PROCEDURE — 4010F PR ACE/ARB THEARPY RXD/TAKEN: ICD-10-PCS | Mod: CPTII,,, | Performed by: NURSE PRACTITIONER

## 2023-09-12 PROCEDURE — 3052F PR MOST RECENT HEMOGLOBIN A1C LEVEL 8.0 - < 9.0%: ICD-10-PCS | Mod: CPTII,,, | Performed by: NURSE PRACTITIONER

## 2023-09-12 PROCEDURE — 4010F ACE/ARB THERAPY RXD/TAKEN: CPT | Mod: CPTII,,, | Performed by: NURSE PRACTITIONER

## 2023-09-12 PROCEDURE — 3052F HG A1C>EQUAL 8.0%<EQUAL 9.0%: CPT | Mod: CPTII,,, | Performed by: NURSE PRACTITIONER

## 2023-09-12 PROCEDURE — 99215 OFFICE O/P EST HI 40 MIN: CPT | Mod: PBBFAC | Performed by: NURSE PRACTITIONER

## 2023-09-12 PROCEDURE — 90471 IMMUNIZATION ADMIN: CPT | Mod: PBBFAC

## 2023-09-12 PROCEDURE — 3075F SYST BP GE 130 - 139MM HG: CPT | Mod: CPTII,,, | Performed by: NURSE PRACTITIONER

## 2023-09-12 PROCEDURE — 3008F PR BODY MASS INDEX (BMI) DOCUMENTED: ICD-10-PCS | Mod: CPTII,,, | Performed by: NURSE PRACTITIONER

## 2023-09-12 PROCEDURE — 1160F RVW MEDS BY RX/DR IN RCRD: CPT | Mod: CPTII,,, | Performed by: NURSE PRACTITIONER

## 2023-09-12 PROCEDURE — 1160F PR REVIEW ALL MEDS BY PRESCRIBER/CLIN PHARMACIST DOCUMENTED: ICD-10-PCS | Mod: CPTII,,, | Performed by: NURSE PRACTITIONER

## 2023-09-12 PROCEDURE — 3078F PR MOST RECENT DIASTOLIC BLOOD PRESSURE < 80 MM HG: ICD-10-PCS | Mod: CPTII,,, | Performed by: NURSE PRACTITIONER

## 2023-09-12 PROCEDURE — 1159F PR MEDICATION LIST DOCUMENTED IN MEDICAL RECORD: ICD-10-PCS | Mod: CPTII,,, | Performed by: NURSE PRACTITIONER

## 2023-09-12 PROCEDURE — 3075F PR MOST RECENT SYSTOLIC BLOOD PRESS GE 130-139MM HG: ICD-10-PCS | Mod: CPTII,,, | Performed by: NURSE PRACTITIONER

## 2023-09-12 PROCEDURE — 99214 OFFICE O/P EST MOD 30 MIN: CPT | Mod: S$PBB,,, | Performed by: NURSE PRACTITIONER

## 2023-09-12 RX ORDER — DULAGLUTIDE 4.5 MG/.5ML
4.5 INJECTION, SOLUTION SUBCUTANEOUS
Qty: 4 PEN | Refills: 3 | Status: SHIPPED | OUTPATIENT
Start: 2023-09-12 | End: 2023-11-14 | Stop reason: SDUPTHER

## 2023-09-12 NOTE — PROGRESS NOTES
Yelitza Roque, BIANCA   [facility]  [address] [facility] [id number]      PATIENT NAME: Michael Stone  : 1965  DATE: 23  MRN: [id number]      Reason for Visit / Chief Complaint: Follow-up (Lab results, needs refills)       History of Present Illness / Problem Focused Workflow     Michael Stone is a 58 y.o. Black or  male presents to the clinic for uncontrolled DM f/u. PMH uncontrolled DM, HLD, HTN, mild CAD, Covid pneumonia (21), adrenal adenoma, vit d deficiency, covid + 23, morbid obesity, cervical spinal stenosis, MINA on CPAP, bilateral knee OA, tinea pedis and med non-compliance. Followed by [facility] cardio, ortho and wound clinics and Dr. Gallo, vascular.    Since last visit, pt had ED visit for gastroenteritis/JACK and has been trying to get back on track. CBGs ranging mostly in the High 100s-high 200s when checked. Has not received trulicity 4.5 mg as ordered from pharmacy. Is attempting ADA diet. Amendable to flu vaccine today. Continues to wear CPAP nightly. Labs reviewed with pt. Denies chest pain, shortness of breath, cough, fever, headache, dizziness, weakness, abdominal pain, nausea, vomiting, diarrhea, constipation, dysuria, depression, anxiety, SI/HI.    Review of Systems     Review of Systems     See HPI for details    Constitutional: Denies Change in appetite. Denies Chills. Denies Fever. Denies Night sweats.  Eye: Denies Blurred vision. Denies Discharge. Denies Eye pain.  ENT: Denies Decreased hearing. Denies Sore throat. Denies Swollen glands.  Respiratory: Denies Cough. Denies Shortness of breath. Denies Shortness of breath with exertion. Denies Wheezing.  Cardiovascular: DeniesChest pain at rest. Denies Chest pain with exertion. Denies Irregular heartbeat. Denies Palpitations. Denies Edema.  Gastrointestinal: Denies Abdominal pain. Denies Diarrhea. Denies Nausea. Denies Vomiting. Denies  Hematemesis or Hematochezia.  Genitourinary: Denies Dysuria. Denies Urinary frequency. Denies Urinary urgency. Denies Blood in urine.  Endocrine: Denies Cold intolerance. Denies Excessive thirst. Denies Heat intolerance. Denies Weight loss. Denies Weight gain.  Musculoskeletal: Denies Painful joints. Denies Weakness.  Integumentary: Denies Rash. Denies Itching. Denies Dry skin.  Neurologic: Denies Dizziness. Denies Fainting. Denies Headache.  Psychiatric: Denies Depression. Denies Anxiety. Denies Suicidal/Homicidal ideations.    All Other ROS: Negative except as stated in HPI.     Medical / Surgical / Social / Family History       ----------------------------  Adrenal adenoma  Callus of foot  Coronary artery disease  Diabetes mellitus  Hyperlipidemia  Hypertension  Spinal stenosis  Unspecified osteoarthritis, unspecified site     Past Surgical History:   Procedure Laterality Date    ANGIOGRAPHY      CHOLECYSTECTOMY      FOOT SURGERY Right        Social History     Socioeconomic History    Marital status: Single   Tobacco Use    Smoking status: Never     Passive exposure: Never    Smokeless tobacco: Never   Substance and Sexual Activity    Alcohol use: Never    Drug use: Never    Sexual activity: Yes     Partners: Female     Birth control/protection: None     Social Determinants of Health     Financial Resource Strain: Low Risk  (6/9/2022)    Overall Financial Resource Strain (CARDIA)     Difficulty of Paying Living Expenses: Not very hard   Food Insecurity: No Food Insecurity (7/14/2022)    Hunger Vital Sign     Worried About Running Out of Food in the Last Year: Never true     Ran Out of Food in the Last Year: Never true   Transportation Needs: No Transportation Needs (7/14/2022)    PRAPARE - Transportation     Lack of Transportation (Medical): No     Lack of Transportation (Non-Medical): No   Physical Activity: Insufficiently Active (6/9/2022)    Exercise Vital Sign     Days of Exercise per Week: 4 days      "Minutes of Exercise per Session: 20 min   Stress: Stress Concern Present (7/14/2022)    [employer] [place] [employer] - Occupational Stress Questionnaire     Feeling of Stress : To some extent   Social Connections: Moderately Isolated (6/9/2022)    Social Connection and Isolation Panel [NHANES]     Frequency of Communication with Friends and Family: More than three times a week     Frequency of Social Gatherings with Friends and Family: More than three times a week     Attends Zoroastrianism Services: 1 to 4 times per year     Active Member of Clubs or Organizations: No     Marital Status: Never    Housing Stability: Low Risk  (7/14/2022)    Housing Stability Vital Sign     Unable to Pay for Housing in the Last Year: No     Number of Places Lived in the Last Year: 1     Unstable Housing in the Last Year: No        Family History   Problem Relation Age of Onset    Hypertension Mother     Heart disease Mother     Stroke Father     Cancer Sister     Heart disease Sister     Cancer Sister     Heart disease Sister     Heart disease Brother     Heart disease Brother         Medications and Allergies     Medications  Current Outpatient Medications   Medication Instructions    albuterol (PROVENTIL/VENTOLIN HFA) 90 mcg/actuation inhaler 2 puffs, Inhalation, Every 6 hours PRN    ammonium lactate 12 % Crea APPLY TWICE DAILY. APPLY A THIN LAYER OF VASELINE OVER LAC-HYDRIN TWICE DAILY. NOTHING BETWEEN TOES    aspirin (ECOTRIN) 81 mg, Oral, Daily    atorvastatin (LIPITOR) 80 mg, Oral, Nightly    BD VEO INSULIN SYRINGE UF 1 mL 31 gauge x 15/64" Syrg USE THREE TIMES DAILY AS DIRECTED    blood sugar diagnostic (ONETOUCH ULTRA TEST) Strp CHECK BLOOD SUGAR THREE TIMES A DAY BEFORE MEALS AS DIRECTED BY DOCTOR    blood sugar diagnostic Strp   One Touch Ultra 2 Test Strips, See Instructions, Use to check CBG TID, # 100 EA, 11 Refill(s), Pharmacy: [facility], 168, cm, Height/Length Dosing, 01/21/22 10:22:00 " "CST, 116, kg, Weight Dosing, 01/21/22 10:22:00 CST    colistimethate (COLYMYCIN) 150 mg injection SMARTSIG:Topical    diclofenac sodium (VOLTAREN) 2 g, Topical (Top), 4 times daily    furosemide (LASIX) 20 mg, Oral, Daily    HumaLOG U-100 Insulin 15 Units, Subcutaneous, 3 times daily before meals    hydroCHLOROthiazide (HYDRODIURIL) 100 mg, Oral, Daily    ID NOW COVID-19 TEST KIT Kit TEST AS DIRECTED TODAY    insulin syringes, disposable, 1 mL Syrg 1 Syringe, Subcutaneous, 3 times daily before meals    lancets (ONETOUCH DELICA LANCETS) 33 gauge Misc CHECK BLOOD SUGAR THREE TIMES A DAY BEFORE MEALS AS DIRECTED BY DOCTOR    KWAKU ENCINAS U-100 INSULIN glargine 100 units/mL SubQ pen Inject 52 units subq qam and 42 units subq at bedtime.    losartan (COZAAR) 100 mg, Oral, Daily    metFORMIN (GLUCOPHAGE) 1,000 mg, Oral, 2 times daily with meals    NIFEdipine (ADALAT CC) 90 mg, Oral, Daily    nitroGLYCERIN (NITROSTAT) 0.4 mg, Sublingual, Every 5 min PRN    ondansetron (ZOFRAN-ODT) 4 mg, Oral, Every 6 hours PRN    ONETOUCH DELICA PLUS LANCET 30 gauge Misc CHECK BLOOD SUGAR THREE TIMES A DAY BEFORE MEALS AS DIRECTED BY DOCTOR    potassium chloride (K-TAB) 20 mEq 20 mEq, Oral, Daily    TRULICITY 4.5 mg, Subcutaneous, Every 7 days         Allergies  Review of patient's allergies indicates:  No Known Allergies    Physical Examination     /78 (BP Location: Right arm, Patient Position: Sitting, BP Method: Medium (Automatic))   Pulse 65   Temp 98.1 °F (36.7 °C) (Oral)   Resp 20   Ht 5' 7.01" (1.702 m)   Wt 107.4 kg (236 lb 12.8 oz)   BMI 37.08 kg/m²     Physical Exam  Constitutional:       Appearance: He is obese.       General: Alert and oriented, No acute distress.  Head: Normocephalic, Atraumatic.  Eye: Pupils are equal, round and reactive to light, Extraocular movements are intact, Sclera non-icteric.  Ears/Nose/Throat: Normal, Mucosa moist,Clear.  Neck/Thyroid: Supple, Non-tender, No carotid bruit, No " lymphadenopathy, No JVD, Full range of motion.  Respiratory: Clear to auscultation bilaterally; No wheezes, rales or rhonchi,Non-labored respirations, Symmetrical chest wall expansion.  Cardiovascular: Regular rate and rhythm, S1/S2 normal, No murmurs, rubs or gallops.  Gastrointestinal: Soft, Non-tender, Non-distended, Normal bowel sounds, No palpable organomegaly.  Integumentary: Warm, Dry, Intact, No suspicious lesions or rashes.  Extremities: No clubbing, cyanosis or edema  Neurologic: No focal deficits, Cranial Nerves II-XII are grossly intact, Motor strength normal upper and lower extremities, Sensory exam intact.  Psychiatric: Normal interaction, Coherent speech, Appropriate affect       Results     Lab Results   Component Value Date    WBC 6.99 08/10/2023    HGB 13.7 (L) 08/10/2023    HCT 40.7 (L) 08/10/2023     08/10/2023    CHOL 116 06/05/2023    TRIG 98 06/05/2023    ALT 17 09/11/2023    AST 12 09/11/2023     09/11/2023    K 3.6 09/11/2023    CREATININE 1.46 (H) 09/11/2023    BUN 26.9 (H) 09/11/2023    CO2 27 09/11/2023    TSH 1.3340 11/15/2022    PSA 2.57 11/15/2022    INR 0.97 08/03/2021    HGBA1C 8.3 (H) 09/11/2023         Assessment and Plan (including Health Maintenance)     Plan:     1. Uncontrolled type 2 diabetes mellitus with hyperglycemia  A1C 8.3 not at goal. Previous A1C 9.0  Begin trulicity 4.5 mg weekly.  Continue lantus, humalog and metformin.  Pt to contact clinic if begins to persistent low readings.  Follow ADA diet.  Avoid soda, simple sweets, and limit rice/pasta/bread/starches and consume brown options when possible.   Maintain healthy weight with BMI goal <30.   Perform aerobic exercise for 150 minutes per week (or 5 days a week for 30 minutes each day).   Examine feet daily.   Obtain annual dilated eye exam.  Eye exam: 6/26/23  Foot exam: 3/3/23    2. Class 2 drug-induced obesity with serious comorbidity and body mass index (BMI) of 37.0 to 37.9 in adult  BMI 37. Goal  BMI <30.  Aerobic exercise 150 minutes per week.  Avoid soda, simple sugars, sweets, excessive rice, pasta, potatoes or bread.   Choose brown options when available and portion control.  Limit fast foods and fried foods.   Choose complex carbs in moderation (ex: green, leafy vegetables, beans, oatmeal).  Eat plenty of fresh fruits and vegetables with lean meats daily.   Consider permanent healthy lifestyle changes.      3. Need for vaccination  - Influenza - Quadrivalent (PF)      Problem List Items Addressed This Visit          Endocrine    Uncontrolled type 2 diabetes mellitus with hyperglycemia (Chronic)    Relevant Medications    dulaglutide (TRULICITY) 4.5 mg/0.5 mL pen injector    Other Relevant Orders    Comprehensive Metabolic Panel    Hemoglobin A1C    Class 2 drug-induced obesity with serious comorbidity and body mass index (BMI) of 37.0 to 37.9 in adult (Chronic)     Other Visit Diagnoses       Need for vaccination    -  Primary    Relevant Orders    Influenza - Quadrivalent (PF) (Completed)              Health Maintenance Due   Topic Date Due    COVID-19 Vaccine (4 - Moderna series) 08/05/2022       Follow up in about 2 months (around 11/12/2023) for Follow up, Diabetes.        Signature:  BIANCA Cabral  [facility]  [address] [facility] [id number]

## 2023-09-14 ENCOUNTER — PATIENT OUTREACH (OUTPATIENT)
Dept: EMERGENCY MEDICINE | Facility: HOSPITAL | Age: 58
End: 2023-09-14
Payer: MEDICAID

## 2023-09-14 NOTE — PROGRESS NOTES
Spoke with patient,voices no current issues. Advised to continue monitoring his BS and B/P at home daily and documenting his readings for doctor visits. States he taking his Rx medications as ordered. Says he has not yet started formal PT  as recommended by Dr Egan. Stressed the importance of getting some sessions of PT completed before he sees the Ortho NP 10/23/23 for evaluation. Voices understanding.

## 2023-09-15 ENCOUNTER — OFFICE VISIT (OUTPATIENT)
Dept: CARDIOLOGY | Facility: CLINIC | Age: 58
End: 2023-09-15
Payer: MEDICAID

## 2023-09-15 VITALS
RESPIRATION RATE: 18 BRPM | BODY MASS INDEX: 35.97 KG/M2 | SYSTOLIC BLOOD PRESSURE: 108 MMHG | HEIGHT: 67 IN | OXYGEN SATURATION: 100 % | DIASTOLIC BLOOD PRESSURE: 80 MMHG | TEMPERATURE: 98 F | HEART RATE: 69 BPM | WEIGHT: 229.19 LBS

## 2023-09-15 DIAGNOSIS — I25.10 MILD CAD: Primary | Chronic | ICD-10-CM

## 2023-09-15 DIAGNOSIS — G47.33 OSA ON CPAP: ICD-10-CM

## 2023-09-15 DIAGNOSIS — R06.09 DOE (DYSPNEA ON EXERTION): ICD-10-CM

## 2023-09-15 DIAGNOSIS — E78.5 HYPERLIPIDEMIA LDL GOAL <70: Chronic | ICD-10-CM

## 2023-09-15 DIAGNOSIS — I10 PRIMARY HYPERTENSION: Chronic | ICD-10-CM

## 2023-09-15 PROCEDURE — 3008F BODY MASS INDEX DOCD: CPT | Mod: CPTII,,, | Performed by: NURSE PRACTITIONER

## 2023-09-15 PROCEDURE — 1159F MED LIST DOCD IN RCRD: CPT | Mod: CPTII,,, | Performed by: NURSE PRACTITIONER

## 2023-09-15 PROCEDURE — 3052F PR MOST RECENT HEMOGLOBIN A1C LEVEL 8.0 - < 9.0%: ICD-10-PCS | Mod: CPTII,,, | Performed by: NURSE PRACTITIONER

## 2023-09-15 PROCEDURE — 99214 OFFICE O/P EST MOD 30 MIN: CPT | Mod: PBBFAC | Performed by: NURSE PRACTITIONER

## 2023-09-15 PROCEDURE — 1160F RVW MEDS BY RX/DR IN RCRD: CPT | Mod: CPTII,,, | Performed by: NURSE PRACTITIONER

## 2023-09-15 PROCEDURE — 3052F HG A1C>EQUAL 8.0%<EQUAL 9.0%: CPT | Mod: CPTII,,, | Performed by: NURSE PRACTITIONER

## 2023-09-15 PROCEDURE — 99214 PR OFFICE/OUTPT VISIT, EST, LEVL IV, 30-39 MIN: ICD-10-PCS | Mod: S$PBB,,, | Performed by: NURSE PRACTITIONER

## 2023-09-15 PROCEDURE — 3079F PR MOST RECENT DIASTOLIC BLOOD PRESSURE 80-89 MM HG: ICD-10-PCS | Mod: CPTII,,, | Performed by: NURSE PRACTITIONER

## 2023-09-15 PROCEDURE — 1159F PR MEDICATION LIST DOCUMENTED IN MEDICAL RECORD: ICD-10-PCS | Mod: CPTII,,, | Performed by: NURSE PRACTITIONER

## 2023-09-15 PROCEDURE — 3074F SYST BP LT 130 MM HG: CPT | Mod: CPTII,,, | Performed by: NURSE PRACTITIONER

## 2023-09-15 PROCEDURE — 3008F PR BODY MASS INDEX (BMI) DOCUMENTED: ICD-10-PCS | Mod: CPTII,,, | Performed by: NURSE PRACTITIONER

## 2023-09-15 PROCEDURE — 3074F PR MOST RECENT SYSTOLIC BLOOD PRESSURE < 130 MM HG: ICD-10-PCS | Mod: CPTII,,, | Performed by: NURSE PRACTITIONER

## 2023-09-15 PROCEDURE — 4010F PR ACE/ARB THEARPY RXD/TAKEN: ICD-10-PCS | Mod: CPTII,,, | Performed by: NURSE PRACTITIONER

## 2023-09-15 PROCEDURE — 1160F PR REVIEW ALL MEDS BY PRESCRIBER/CLIN PHARMACIST DOCUMENTED: ICD-10-PCS | Mod: CPTII,,, | Performed by: NURSE PRACTITIONER

## 2023-09-15 PROCEDURE — 99214 OFFICE O/P EST MOD 30 MIN: CPT | Mod: S$PBB,,, | Performed by: NURSE PRACTITIONER

## 2023-09-15 PROCEDURE — 3079F DIAST BP 80-89 MM HG: CPT | Mod: CPTII,,, | Performed by: NURSE PRACTITIONER

## 2023-09-15 PROCEDURE — 4010F ACE/ARB THERAPY RXD/TAKEN: CPT | Mod: CPTII,,, | Performed by: NURSE PRACTITIONER

## 2023-09-15 NOTE — PATIENT INSTRUCTIONS
ECHO  Follow up in Cardiology Clinic in 4 months or sooner pending results   Follow up with PCP and Dr. Gallo as directed

## 2023-09-22 ENCOUNTER — HOSPITAL ENCOUNTER (OUTPATIENT)
Dept: WOUND CARE | Facility: HOSPITAL | Age: 58
Discharge: HOME | End: 2023-09-22
Attending: NURSE PRACTITIONER
Payer: MEDICAID

## 2023-09-22 VITALS
TEMPERATURE: 98 F | OXYGEN SATURATION: 100 % | DIASTOLIC BLOOD PRESSURE: 79 MMHG | SYSTOLIC BLOOD PRESSURE: 121 MMHG | HEART RATE: 67 BPM | RESPIRATION RATE: 18 BRPM

## 2023-09-22 DIAGNOSIS — L60.3 DYSTROPHIC NAIL: ICD-10-CM

## 2023-09-22 DIAGNOSIS — L84 CALLUS OF FOOT: ICD-10-CM

## 2023-09-22 DIAGNOSIS — L60.2 OVERGROWN TOENAILS: ICD-10-CM

## 2023-09-22 DIAGNOSIS — B35.3 TINEA PEDIS OF BOTH FEET: ICD-10-CM

## 2023-09-22 DIAGNOSIS — Z79.4 TYPE 2 DIABETES MELLITUS WITH OTHER SKIN COMPLICATION, WITH LONG-TERM CURRENT USE OF INSULIN: ICD-10-CM

## 2023-09-22 DIAGNOSIS — E11.9 ENCOUNTER FOR COMPREHENSIVE DIABETIC FOOT EXAMINATION, TYPE 2 DIABETES MELLITUS: Primary | ICD-10-CM

## 2023-09-22 DIAGNOSIS — E11.628 TYPE 2 DIABETES MELLITUS WITH OTHER SKIN COMPLICATION, WITH LONG-TERM CURRENT USE OF INSULIN: ICD-10-CM

## 2023-09-22 PROCEDURE — 99211 OFF/OP EST MAY X REQ PHY/QHP: CPT

## 2023-09-22 PROCEDURE — 11056 PARNG/CUTG B9 HYPRKR LES 2-4: CPT | Mod: ,,, | Performed by: NURSE PRACTITIONER

## 2023-09-22 PROCEDURE — 11719 TRIM NAIL(S) ANY NUMBER: CPT | Mod: 51,,, | Performed by: NURSE PRACTITIONER

## 2023-09-22 PROCEDURE — 27000999 HC MEDICAL RECORD PHOTO DOCUMENTATION

## 2023-09-22 PROCEDURE — 11056 PR TRIM BENIGN HYPERKERATOTIC SKIN LESION,2-4: ICD-10-PCS | Mod: ,,, | Performed by: NURSE PRACTITIONER

## 2023-09-22 PROCEDURE — 11719 PR TRIM NAIL(S): ICD-10-PCS | Mod: 51,,, | Performed by: NURSE PRACTITIONER

## 2023-09-22 PROCEDURE — 11719 TRIM NAIL(S) ANY NUMBER: CPT

## 2023-09-22 PROCEDURE — 11056 PARNG/CUTG B9 HYPRKR LES 2-4: CPT

## 2023-09-22 PROCEDURE — 99499 UNLISTED E&M SERVICE: CPT | Mod: ,,, | Performed by: NURSE PRACTITIONER

## 2023-09-22 PROCEDURE — 99499 NO LOS: ICD-10-PCS | Mod: ,,, | Performed by: NURSE PRACTITIONER

## 2023-09-22 NOTE — PROGRESS NOTES
TODAY'S VISIT NOTE WAS IMPORTED FROM LAST WOUND CLINIC VISIT OF 6/16/23.  I ATTEST THAT I REVIEWED THE HPI, ROS, LABS, WOUND-RELATED IMAGING, PHYSICAL EXAMINATION, EVALUATION AND PLAN SECTIONS OF IMPORTED NOTE AND REVISED TODAY'S VISIT NOTE TO REFLECT TODAY'S NEW ASSESSMENT FINDINGS AND TODAY'S UPDATES TO WOUND TREATMENT PLAN.          Chief Complaint:  Routine diabetic foot care.       History of Present Illness:  57 yo Black male being seen today for routine diabetic foot care.   Followed by BIANCA Eli, for PCP.  Last visit with Ms. Roque was on 9/12/23.  Lesion to right dorsal foot biopsied by [facility] dermatology with negative findings (Spring 2022).   Hx includes medical non-compliance, obesity (BMI 35.97), Type 2 diabetes (insulin-dependent), HTN, HLD, adrenal adenoma, and generalized DJD, and tinea pedis. Has attended individual diabetes education on 12/18/20 and has been attempting to follow ADA/DASH diet. Underwent angiogram 8/20/2021 here @ [facility]; very mild nonobstructive one vessel disease. Followed by [facility] cardiology clinic.  RLE 12/2021 venous reflux study showed reflux of 4.3 seconds in the GSV at the level of the upper calf and reflux of 4.8 seconds and a branch of the GSV at the level of the upper calf. August 2021 LLE venous reflux study revealed no substantial venous reflux and no DVT.  Followed by Dr. Gallo's [facility] PVD clinic for vascular.       Review of Most Recent Labs:  9/11/23:  BUN/CR 26.9 & 1.46.  eGFR 56.  HgbA1C 8.3%.    8/10/23:  CBC unremarkable.    6/5/23:  CR 1.01.  Chol 116. HDL 39.  LDL 57.  Trig 98.  HgbA1C 7.0.   1/15/23:  CBC unremarkable.  Cr 1.26.    11/15/2022:   Chol 109.  HDL 38.  LDL 58.  Trig 65.  HgbA1C 7.8.  PSA 2.57.          Today 9/22/23:  Did get antibiotics for rash between toes and used it, with improvement in rash between toes.  Has not used it recently though.  Did not know rash had returned.  Wearing protective shoes.  Checks feet several times/week.  Toes  "continue to burn and tingle; however, no worse than his usual baseline.  Using Lac-Hydrin and Vaseline on feet several times a week.      6/16/23:  Very happy that he got diabetes at goal.  Trying to eat healthier and lose some weight, as well.  Checking feet/toes daily.  Not aware of any rashes between toes.  Has not used gentian violet in a long time.  Reporting toes in both feet "are hurting real bad" with nerve pain of diabetes.  No new rashes, lesions, or skin breakdown.  Continues with twice/day Lac-Hydrin and Vaseline to feet for moisturizing.      2/28/23:  Still having pain in calves with walking sometimes, along with cold feet, and numbness over all toes on both feet.  Ankles are always swollen.  Applying Lac-Hydrin and Vaseline to both feet, with improvement in dry skin.  Admits to walking barefoot in house.  Denies walking outside barefoot.     11/29/2022:  Having mild discomfort between bilateral 4th and 5th toes, where he has had calluses previously.  Has not been sanding calluses down with [employer] boards, as taught previously.  No rashes between toes.  Reports calf pain with walking (left > right) at times, cold feet, and numbness over all toes.  Ankles are always swollen.  Scheduled to see Dr. Gallo, vascular, 12/2/2022.  Has not been applying any kind of moisturizer to dry skin over bottoms of feet and lower legs.  No new rashes, lesions, or skin breakdown.       Review of Systems:  Except as stated in HPI, all other 10 body systems normal      Physical Exam Vitals & Measurements:    Vitals:    09/22/23 0907   BP: 121/79   Pulse: 67   Resp: 18   Temp: 98.4 °F (36.9 °C)         General:  VSS,  afebrile.    Obese; in no acute distress.  Respiratory:   Breathing even, quiet, and unlabored at rest. No coughing.   Cardiovascular:   Moderate non-pitting edema over bilateral medial and lateral ankles.  No hemosiderin staining or varicosities.  No hair distribution over BLE.   Toenails dystrophic and " overgrown.  DP pulses palpated bilaterally.   PT pulses dopplered bilaterally.    Musculoskeletal:  Full range of motion of all extremities.  Bunion to left 1st met head.   Decreased dorsiflexion and flexion of bilateral ankles, and left hallux toe.  Loss of intrinsic muscle ROM in bilateral feet.  Bilateral pes planus.    Neurologic: A&O X 3; cranial nerves grossly intact.  Normal monofilament testing.  No loss of sensation.    Psychiatric: Calm, cooperative. Mood and affect normal. Responses appropriate  Integumentary:     Ten toenails all overgrown and dystrophic.     Linear calluses to left and right lateral halluxes (left thicker than right).                       Assessment/Plan:    Obesity, BMI 35.97:  This has been identified as a co-factor towards foot health, and increased risk of calluses, and diabetic foot ulcers.   Being managed by PCP.     Encounter for routine diabetic foot care, Type 2, insulin-dependent:  Last visit with PCP, Yelitza Roque, APRN:  9/12/23  Diabetes not at goal, per last HgbA1C.     Diabetic foot care principles reinforced.   Wound clinic and UCC precautions reinforced.      Overgrown toenails:  Procedure Today: cutting and filing of 10 toenails  Informed verbal consent obtained.    Using standard podiatry clippers and Tyesha boards, I cut, trimmed, filed/sanded all ten toenails. No bleeding or discomfort; Mr. Stone tolerated procedure without complications.     Calluses of left and right hallux toes:  Procedure Today: shaving of 2 toe calluses  Rationale:  Calluses can lead to diabetic foot wounds, cellulitis, osteomyelitis, and lower limb amputations.     Informed verbal consent obtained.   Using #4 dermal curette and tyesha boards, I pared and sanded two calluses to healthy soft tissue. No bleeding or discomfort with procedure.     Tinea Pedis:  Mild white rash between left and right 4th toe webs.    Reinforced teaching on importance of checking toe webs thoroughly to prevent  wounds between toes, which can lead to skin and bone infections, osteo, and amputations.    Prescribed CMPD Colistimethate 150 mg, Clinda 150 mg, Mupirocin 20 mg, and Itraconazole 50 mg in gel twice/day.  Script being filled by [facility].  Teaching reinforced on medication, expected outcomes of medication, how to administer medication, and possible s/e of medications.      Xerosis of bilateral feet:  Improved with current tx plan.   CPM   Prescribed Lac-Hydrin 12%, followed by thin layer of Vaseline twice/day.   Instructions reinforced to not put Lac-Hydrin and Vaseline between toes, with rationale.                RTC in three months. Instructed on s/s to call wound clinic for in between clinic visits.

## 2023-10-11 ENCOUNTER — HOSPITAL ENCOUNTER (OUTPATIENT)
Dept: CARDIOLOGY | Facility: HOSPITAL | Age: 58
Discharge: HOME | End: 2023-10-11
Attending: NURSE PRACTITIONER
Payer: MEDICAID

## 2023-10-11 VITALS
WEIGHT: 229 LBS | SYSTOLIC BLOOD PRESSURE: 162 MMHG | BODY MASS INDEX: 35.94 KG/M2 | DIASTOLIC BLOOD PRESSURE: 88 MMHG | HEIGHT: 67 IN

## 2023-10-11 DIAGNOSIS — R06.09 DOE (DYSPNEA ON EXERTION): ICD-10-CM

## 2023-10-11 LAB
AV INDEX (PROSTH): 0.7
AV MEAN GRADIENT: 8 MMHG
AV PEAK GRADIENT: 17 MMHG
AV VALVE AREA BY VELOCITY RATIO: 1.78 CM²
AV VALVE AREA: 2.12 CM²
AV VELOCITY RATIO: 0.59
BSA FOR ECHO PROCEDURE: 2.22 M2
CV ECHO LV RWT: 0.48 CM
DOP CALC AO PEAK VEL: 2.07 M/S
DOP CALC AO VTI: 38.4 CM
DOP CALC LVOT AREA: 3 CM2
DOP CALC LVOT DIAMETER: 1.96 CM
DOP CALC LVOT PEAK VEL: 1.22 M/S
DOP CALC LVOT STROKE VOLUME: 81.42 CM3
DOP CALC MV VTI: 42.8 CM
DOP CALCLVOT PEAK VEL VTI: 27 CM
E WAVE DECELERATION TIME: 257.86 MSEC
E/A RATIO: 1
ECHO LV POSTERIOR WALL: 1.41 CM (ref 0.6–1.1)
FRACTIONAL SHORTENING: 32 % (ref 28–44)
HR MV ECHO: 58 BPM
INTERVENTRICULAR SEPTUM: 1.15 CM (ref 0.6–1.1)
IVC DIAMETER: 1.4 CM
LEFT ATRIUM SIZE: 4.08 CM
LEFT ATRIUM VOLUME INDEX MOD: 15.1 ML/M2
LEFT ATRIUM VOLUME MOD: 32.36 CM3
LEFT INTERNAL DIMENSION IN SYSTOLE: 3.97 CM (ref 2.1–4)
LEFT VENTRICLE DIASTOLIC VOLUME INDEX: 78.39 ML/M2
LEFT VENTRICLE DIASTOLIC VOLUME: 167.76 ML
LEFT VENTRICLE MASS INDEX: 152 G/M2
LEFT VENTRICLE SYSTOLIC VOLUME INDEX: 32.2 ML/M2
LEFT VENTRICLE SYSTOLIC VOLUME: 68.89 ML
LEFT VENTRICULAR INTERNAL DIMENSION IN DIASTOLE: 5.82 CM (ref 3.5–6)
LEFT VENTRICULAR MASS: 326.2 G
LV LATERAL E/E' RATIO: 15.67 M/S
LVOT MG: 3.19 MMHG
LVOT MV: 0.84 CM/S
MV MEAN GRADIENT: 2 MMHG
MV PEAK A VEL: 0.94 M/S
MV PEAK E VEL: 0.94 M/S
MV PEAK GRADIENT: 5 MMHG
MV VALVE AREA BY CONTINUITY EQUATION: 1.9 CM2
OHS LV EJECTION FRACTION SIMPSONS BIPLANE MOD: 59 %
PISA TR MAX VEL: 3.06 M/S
RA MAJOR: 5.19 CM
RA PRESSURE ESTIMATED: 3 MMHG
RV TB RVSP: 6 MMHG
TDI LATERAL: 0.06 M/S
TR MAX PG: 37 MMHG
TRICUSPID ANNULAR PLANE SYSTOLIC EXCURSION: 3.49 CM
TV REST PULMONARY ARTERY PRESSURE: 40 MMHG
Z-SCORE OF LEFT VENTRICULAR DIMENSION IN END DIASTOLE: -1.68
Z-SCORE OF LEFT VENTRICULAR DIMENSION IN END SYSTOLE: -0.44

## 2023-10-11 PROCEDURE — 93306 TTE W/DOPPLER COMPLETE: CPT

## 2023-10-20 ENCOUNTER — PATIENT OUTREACH (OUTPATIENT)
Dept: EMERGENCY MEDICINE | Facility: HOSPITAL | Age: 58
End: 2023-10-20
Payer: MEDICAID

## 2023-10-20 RX ORDER — METFORMIN HYDROCHLORIDE 1000 MG/1
1000 TABLET ORAL 2 TIMES DAILY WITH MEALS
Qty: 60 TABLET | Refills: 3 | Status: SHIPPED | OUTPATIENT
Start: 2023-10-20 | End: 2023-11-14 | Stop reason: SDUPTHER

## 2023-10-23 ENCOUNTER — PATIENT OUTREACH (OUTPATIENT)
Dept: EMERGENCY MEDICINE | Facility: HOSPITAL | Age: 58
End: 2023-10-23
Payer: MEDICAID

## 2023-10-23 ENCOUNTER — OFFICE VISIT (OUTPATIENT)
Dept: ORTHOPEDICS | Facility: CLINIC | Age: 58
End: 2023-10-23
Payer: MEDICAID

## 2023-10-23 VITALS — DIASTOLIC BLOOD PRESSURE: 83 MMHG | SYSTOLIC BLOOD PRESSURE: 145 MMHG

## 2023-10-23 DIAGNOSIS — M17.12 PRIMARY OSTEOARTHRITIS OF LEFT KNEE: Primary | ICD-10-CM

## 2023-10-23 PROCEDURE — 3077F PR MOST RECENT SYSTOLIC BLOOD PRESSURE >= 140 MM HG: ICD-10-PCS | Mod: CPTII,,, | Performed by: NURSE PRACTITIONER

## 2023-10-23 PROCEDURE — 1160F PR REVIEW ALL MEDS BY PRESCRIBER/CLIN PHARMACIST DOCUMENTED: ICD-10-PCS | Mod: CPTII,,, | Performed by: NURSE PRACTITIONER

## 2023-10-23 PROCEDURE — 1159F MED LIST DOCD IN RCRD: CPT | Mod: CPTII,,, | Performed by: NURSE PRACTITIONER

## 2023-10-23 PROCEDURE — 3079F DIAST BP 80-89 MM HG: CPT | Mod: CPTII,,, | Performed by: NURSE PRACTITIONER

## 2023-10-23 PROCEDURE — 3052F HG A1C>EQUAL 8.0%<EQUAL 9.0%: CPT | Mod: CPTII,,, | Performed by: NURSE PRACTITIONER

## 2023-10-23 PROCEDURE — 99214 PR OFFICE/OUTPT VISIT, EST, LEVL IV, 30-39 MIN: ICD-10-PCS | Mod: S$PBB,,, | Performed by: NURSE PRACTITIONER

## 2023-10-23 PROCEDURE — 3052F PR MOST RECENT HEMOGLOBIN A1C LEVEL 8.0 - < 9.0%: ICD-10-PCS | Mod: CPTII,,, | Performed by: NURSE PRACTITIONER

## 2023-10-23 PROCEDURE — 99215 OFFICE O/P EST HI 40 MIN: CPT | Mod: PBBFAC | Performed by: NURSE PRACTITIONER

## 2023-10-23 PROCEDURE — 3079F PR MOST RECENT DIASTOLIC BLOOD PRESSURE 80-89 MM HG: ICD-10-PCS | Mod: CPTII,,, | Performed by: NURSE PRACTITIONER

## 2023-10-23 PROCEDURE — 4010F ACE/ARB THERAPY RXD/TAKEN: CPT | Mod: CPTII,,, | Performed by: NURSE PRACTITIONER

## 2023-10-23 PROCEDURE — 1159F PR MEDICATION LIST DOCUMENTED IN MEDICAL RECORD: ICD-10-PCS | Mod: CPTII,,, | Performed by: NURSE PRACTITIONER

## 2023-10-23 PROCEDURE — 99214 OFFICE O/P EST MOD 30 MIN: CPT | Mod: S$PBB,,, | Performed by: NURSE PRACTITIONER

## 2023-10-23 PROCEDURE — 4010F PR ACE/ARB THEARPY RXD/TAKEN: ICD-10-PCS | Mod: CPTII,,, | Performed by: NURSE PRACTITIONER

## 2023-10-23 PROCEDURE — 3077F SYST BP >= 140 MM HG: CPT | Mod: CPTII,,, | Performed by: NURSE PRACTITIONER

## 2023-10-23 PROCEDURE — 1160F RVW MEDS BY RX/DR IN RCRD: CPT | Mod: CPTII,,, | Performed by: NURSE PRACTITIONER

## 2023-10-23 NOTE — PROGRESS NOTES
Patient returned my phone call,he did go to his Ortho Clinic this AM. A new referral was sent to [facility] in [place] for TKA. He previously was referred and seen by Dr Egan in [place] 07/10/23. During one our last phone conversation he stated that Dr Egan said his knee was not bad enough for surgery but looking at  Dr Egan documentation of evaluation, patient missed understood.. Explained once [place] reviews his referral he will be contacted with an appointment. He verified he is continuing to monitor his BS and B/P daily with logging his readings. Stressed the importance of maintaining his diet restrictions. Reminded to use urgent care for minor issues. Voices understanding.

## 2023-10-23 NOTE — PROGRESS NOTES
Subjective:   PATIENT ID: Michael Stone is a 58 y.o. male. Non-smoker. Employment HX: [employer], currently employed.    Seen [facility] for same DX since 2022.   CHIEF COMPLAINT: Knee Pain of the Right Knee (Pt. Did PT and said it wasn't effective) and Knee Pain of the Left Knee    HPI:    Bilateral L > R aching medial, lateral knee pain.   Injury: no known injury  Onset: several years ago fluctuates   Modifying Factors: worse with activity, improves with rest, stiffness after immobilization, and improves with less than 30 minutes activity  Associated Symptoms: crepitus, decreased ROM, and swelling   Activity: sedentary with light activity and pain moderately interferes with ADLs   Previous Treatments:  BMI reduction ongoing education, HEP withTheraBand, RX NSAIDs, RX PT completed 6 wks/ 2 xs per week d/c'd 8/2021, and VS injections since 2022 last series 2/2022  with some relief but symptoms returning  PMH: + CVD and + DM   Family History: + OA    NOTE: Follow up      Patient given VS injections 2/2022 with good relief but only lasting 3-4 months then symptoms returning L>R and affecting ADLs and difficulty sleeping due to increased pain at night.  Also reports continued recurrent left knee effusion despite draining.   Interested in surgical treatment options due to symptoms affecting ADLs and quality of life.    Current Outpatient Medications:     albuterol (PROVENTIL/VENTOLIN HFA) 90 mcg/actuation inhaler, Inhale 2 puffs into the lungs every 6 (six) hours as needed for Wheezing or Shortness of Breath., Disp: 6.7 g, Rfl: 2    ammonium lactate 12 % Crea, APPLY TWICE DAILY. APPLY A THIN LAYER OF VASELINE OVER LAC-HYDRIN TWICE DAILY. NOTHING BETWEEN TOES, Disp: , Rfl:     aspirin (ECOTRIN) 81 MG EC tablet, Take 1 tablet (81 mg total) by mouth once daily., Disp: 90 tablet, Rfl: 3    atorvastatin (LIPITOR) 80 MG tablet, Take 1 tablet (80 mg total) by mouth nightly., Disp: 90 tablet, Rfl: 3    BD VEO INSULIN  "SYRINGE UF 1 mL 31 gauge x 15/64" Syrg, USE THREE TIMES DAILY AS DIRECTED, Disp: 100 each, Rfl: 0    blood sugar diagnostic (ONETOUCH ULTRA TEST) Strp, CHECK BLOOD SUGAR THREE TIMES A DAY BEFORE MEALS AS DIRECTED BY DOCTOR, Disp: 100 strip, Rfl: 11    blood sugar diagnostic Strp,  One Touch Ultra 2 Test Strips, See Instructions, Use to check CBG TID, # 100 EA, 11 Refill(s), Pharmacy: [facility], 168, cm, Height/Length Dosing, 01/21/22 10:22:00 CST, 116, kg, Weight Dosing, 01/21/22 10:22:00 CST, Disp: , Rfl:     colistimethate (COLYMYCIN) 150 mg injection, SMARTSIG:Topical, Disp: , Rfl:     dulaglutide (TRULICITY) 4.5 mg/0.5 mL pen injector, Inject 4.5 mg into the skin every 7 days., Disp: 4 pen , Rfl: 3    furosemide (LASIX) 20 MG tablet, Take 1 tablet (20 mg total) by mouth once daily., Disp: 90 tablet, Rfl: 3    HUMALOG U-100 INSULIN 100 unit/mL injection, Inject 15 Units into the skin 3 (three) times daily before meals., Disp: 25 mL, Rfl: 1    hydroCHLOROthiazide (HYDRODIURIL) 50 MG tablet, Take 2 tablets (100 mg total) by mouth once daily., Disp: 180 tablet, Rfl: 3    ID NOW COVID-19 TEST KIT Kit, TEST AS DIRECTED TODAY, Disp: , Rfl:     insulin syringes, disposable, 1 mL Syrg, Inject 1 Syringe into the skin 3 (three) times daily before meals., Disp: 100 each, Rfl: 11    lancets (ONETOUCH DELICA LANCETS) 33 gauge Misc, CHECK BLOOD SUGAR THREE TIMES A DAY BEFORE MEALS AS DIRECTED BY DOCTOR, Disp: 100 each, Rfl: 11    LANTUS SOLOSTAR U-100 INSULIN glargine 100 units/mL SubQ pen, Inject 52 units subq qam and 42 units subq at bedtime., Disp: 85 mL, Rfl: 1    losartan (COZAAR) 100 MG tablet, Take 1 tablet (100 mg total) by mouth once daily., Disp: 90 tablet, Rfl: 3    metFORMIN (GLUCOPHAGE) 1000 MG tablet, TAKE 1 TABLET BY MOUTH TWICE DAILY WITH MEALS, Disp: 60 tablet, Rfl: 3    NIFEdipine (ADALAT CC) 90 MG TbSR, Take 1 tablet (90 mg total) by mouth once daily., Disp: 90 tablet, Rfl: 3    ondansetron " (ZOFRAN-ODT) 4 MG TbDL, Take 1 tablet (4 mg total) by mouth every 6 (six) hours as needed (Nausea)., Disp: 14 tablet, Rfl: 0    ONETOUCH DELICA PLUS LANCET 30 gauge Misc, CHECK BLOOD SUGAR THREE TIMES A DAY BEFORE MEALS AS DIRECTED BY DOCTOR, Disp: , Rfl:     potassium chloride (K-TAB) 20 mEq, Take 1 tablet (20 mEq total) by mouth once daily., Disp: 90 tablet, Rfl: 3    diclofenac sodium (VOLTAREN) 1 % Gel, Apply 2 g topically 4 (four) times daily. for 7 days, Disp: 100 g, Rfl: 0    nitroGLYCERIN (NITROSTAT) 0.4 MG SL tablet, Place 1 tablet (0.4 mg total) under the tongue every 5 (five) minutes as needed for Chest pain., Disp: 25 tablet, Rfl: 6  Review of patient's allergies indicates:  No Known Allergies  Hemoglobin A1c   Date Value Ref Range Status   09/11/2023 8.3 (H) <=7.0 % Final   08/10/2023 9.0 (H) <=7.0 % Final   06/05/2023 7.0 <=7.0 % Final      There is no height or weight on file to calculate BMI.   Vitals:    10/23/23 0754   BP: (!) 145/83   PainSc:   8      REVIEW OF SYSTEMS:  A ten-point review of systems was performed and is negative, except as mentioned above   Objective:   MSK Bilateral Knee  General:  No apparent distress, no pain indicators, obesity   Inspection: limping gait, mild effusion, full weight bearing, no erythema, no contusion, mild quad deconditioning, varus deformity   Palpation: BILATERAL knees tenderness with palpation at medial joint line, peripatellar soft tissue, non-tender: patellar tendon, tibial plateau  ROM:    Active Flexion / Extension (0-140):  0 / 119 painful (R)  2 / 106 painful (L)  Strength:   Flexion     4 / 5 (R)  4 / 5 (L)  Extension     4 / 5 (R)  4 / 5 (L)  Special Testing:  IT Band Testing  Fay's Test:  -- (R)  -- (L)  Effusion Testing  Ballotable Effusion:   -- (R)  + (L)  Fluid Wave:    -- (R)  + (L)  Patellar Testing  Patellar Grind:    + (R)  + (L)  Patellar Facet Pain:   + (R)  + (L)  Apprehension:    -- (R)  -- (L)  Meniscal Testing  Micheal's test:    +  (R)  + (L)  Thessaly's Test:    Not Tested (R)  Not Tested (L)  Ligament Testing  ACL Anterior Drawer:   -- (R) -- (L)  PCL Posterior Drawer:   -- (R) -- (L)  LCL Varus Test:    -- (R) -- (L)  MCL Valgus Test:    -- (R) -- (L)  Hip Exam normal  Ankle Exam normal  Neurovascular: Intact to light touch  Neuro/ Psych: Awake, alert, oriented, normal mood and affect  Lymphatic: No LAD  Skin/ Soft Tissue: no rash, skin intact  Assessment:   IMAGING: X-ray 4 views of right and left knee dated 6/7/23 reviewed and independently interpreted by me.  Discussed with patient. Noted no acute, DJD noted.    XR KNEE COMP 4 OR MORE VIEWS RIGHT 6/7/23  CLINICAL HISTORY:  Pain in right knee  COMPARISON:  None.  FINDINGS:  No acute displaced fractures or dislocations.  There is narrowing of the medial compartment of the knee joint with some degenerative changes of the lateral compartment articular spaces are otherwise preserved with smooth articular surfaces  No blastic or lytic lesions.  Soft tissues within normal limits.  Impression:  Degenerative changes.    XR KNEE COMP 4 OR MORE VIEWS LEFT 6/7/23  CLINICAL HISTORY:  Pain in right knee  COMPARISON:  None.  FINDINGS:  No acute displaced fractures or dislocations.  There is narrowing of the lateral compartment of the knee joint with sclerosis and marginal osteophytosis marginal osteophytes also identified of the medial compartment on the standing projection there are degenerative changes of the medial compartment of the contralateral knee articular spaces otherwise preserved with smooth articular surfaces  No blastic or lytic lesions.  Soft tissues within normal limits.  Impression:  Degenerative changes.    EMR REVIEW: completed with noted prior visit 6/7/23 reviewed.    DIAGNOSIS:  1. Primary osteoarthritis of left knee    2. BMI 35.0-35.9,adult      Plan:      Ongoing education about DX and treatment recommendations including conservative treatments of daily HEP with TheraBand, BMI  reduction goal 5-10% of body weight (240# overall goal), muscle strengthening with use of stationary bike (RPM set at 80 or > with slow progression to goal of 40 minutes 3-4 times per week as tolerated), adequate vit D/C, glucosamine 1500 mg/day and daily acetaminophen 1000 mg 3 times/ day if able to tolerate.    Treatment plan: Moderate exacerbation of chronic Bilateral L > R Severe knee arthritis Referral for TKA eval. to [facility] [phone] for Left knee DJD with failed conservative treatments including: RX NSAID, formal RX PT, HEP > 3 months, and VS.    Procedure:  offered and declines due to interest in TKA eval  RX Medications: continue medications as RX per PCP .  RTC: PRN if symptoms worsen or return  NOTE: None    Nica Perez FNP  Procedure Note:   None    Total Time Spent with Patient: 30 minutes .  Visit Start Time: 0800  10 minutes spent prior to visit reviewing EMR, prior labs and x-rays.  10 minutes spent in visit with patient face-to-face time completing exam, obtaining history, educating on DX and treatment plan.  10 minutes spent after visit completing EMR documentation.   Visit End Time: 0830

## 2023-11-12 ENCOUNTER — LAB VISIT (OUTPATIENT)
Dept: LAB | Facility: HOSPITAL | Age: 58
End: 2023-11-12
Attending: INTERNAL MEDICINE
Payer: MEDICAID

## 2023-11-12 DIAGNOSIS — E11.65 UNCONTROLLED TYPE 2 DIABETES MELLITUS WITH HYPERGLYCEMIA: Chronic | ICD-10-CM

## 2023-11-12 LAB
ALBUMIN SERPL-MCNC: 3.7 G/DL (ref 3.5–5)
ALBUMIN/GLOB SERPL: 0.9 RATIO (ref 1.1–2)
ALP SERPL-CCNC: 96 UNIT/L (ref 40–150)
ALT SERPL-CCNC: 25 UNIT/L (ref 0–55)
AST SERPL-CCNC: 18 UNIT/L (ref 5–34)
BILIRUB SERPL-MCNC: 0.7 MG/DL
BUN SERPL-MCNC: 24.7 MG/DL (ref 8.4–25.7)
CALCIUM SERPL-MCNC: 9.9 MG/DL (ref 8.4–10.2)
CHLORIDE SERPL-SCNC: 106 MMOL/L (ref 98–107)
CO2 SERPL-SCNC: 25 MMOL/L (ref 22–29)
CREAT SERPL-MCNC: 1.18 MG/DL (ref 0.73–1.18)
EST. AVERAGE GLUCOSE BLD GHB EST-MCNC: 174.3 MG/DL
GFR SERPLBLD CREATININE-BSD FMLA CKD-EPI: >60 MLS/MIN/1.73/M2
GLOBULIN SER-MCNC: 4 GM/DL (ref 2.4–3.5)
GLUCOSE SERPL-MCNC: 198 MG/DL (ref 74–100)
HBA1C MFR BLD: 7.7 %
POTASSIUM SERPL-SCNC: 3.3 MMOL/L (ref 3.5–5.1)
PROT SERPL-MCNC: 7.7 GM/DL (ref 6.4–8.3)
SODIUM SERPL-SCNC: 142 MMOL/L (ref 136–145)

## 2023-11-12 PROCEDURE — 36415 COLL VENOUS BLD VENIPUNCTURE: CPT

## 2023-11-12 PROCEDURE — 80053 COMPREHEN METABOLIC PANEL: CPT

## 2023-11-12 PROCEDURE — 83036 HEMOGLOBIN GLYCOSYLATED A1C: CPT

## 2023-11-13 ENCOUNTER — PATIENT OUTREACH (OUTPATIENT)
Dept: EMERGENCY MEDICINE | Facility: HOSPITAL | Age: 58
End: 2023-11-13
Payer: MEDICAID

## 2023-11-13 PROBLEM — N17.9 AKI (ACUTE KIDNEY INJURY): Status: RESOLVED | Noted: 2023-08-10 | Resolved: 2023-11-13

## 2023-11-14 ENCOUNTER — OFFICE VISIT (OUTPATIENT)
Dept: INTERNAL MEDICINE | Facility: CLINIC | Age: 58
End: 2023-11-14
Payer: MEDICAID

## 2023-11-14 VITALS
RESPIRATION RATE: 20 BRPM | WEIGHT: 241.38 LBS | DIASTOLIC BLOOD PRESSURE: 61 MMHG | TEMPERATURE: 98 F | BODY MASS INDEX: 37.89 KG/M2 | SYSTOLIC BLOOD PRESSURE: 104 MMHG | HEART RATE: 65 BPM | HEIGHT: 67 IN

## 2023-11-14 DIAGNOSIS — I10 PRIMARY HYPERTENSION: Chronic | ICD-10-CM

## 2023-11-14 DIAGNOSIS — Z12.5 SCREENING FOR MALIGNANT NEOPLASM OF PROSTATE: ICD-10-CM

## 2023-11-14 DIAGNOSIS — E66.1 CLASS 2 DRUG-INDUCED OBESITY WITH SERIOUS COMORBIDITY AND BODY MASS INDEX (BMI) OF 37.0 TO 37.9 IN ADULT: Chronic | ICD-10-CM

## 2023-11-14 DIAGNOSIS — E11.65 UNCONTROLLED TYPE 2 DIABETES MELLITUS WITH HYPERGLYCEMIA: Primary | Chronic | ICD-10-CM

## 2023-11-14 DIAGNOSIS — E87.6 HYPOKALEMIA: Chronic | ICD-10-CM

## 2023-11-14 PROCEDURE — 4010F ACE/ARB THERAPY RXD/TAKEN: CPT | Mod: CPTII,,, | Performed by: NURSE PRACTITIONER

## 2023-11-14 PROCEDURE — 3074F SYST BP LT 130 MM HG: CPT | Mod: CPTII,,, | Performed by: NURSE PRACTITIONER

## 2023-11-14 PROCEDURE — 4010F PR ACE/ARB THEARPY RXD/TAKEN: ICD-10-PCS | Mod: CPTII,,, | Performed by: NURSE PRACTITIONER

## 2023-11-14 PROCEDURE — 3078F DIAST BP <80 MM HG: CPT | Mod: CPTII,,, | Performed by: NURSE PRACTITIONER

## 2023-11-14 PROCEDURE — 3078F PR MOST RECENT DIASTOLIC BLOOD PRESSURE < 80 MM HG: ICD-10-PCS | Mod: CPTII,,, | Performed by: NURSE PRACTITIONER

## 2023-11-14 PROCEDURE — 3074F PR MOST RECENT SYSTOLIC BLOOD PRESSURE < 130 MM HG: ICD-10-PCS | Mod: CPTII,,, | Performed by: NURSE PRACTITIONER

## 2023-11-14 PROCEDURE — 99215 OFFICE O/P EST HI 40 MIN: CPT | Mod: PBBFAC | Performed by: NURSE PRACTITIONER

## 2023-11-14 PROCEDURE — 3051F HG A1C>EQUAL 7.0%<8.0%: CPT | Mod: CPTII,,, | Performed by: NURSE PRACTITIONER

## 2023-11-14 PROCEDURE — 3051F PR MOST RECENT HEMOGLOBIN A1C LEVEL 7.0 - < 8.0%: ICD-10-PCS | Mod: CPTII,,, | Performed by: NURSE PRACTITIONER

## 2023-11-14 PROCEDURE — 1159F MED LIST DOCD IN RCRD: CPT | Mod: CPTII,,, | Performed by: NURSE PRACTITIONER

## 2023-11-14 PROCEDURE — 1160F RVW MEDS BY RX/DR IN RCRD: CPT | Mod: CPTII,,, | Performed by: NURSE PRACTITIONER

## 2023-11-14 PROCEDURE — 99214 PR OFFICE/OUTPT VISIT, EST, LEVL IV, 30-39 MIN: ICD-10-PCS | Mod: S$PBB,,, | Performed by: NURSE PRACTITIONER

## 2023-11-14 PROCEDURE — 1159F PR MEDICATION LIST DOCUMENTED IN MEDICAL RECORD: ICD-10-PCS | Mod: CPTII,,, | Performed by: NURSE PRACTITIONER

## 2023-11-14 PROCEDURE — 99214 OFFICE O/P EST MOD 30 MIN: CPT | Mod: S$PBB,,, | Performed by: NURSE PRACTITIONER

## 2023-11-14 PROCEDURE — 3008F BODY MASS INDEX DOCD: CPT | Mod: CPTII,,, | Performed by: NURSE PRACTITIONER

## 2023-11-14 PROCEDURE — 3008F PR BODY MASS INDEX (BMI) DOCUMENTED: ICD-10-PCS | Mod: CPTII,,, | Performed by: NURSE PRACTITIONER

## 2023-11-14 PROCEDURE — 1160F PR REVIEW ALL MEDS BY PRESCRIBER/CLIN PHARMACIST DOCUMENTED: ICD-10-PCS | Mod: CPTII,,, | Performed by: NURSE PRACTITIONER

## 2023-11-14 RX ORDER — INSULIN GLARGINE 100 [IU]/ML
INJECTION, SOLUTION SUBCUTANEOUS
Qty: 85 ML | Refills: 1 | Status: SHIPPED | OUTPATIENT
Start: 2023-11-14

## 2023-11-14 RX ORDER — DULAGLUTIDE 4.5 MG/.5ML
4.5 INJECTION, SOLUTION SUBCUTANEOUS
Qty: 4 PEN | Refills: 3 | Status: SHIPPED | OUTPATIENT
Start: 2023-11-14 | End: 2024-02-16 | Stop reason: SDUPTHER

## 2023-11-14 RX ORDER — METFORMIN HYDROCHLORIDE 1000 MG/1
1000 TABLET ORAL 2 TIMES DAILY WITH MEALS
Qty: 60 TABLET | Refills: 11 | Status: SHIPPED | OUTPATIENT
Start: 2023-11-14

## 2023-11-14 RX ORDER — INSULIN LISPRO 100 [IU]/ML
15 INJECTION, SOLUTION INTRAVENOUS; SUBCUTANEOUS
Qty: 25 ML | Refills: 1 | Status: SHIPPED | OUTPATIENT
Start: 2023-11-14 | End: 2024-01-25

## 2023-11-14 NOTE — PROGRESS NOTES
Yelitza Roque, BIANCA   [facility]  [address] [facility] [address]      PATIENT NAME: Michael Stone  : 1965  DATE: 23  MRN: [id number]      Reason for Visit / Chief Complaint: Diabetes (Lab results )       History of Present Illness / Problem Focused Workflow     Michael Stone is a 58 y.o. Black or  male presents to the clinic for uncontrolled DM f/u. PMH uncontrolled DM, HLD, HTN, mild CAD, Covid pneumonia (21), adrenal adenoma, vit d deficiency, covid + 23, morbid obesity, cervical spinal stenosis, MINA on CPAP, bilateral knee OA, tinea pedis and med non-compliance. Followed by [facility] cardio, ortho and wound clinics and Dr. Gallo, vascular.     Today, pt reports continued med compliance. Is attempting ADA/low sodium diet. CBGs ranging 150-low 200s when checked at least BID. Denies any symptomatic lows. Labs reviewed with pt. Denies chest pain, shortness of breath, cough, fever, headache, dizziness, weakness, abdominal pain, nausea, vomiting, diarrhea, constipation, dysuria, depression, anxiety, SI/HI.    Review of Systems     Review of Systems     See HPI for details    Constitutional: Denies Change in appetite. Denies Chills. Denies Fever. Denies Night sweats.  Eye: Denies Blurred vision. Denies Discharge. Denies Eye pain.  ENT: Denies Decreased hearing. Denies Sore throat. Denies Swollen glands.  Respiratory: Denies Cough. Denies Shortness of breath. Denies Shortness of breath with exertion. Denies Wheezing.  Cardiovascular: Denies Chest pain at rest. Denies Chest pain with exertion. Denies Irregular heartbeat. Denies Palpitations. Denies Edema.  Gastrointestinal: Denies Abdominal pain. Denies Diarrhea. Denies Nausea. Denies Vomiting. Denies Hematemesis or Hematochezia.  Genitourinary: Denies Dysuria. Denies Urinary frequency. Denies Urinary urgency. Denies Blood in urine.  Endocrine: Denies Cold  intolerance. Denies Excessive thirst. Denies Heat intolerance. Denies Weight loss. Denies Weight gain.  Musculoskeletal: Denies Weakness.  Integumentary: Denies Rash. Denies Itching. Denies Dry skin.  Neurologic: Denies Dizziness. Denies Fainting. Denies Headache.  Psychiatric: Denies Depression. Denies Anxiety. Denies Suicidal/Homicidal ideations.    All Other ROS: Negative except as stated in HPI.     Medical / Surgical / Social / Family History       ----------------------------  Adrenal adenoma  Callus of foot  Coronary artery disease  Diabetes mellitus  Hyperlipidemia  Hypertension  Spinal stenosis  Unspecified osteoarthritis, unspecified site     Past Surgical History:   Procedure Laterality Date    ANGIOGRAPHY      CHOLECYSTECTOMY      FOOT SURGERY Right        Social History     Socioeconomic History    Marital status: Single   Tobacco Use    Smoking status: Never     Passive exposure: Never    Smokeless tobacco: Never   Substance and Sexual Activity    Alcohol use: Never    Drug use: Never    Sexual activity: Yes     Partners: Female     Birth control/protection: None     Social Determinants of Health     Financial Resource Strain: Low Risk  (6/9/2022)    Overall Financial Resource Strain (CARDIA)     Difficulty of Paying Living Expenses: Not very hard   Food Insecurity: No Food Insecurity (7/14/2022)    Hunger Vital Sign     Worried About Running Out of Food in the Last Year: Never true     Ran Out of Food in the Last Year: Never true   Transportation Needs: No Transportation Needs (7/14/2022)    PRAPARE - Transportation     Lack of Transportation (Medical): No     Lack of Transportation (Non-Medical): No   Physical Activity: Insufficiently Active (6/9/2022)    Exercise Vital Sign     Days of Exercise per Week: 4 days     Minutes of Exercise per Session: 20 min   Stress: Stress Concern Present (7/14/2022)    [employer] [place] [employer] - Occupational Stress Questionnaire     Feeling of Stress  ": To some extent   Social Connections: Moderately Isolated (6/9/2022)    Social Connection and Isolation Panel [NHANES]     Frequency of Communication with Friends and Family: More than three times a week     Frequency of Social Gatherings with Friends and Family: More than three times a week     Attends Buddhism Services: 1 to 4 times per year     Active Member of Clubs or Organizations: No     Marital Status: Never    Housing Stability: Low Risk  (7/14/2022)    Housing Stability Vital Sign     Unable to Pay for Housing in the Last Year: No     Number of Places Lived in the Last Year: 1     Unstable Housing in the Last Year: No        Family History   Problem Relation Age of Onset    Hypertension Mother     Heart disease Mother     Stroke Father     Cancer Sister     Heart disease Sister     Cancer Sister     Heart disease Sister     Heart disease Brother     Heart disease Brother         Medications and Allergies     Medications  Current Outpatient Medications   Medication Instructions    albuterol (PROVENTIL/VENTOLIN HFA) 90 mcg/actuation inhaler 2 puffs, Inhalation, Every 6 hours PRN    ammonium lactate 12 % Crea APPLY TWICE DAILY. APPLY A THIN LAYER OF VASELINE OVER LAC-HYDRIN TWICE DAILY. NOTHING BETWEEN TOES    aspirin (ECOTRIN) 81 mg, Oral, Daily    atorvastatin (LIPITOR) 80 mg, Oral, Nightly    BD VEO INSULIN SYRINGE UF 1 mL 31 gauge x 15/64" Syrg USE THREE TIMES DAILY AS DIRECTED    blood sugar diagnostic (ONETOUCH ULTRA TEST) Strp CHECK BLOOD SUGAR THREE TIMES A DAY BEFORE MEALS AS DIRECTED BY DOCTOR    blood sugar diagnostic Strp   One Touch Ultra 2 Test Strips, See Instructions, Use to check CBG TID, # 100 EA, 11 Refill(s), Pharmacy: [facility], 168, cm, Height/Length Dosing, 01/21/22 10:22:00 CST, 116, kg, Weight Dosing, 01/21/22 10:22:00 CST    colistimethate (COLYMYCIN) 150 mg injection SMARTSIG:Topical    diclofenac sodium (VOLTAREN) 2 g, Topical (Top), 4 times daily    " "furosemide (LASIX) 20 mg, Oral, Daily    HumaLOG U-100 Insulin 15 Units, Subcutaneous, 3 times daily before meals    hydroCHLOROthiazide (HYDRODIURIL) 100 mg, Oral, Daily    ID NOW COVID-19 TEST KIT Kit TEST AS DIRECTED TODAY    insulin syringes, disposable, 1 mL Syrg 1 Syringe, Subcutaneous, 3 times daily before meals    lancets (ONETOUCH DELICA LANCETS) 33 gauge Misc CHECK BLOOD SUGAR THREE TIMES A DAY BEFORE MEALS AS DIRECTED BY DOCTOR    KWAKU ENCINAS U-100 INSULIN glargine 100 units/mL SubQ pen Inject 52 units subq qam and 42 units subq at bedtime.    losartan (COZAAR) 100 mg, Oral, Daily    metFORMIN (GLUCOPHAGE) 1,000 mg, Oral, 2 times daily with meals    NIFEdipine (ADALAT CC) 90 mg, Oral, Daily    nitroGLYCERIN (NITROSTAT) 0.4 mg, Sublingual, Every 5 min PRN    ondansetron (ZOFRAN-ODT) 4 mg, Oral, Every 6 hours PRN    ONETOUCH DELICA PLUS LANCET 30 gauge Misc CHECK BLOOD SUGAR THREE TIMES A DAY BEFORE MEALS AS DIRECTED BY DOCTOR    potassium chloride (K-TAB) 20 mEq 20 mEq, Oral, Daily    TRULICITY 4.5 mg, Subcutaneous, Every 7 days         Allergies  Review of patient's allergies indicates:  No Known Allergies    Physical Examination     /61 (BP Location: Right arm, Patient Position: Sitting, BP Method: Large (Automatic))   Pulse 65   Temp 97.9 °F (36.6 °C) (Oral)   Resp 20   Ht 5' 7.01" (1.702 m)   Wt 109.5 kg (241 lb 6.4 oz)   BMI 37.80 kg/m²     Physical Exam  Constitutional:       Appearance: He is obese.         General: Alert and oriented, No acute distress.  Head: Normocephalic, Atraumatic.  Eye: Pupils are equal, round and reactive to light, Extraocular movements are intact, Sclera non-icteric.  Ears/Nose/Throat: Normal, Mucosa moist,Clear.  Neck/Thyroid: Supple, Non-tender, No carotid bruit, No lymphadenopathy, No JVD, Full range of motion.  Respiratory: Clear to auscultation bilaterally; No wheezes, rales or rhonchi,Non-labored respirations, Symmetrical chest wall " expansion.  Cardiovascular: Regular rate and rhythm, S1/S2 normal, No murmurs, rubs or gallops.  Gastrointestinal: Soft, Non-tender, Non-distended, Normal bowel sounds, No palpable organomegaly.  Musculoskeletal: Normal range of motion.  Integumentary: Warm, Dry, Intact, No suspicious lesions or rashes.  Extremities: No clubbing, cyanosis or edema  Neurologic: No focal deficits, Cranial Nerves II-XII are grossly intact, Motor strength normal upper and lower extremities, Sensory exam intact.  Psychiatric: Normal interaction, Coherent speech, Appropriate affect       Results     Lab Results   Component Value Date    WBC 6.99 08/10/2023    HGB 13.7 (L) 08/10/2023    HCT 40.7 (L) 08/10/2023     08/10/2023    CHOL 116 06/05/2023    TRIG 98 06/05/2023    ALT 25 11/12/2023    AST 18 11/12/2023     11/12/2023    K 3.3 (L) 11/12/2023    CREATININE 1.18 11/12/2023    BUN 24.7 11/12/2023    CO2 25 11/12/2023    TSH 1.3340 11/15/2022    PSA 2.57 11/15/2022    INR 0.97 08/03/2021    HGBA1C 7.7 (H) 11/12/2023         Assessment and Plan (including Health Maintenance)     Plan:     1. Uncontrolled type 2 diabetes mellitus with hyperglycemia  A1C 7.7 not at goal. Previous A1C 8.3  Continue trulicity 4.5 mg weekly.  Continue lantus, humalog and metformin.  Pt to contact clinic if begins to persistent low readings.  Encouraged and congratulated on achieving better glucose control.   Follow ADA diet.  Avoid soda, simple sweets, and limit rice/pasta/bread/starches and consume brown options when possible.   Maintain healthy weight with BMI goal <30.   Perform aerobic exercise for 150 minutes per week (or 5 days a week for 30 minutes each day).   Examine feet daily.   Obtain annual dilated eye exam.  Eye exam: 6/26/23  Foot exam: 3/3/23  - Microalbumin/Creatinine Ratio, Urine; Future  - Urinalysis, Reflex to Urine Culture; Future  - CBC Auto Differential; Future  - Comprehensive Metabolic Panel; Future  - Hemoglobin A1C;  Future    2. Class 2 drug-induced obesity with serious comorbidity and body mass index (BMI) of 37.0 to 37.9 in adult  BMI 37. Goal BMI <30.  Aerobic exercise 150 minutes per week.  Avoid soda, simple sugars, sweets, excessive rice, pasta, potatoes or bread.   Choose brown options when available and portion control.  Limit fast foods and fried foods.   Choose complex carbs in moderation (ex: green, leafy vegetables, beans, oatmeal).  Eat plenty of fresh fruits and vegetables with lean meats daily.   Consider permanent healthy lifestyle changes.    - TSH; Future    3. Hypokalemia  K 3.3  Takes Kdur 20 meq daily.  Denies s/s of hypokalemia.  Repeat BMP in 2 weeks.  Take meds as prescribed.  Incorporated potassium rich foods into you diet such as nuts, seeds, peas, beans (dried), whole grains, fresh fruits and vegetables, lean red meat and yogurt.  Education provided on additional sources of potassium-rich foods including: carrots, broccoli, potatoes, celery, spinach, squash, tomatoes, avocados, apricots, cantaloupe, bananas, nectarines, prunes, figs and raisins.  Seek health care assistance if you experience chest pain, shortness of breath, vomiting or diarrhea lasting more than 2 days, fainting, palpitations, or weakness worsens.    - Basic Metabolic Panel; Future    4. Screening for malignant neoplasm of prostate  - PSA, Screening; Future      Problem List Items Addressed This Visit          Cardiac/Vascular    Primary hypertension (Chronic)       Renal/    Hypokalemia (Chronic)    Relevant Orders    Basic Metabolic Panel       Endocrine    Uncontrolled type 2 diabetes mellitus with hyperglycemia - Primary (Chronic)    Relevant Medications    dulaglutide (TRULICITY) 4.5 mg/0.5 mL pen injector    HUMALOG U-100 INSULIN 100 unit/mL injection    metFORMIN (GLUCOPHAGE) 1000 MG tablet    LANTUS SOLOSTAR U-100 INSULIN glargine 100 units/mL SubQ pen    Other Relevant Orders    Microalbumin/Creatinine Ratio, Urine     Urinalysis, Reflex to Urine Culture    CBC Auto Differential    Comprehensive Metabolic Panel    Hemoglobin A1C    Class 2 drug-induced obesity with serious comorbidity and body mass index (BMI) of 37.0 to 37.9 in adult (Chronic)    Relevant Orders    TSH     Other Visit Diagnoses       Screening for malignant neoplasm of prostate        Relevant Orders    PSA, Screening              Health Maintenance Due   Topic Date Due    COVID-19 Vaccine (4 - 2023-24 season) 09/01/2023    Diabetes Urine Screening  11/28/2023     Virtual visit on 12/7/23 with lab results - hypokalemia.  Follow up in about 3 months (around 2/14/2024) for Follow up, Lab Results.        Signature:  BIANCA Cabral  [facility]  [address] [facility] [address]

## 2023-11-29 ENCOUNTER — LAB VISIT (OUTPATIENT)
Dept: LAB | Facility: HOSPITAL | Age: 58
End: 2023-11-29
Attending: NURSE PRACTITIONER
Payer: MEDICAID

## 2023-11-29 ENCOUNTER — TELEPHONE (OUTPATIENT)
Dept: INTERNAL MEDICINE | Facility: CLINIC | Age: 58
End: 2023-11-29
Payer: MEDICAID

## 2023-11-29 DIAGNOSIS — E66.1 CLASS 2 DRUG-INDUCED OBESITY WITH SERIOUS COMORBIDITY AND BODY MASS INDEX (BMI) OF 37.0 TO 37.9 IN ADULT: Chronic | ICD-10-CM

## 2023-11-29 DIAGNOSIS — Z12.5 SCREENING FOR MALIGNANT NEOPLASM OF PROSTATE: ICD-10-CM

## 2023-11-29 DIAGNOSIS — E87.6 HYPOKALEMIA: Chronic | ICD-10-CM

## 2023-11-29 DIAGNOSIS — E11.65 UNCONTROLLED TYPE 2 DIABETES MELLITUS WITH HYPERGLYCEMIA: ICD-10-CM

## 2023-11-29 LAB
ANION GAP SERPL CALC-SCNC: 9 MEQ/L
APPEARANCE UR: CLEAR
BACTERIA #/AREA URNS AUTO: NORMAL /HPF
BILIRUB UR QL STRIP.AUTO: NEGATIVE
BUN SERPL-MCNC: 18.2 MG/DL (ref 8.4–25.7)
CALCIUM SERPL-MCNC: 9.2 MG/DL (ref 8.4–10.2)
CHLORIDE SERPL-SCNC: 109 MMOL/L (ref 98–107)
CO2 SERPL-SCNC: 30 MMOL/L (ref 22–29)
COLOR UR AUTO: YELLOW
CREAT SERPL-MCNC: 0.94 MG/DL (ref 0.73–1.18)
CREAT UR-MCNC: 126.7 MG/DL (ref 63–166)
CREAT/UREA NIT SERPL: 19
GFR SERPLBLD CREATININE-BSD FMLA CKD-EPI: >60 MLS/MIN/1.73/M2
GLUCOSE SERPL-MCNC: 38 MG/DL (ref 74–100)
GLUCOSE UR QL STRIP.AUTO: NEGATIVE
KETONES UR QL STRIP.AUTO: NEGATIVE
LEUKOCYTE ESTERASE UR QL STRIP.AUTO: NEGATIVE
MICROALBUMIN UR-MCNC: 8.9 UG/ML
MICROALBUMIN/CREAT RATIO PNL UR: 7 MG/GM CR (ref 0–30)
NITRITE UR QL STRIP.AUTO: NEGATIVE
PH UR STRIP.AUTO: 6 [PH]
POTASSIUM SERPL-SCNC: 3.5 MMOL/L (ref 3.5–5.1)
PROT UR QL STRIP.AUTO: NEGATIVE
PSA SERPL-MCNC: 1.64 NG/ML
RBC #/AREA URNS AUTO: NORMAL /HPF
RBC UR QL AUTO: ABNORMAL
SODIUM SERPL-SCNC: 148 MMOL/L (ref 136–145)
SP GR UR STRIP.AUTO: 1.02 (ref 1–1.03)
SQUAMOUS #/AREA URNS AUTO: NORMAL /HPF
TSH SERPL-ACNC: 1.41 UIU/ML (ref 0.35–4.94)
UROBILINOGEN UR STRIP-ACNC: 0.2
WBC #/AREA URNS AUTO: NORMAL /HPF

## 2023-11-29 PROCEDURE — 84443 ASSAY THYROID STIM HORMONE: CPT

## 2023-11-29 PROCEDURE — 81001 URINALYSIS AUTO W/SCOPE: CPT

## 2023-11-29 PROCEDURE — 80048 BASIC METABOLIC PNL TOTAL CA: CPT

## 2023-11-29 PROCEDURE — 36415 COLL VENOUS BLD VENIPUNCTURE: CPT

## 2023-11-29 PROCEDURE — 84153 ASSAY OF PSA TOTAL: CPT

## 2023-11-29 PROCEDURE — 82043 UR ALBUMIN QUANTITATIVE: CPT

## 2023-11-29 NOTE — TELEPHONE ENCOUNTER
Unable to contact pt via phone, left message on voicemail to return call to clinic. Call placed to sister Theodora and left message with her to ask pt to return call to clinic ASAP.

## 2023-11-29 NOTE — TELEPHONE ENCOUNTER
[facility]  Reported by: Joann Vieira,     CRITICAL RESULT: Glucose 38   Collection time: 0700

## 2023-11-29 NOTE — TELEPHONE ENCOUNTER
Received return call from patient who states blood glucose upon awaking this am was 240. Pt states he took 20 units of insulin, reported to lab for blood work and has eaten breakfast and lunch. Patient encouraged never to take insulin prior to labs unless he is going to eat right after. Patient verbalized understanding.

## 2023-11-29 NOTE — TELEPHONE ENCOUNTER
Pt returned call to clinic and reported to Theodora Patricio LPN that he took insulin this am without eating and went to lab. States ate breakfast and lunch since having labs drawn and feels fine.

## 2023-11-29 NOTE — TELEPHONE ENCOUNTER
Unable to contact  pt via phone or leave message. Call placed to sister Theodora again who states she has tried calling pt 4-5 times and has not been able to reach him. Sister states pt is not home but at work. 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 She will have patient call clinic when she hears from him. Ms. Yip states she is sure patient has eaten something by now. Unable to do wellness check DT patient not being home.

## 2023-12-07 ENCOUNTER — TELEPHONE (OUTPATIENT)
Dept: INTERNAL MEDICINE | Facility: CLINIC | Age: 58
End: 2023-12-07
Payer: MEDICAID

## 2023-12-07 NOTE — TELEPHONE ENCOUNTER
Unable to contact pt via phone for virtual visit, no voicemail setup. Link for virtual visit sent x 2, pt did not connect. Provider made aware.

## 2023-12-26 ENCOUNTER — PATIENT OUTREACH (OUTPATIENT)
Dept: EMERGENCY MEDICINE | Facility: HOSPITAL | Age: 58
End: 2023-12-26
Payer: MEDICAID

## 2024-01-09 NOTE — PROGRESS NOTES
CHIEF COMPLAINT:   Chief Complaint   Patient presents with    4 month follow up with echo      Patient complains of ankle swelling                      Review of patient's allergies indicates:  No Known Allergies                                       HPI:  Michael Stone 58 y.o. male with a past medical history of mild nonobstructive CAD per [facility] Aug. 2021, uncontrolled diabetes Mellitus Type II, hyperlipidemia, hypertension, MINA on CPAP, venous insufficiency,  adrenal adenoma, and obesity who presents to cardiology clinic for routine follow up and ongoing care.  At his last clinic visit, the patient endorsed ongoing exertional dyspnea.  Subsequent Echocardiogram obtained on 10.11.23 revealed a preserved EF of 59% and mild tricuspid regurgitation.  Due to a previous abnormal nuclear stress test, the patient underwent a left heart catheterization on 8.20.21 that revealed very mild nonobstructive CAD (30% ostial ramus). (See reports below).    Patient presents to clinic today endorsing stable shortness of breath with exertion that has not progressed since last seen.  However, he does note a overall feeling of un-wellness over the past few days and believes that he he may have some type of upper respiratory issue.  He endorses a nonproductive persistent cough but denies any known fever.  Cardiac wise, he denies any chest pain, palpitations, orthopnea, PND, claudication, lightheadedness or syncope but does endorse occasional bilateral ankle edema.  Notably, the patient follows with Dr. Gallo with [facility] for his venous insufficiency.  He endorses compliance with his current medication but states that he takes his nifedipine on an as-needed basis.  He reports intermittent CPAP use due to intolerance to a mask.     CARDIAC TESTING   ECHO 10.11.23    Left Ventricle: The left ventricle is normal in size. Normal wall thickness. There is normal systolic function. Biplane (2D) method of discs ejection fraction is 59%.    Left  Atrium: Left atrium is mildly dilated.    Right Ventricle: Mild right ventricular enlargement. Systolic function is normal.    Right Atrium: Right atrium is moderately dilated.    Aortic Valve: There is mild annular calcification present.    Tricuspid Valve: There is mild regurgitation.    IVC/SVC: Normal venous pressure at 3 mmHg.     ECHO 6.1.22  The left ventricle is normal in size with mild concentric hypertrophy and normal systolic function.  The estimated ejection fraction is 59%.  Normal right ventricular size with normal right ventricular systolic function.  Normal left ventricular diastolic function.  There is no pulmonary hypertension.  The estimated PA systolic pressure is 10 mmHg.  Normal central venous pressure (3 mmHg).      CORONARY ANGIOGRAM 8.20.21  Left Main: large vessel.   Left anterior descending: large vessel.   Diagonal 1: small vessel.   Ramus: medium size vessel. 30% ostial stenosis  Circumflex: large vessel.   Obtuse marginal 1: medium size vessel.   Right coronary artery: medium size vessel.   Posterior descending artery: Tiny vessel.     COMPLICATIONS  No immediate complications.    Closure of access site: Radial band  Estimated blood loss: less than 10 ml  Specimens obtained: none   SUMMARY  Very mild nonobstructive 1 vessel disease      Nuclear pharmacologic stress test 06/24/2021:  Rest EKG: Sinus rhythm  Stress EKG: No significant new EKG changes  Medium size area of moderate anterior and apical ischemia  No scar    ECHO 4.16.21  Mild concentric left ventricular hypertrophy  Left ventricular ejection fraction is measured at approximately 50%  Structurally normal mitral valve  Trace mitral regurgitation  Structurally aortic valve is trileaflet  Trace tricuspid regurgitation with RVSP of 38 mmHg  No evidence of pulmonic regurgitation  The pulmonic valve was not well visualized  Mildly dilated left atrium  Borderline dilated right atrium  Normal right ventricular size  No evidence of  pericardial effusion  No evidence of pleural effusion     SADE testing- December 2021 - revealed no evidence of significant arterial insufficiency                                                                                                                                                                                   Right lower extremity venous reflux study December 2021 - revealed reflux of 4.3 seconds in the GSV at the level of the upper calf and reflux of 4.8 seconds and a branch of the GSV at the level of the upper calf.         Left lower extremity venous reflux studies - August 24, 2021- revealed no DVT and no substantial reflux identified in the interrogated portions of the left leg deep system, GSV, or SSV.                                                                                                                                                                                                                                                                                            Patient Active Problem List   Diagnosis    Mild CAD    Disorder of tendon of shoulder region    Edema of lower extremity    History of severe acute respiratory syndrome coronavirus 2 (SARS-CoV-2) disease    Hyperlipidemia LDL goal <70    Primary hypertension    Class 2 severe obesity due to excess calories with serious comorbidity and body mass index (BMI) of 38.0 to 38.9 in adult    MINA (obstructive sleep apnea)    Overgrown toenails    Primary osteoarthritis of both knees    Spinal stenosis of cervical region    Tinea pedis    Uncontrolled type 2 diabetes mellitus with hyperglycemia    JOSEPH (dyspnea on exertion)    Venous insufficiency    Other chest pain    Callus of foot    DDD (degenerative disc disease), cervical    Encounter for comprehensive diabetic foot examination, type 2 diabetes mellitus    Hypokalemia    Acute gastritis    Class 2 drug-induced obesity with serious comorbidity and body mass index (BMI) of 37.0  to 37.9 in adult    Primary osteoarthritis of left knee    BMI 35.0-35.9,adult     Past Surgical History:   Procedure Laterality Date    ANGIOGRAPHY      CHOLECYSTECTOMY      FOOT SURGERY Right      Social History     Socioeconomic History    Marital status: Single   Tobacco Use    Smoking status: Never     Passive exposure: Never    Smokeless tobacco: Never   Substance and Sexual Activity    Alcohol use: Never    Drug use: Never    Sexual activity: Yes     Partners: Female     Birth control/protection: None     Social Determinants of Health     Financial Resource Strain: Medium Risk (12/5/2023)    Overall Financial Resource Strain (CARDIA)     Difficulty of Paying Living Expenses: Somewhat hard   Food Insecurity: Food Insecurity Present (12/5/2023)    Hunger Vital Sign     Worried About Running Out of Food in the Last Year: Sometimes true     Ran Out of Food in the Last Year: Sometimes true   Transportation Needs: No Transportation Needs (12/5/2023)    PRAPARE - Transportation     Lack of Transportation (Medical): No     Lack of Transportation (Non-Medical): No   Physical Activity: Insufficiently Active (12/5/2023)    Exercise Vital Sign     Days of Exercise per Week: 1 day     Minutes of Exercise per Session: 30 min   Stress: Stress Concern Present (12/5/2023)    [employer] [place] [employer] - Occupational Stress Questionnaire     Feeling of Stress : To some extent   Social Connections: Unknown (12/5/2023)    Social Connection and Isolation Panel [NHANES]     Frequency of Communication with Friends and Family: Twice a week     Frequency of Social Gatherings with Friends and Family: Twice a week     Active Member of Clubs or Organizations: No     Attends Club or Organization Meetings: Never     Marital Status: Living with partner   Housing Stability: Low Risk  (12/5/2023)    Housing Stability Vital Sign     Unable to Pay for Housing in the Last Year: No     Number of Places Lived in the Last Year: 0      Unstable Housing in the Last Year: No        Family History   Problem Relation Age of Onset    Hypertension Mother     Heart disease Mother     Stroke Father     Cancer Sister     Heart disease Sister     Cancer Sister     Heart disease Sister     Heart disease Brother     Heart disease Brother          Current Outpatient Medications:     albuterol (PROVENTIL/VENTOLIN HFA) 90 mcg/actuation inhaler, Inhale 2 puffs into the lungs every 6 (six) hours as needed for Wheezing or Shortness of Breath., Disp: 6.7 g, Rfl: 2    ammonium lactate 12 % Crea, APPLY TWICE DAILY. APPLY A THIN LAYER OF VASELINE OVER LAC-HYDRIN TWICE DAILY. NOTHING BETWEEN TOES, Disp: , Rfl:     aspirin (ECOTRIN) 81 MG EC tablet, Take 1 tablet (81 mg total) by mouth once daily., Disp: 90 tablet, Rfl: 3    atorvastatin (LIPITOR) 80 MG tablet, Take 1 tablet (80 mg total) by mouth nightly., Disp: 90 tablet, Rfl: 3    diclofenac sodium (VOLTAREN) 1 % Gel, Apply 2 g topically 4 (four) times daily. for 7 days, Disp: 100 g, Rfl: 0    dulaglutide (TRULICITY) 4.5 mg/0.5 mL pen injector, Inject 4.5 mg into the skin every 7 days., Disp: 4 pen , Rfl: 3    furosemide (LASIX) 20 MG tablet, Take 1 tablet (20 mg total) by mouth once daily., Disp: 90 tablet, Rfl: 3    HUMALOG U-100 INSULIN 100 unit/mL injection, Inject 15 Units into the skin 3 (three) times daily before meals., Disp: 25 mL, Rfl: 1    hydroCHLOROthiazide (HYDRODIURIL) 50 MG tablet, Take 2 tablets (100 mg total) by mouth once daily., Disp: 180 tablet, Rfl: 3    LANTUS SOLOSTAR U-100 INSULIN glargine 100 units/mL SubQ pen, Inject 52 units subq qam and 42 units subq at bedtime., Disp: 85 mL, Rfl: 1    losartan (COZAAR) 100 MG tablet, Take 1 tablet (100 mg total) by mouth once daily., Disp: 90 tablet, Rfl: 3    metFORMIN (GLUCOPHAGE) 1000 MG tablet, Take 1 tablet (1,000 mg total) by mouth 2 (two) times daily with meals., Disp: 60 tablet, Rfl: 11    NIFEdipine (ADALAT CC) 90 MG TbSR, Take 1 tablet (90 mg  "total) by mouth once daily., Disp: 90 tablet, Rfl: 3    nitroGLYCERIN (NITROSTAT) 0.4 MG SL tablet, Place 1 tablet (0.4 mg total) under the tongue every 5 (five) minutes as needed for Chest pain., Disp: 25 tablet, Rfl: 6    ondansetron (ZOFRAN-ODT) 4 MG TbDL, Take 1 tablet (4 mg total) by mouth every 6 (six) hours as needed (Nausea)., Disp: 14 tablet, Rfl: 0    potassium chloride (K-TAB) 20 mEq, Take 1 tablet (20 mEq total) by mouth once daily., Disp: 90 tablet, Rfl: 3    BD VEO INSULIN SYRINGE UF 1 mL 31 gauge x 15/64" Syrg, USE THREE TIMES DAILY AS DIRECTED, Disp: 100 each, Rfl: 0    blood sugar diagnostic (ONETOUCH ULTRA TEST) Strp, CHECK BLOOD SUGAR THREE TIMES A DAY BEFORE MEALS AS DIRECTED BY DOCTOR, Disp: 100 strip, Rfl: 11    blood sugar diagnostic Strp,  One Touch Ultra 2 Test Strips, See Instructions, Use to check CBG TID, # 100 EA, 11 Refill(s), Pharmacy: [facility], 168, cm, Height/Length Dosing, 01/21/22 10:22:00 CST, 116, kg, Weight Dosing, 01/21/22 10:22:00 CST, Disp: , Rfl:     colistimethate (COLYMYCIN) 150 mg injection, SMARTSIG:Topical, Disp: , Rfl:     ID NOW COVID-19 TEST KIT Kit, TEST AS DIRECTED TODAY, Disp: , Rfl:     insulin syringes, disposable, 1 mL Syrg, Inject 1 Syringe into the skin 3 (three) times daily before meals., Disp: 100 each, Rfl: 11    lancets (ONETOUCH DELICA LANCETS) 33 gauge Misc, CHECK BLOOD SUGAR THREE TIMES A DAY BEFORE MEALS AS DIRECTED BY DOCTOR, Disp: 100 each, Rfl: 11    ONETOUCH DELICA PLUS LANCET 30 gauge Misc, CHECK BLOOD SUGAR THREE TIMES A DAY BEFORE MEALS AS DIRECTED BY DOCTOR, Disp: , Rfl:      ROS:                                                                                                                                                                             Review of Systems   Constitutional: Negative.    Respiratory:  Positive for shortness of breath.    Cardiovascular:  Positive for palpitations.   Gastrointestinal: Negative.  " "  Musculoskeletal: Negative.         BLE pain   Skin: Negative.    Neurological: Negative.    Psychiatric/Behavioral: Negative.          Blood pressure (!) 156/91, pulse 81, temperature 97.9 °F (36.6 °C), resp. rate 18, height 5' 6.93" (1.7 m), weight 114.3 kg (252 lb), SpO2 100 %.   PE:  Physical Exam  Constitutional:       Appearance: Normal appearance.   HENT:      Head: Normocephalic.   Eyes:      Extraocular Movements: Extraocular movements intact.   Cardiovascular:      Rate and Rhythm: Normal rate and regular rhythm.   Pulmonary:      Effort: Pulmonary effort is normal.   Abdominal:      Palpations: Abdomen is soft.   Musculoskeletal:         General: Normal range of motion.      Cervical back: Neck supple.      Comments: Mild BLE edema    Skin:     General: Skin is warm and dry.   Neurological:      General: No focal deficit present.      Mental Status: He is alert and oriented to person, place, and time.   Psychiatric:         Mood and Affect: Mood normal.        ASSESSMENT/PLAN:  Very Mild CAD (30% ostial Ramus Stenosis) per [facility] 8.20.21   - Lexiscan stress test -medium sized area of moderate anterior and apical ischemia and no scar (6.21.21)  - Denies CP. Reports stable JOSEHP   - Continue ASA, atorvastatin, losartan, and SL Nitro  - Counseled on heart healthy, low cholesterol diet and exercise as tolerated    JOSEPH - stable   - EF-59% with mild TR per ECHO 10.11.23  - EF 59% per Echo 6.1.22  - Continue and HCTZ 100 mg and Lasix 20 mg  - Normal PFTs Dec. 2022      Venous Insufficiency  - Continue Lasix 20 mg and HCTZ 100 mg  - Recommended low-sodium diet, compression stocking therapy, and leg elevation  - Follow up with Dr. Gallo as directed    HLD  - LDL at goal - 57  - Continue with Atorvastatin 80 mg po daily   - Counseled patient on low-cholesterol diet and exercise as tolerated    HTN  - BP above goal   - Continue Losartan 100 mg,  HCTZ 100 mg, Lasix 20 mg. Instructed patient to take nifedipine 90 mg daily " as directed.  Advised patient to notify clinic with any increased peripheral edema  - NV in 2-3 weeks for BP check   - Counseled on low salt diet    Diabetes Mellitus Type II   - A1C- 7.7  - Continue management per PCP    MINA  - Reports intermittent compliance with CPAP due to mask intolerance  - Recommend continued nightly CPAP use    Obesity  Counseled on the importance of weight loss through diet and exercise    Nurse visit in 2-3 weeks for BP check   Follow up in Cardiology Clinic in 5 months or sooner if needed   Follow up with PCP and Dr. Gallo as directed

## 2024-01-10 ENCOUNTER — HOSPITAL ENCOUNTER (OUTPATIENT)
Dept: WOUND CARE | Facility: HOSPITAL | Age: 59
Discharge: HOME | End: 2024-01-10
Attending: NURSE PRACTITIONER
Payer: MEDICAID

## 2024-01-10 VITALS
HEART RATE: 72 BPM | DIASTOLIC BLOOD PRESSURE: 76 MMHG | TEMPERATURE: 98 F | SYSTOLIC BLOOD PRESSURE: 162 MMHG | OXYGEN SATURATION: 97 %

## 2024-01-10 DIAGNOSIS — E11.9 ENCOUNTER FOR COMPREHENSIVE DIABETIC FOOT EXAMINATION, TYPE 2 DIABETES MELLITUS: Primary | ICD-10-CM

## 2024-01-10 DIAGNOSIS — L60.2 OVERGROWN TOENAILS: ICD-10-CM

## 2024-01-10 DIAGNOSIS — B35.3 TINEA PEDIS OF BOTH FEET: ICD-10-CM

## 2024-01-10 DIAGNOSIS — Z79.4 TYPE 2 DIABETES MELLITUS WITH OTHER SKIN COMPLICATION, WITH LONG-TERM CURRENT USE OF INSULIN: ICD-10-CM

## 2024-01-10 DIAGNOSIS — E11.628 TYPE 2 DIABETES MELLITUS WITH OTHER SKIN COMPLICATION, WITH LONG-TERM CURRENT USE OF INSULIN: ICD-10-CM

## 2024-01-10 DIAGNOSIS — L60.3 DYSTROPHIC NAIL: ICD-10-CM

## 2024-01-10 PROCEDURE — 27000999 HC MEDICAL RECORD PHOTO DOCUMENTATION

## 2024-01-10 PROCEDURE — 11719 TRIM NAIL(S) ANY NUMBER: CPT | Mod: ,,, | Performed by: NURSE PRACTITIONER

## 2024-01-10 PROCEDURE — 99499 UNLISTED E&M SERVICE: CPT | Mod: ,,, | Performed by: NURSE PRACTITIONER

## 2024-01-10 PROCEDURE — 11721 DEBRIDE NAIL 6 OR MORE: CPT

## 2024-01-10 PROCEDURE — 99211 OFF/OP EST MAY X REQ PHY/QHP: CPT

## 2024-01-10 PROCEDURE — 11719 TRIM NAIL(S) ANY NUMBER: CPT

## 2024-01-10 NOTE — PROGRESS NOTES
TODAY'S VISIT NOTE WAS IMPORTED FROM LAST WOUND CLINIC VISIT OF 9/22/23.  I ATTEST THAT I REVIEWED THE HPI, ROS, LABS, WOUND-RELATED IMAGING, PHYSICAL EXAMINATION, EVALUATION AND PLAN SECTIONS OF IMPORTED NOTE AND REVISED TODAY'S VISIT NOTE TO REFLECT TODAY'S NEW ASSESSMENT FINDINGS AND TODAY'S UPDATES TO WOUND TREATMENT PLAN.          Chief Complaint:  Routine diabetic foot care.       History of Present Illness:  59 yo Black male being seen today for routine diabetic foot care.   Followed by BIANCA Eli, for PCP.  Last visit with Ms. Roque was on 11/29/23.  Lesion to right dorsal foot biopsied by [facility] dermatology with negative findings (Spring 2022).   Hx includes medical non-compliance, obesity (BMI 35.97), Type 2 diabetes (insulin-dependent), HTN, HLD, adrenal adenoma, and generalized DJD, and tinea pedis. Has attended individual diabetes education on 12/18/20 and has been attempting to follow ADA/DASH diet. Underwent angiogram 8/20/2021 here @ [facility]; very mild nonobstructive one vessel disease. Followed by [facility] cardiology clinic.  RLE 12/2021 venous reflux study showed reflux of 4.3 seconds in the GSV at the level of the upper calf and reflux of 4.8 seconds and a branch of the GSV at the level of the upper calf. August 2021 LLE venous reflux study revealed no substantial venous reflux and no DVT.  Followed by Dr. Gallo's [facility] PVD clinic for vascular.       Review of Most Recent Labs:  11/29/23:  Na+ 148.  CR 0.94.  Glucose 38.    9/11/23:  BUN/CR 26.9 & 1.46.  eGFR 56.  HgbA1C 8.3%.    8/10/23:  CBC unremarkable.    6/5/23:  CR 1.01.  Chol 116. HDL 39.  LDL 57.  Trig 98.  HgbA1C 7.0.   1/15/23:  CBC unremarkable.  Cr 1.26.    11/15/2022:   Chol 109.  HDL 38.  LDL 58.  Trig 65.  HgbA1C 7.8.  PSA 2.57.          Today 1/10/24:  Continues having pain and tingling in feet and toes.  Pain worse in left 5th toe.  Ankles are always puffy.  Toes are cold a lot of times.  Pain in calves, with walking, is about the  "same.   Relieved with rest.  Denies pain with laying down in calves and thighs.  Checking feet several times/week, and in between toes, for recurrent rash.  Not sure if he is mixing up capsule powder in gel correctly or not, but he is mixing it per instructions.  No new rashes, lesions, or skin breakdown.  Applied Lac-Hydrin and Vaseline last night; however, not today.  Is using that twice/day, as prescribed.      9/22/23:  Did get antibiotics for rash between toes and used it, with improvement in rash between toes.  Has not used it recently though.  Did not know rash had returned.  Wearing protective shoes.  Checks feet several times/week.  Toes continue to burn and tingle; however, no worse than his usual baseline.  Using Lac-Hydrin and Vaseline on feet several times a week.      6/16/23:  Very happy that he got diabetes at goal.  Trying to eat healthier and lose some weight, as well.  Checking feet/toes daily.  Not aware of any rashes between toes.  Has not used gentian violet in a long time.  Reporting toes in both feet "are hurting real bad" with nerve pain of diabetes.  No new rashes, lesions, or skin breakdown.  Continues with twice/day Lac-Hydrin and Vaseline to feet for moisturizing.      2/28/23:  Still having pain in calves with walking sometimes, along with cold feet, and numbness over all toes on both feet.  Ankles are always swollen.  Applying Lac-Hydrin and Vaseline to both feet, with improvement in dry skin.  Admits to walking barefoot in house.  Denies walking outside barefoot.     11/29/2022:  Having mild discomfort between bilateral 4th and 5th toes, where he has had calluses previously.  Has not been sanding calluses down with [place] boards, as taught previously.  No rashes between toes.  Reports calf pain with walking (left > right) at times, cold feet, and numbness over all toes.  Ankles are always swollen.  Scheduled to see Dr. Gallo, vascular, 12/2/2022.  Has not been applying any kind of " moisturizer to dry skin over bottoms of feet and lower legs.  No new rashes, lesions, or skin breakdown.       Review of Systems:  Except as stated in HPI, all other 10 body systems normal      Physical Exam Vitals & Measurements:    Vitals:    01/10/24 0913   BP: (!) 162/76   Pulse: 72   Temp: 97.5 °F (36.4 °C)           General:  Hypertensive, with diabetes, asymptomatic with,  afebrile.    Obese; in no acute distress.  Respiratory:   Breathing even, quiet, and unlabored at rest. No coughing.   Cardiovascular:   Moderate non-pitting edema over bilateral medial and lateral ankles.  No hemosiderin staining or varicosities.  No hair distribution over BLE.   Toenails dystrophic and overgrown.  DP pulses palpated bilaterally.   PT pulses dopplered bilaterally.    Musculoskeletal:  Full range of motion of all extremities.  Bunion to left 1st met head.   Decreased dorsiflexion and flexion of bilateral ankles, and left hallux toe.  Loss of intrinsic muscle ROM in bilateral feet.  Bilateral pes planus.    Neurologic: A&O X 3; cranial nerves grossly intact.  Normal monofilament testing.  No loss of sensation.    Psychiatric: Calm, cooperative. Mood and affect normal. Responses appropriate  Integumentary:     Ten toenails all overgrown and dystrophic.     Generalized xerosis over bilateral plantar feet.                                 Assessment/Plan:    Obesity, BMI 35.97:  This has been identified as a co-factor towards foot and toe complications, and increased risk of calluses, and diabetic foot ulcers.   Being managed by PCP.     Encounter for routine diabetic foot care, Type 2, insulin-dependent:  Last visit with PCP, Yelitza Roque, APRN:  11/14/23  Diabetes not at goal, per last HgbA1C.     Diabetic foot care principles reinforced.   Wound clinic and [facility] precautions reinforced.                                                   PROCEDURE NOTE    Overgrown toenails:  Procedure Today: cutting and filing of 10  toenails  Informed verbal consent obtained.    Using standard podiatry clippers and [place] boards, I cut, trimmed, filed/sanded all ten toenails. No bleeding or discomfort; Mr. Stone tolerated procedure without complications.     Tinea Pedis:  Stable today; no recurrent rash noted.    Reinforced teaching on importance of checking toe webs thoroughly to prevent wounds between toes, which can lead to skin and bone infections, osteo, and amputations.      Prescribed CMPD Colistimethate 150 mg, Clinda 150 mg, Mupirocin 20 mg, and Itraconazole 50 mg in gel twice/day.  Script being filled by [facility].       Xerosis of bilateral feet:  Improved with current tx plan.   CPM   Prescribed Lac-Hydrin 12%, followed by thin layer of Vaseline twice/day.   Instructions reinforced to not put Lac-Hydrin and Vaseline between toes, with rationale.                RTC in three months. Instructed on s/s to call wound clinic for in between clinic visits.

## 2024-01-16 ENCOUNTER — HOSPITAL ENCOUNTER (EMERGENCY)
Facility: HOSPITAL | Age: 59
Discharge: HOME | End: 2024-01-16
Attending: EMERGENCY MEDICINE
Payer: MEDICAID

## 2024-01-16 ENCOUNTER — OFFICE VISIT (OUTPATIENT)
Dept: CARDIOLOGY | Facility: CLINIC | Age: 59
End: 2024-01-16
Payer: MEDICAID

## 2024-01-16 VITALS
HEIGHT: 67 IN | WEIGHT: 252 LBS | BODY MASS INDEX: 39.55 KG/M2 | RESPIRATION RATE: 18 BRPM | OXYGEN SATURATION: 100 % | DIASTOLIC BLOOD PRESSURE: 82 MMHG | HEART RATE: 81 BPM | SYSTOLIC BLOOD PRESSURE: 138 MMHG | TEMPERATURE: 98 F

## 2024-01-16 VITALS
HEART RATE: 80 BPM | BODY MASS INDEX: 39.55 KG/M2 | HEIGHT: 67 IN | RESPIRATION RATE: 18 BRPM | DIASTOLIC BLOOD PRESSURE: 83 MMHG | WEIGHT: 252 LBS | SYSTOLIC BLOOD PRESSURE: 154 MMHG | TEMPERATURE: 98 F

## 2024-01-16 DIAGNOSIS — R06.09 DOE (DYSPNEA ON EXERTION): ICD-10-CM

## 2024-01-16 DIAGNOSIS — I87.2 VENOUS INSUFFICIENCY: Chronic | ICD-10-CM

## 2024-01-16 DIAGNOSIS — G47.33 OSA (OBSTRUCTIVE SLEEP APNEA): Chronic | ICD-10-CM

## 2024-01-16 DIAGNOSIS — I25.10 MILD CAD: Primary | Chronic | ICD-10-CM

## 2024-01-16 DIAGNOSIS — E78.5 HYPERLIPIDEMIA LDL GOAL <70: Chronic | ICD-10-CM

## 2024-01-16 DIAGNOSIS — I10 PRIMARY HYPERTENSION: Chronic | ICD-10-CM

## 2024-01-16 DIAGNOSIS — J06.9 VIRAL URI WITH COUGH: Primary | ICD-10-CM

## 2024-01-16 DIAGNOSIS — E11.65 UNCONTROLLED TYPE 2 DIABETES MELLITUS WITH HYPERGLYCEMIA: Chronic | ICD-10-CM

## 2024-01-16 PROCEDURE — 1159F MED LIST DOCD IN RCRD: CPT | Mod: CPTII,,, | Performed by: NURSE PRACTITIONER

## 2024-01-16 PROCEDURE — 99215 OFFICE O/P EST HI 40 MIN: CPT | Mod: PBBFAC | Performed by: NURSE PRACTITIONER

## 2024-01-16 PROCEDURE — 1160F RVW MEDS BY RX/DR IN RCRD: CPT | Mod: CPTII,,, | Performed by: NURSE PRACTITIONER

## 2024-01-16 PROCEDURE — 63600175 PHARM REV CODE 636 W HCPCS

## 2024-01-16 PROCEDURE — 99284 EMERGENCY DEPT VISIT MOD MDM: CPT | Mod: 27

## 2024-01-16 PROCEDURE — 3075F SYST BP GE 130 - 139MM HG: CPT | Mod: CPTII,,, | Performed by: NURSE PRACTITIONER

## 2024-01-16 PROCEDURE — 3079F DIAST BP 80-89 MM HG: CPT | Mod: CPTII,,, | Performed by: NURSE PRACTITIONER

## 2024-01-16 PROCEDURE — 3008F BODY MASS INDEX DOCD: CPT | Mod: CPTII,,, | Performed by: NURSE PRACTITIONER

## 2024-01-16 PROCEDURE — 96372 THER/PROPH/DIAG INJ SC/IM: CPT

## 2024-01-16 PROCEDURE — 3072F LOW RISK FOR RETINOPATHY: CPT | Mod: CPTII,,, | Performed by: NURSE PRACTITIONER

## 2024-01-16 PROCEDURE — 0240U COVID/FLU A&B PCR: CPT | Performed by: EMERGENCY MEDICINE

## 2024-01-16 PROCEDURE — 4010F ACE/ARB THERAPY RXD/TAKEN: CPT | Mod: CPTII,,, | Performed by: NURSE PRACTITIONER

## 2024-01-16 PROCEDURE — 99214 OFFICE O/P EST MOD 30 MIN: CPT | Mod: S$PBB,,, | Performed by: NURSE PRACTITIONER

## 2024-01-16 RX ORDER — NIFEDIPINE 90 MG/1
90 TABLET, FILM COATED, EXTENDED RELEASE ORAL DAILY
Qty: 30 TABLET | Refills: 11 | Status: SHIPPED | OUTPATIENT
Start: 2024-01-16 | End: 2025-01-15

## 2024-01-16 RX ORDER — HYDROCHLOROTHIAZIDE 50 MG/1
100 TABLET ORAL DAILY
Qty: 180 TABLET | Refills: 3 | Status: SHIPPED | OUTPATIENT
Start: 2024-01-16 | End: 2024-05-16 | Stop reason: SDUPTHER

## 2024-01-16 RX ORDER — ATORVASTATIN CALCIUM 80 MG/1
80 TABLET, FILM COATED ORAL NIGHTLY
Qty: 90 TABLET | Refills: 3 | Status: SHIPPED | OUTPATIENT
Start: 2024-01-16 | End: 2025-01-15

## 2024-01-16 RX ORDER — DEXAMETHASONE SODIUM PHOSPHATE 4 MG/ML
8 INJECTION, SOLUTION INTRA-ARTICULAR; INTRALESIONAL; INTRAMUSCULAR; INTRAVENOUS; SOFT TISSUE
Status: COMPLETED | OUTPATIENT
Start: 2024-01-16 | End: 2024-01-16

## 2024-01-16 RX ORDER — LOSARTAN POTASSIUM 100 MG/1
100 TABLET ORAL DAILY
Qty: 90 TABLET | Refills: 3 | Status: SHIPPED | OUTPATIENT
Start: 2024-01-16 | End: 2025-01-15

## 2024-01-16 RX ORDER — POTASSIUM CHLORIDE 1500 MG/1
20 TABLET, EXTENDED RELEASE ORAL DAILY
Qty: 90 TABLET | Refills: 3 | Status: SHIPPED | OUTPATIENT
Start: 2024-01-16 | End: 2024-05-13 | Stop reason: DRUGHIGH

## 2024-01-16 RX ORDER — FUROSEMIDE 20 MG/1
20 TABLET ORAL DAILY
Qty: 90 TABLET | Refills: 3 | Status: SHIPPED | OUTPATIENT
Start: 2024-01-16 | End: 2025-01-15

## 2024-01-16 RX ORDER — OXYMETAZOLINE HCL 0.05 %
1 SPRAY, NON-AEROSOL (ML) NASAL 2 TIMES DAILY
Qty: 15 ML | Refills: 0 | Status: SHIPPED | OUTPATIENT
Start: 2024-01-16 | End: 2024-01-19

## 2024-01-16 RX ORDER — GUAIFENESIN AND DEXTROMETHORPHAN HYDROBROMIDE 10; 100 MG/5ML; MG/5ML
5 SYRUP ORAL 4 TIMES DAILY PRN
Qty: 120 ML | Refills: 0 | Status: SHIPPED | OUTPATIENT
Start: 2024-01-16 | End: 2024-01-26

## 2024-01-16 RX ADMIN — DEXAMETHASONE SODIUM PHOSPHATE 8 MG: 4 INJECTION, SOLUTION INTRA-ARTICULAR; INTRALESIONAL; INTRAMUSCULAR; INTRAVENOUS; SOFT TISSUE at 03:01

## 2024-01-16 NOTE — PATIENT INSTRUCTIONS
Nurse visit in 2-3 weeks for BP check   Follow up in Cardiology Clinic in 5 months or sooner if needed   Follow up with PCP and Dr. Gallo as directed

## 2024-01-16 NOTE — DISCHARGE INSTRUCTIONS
Patient will be discharged home use Afrin nasal spray twice a day for 3 days.  Take cough medicine by mouth 4 times a day as needed for cough.  Follow up with your primary care provider as a rechecked.

## 2024-01-16 NOTE — ED PROVIDER NOTES
Encounter Date: 1/16/2024       History     Chief Complaint   Patient presents with    Cough     C/o cough and nausea x several days.      Patient presents to ER with cough congestion and URI    The history is provided by the patient.     Review of patient's allergies indicates:  No Known Allergies  Past Medical History:   Diagnosis Date    Adrenal adenoma     Callus of foot 8/19/2022    Coronary artery disease     Diabetes mellitus     Hyperlipidemia     Hypertension     Spinal stenosis     Unspecified osteoarthritis, unspecified site      Past Surgical History:   Procedure Laterality Date    ANGIOGRAPHY      CHOLECYSTECTOMY      FOOT SURGERY Right      Family History   Problem Relation Age of Onset    Hypertension Mother     Heart disease Mother     Stroke Father     Cancer Sister     Heart disease Sister     Cancer Sister     Heart disease Sister     Heart disease Brother     Heart disease Brother      Social History     Tobacco Use    Smoking status: Never     Passive exposure: Never    Smokeless tobacco: Never   Substance Use Topics    Alcohol use: Never    Drug use: Never     Review of Systems   Constitutional: Negative.    HENT:  Positive for congestion, hearing loss, postnasal drip and rhinorrhea.    Eyes: Negative.    Respiratory:  Positive for cough.    Cardiovascular: Negative.    Gastrointestinal: Negative.    Endocrine: Negative.    Genitourinary: Negative.    Musculoskeletal: Negative.    Skin: Negative.    Allergic/Immunologic: Negative.    Neurological: Negative.    Hematological: Negative.    Psychiatric/Behavioral: Negative.     All other systems reviewed and are negative.      Physical Exam     Initial Vitals [01/16/24 1440]   BP Pulse Resp Temp SpO2   (!) 154/83 80 18 98.2 °F (36.8 °C) --      MAP       --         Physical Exam    Nursing note and vitals reviewed.  Constitutional: He appears well-developed and well-nourished.   HENT:   Head: Normocephalic and atraumatic.   Nose: Nose normal.    Eyes: Conjunctivae and EOM are normal. Pupils are equal, round, and reactive to light.   Neck: Neck supple.   Normal range of motion.  Cardiovascular:  Normal rate, regular rhythm, normal heart sounds and intact distal pulses.           Pulmonary/Chest: Breath sounds normal.   Abdominal: Abdomen is soft. Bowel sounds are normal.   Musculoskeletal:         General: Normal range of motion.      Cervical back: Normal range of motion and neck supple.     Neurological: He is alert and oriented to person, place, and time. He has normal strength and normal reflexes. GCS score is 15. GCS eye subscore is 4. GCS verbal subscore is 5. GCS motor subscore is 6.   Skin: Skin is warm and dry. Capillary refill takes less than 2 seconds.   Psychiatric: He has a normal mood and affect. His behavior is normal. Judgment and thought content normal.         ED Course   Procedures  Labs Reviewed   COVID/FLU A&B PCR - Normal    Narrative:     The Xpert Xpress SARS-CoV-2/FLU/RSV plus is a rapid, multiplexed real-time PCR test intended for the simultaneous qualitative detection and differentiation of SARS-CoV-2, Influenza A, Influenza B, and respiratory syncytial virus (RSV) viral RNA in either nasopharyngeal swab or nasal swab specimens.                Imaging Results    None          Medications   dexAMETHasone injection 8 mg (8 mg Intramuscular Given 1/16/24 8091)     Medical Decision Making  Awake alert and oriented no acute distress 58-year-old male presents emergency room with complaints of  nasal congestion cough and bilateral ear congestion.  Ambulatory gait steady no acute distress vitals stable.  Head atraumatic. Tenderness to the frontal and maxillary sinus cavities.  PERRLA, EOMI.  Bilateral ear canals impacted with dark cerumen.  Pt reports decreased hearing.  Nares with clear discharge.  Posterior oral pharynx red without exudate.  Neck supple.  BBS CTA.  Nonproductive cough.  Abdomen is soft nontender.  Moves all extremities  without difficulty.  Skin warm dry and intact.  GCS of 15 cranial nerves 2-12 intact neuro focal intact    Differential diagnoses:  URI, Covid, Flu,     Risk  OTC drugs.  Prescription drug management.                                      Clinical Impression:  Final diagnoses:  [J06.9] Viral URI with cough (Primary)          ED Disposition Condition    Discharge Stable          ED Prescriptions       Medication Sig Dispense Start Date End Date Auth. Provider    dextromethorphan-guaiFENesin  mg/5 ml (ROBITUSSIN-DM)  mg/5 mL liquid Take 5 mLs by mouth 4 (four) times daily as needed (cough). 120 mL 1/16/2024 1/26/2024 Rossi Chris NP    oxymetazoline (AFRIN) 0.05 % nasal spray 1 spray by Nasal route 2 (two) times daily. for 3 days 15 mL 1/16/2024 1/19/2024 Rossi Chris NP          Follow-up Information       Follow up With Specialties Details Why Contact Info    Yelitza Roque FNP Nurse Practitioner  As needed [address] [facility]  Internal Medicine Clinic  [facility] [id number] [phone]               Rossi Chris NP  01/16/24 0847

## 2024-01-25 DIAGNOSIS — E11.65 UNCONTROLLED TYPE 2 DIABETES MELLITUS WITH HYPERGLYCEMIA: Chronic | ICD-10-CM

## 2024-01-25 RX ORDER — INSULIN LISPRO 100 [IU]/ML
INJECTION, SOLUTION INTRAVENOUS; SUBCUTANEOUS
Qty: 30 ML | Refills: 0 | Status: SHIPPED | OUTPATIENT
Start: 2024-01-25 | End: 2024-05-10

## 2024-02-12 ENCOUNTER — CLINICAL SUPPORT (OUTPATIENT)
Dept: CARDIOLOGY | Facility: CLINIC | Age: 59
End: 2024-02-12
Payer: MEDICAID

## 2024-02-12 VITALS
SYSTOLIC BLOOD PRESSURE: 114 MMHG | RESPIRATION RATE: 20 BRPM | WEIGHT: 253.19 LBS | HEIGHT: 66 IN | DIASTOLIC BLOOD PRESSURE: 74 MMHG | OXYGEN SATURATION: 99 % | TEMPERATURE: 99 F | BODY MASS INDEX: 40.69 KG/M2 | HEART RATE: 75 BPM

## 2024-02-12 DIAGNOSIS — I10 PRIMARY HYPERTENSION: Primary | ICD-10-CM

## 2024-02-12 PROCEDURE — 99215 OFFICE O/P EST HI 40 MIN: CPT | Mod: PBBFAC

## 2024-02-12 PROCEDURE — 99211 OFF/OP EST MAY X REQ PHY/QHP: CPT | Mod: S$PBB,,, | Performed by: NURSE PRACTITIONER

## 2024-02-12 NOTE — PROGRESS NOTES
HERE FOR B/P CHECK 114/74 HR 76 LEFT  STS TAKES MEDS DAILY NO OTHER PROBLEMS  INSTRUCTED TO CONTINUE DOING LOG WEEKLY IF GOES OVER 140 DO DAILY AGAIN  IF CONTINUES TO ELEVATE CALL US  ED PRECAUTIONS GIVEN   VERBALIZED UNDERSTANDING

## 2024-02-15 ENCOUNTER — LAB VISIT (OUTPATIENT)
Dept: LAB | Facility: HOSPITAL | Age: 59
End: 2024-02-15
Attending: NURSE PRACTITIONER
Payer: MEDICAID

## 2024-02-15 DIAGNOSIS — E11.65 UNCONTROLLED TYPE 2 DIABETES MELLITUS WITH HYPERGLYCEMIA: ICD-10-CM

## 2024-02-15 LAB
ALBUMIN SERPL-MCNC: 3.5 G/DL (ref 3.5–5)
ALBUMIN/GLOB SERPL: 0.9 RATIO (ref 1.1–2)
ALP SERPL-CCNC: 102 UNIT/L (ref 40–150)
ALT SERPL-CCNC: 32 UNIT/L (ref 0–55)
AST SERPL-CCNC: 26 UNIT/L (ref 5–34)
BASOPHILS # BLD AUTO: 0.05 X10(3)/MCL
BASOPHILS NFR BLD AUTO: 0.7 %
BILIRUB SERPL-MCNC: 0.4 MG/DL
BUN SERPL-MCNC: 20.3 MG/DL (ref 8.4–25.7)
CALCIUM SERPL-MCNC: 8.9 MG/DL (ref 8.4–10.2)
CHLORIDE SERPL-SCNC: 109 MMOL/L (ref 98–107)
CO2 SERPL-SCNC: 25 MMOL/L (ref 22–29)
CREAT SERPL-MCNC: 1.32 MG/DL (ref 0.73–1.18)
EOSINOPHIL # BLD AUTO: 0.28 X10(3)/MCL (ref 0–0.9)
EOSINOPHIL NFR BLD AUTO: 4 %
ERYTHROCYTE [DISTWIDTH] IN BLOOD BY AUTOMATED COUNT: 13.4 % (ref 11.5–17)
EST. AVERAGE GLUCOSE BLD GHB EST-MCNC: 157.1 MG/DL
GFR SERPLBLD CREATININE-BSD FMLA CKD-EPI: >60 MLS/MIN/1.73/M2
GLOBULIN SER-MCNC: 3.8 GM/DL (ref 2.4–3.5)
GLUCOSE SERPL-MCNC: 141 MG/DL (ref 74–100)
HBA1C MFR BLD: 7.1 %
HCT VFR BLD AUTO: 39.5 % (ref 42–52)
HGB BLD-MCNC: 13.3 G/DL (ref 14–18)
IMM GRANULOCYTES # BLD AUTO: 0.02 X10(3)/MCL (ref 0–0.04)
IMM GRANULOCYTES NFR BLD AUTO: 0.3 %
LYMPHOCYTES # BLD AUTO: 1.79 X10(3)/MCL (ref 0.6–4.6)
LYMPHOCYTES NFR BLD AUTO: 25.6 %
MCH RBC QN AUTO: 29 PG (ref 27–31)
MCHC RBC AUTO-ENTMCNC: 33.7 G/DL (ref 33–36)
MCV RBC AUTO: 86.2 FL (ref 80–94)
MONOCYTES # BLD AUTO: 0.59 X10(3)/MCL (ref 0.1–1.3)
MONOCYTES NFR BLD AUTO: 8.4 %
NEUTROPHILS # BLD AUTO: 4.27 X10(3)/MCL (ref 2.1–9.2)
NEUTROPHILS NFR BLD AUTO: 61 %
NRBC BLD AUTO-RTO: 0 %
PLATELET # BLD AUTO: 308 X10(3)/MCL (ref 130–400)
PMV BLD AUTO: 9.2 FL (ref 7.4–10.4)
POTASSIUM SERPL-SCNC: 3.2 MMOL/L (ref 3.5–5.1)
PROT SERPL-MCNC: 7.3 GM/DL (ref 6.4–8.3)
RBC # BLD AUTO: 4.58 X10(6)/MCL (ref 4.7–6.1)
SODIUM SERPL-SCNC: 143 MMOL/L (ref 136–145)
WBC # SPEC AUTO: 7 X10(3)/MCL (ref 4.5–11.5)

## 2024-02-15 PROCEDURE — 80053 COMPREHEN METABOLIC PANEL: CPT

## 2024-02-15 PROCEDURE — 36415 COLL VENOUS BLD VENIPUNCTURE: CPT

## 2024-02-15 PROCEDURE — 83036 HEMOGLOBIN GLYCOSYLATED A1C: CPT

## 2024-02-15 PROCEDURE — 85025 COMPLETE CBC W/AUTO DIFF WBC: CPT

## 2024-02-16 ENCOUNTER — OFFICE VISIT (OUTPATIENT)
Dept: INTERNAL MEDICINE | Facility: CLINIC | Age: 59
End: 2024-02-16
Payer: MEDICAID

## 2024-02-16 DIAGNOSIS — E11.65 UNCONTROLLED TYPE 2 DIABETES MELLITUS WITH HYPERGLYCEMIA: Primary | Chronic | ICD-10-CM

## 2024-02-16 DIAGNOSIS — E87.6 HYPOKALEMIA: Chronic | ICD-10-CM

## 2024-02-16 PROCEDURE — 4010F ACE/ARB THERAPY RXD/TAKEN: CPT | Mod: CPTII,95,, | Performed by: NURSE PRACTITIONER

## 2024-02-16 PROCEDURE — 3051F HG A1C>EQUAL 7.0%<8.0%: CPT | Mod: CPTII,95,, | Performed by: NURSE PRACTITIONER

## 2024-02-16 PROCEDURE — 1159F MED LIST DOCD IN RCRD: CPT | Mod: CPTII,95,, | Performed by: NURSE PRACTITIONER

## 2024-02-16 PROCEDURE — 1160F RVW MEDS BY RX/DR IN RCRD: CPT | Mod: CPTII,95,, | Performed by: NURSE PRACTITIONER

## 2024-02-16 PROCEDURE — 3072F LOW RISK FOR RETINOPATHY: CPT | Mod: CPTII,95,, | Performed by: NURSE PRACTITIONER

## 2024-02-16 PROCEDURE — 99214 OFFICE O/P EST MOD 30 MIN: CPT | Mod: 95,,, | Performed by: NURSE PRACTITIONER

## 2024-02-16 RX ORDER — DULAGLUTIDE 4.5 MG/.5ML
4.5 INJECTION, SOLUTION SUBCUTANEOUS
Qty: 4 PEN | Refills: 6 | Status: SHIPPED | OUTPATIENT
Start: 2024-02-16

## 2024-02-16 RX ORDER — DICLOFENAC SODIUM 10 MG/G
2 GEL TOPICAL 4 TIMES DAILY
Qty: 100 G | Refills: 2 | Status: SHIPPED | OUTPATIENT
Start: 2024-02-16

## 2024-02-16 RX ORDER — ALBUTEROL SULFATE 90 UG/1
2 AEROSOL, METERED RESPIRATORY (INHALATION) EVERY 6 HOURS PRN
Qty: 6.7 G | Refills: 2 | Status: SHIPPED | OUTPATIENT
Start: 2024-02-16 | End: 2025-02-15

## 2024-02-16 NOTE — PROGRESS NOTES
The patient location is: at home  The chief complaint leading to consultation is: diabetes f/u    Visit type: audio only    The reason for the audio only service rather than synchronous audio and video virtual visit was related to technical difficulties as pt unable to connect with video d/t EPIC/connectivity issues.    20 minutes of total time spent on the encounter, which includes face to face time and non-face to face time preparing to see the patient (eg, review of tests), Obtaining and/or reviewing separately obtained history, Documenting clinical information in the electronic or other health record, Independently interpreting results (not separately reported) and communicating results to the patient/family/caregiver, or Care coordination (not separately reported).         Each patient to whom he or she provides medical services by telemedicine is:  (1) informed of the relationship between the physician and patient and the respective role of any other health care provider with respect to management of the patient; and (2) notified that he or she may decline to receive medical services by telemedicine and may withdraw from such care at any time.    Billing Provider: BIANCA Cabral  Level of Service: SC OFFICE/OUTPT VISIT, EST, LEVL IV, 30-39 MIN  Patient PCP Information       Provider PCP Type    BIANCA Cabral General            Reason for Visit / Chief Complaint: Diabetes (Audio only per pt req., lab results, needs refills on albuterol, diclofenac gel)       History of Present Illness / Problem Focused Workflow     Michael Stone is a 58 y.o. Black or  male for uncontrolled DM f/u. Medical problems include uncontrolled DM, HLD, HTN, mild CAD, Covid pneumonia (1/12/21), adrenal adenoma, vit d deficiency, covid + 2/9/23, morbid obesity, cervical spinal stenosis, MINA on CPAP, bilateral knee OA, tinea pedis and med non-compliance. Followed by [facility] cardio, ortho and wound clinics  and Dr. Gallo, vascular.      Today, pt reports he felt like he was going to have a leg cramp but walked it off just prior to onset. Reports CBGs have mostly been in the 100s and a few in 200s w/one reading in the 300s since last visit. Has decreased on amount of food consumed. Attempts ADA/low sodium diet. BP at goal earlier this week during cardio visit. Reports med compliance. Denies chest pain, cough, fever, headache, dizziness, weakness, abdominal pain, nausea, vomiting, diarrhea, constipation, dysuria, depression, anxiety, SI/HI.    Review of Systems     Review of Systems     See HPI for details    Constitutional: Denies Change in appetite. Denies Chills. Denies Fever. Denies Night sweats.  Eye: Denies Blurred vision. Denies Discharge. Denies Eye pain.  ENT: Denies Decreased hearing. Denies Sore throat. Denies Swollen glands.  Respiratory: Denies Cough. Denies Shortness of breath. Denies Shortness of breath with exertion. Denies Wheezing.  Cardiovascular: DeniesChest pain at rest. Denies Chest pain with exertion. Denies Irregular heartbeat. Denies Palpitations. Denies Edema.  Gastrointestinal: Denies Abdominal pain. Denies Diarrhea. Denies Nausea. Denies Vomiting. Denies Hematemesis or Hematochezia.  Genitourinary: Denies Dysuria. Denies Urinary frequency. Denies Urinary urgency. Denies Blood in urine.  Endocrine: Denies Cold intolerance. Denies Excessive thirst. Denies Heat intolerance. Denies Weight loss. Denies Weight gain.  Musculoskeletal: Admits Painful joints. Denies Weakness.  Integumentary: Denies Rash. Denies Itching. Denies Dry skin.  Neurologic: Denies Dizziness. Denies Fainting. Denies Headache.  Psychiatric: Denies Depression. Denies Anxiety. Denies Suicidal/Homicidal ideations.    All Other ROS: Negative except as stated in HPI.     Medical / Social / Family History     ----------------------------  Adrenal adenoma  Callus of foot  Coronary artery disease  Diabetes  mellitus  Hyperlipidemia  Hypertension  Spinal stenosis  Unspecified osteoarthritis, unspecified site     Past Surgical History:   Procedure Laterality Date    ANGIOGRAPHY      CHOLECYSTECTOMY      FOOT SURGERY Right        Social History     Socioeconomic History    Marital status: Single   Tobacco Use    Smoking status: Never     Passive exposure: Never    Smokeless tobacco: Never   Substance and Sexual Activity    Alcohol use: Never    Drug use: Never    Sexual activity: Yes     Partners: Female     Birth control/protection: None     Social Determinants of Health     Financial Resource Strain: Medium Risk (2/8/2024)    Overall Financial Resource Strain (CARDIA)     Difficulty of Paying Living Expenses: Somewhat hard   Food Insecurity: Food Insecurity Present (2/8/2024)    Hunger Vital Sign     Worried About Running Out of Food in the Last Year: Sometimes true     Ran Out of Food in the Last Year: Sometimes true   Transportation Needs: Unmet Transportation Needs (2/8/2024)    PRAPARE - Transportation     Lack of Transportation (Medical): Yes     Lack of Transportation (Non-Medical): Yes   Physical Activity: Insufficiently Active (2/8/2024)    Exercise Vital Sign     Days of Exercise per Week: 1 day     Minutes of Exercise per Session: 10 min   Stress: Stress Concern Present (2/8/2024)    [employer] [place] [employer] - Occupational Stress Questionnaire     Feeling of Stress : To some extent   Social Connections: Unknown (2/8/2024)    Social Connection and Isolation Panel [NHANES]     Frequency of Communication with Friends and Family: Once a week     Frequency of Social Gatherings with Friends and Family: Once a week     Active Member of Clubs or Organizations: No     Attends Club or Organization Meetings: Never     Marital Status: Living with partner   Housing Stability: Low Risk  (2/8/2024)    Housing Stability Vital Sign     Unable to Pay for Housing in the Last Year: No     Number of Places Lived in  "the Last Year: 1     Unstable Housing in the Last Year: No        Family History   Problem Relation Age of Onset    Hypertension Mother     Heart disease Mother     Stroke Father     Cancer Sister     Heart disease Sister     Cancer Sister     Heart disease Sister     Heart disease Brother     Heart disease Brother         Medications and Allergies     Medications  Current Outpatient Medications   Medication Instructions    albuterol (PROVENTIL/VENTOLIN HFA) 90 mcg/actuation inhaler 2 puffs, Inhalation, Every 6 hours PRN    ammonium lactate 12 % Crea APPLY TWICE DAILY. APPLY A THIN LAYER OF VASELINE OVER LAC-HYDRIN TWICE DAILY. NOTHING BETWEEN TOES    aspirin (ECOTRIN) 81 mg, Oral, Daily    atorvastatin (LIPITOR) 80 mg, Oral, Nightly    BD VEO INSULIN SYRINGE UF 1 mL 31 gauge x 15/64" Syrg USE THREE TIMES DAILY AS DIRECTED    blood sugar diagnostic (ONETOUCH ULTRA TEST) Strp CHECK BLOOD SUGAR THREE TIMES A DAY BEFORE MEALS AS DIRECTED BY DOCTOR    colistimethate (COLYMYCIN) 150 mg injection SMARTSIG:Topical    diclofenac sodium (VOLTAREN) 2 g, Topical (Top), 4 times daily    furosemide (LASIX) 20 mg, Oral, Daily    HUMALOG U-100 INSULIN 100 unit/mL injection INJECT 20 UNITS SUBCUTANEOUSLY THREE TIMES DAILY BEFORE MEAL(S)    hydroCHLOROthiazide (HYDRODIURIL) 100 mg, Oral, Daily    ID NOW COVID-19 TEST KIT Kit TEST AS DIRECTED TODAY    insulin syringes, disposable, 1 mL Syrg 1 Syringe, Subcutaneous, 3 times daily before meals    lancets (ONETOUCH DELICA LANCETS) 33 gauge Misc CHECK BLOOD SUGAR THREE TIMES A DAY BEFORE MEALS AS DIRECTED BY DOCTOR    KWAKU ENCINAS U-100 INSULIN glargine 100 units/mL SubQ pen Inject 52 units subq qam and 42 units subq at bedtime.    losartan (COZAAR) 100 mg, Oral, Daily    metFORMIN (GLUCOPHAGE) 1,000 mg, Oral, 2 times daily with meals    NIFEdipine (ADALAT CC) 90 mg, Oral, Daily    nitroGLYCERIN (NITROSTAT) 0.4 mg, Sublingual, Every 5 min PRN    ondansetron (ZOFRAN-ODT) 4 mg, Oral, " Every 6 hours PRN    ONETOUCH DELICA PLUS LANCET 30 gauge Misc CHECK BLOOD SUGAR THREE TIMES A DAY BEFORE MEALS AS DIRECTED BY DOCTOR    potassium chloride (K-TAB) 20 mEq 20 mEq, Oral, Daily    TRULICITY 4.5 mg, Subcutaneous, Every 7 days         Allergies  Review of patient's allergies indicates:  No Known Allergies    Physical Examination   Vital Signs  Pain Score: 0-No pain]    Physical Exam  Neurological:      Mental Status: He is alert and oriented to person, place, and time.   Psychiatric:         Mood and Affect: Mood normal.         Speech: Speech normal.         Behavior: Behavior normal. Behavior is cooperative.         Thought Content: Thought content normal.         Judgment: Judgment normal.           Results     Lab Results   Component Value Date    WBC 7.00 02/15/2024    HGB 13.3 (L) 02/15/2024    HCT 39.5 (L) 02/15/2024     02/15/2024    CHOL 116 06/05/2023    TRIG 98 06/05/2023    ALT 32 02/15/2024    AST 26 02/15/2024     02/15/2024    K 3.2 (L) 02/15/2024    CREATININE 1.32 (H) 02/15/2024    BUN 20.3 02/15/2024    CO2 25 02/15/2024    TSH 1.406 11/29/2023    PSA 1.64 11/29/2023    INR 0.97 08/03/2021    HGBA1C 7.1 (H) 02/15/2024       Assessment and Plan (including Health Maintenance)     Plan:     1. Uncontrolled type 2 diabetes mellitus with hyperglycemia  A1C 7.1 at goal. Previous A1C 7.7.  Encouraged and congratulated on better glycemic control.  Continue trulicity 4.5 mg weekly.  Continue lantus, humalog and metformin.  Pt to contact clinic if begins to persistent low readings.  Encouraged and congratulated on achieving better glucose control.   Follow ADA diet.  Avoid soda, simple sweets, and limit rice/pasta/bread/starches and consume brown options when possible.   Maintain healthy weight with BMI goal <30.   Perform aerobic exercise for 150 minutes per week (or 5 days a week for 30 minutes each day).   Examine feet daily.   Obtain annual dilated eye exam.  Eye exam:  6/26/23  Foot exam: 3/3/23  - Basic Metabolic Panel; Future  - Hemoglobin A1C; Future    2. Hypokalemia  K 3.2  Increase Kdur to 40 meq daily x 5 days then resume 20 mEq daily.  Take meds as prescribed.  Incorporated potassium rich foods into you diet such as nuts, seeds, peas, beans (dried), whole grains, fresh fruits and vegetables, lean red meat and yogurt.  Education provided on additional sources of potassium-rich foods including: carrots, broccoli, potatoes, celery, spinach, squash, tomatoes, avocados, apricots, cantaloupe, bananas, nectarines, prunes, figs and raisins.  Seek health care assistance if you experience chest pain, shortness of breath, vomiting or diarrhea lasting more than 2 days, fainting, palpitations, or weakness worsens.          Problem List Items Addressed This Visit          Renal/    Hypokalemia (Chronic)       Endocrine    Uncontrolled type 2 diabetes mellitus with hyperglycemia - Primary (Chronic)    Relevant Medications    dulaglutide (TRULICITY) 4.5 mg/0.5 mL pen injector    Other Relevant Orders    Basic Metabolic Panel    Hemoglobin A1C         Health Maintenance Due   Topic Date Due    COVID-19 Vaccine (4 - 2023-24 season) 09/01/2023    Foot Exam  03/03/2024       Follow up in about 3 months (around 5/16/2024) for Follow up, Diabetes, HTN, Lab Results.        Signature:  BIANCA Cabral  [facility]  [address] [facility] [id number]      Date of encounter: 2/16/24

## 2024-03-05 ENCOUNTER — HOSPITAL ENCOUNTER (EMERGENCY)
Facility: HOSPITAL | Age: 59
Discharge: HOME | End: 2024-03-05
Attending: STUDENT IN AN ORGANIZED HEALTH CARE EDUCATION/TRAINING PROGRAM
Payer: MEDICAID

## 2024-03-05 VITALS
HEART RATE: 79 BPM | WEIGHT: 248.38 LBS | HEIGHT: 66 IN | OXYGEN SATURATION: 99 % | BODY MASS INDEX: 39.92 KG/M2 | TEMPERATURE: 98 F | RESPIRATION RATE: 18 BRPM | SYSTOLIC BLOOD PRESSURE: 112 MMHG | DIASTOLIC BLOOD PRESSURE: 64 MMHG

## 2024-03-05 DIAGNOSIS — B35.3 TINEA PEDIS OF LEFT FOOT: Primary | ICD-10-CM

## 2024-03-05 PROCEDURE — 99282 EMERGENCY DEPT VISIT SF MDM: CPT

## 2024-03-05 RX ORDER — CLOTRIMAZOLE 1 %
CREAM (GRAM) TOPICAL 2 TIMES DAILY
Qty: 28 G | Refills: 1 | Status: SHIPPED | OUTPATIENT
Start: 2024-03-05 | End: 2024-06-18

## 2024-03-05 NOTE — ED PROVIDER NOTES
Encounter Date: 3/5/2024       History     Chief Complaint   Patient presents with    Toe Pain     Complains or left toe pain x months. Hx of DM. Denies injury.      Chronic rash in between toes left foot. Painful and itches. He denies any injury, fever, or chills.      Review of patient's allergies indicates:  No Known Allergies  Past Medical History:   Diagnosis Date    Adrenal adenoma     Callus of foot 8/19/2022    Coronary artery disease     Diabetes mellitus     Hyperlipidemia     Hypertension     Spinal stenosis     Unspecified osteoarthritis, unspecified site      Past Surgical History:   Procedure Laterality Date    ANGIOGRAPHY      CHOLECYSTECTOMY      FOOT SURGERY Right      Family History   Problem Relation Age of Onset    Hypertension Mother     Heart disease Mother     Stroke Father     Cancer Sister     Heart disease Sister     Cancer Sister     Heart disease Sister     Heart disease Brother     Heart disease Brother      Social History     Tobacco Use    Smoking status: Never     Passive exposure: Never    Smokeless tobacco: Never   Substance Use Topics    Alcohol use: Never    Drug use: Never     Review of Systems   Constitutional:  Negative for fever.   HENT:  Negative for sore throat.    Respiratory:  Negative for shortness of breath.    Cardiovascular:  Negative for chest pain.   Gastrointestinal:  Negative for nausea.   Genitourinary:  Negative for dysuria.   Musculoskeletal:  Negative for back pain.   Skin:  Positive for rash.   Neurological:  Negative for weakness.   Hematological:  Does not bruise/bleed easily.   All other systems reviewed and are negative.      Physical Exam     Initial Vitals [03/05/24 1408]   BP Pulse Resp Temp SpO2   112/64 79 18 97.8 °F (36.6 °C) 99 %      MAP       --         Physical Exam    Nursing note and vitals reviewed.  Constitutional: He appears well-developed and well-nourished.   HENT:   Head: Normocephalic and atraumatic.   Neck: Neck supple.   Normal range  of motion.  Cardiovascular:  Normal rate, regular rhythm, normal heart sounds and intact distal pulses.           Pulmonary/Chest: Effort normal and breath sounds normal. He has no decreased breath sounds.   Abdominal: Abdomen is soft and flat. Bowel sounds are normal. There is no abdominal tenderness.   Musculoskeletal:         General: Normal range of motion.      Cervical back: Normal range of motion and neck supple.     Neurological: He is alert and oriented to person, place, and time. He has normal strength.   Skin: Skin is warm and dry.   Dry scaly rash between #4 and 5 toes left foot, foot warm with good pulses, NVI   Psychiatric: He has a normal mood and affect.         ED Course   Procedures  Labs Reviewed - No data to display       Imaging Results    None          Medications - No data to display  Medical Decision Making  Chronic rash in between toes left foot. Painful and itches. He denies any injury, fever, or chills.    2:21 PM DISPOSITION: The patient is resting comfortably in no acute distress.  He is hemodynamically stable and is without objective evidence for acute process requiring urgent intervention or hospitalization. I provided counseling to patient with regard to condition, the treatment plan, specific conditions for return, and the importance of follow up. Detailed written and verbal instructions provided to patient and he expressed a verbal understanding of the discharge instructions and overall management plan. Reiterated the importance of medication administration and safety and advised patient to follow up with primary care provider in 3-5 days or sooner if needed.  Answered questions at this time. The patient is stable for discharge.         Additional MDM:   Differential Diagnosis:   At this time differential diagnosis is but not limited to tinea, eczema, contact dermatitis                                     Clinical Impression:  Final diagnoses:  [B35.3] Tinea pedis of left foot  (Primary)          ED Disposition Condition    Discharge Stable          ED Prescriptions       Medication Sig Dispense Start Date End Date Auth. Provider    clotrimazole (LOTRIMIN) 1 % cream Apply topically 2 (two) times daily. Apply to affected area between toes 2 times daily for 21 days 28 g 3/5/2024 3/26/2024 Greg Leavitt ACNP          Follow-up Information       Follow up With Specialties Details Why Contact Info    Yelitza Roque FNP Nurse Practitioner In 3 days  [address] [facility]  Internal Medicine Clinic  [facility] [id number]  [phone]      [facility] Emergency Medicine  If symptoms worsen [address] [facility] [id number] [phone]             Greg Leavitt ACNP  03/05/24 1421

## 2024-03-13 ENCOUNTER — OFFICE VISIT (OUTPATIENT)
Dept: INTERNAL MEDICINE | Facility: CLINIC | Age: 59
End: 2024-03-13
Payer: MEDICAID

## 2024-03-13 ENCOUNTER — HOSPITAL ENCOUNTER (OUTPATIENT)
Dept: RADIOLOGY | Facility: HOSPITAL | Age: 59
Discharge: HOME | End: 2024-03-13
Attending: NURSE PRACTITIONER
Payer: MEDICAID

## 2024-03-13 ENCOUNTER — TELEPHONE (OUTPATIENT)
Dept: INTERNAL MEDICINE | Facility: CLINIC | Age: 59
End: 2024-03-13

## 2024-03-13 VITALS
TEMPERATURE: 98 F | DIASTOLIC BLOOD PRESSURE: 81 MMHG | RESPIRATION RATE: 18 BRPM | HEIGHT: 67 IN | HEART RATE: 80 BPM | BODY MASS INDEX: 38.77 KG/M2 | SYSTOLIC BLOOD PRESSURE: 126 MMHG | WEIGHT: 247 LBS | OXYGEN SATURATION: 100 %

## 2024-03-13 DIAGNOSIS — M79.675 PAIN AND SWELLING OF TOE OF LEFT FOOT: Primary | ICD-10-CM

## 2024-03-13 DIAGNOSIS — M79.89 PAIN AND SWELLING OF TOE OF LEFT FOOT: Primary | ICD-10-CM

## 2024-03-13 DIAGNOSIS — M79.89 PAIN AND SWELLING OF TOE OF LEFT FOOT: ICD-10-CM

## 2024-03-13 DIAGNOSIS — M79.675 PAIN AND SWELLING OF TOE OF LEFT FOOT: ICD-10-CM

## 2024-03-13 PROCEDURE — 3079F DIAST BP 80-89 MM HG: CPT | Mod: CPTII,,, | Performed by: NURSE PRACTITIONER

## 2024-03-13 PROCEDURE — 73620 X-RAY EXAM OF FOOT: CPT | Mod: TC,LT

## 2024-03-13 PROCEDURE — 3072F LOW RISK FOR RETINOPATHY: CPT | Mod: CPTII,,, | Performed by: NURSE PRACTITIONER

## 2024-03-13 PROCEDURE — 4010F ACE/ARB THERAPY RXD/TAKEN: CPT | Mod: CPTII,,, | Performed by: NURSE PRACTITIONER

## 2024-03-13 PROCEDURE — 1160F RVW MEDS BY RX/DR IN RCRD: CPT | Mod: CPTII,,, | Performed by: NURSE PRACTITIONER

## 2024-03-13 PROCEDURE — 99213 OFFICE O/P EST LOW 20 MIN: CPT | Mod: S$PBB,,, | Performed by: NURSE PRACTITIONER

## 2024-03-13 PROCEDURE — 1159F MED LIST DOCD IN RCRD: CPT | Mod: CPTII,,, | Performed by: NURSE PRACTITIONER

## 2024-03-13 PROCEDURE — 99215 OFFICE O/P EST HI 40 MIN: CPT | Mod: PBBFAC,25 | Performed by: NURSE PRACTITIONER

## 2024-03-13 PROCEDURE — 3051F HG A1C>EQUAL 7.0%<8.0%: CPT | Mod: CPTII,,, | Performed by: NURSE PRACTITIONER

## 2024-03-13 PROCEDURE — 3008F BODY MASS INDEX DOCD: CPT | Mod: CPTII,,, | Performed by: NURSE PRACTITIONER

## 2024-03-13 PROCEDURE — 3074F SYST BP LT 130 MM HG: CPT | Mod: CPTII,,, | Performed by: NURSE PRACTITIONER

## 2024-03-13 NOTE — PROGRESS NOTES
Yelitza Roque, BIANCA   [facility]  [address] [facility] [address]      PATIENT NAME: Michael Stone  : 1965  DATE: 3/13/24  MRN: [id number]      Reason for Visit / Chief Complaint: Toe Pain (Pt states left foot, little toe is very painful. He was told by ER it was ringworm and athletes foot but cream he was prescribed at ER is not stopping the pain he is having and is worsening.)       History of Present Illness / Problem Focused Workflow     Michael Stone is a 58 y.o. Black or  male presents to the clinic for ED f/u. Medical problems include uncontrolled DM, HLD, HTN, mild CAD, Covid pneumonia (21), adrenal adenoma, vit d deficiency, covid + 23, morbid obesity, cervical spinal stenosis, MINA on CPAP, bilateral knee OA, tinea pedis and med non-compliance. Followed by [facility] cardio, ortho and wound clinics and Dr. Gallo, vascular.       Today, pt presents after reporting to ED on 3/5/24 with left foot/toe pain and itching. Pt was dx'd with tinea pedis and prescribed clotrimazole cream. Pt reports pain is worsening in the left 5th digit and is red and swollen and can sometimes barely stand to touch or walk on it. Denies chest pain, shortness of breath, cough, fever, headache, dizziness, weakness, abdominal pain, nausea, vomiting, diarrhea, constipation, dysuria, depression, anxiety, SI/HI.      Review of Systems     Review of Systems     See HPI for details    Constitutional: Denies Change in appetite. Denies Chills. Denies Fever. Denies Night sweats.  Eye: Denies Blurred vision. Denies Discharge. Denies Eye pain.  ENT: Denies Decreased hearing. Denies Sore throat. Denies Swollen glands.  Respiratory: Denies Cough. Denies Shortness of breath. Denies Shortness of breath with exertion. Denies Wheezing.  Cardiovascular: DeniesChest pain at rest. Denies Chest pain with exertion. Denies Irregular heartbeat. Denies  Palpitations. Denies Edema.  Gastrointestinal: Denies Abdominal pain. Denies Diarrhea. Denies Nausea. Denies Vomiting. Denies Hematemesis or Hematochezia.  Genitourinary: Denies Dysuria. Denies Urinary frequency. Denies Urinary urgency. Denies Blood in urine.  Endocrine: Denies Cold intolerance. Denies Excessive thirst. Denies Heat intolerance. Denies Weight loss. Denies Weight gain.  Musculoskeletal: Admits Painful joints. Denies Weakness.  Integumentary: Denies Rash. Denies Itching. Denies Dry skin.  Neurologic: Denies Dizziness. Denies Fainting. Denies Headache.  Psychiatric: Denies Depression. Denies Anxiety. Denies Suicidal/Homicidal ideations.    All Other ROS: Negative except as stated in HPI.     Medical / Surgical / Social / Family History       ----------------------------  Adrenal adenoma  Callus of foot  Coronary artery disease  Diabetes mellitus  Hyperlipidemia  Hypertension  Spinal stenosis  Unspecified osteoarthritis, unspecified site     Past Surgical History:   Procedure Laterality Date    ANGIOGRAPHY      CHOLECYSTECTOMY      FOOT SURGERY Right        Social History     Socioeconomic History    Marital status: Single   Tobacco Use    Smoking status: Never     Passive exposure: Never    Smokeless tobacco: Never   Substance and Sexual Activity    Alcohol use: Never    Drug use: Never    Sexual activity: Yes     Partners: Female     Birth control/protection: None     Social Determinants of Health     Financial Resource Strain: Medium Risk (2/8/2024)    Overall Financial Resource Strain (CARDIA)     Difficulty of Paying Living Expenses: Somewhat hard   Food Insecurity: Food Insecurity Present (2/8/2024)    Hunger Vital Sign     Worried About Running Out of Food in the Last Year: Sometimes true     Ran Out of Food in the Last Year: Sometimes true   Transportation Needs: Unmet Transportation Needs (2/8/2024)    PRAPARE - Transportation     Lack of Transportation (Medical): Yes     Lack of Transportation  "(Non-Medical): Yes   Physical Activity: Insufficiently Active (2/8/2024)    Exercise Vital Sign     Days of Exercise per Week: 1 day     Minutes of Exercise per Session: 10 min   Stress: Stress Concern Present (2/8/2024)    [employer] [place] [employer] - Occupational Stress Questionnaire     Feeling of Stress : To some extent   Social Connections: Unknown (2/8/2024)    Social Connection and Isolation Panel [NHANES]     Frequency of Communication with Friends and Family: Once a week     Frequency of Social Gatherings with Friends and Family: Once a week     Active Member of Clubs or Organizations: No     Attends Club or Organization Meetings: Never     Marital Status: Living with partner   Housing Stability: Low Risk  (2/8/2024)    Housing Stability Vital Sign     Unable to Pay for Housing in the Last Year: No     Number of Places Lived in the Last Year: 1     Unstable Housing in the Last Year: No        Family History   Problem Relation Age of Onset    Hypertension Mother     Heart disease Mother     Stroke Father     Cancer Sister     Heart disease Sister     Cancer Sister     Heart disease Sister     Heart disease Brother     Heart disease Brother         Medications and Allergies     Medications  Current Outpatient Medications   Medication Instructions    albuterol (PROVENTIL/VENTOLIN HFA) 90 mcg/actuation inhaler 2 puffs, Inhalation, Every 6 hours PRN    ammonium lactate 12 % Crea APPLY TWICE DAILY. APPLY A THIN LAYER OF VASELINE OVER LAC-HYDRIN TWICE DAILY. NOTHING BETWEEN TOES    aspirin (ECOTRIN) 81 mg, Oral, Daily    atorvastatin (LIPITOR) 80 mg, Oral, Nightly    BD VEO INSULIN SYRINGE UF 1 mL 31 gauge x 15/64" Syrg USE THREE TIMES DAILY AS DIRECTED    blood sugar diagnostic (ONETOUCH ULTRA TEST) Strp CHECK BLOOD SUGAR THREE TIMES A DAY BEFORE MEALS AS DIRECTED BY DOCTOR    clotrimazole (LOTRIMIN) 1 % cream Topical (Top), 2 times daily, Apply to affected area between toes 2 times daily    " "colistimethate (COLYMYCIN) 150 mg injection SMARTSIG:Topical    diclofenac sodium (VOLTAREN) 2 g, Topical (Top), 4 times daily    furosemide (LASIX) 20 mg, Oral, Daily    HUMALOG U-100 INSULIN 100 unit/mL injection INJECT 20 UNITS SUBCUTANEOUSLY THREE TIMES DAILY BEFORE MEAL(S)    hydroCHLOROthiazide (HYDRODIURIL) 100 mg, Oral, Daily    ID NOW COVID-19 TEST KIT Kit TEST AS DIRECTED TODAY    insulin syringes, disposable, 1 mL Syrg 1 Syringe, Subcutaneous, 3 times daily before meals    lancets (ONETOUCH DELICA LANCETS) 33 gauge Misc CHECK BLOOD SUGAR THREE TIMES A DAY BEFORE MEALS AS DIRECTED BY DOCTOR    KWAKU ENCINAS U-100 INSULIN glargine 100 units/mL SubQ pen Inject 52 units subq qam and 42 units subq at bedtime.    losartan (COZAAR) 100 mg, Oral, Daily    metFORMIN (GLUCOPHAGE) 1,000 mg, Oral, 2 times daily with meals    NIFEdipine (ADALAT CC) 90 mg, Oral, Daily    nitroGLYCERIN (NITROSTAT) 0.4 mg, Sublingual, Every 5 min PRN    ondansetron (ZOFRAN-ODT) 4 mg, Oral, Every 6 hours PRN    ONETOUCH DELICA PLUS LANCET 30 gauge Misc CHECK BLOOD SUGAR THREE TIMES A DAY BEFORE MEALS AS DIRECTED BY DOCTOR    potassium chloride (K-TAB) 20 mEq 20 mEq, Oral, Daily    TRULICITY 4.5 mg, Subcutaneous, Every 7 days         Allergies  Review of patient's allergies indicates:  No Known Allergies    Physical Examination     /81 (BP Location: Right arm, Patient Position: Sitting, BP Method: Large (Automatic))   Pulse 80   Temp 98.4 °F (36.9 °C) (Oral)   Resp 18   Ht 5' 7" (1.702 m)   Wt 112 kg (247 lb)   SpO2 100%   BMI 38.69 kg/m²     Physical Exam  Constitutional:       Appearance: He is obese.   Musculoskeletal:        Feet:    Feet:      Left foot:      Skin integrity: Erythema and dry skin present.      Comments: Left 5th digit red, swollen and tender to touch with limping gait        General: Alert and oriented, No acute distress.  Head: Normocephalic, Atraumatic.  Eye: Pupils are equal, round and reactive to " light, Extraocular movements are intact, Sclera non-icteric.  Ears/Nose/Throat: Normal, Mucosa moist,Clear.  Neck/Thyroid: Supple, Non-tender, No carotid bruit, No lymphadenopathy, No JVD, Full range of motion.  Respiratory: Clear to auscultation bilaterally; No wheezes, rales or rhonchi,Non-labored respirations, Symmetrical chest wall expansion.  Cardiovascular: Regular rate and rhythm, S1/S2 normal, No murmurs, rubs or gallops.  Gastrointestinal: Soft, Non-tender, Non-distended, Normal bowel sounds, No palpable organomegaly.  Integumentary: Warm, Dry, Intact, No suspicious lesions or rashes.  Extremities: No clubbing, cyanosis or edema  Neurologic: No focal deficits, Cranial Nerves II-XII are grossly intact, Motor strength normal upper and lower extremities, Sensory exam intact.  Psychiatric: Normal interaction, Coherent speech, Appropriate affect       Results     Lab Results   Component Value Date    WBC 7.00 02/15/2024    HGB 13.3 (L) 02/15/2024    HCT 39.5 (L) 02/15/2024     02/15/2024    CHOL 116 06/05/2023    TRIG 98 06/05/2023    ALT 32 02/15/2024    AST 26 02/15/2024     02/15/2024    K 3.2 (L) 02/15/2024    CREATININE 1.32 (H) 02/15/2024    BUN 20.3 02/15/2024    CO2 25 02/15/2024    TSH 1.406 11/29/2023    PSA 1.64 11/29/2023    INR 0.97 08/03/2021    HGBA1C 7.1 (H) 02/15/2024         Assessment and Plan (including Health Maintenance)     Plan:     1. Pain and swelling of toe of left foot  XR left foot today.  Uric acid level.  Will notify of abn results.  RICE encouraged.  Continue lotrimin and wound care appts for tinea pedis.  - X-Ray Foot 2 View Left; Future  - Uric Acid; Future      Problem List Items Addressed This Visit          Orthopedic    Pain and swelling of toe of left foot - Primary    Relevant Orders    X-Ray Foot 2 View Left    Uric Acid         Health Maintenance Due   Topic Date Due    COVID-19 Vaccine (4 - 2023-24 season) 09/01/2023    Foot Exam  03/03/2024       Follow up  if symptoms worsen or fail to improve, for Keep appt in May as scheduled with labs one week prior..        Signature:  BIANCA Cabral  [facility]  [address] [facility] [id number]

## 2024-04-08 DIAGNOSIS — E08.11 DIABETES MELLITUS DUE TO UNDERLYING CONDITION WITH KETOACIDOSIS WITH COMA: ICD-10-CM

## 2024-04-08 RX ORDER — LANCETS 33 GAUGE
EACH MISCELLANEOUS
Qty: 100 EACH | Refills: 11 | Status: SHIPPED | OUTPATIENT
Start: 2024-04-08

## 2024-04-26 ENCOUNTER — HOSPITAL ENCOUNTER (OUTPATIENT)
Dept: WOUND CARE | Facility: HOSPITAL | Age: 59
Discharge: HOME | End: 2024-04-26
Attending: NURSE PRACTITIONER
Payer: MEDICAID

## 2024-04-26 VITALS
TEMPERATURE: 98 F | SYSTOLIC BLOOD PRESSURE: 125 MMHG | RESPIRATION RATE: 18 BRPM | HEART RATE: 69 BPM | DIASTOLIC BLOOD PRESSURE: 84 MMHG | OXYGEN SATURATION: 99 %

## 2024-04-26 DIAGNOSIS — L60.3 DYSTROPHIC NAIL: ICD-10-CM

## 2024-04-26 DIAGNOSIS — E11.628 TYPE 2 DIABETES MELLITUS WITH OTHER SKIN COMPLICATION, WITH LONG-TERM CURRENT USE OF INSULIN: ICD-10-CM

## 2024-04-26 DIAGNOSIS — E11.9 ENCOUNTER FOR COMPREHENSIVE DIABETIC FOOT EXAMINATION, TYPE 2 DIABETES MELLITUS: Primary | ICD-10-CM

## 2024-04-26 DIAGNOSIS — Z79.4 TYPE 2 DIABETES MELLITUS WITH OTHER SKIN COMPLICATION, WITH LONG-TERM CURRENT USE OF INSULIN: ICD-10-CM

## 2024-04-26 DIAGNOSIS — B35.3 TINEA PEDIS OF BOTH FEET: ICD-10-CM

## 2024-04-26 DIAGNOSIS — L60.2 OVERGROWN TOENAILS: ICD-10-CM

## 2024-04-26 PROCEDURE — 99499 UNLISTED E&M SERVICE: CPT | Mod: ,,, | Performed by: NURSE PRACTITIONER

## 2024-04-26 PROCEDURE — 11719 TRIM NAIL(S) ANY NUMBER: CPT

## 2024-04-26 PROCEDURE — 99211 OFF/OP EST MAY X REQ PHY/QHP: CPT | Mod: 25

## 2024-04-26 PROCEDURE — 11719 TRIM NAIL(S) ANY NUMBER: CPT | Mod: ,,, | Performed by: NURSE PRACTITIONER

## 2024-04-26 PROCEDURE — 27000999 HC MEDICAL RECORD PHOTO DOCUMENTATION

## 2024-04-26 NOTE — PROGRESS NOTES
TODAY'S VISIT NOTE WAS IMPORTED FROM LAST WOUND CLINIC VISIT OF 1/10/24.  I ATTEST THAT I REVIEWED THE HPI, ROS, LABS, WOUND-RELATED IMAGING, PHYSICAL EXAMINATION, EVALUATION AND PLAN SECTIONS OF IMPORTED NOTE AND REVISED TODAY'S VISIT NOTE TO REFLECT TODAY'S NEW ASSESSMENT FINDINGS AND TODAY'S UPDATES TO WOUND TREATMENT PLAN.          Chief Complaint:  Routine diabetic foot care.       History of Present Illness:  59 yo Black male being seen today for routine diabetic foot care.   Followed by BIANCA Eli, for PCP.  Last visit with Ms. Roque was on 3/13/24.  Lesion to right dorsal foot biopsied by [facility] dermatology with negative findings (Spring 2022).   Hx includes medical non-compliance, obesity (BMI 35.97), Type 2 diabetes (insulin-dependent), HTN, HLD, adrenal adenoma, and generalized DJD, gout, and tinea pedis. Has attended individual diabetes education on 12/18/20 and has been attempting to follow ADA/DASH diet. Underwent angiogram 8/20/2021 here @ [facility]; very mild nonobstructive one vessel disease. Followed by [facility] cardiology clinic.  RLE 12/2021 venous reflux study showed reflux of 4.3 seconds in the GSV at the level of the upper calf and reflux of 4.8 seconds and a branch of the GSV at the level of the upper calf. August 2021 LLE venous reflux study revealed no substantial venous reflux and no DVT.  Followed by Dr. Gallo's [facility] PVD clinic for vascular.       Review of Most Recent Labs:  3/13/24:  Uric acid 8.2.   2/15/24:  CBC unremarkable.  K+ 3.2.  CR 1.32.  HgbA1C 7.1.    11/29/23:  Na+ 148.  CR 0.94.  Glucose 38.    9/11/23:  BUN/CR 26.9 & 1.46.  eGFR 56.  HgbA1C 8.3%.    8/10/23:  CBC unremarkable.    6/5/23:  CR 1.01.  Chol 116. HDL 39.  LDL 57.  Trig 98.  HgbA1C 7.0.   1/15/23:  CBC unremarkable.  Cr 1.26.    11/15/2022:   Chol 109.  HDL 38.  LDL 58.  Trig 65.  HgbA1C 7.8.  PSA 2.57.      Imaging:  3/13/24 xray of left foot:  No acute osseous finding.  Mild hallux valgus deformity.  Small vessel  "vascular calcification.  Small lucencies in the 1st metatarsal head medially possibly intra osseous ganglion versus gouty erosions peer        Today 4/26/24:  Has been checking toes several times/week.  Has Lac-Hydrin and topically CMPD antibiotics and gel at home.  Using Lac-Hydrin consistently twice/day, per self-report.  Uses topically CMPD antibiotics as-needed for recurrent toe web rashes.  CBG was 220 this morning at 0400, before eating.  Drank a regular soda yesterday and ate pork chop for supper.   Does not drink ETOH.  Received teaching on foods to avoid with new diagnosis of gout from PCP.      1/10/24:  Continues having pain and tingling in feet and toes.  Pain worse in left 5th toe.  Ankles are always puffy.  Toes are cold a lot of times.  Pain in calves, with walking, is about the same.   Relieved with rest.  Denies pain with laying down in calves and thighs.  Checking feet several times/week, and in between toes, for recurrent rash.  Not sure if he is mixing up capsule powder in gel correctly or not, but he is mixing it per instructions.  No new rashes, lesions, or skin breakdown.  Applied Lac-Hydrin and Vaseline last night; however, not today.  Is using that twice/day, as prescribed.      9/22/23:  Did get antibiotics for rash between toes and used it, with improvement in rash between toes.  Has not used it recently though.  Did not know rash had returned.  Wearing protective shoes.  Checks feet several times/week.  Toes continue to burn and tingle; however, no worse than his usual baseline.  Using Lac-Hydrin and Vaseline on feet several times a week.      6/16/23:  Very happy that he got diabetes at goal.  Trying to eat healthier and lose some weight, as well.  Checking feet/toes daily.  Not aware of any rashes between toes.  Has not used gentian violet in a long time.  Reporting toes in both feet "are hurting real bad" with nerve pain of diabetes.  No new rashes, lesions, or skin breakdown.  " Continues with twice/day Lac-Hydrin and Vaseline to feet for moisturizing.      2/28/23:  Still having pain in calves with walking sometimes, along with cold feet, and numbness over all toes on both feet.  Ankles are always swollen.  Applying Lac-Hydrin and Vaseline to both feet, with improvement in dry skin.  Admits to walking barefoot in house.  Denies walking outside barefoot.     11/29/2022:  Having mild discomfort between bilateral 4th and 5th toes, where he has had calluses previously.  Has not been sanding calluses down with [employer] boards, as taught previously.  No rashes between toes.  Reports calf pain with walking (left > right) at times, cold feet, and numbness over all toes.  Ankles are always swollen.  Scheduled to see Dr. Gallo, vascular, 12/2/2022.  Has not been applying any kind of moisturizer to dry skin over bottoms of feet and lower legs.  No new rashes, lesions, or skin breakdown.       Review of Systems:  Except as stated in HPI, all other 10 body systems normal      Physical Exam Vitals & Measurements:    Vitals:    04/26/24 0808   BP: 125/84   Pulse: 69   Resp: 18   Temp: 98.3 °F (36.8 °C)           General:  VSS,  afebrile.    Obese; in no acute distress.  Respiratory:   Breathing even, quiet, and unlabored at rest. No coughing.   Cardiovascular:   Moderate non-pitting edema over bilateral medial and lateral ankles.  No hemosiderin staining or varicosities.  No hair distribution over BLE.   Toenails dystrophic and overgrown.  DP pulses palpated bilaterally.   PT pulses dopplered bilaterally.    Musculoskeletal:  Full range of motion of all extremities.  Bunion to left 1st met head.   Decreased dorsiflexion and flexion of bilateral ankles, and left hallux toe.  Loss of intrinsic muscle ROM in bilateral feet.  Bilateral pes planus.    Neurologic: A&O X 3; cranial nerves grossly intact.  Normal monofilament testing.  No loss of sensation.    Psychiatric: Calm, cooperative. Mood and affect normal.  Responses appropriate  Integumentary:     Ten toenails all overgrown and dystrophic.     Very small white lesion to left medial 5th toe, down in a deep crease in toe web.  Skin intact.                                  Assessment/Plan:    Obesity, BMI 35.97:  This has been identified as a co-factor towards foot and toe complications, and increased risk of calluses, and diabetic foot ulcers.   Being managed by PCP.     Encounter for routine diabetic foot care, Type 2, insulin-dependent:  Last visit with PCP, Yelitza Roque APRN:  3/13/24  Diabetes not at goal, per last HgbA1C.     Diabetic foot care principles reinforced.   Wound clinic and [facility] precautions reinforced.                                                   PROCEDURE NOTE    Overgrown toenails:  Procedure Today: cutting and filing of 10 toenails  Informed verbal consent obtained.    Using standard podiatry clippers and Daniel boards, I cut, trimmed, filed/sanded all ten toenails. No bleeding or discomfort; Mr. Stone tolerated procedure without complications.     Tinea Pedis:  Very mild rash at left 5th toe web crease, along 5th medial toe.  Instructed we are entering summer and he is at increased risk for worsening tinea pedis.  Instructed to apply topically CMPD antibiotics to bilateral 3rd and 4th toe webs twice/day to avoid worsening tinea pedis, with rationale.   Reinforced teaching on importance of checking toe webs thoroughly to prevent wounds between toes, which can lead to skin and bone infections, osteo, and amputations.    Prescribed CMPD Colistimethate 150 mg, Clinda 150 mg, Mupirocin 20 mg, and Itraconazole 50 mg in gel twice/day.  Script being filled by [facility].   Instructed on how to get refills and new scripts for both Lac-Hydrin and topically CMPD antibiotics.  Instructed on proper sock and shoewear with diabetes.      Xerosis of bilateral feet:  Improved with current tx plan.   CPM   Prescribed Lac-Hydrin  12%, followed by thin layer of Vaseline twice/day.   Instructions reinforced to not put Lac-Hydrin and Vaseline between toes, with rationale.                RTC in three months. Instructed on s/s to call wound clinic for in between clinic visits.

## 2024-05-09 DIAGNOSIS — E11.65 UNCONTROLLED TYPE 2 DIABETES MELLITUS WITH HYPERGLYCEMIA: Chronic | ICD-10-CM

## 2024-05-10 RX ORDER — INSULIN LISPRO 100 [IU]/ML
INJECTION, SOLUTION INTRAVENOUS; SUBCUTANEOUS
Qty: 30 ML | Refills: 0 | Status: SHIPPED | OUTPATIENT
Start: 2024-05-10

## 2024-05-13 ENCOUNTER — TELEPHONE (OUTPATIENT)
Dept: INTERNAL MEDICINE | Facility: CLINIC | Age: 59
End: 2024-05-13
Payer: MEDICAID

## 2024-05-13 ENCOUNTER — LAB VISIT (OUTPATIENT)
Dept: LAB | Facility: HOSPITAL | Age: 59
End: 2024-05-13
Attending: NURSE PRACTITIONER
Payer: MEDICAID

## 2024-05-13 DIAGNOSIS — M10.072 ACUTE IDIOPATHIC GOUT INVOLVING TOE OF LEFT FOOT: ICD-10-CM

## 2024-05-13 DIAGNOSIS — E87.6 HYPOKALEMIA: Primary | Chronic | ICD-10-CM

## 2024-05-13 DIAGNOSIS — E11.65 UNCONTROLLED TYPE 2 DIABETES MELLITUS WITH HYPERGLYCEMIA: Chronic | ICD-10-CM

## 2024-05-13 LAB
ANION GAP SERPL CALC-SCNC: 8 MEQ/L
BUN SERPL-MCNC: 21.9 MG/DL (ref 8.4–25.7)
CALCIUM SERPL-MCNC: 9.4 MG/DL (ref 8.4–10.2)
CHLORIDE SERPL-SCNC: 109 MMOL/L (ref 98–107)
CO2 SERPL-SCNC: 28 MMOL/L (ref 22–29)
CREAT SERPL-MCNC: 1.35 MG/DL (ref 0.73–1.18)
CREAT/UREA NIT SERPL: 16
EST. AVERAGE GLUCOSE BLD GHB EST-MCNC: 151.3 MG/DL
GFR SERPLBLD CREATININE-BSD FMLA CKD-EPI: >60 ML/MIN/1.73/M2
GLUCOSE SERPL-MCNC: 98 MG/DL (ref 74–100)
HBA1C MFR BLD: 6.9 %
POTASSIUM SERPL-SCNC: 2.9 MMOL/L (ref 3.5–5.1)
SODIUM SERPL-SCNC: 145 MMOL/L (ref 136–145)
URATE SERPL-MCNC: 8.2 MG/DL (ref 3.5–7.2)

## 2024-05-13 PROCEDURE — 80048 BASIC METABOLIC PNL TOTAL CA: CPT

## 2024-05-13 PROCEDURE — 84550 ASSAY OF BLOOD/URIC ACID: CPT

## 2024-05-13 PROCEDURE — 36415 COLL VENOUS BLD VENIPUNCTURE: CPT

## 2024-05-13 PROCEDURE — 83036 HEMOGLOBIN GLYCOSYLATED A1C: CPT

## 2024-05-13 RX ORDER — POTASSIUM CHLORIDE 20 MEQ/1
TABLET, EXTENDED RELEASE ORAL
Qty: 180 TABLET | Refills: 1 | Status: SHIPPED | OUTPATIENT
Start: 2024-05-13

## 2024-05-13 NOTE — TELEPHONE ENCOUNTER
----- Message from BIANCA Eli sent at 5/13/2024  3:45 PM CDT -----  Inform the pt that his K is lower than last visit. Inform him to increase his Kdur to 40 mEq BID x 3 days then increase to 20 mEq BID. New rx sent to pharmacy.  Repeat BMP this Friday.   Take meds as prescribed.  Incorporate potassium rich foods into you diet such as nuts, seeds, peas, beans (dried), whole grains, fresh fruits and vegetables, lean red meat and yogurt.  Education provided on additional sources of potassium-rich foods including: carrots, broccoli, potatoes, celery, spinach, squash, tomatoes, avocados, apricots, cantaloupe, bananas, nectarines, prunes, figs and raisins.  Seek health care assistance if you experience chest pain, shortness of breath, muscle cramps, vomiting or diarrhea lasting more than 2 days, fainting, palpitations, or weakness worsens.

## 2024-05-13 NOTE — TELEPHONE ENCOUNTER
Called pt and informed him of provider's message below. Pt verbalized understanding with no further questions or concerns.

## 2024-05-13 NOTE — PROGRESS NOTES
Inform the pt that his K is lower than last visit. Inform him to increase his Kdur to 40 mEq BID x 3 days then increase to 20 mEq BID. New rx sent to pharmacy.  Repeat BMP this Friday.   Take meds as prescribed.  Incorporate potassium rich foods into you diet such as nuts, seeds, peas, beans (dried), whole grains, fresh fruits and vegetables, lean red meat and yogurt.  Education provided on additional sources of potassium-rich foods including: carrots, broccoli, potatoes, celery, spinach, squash, tomatoes, avocados, apricots, cantaloupe, bananas, nectarines, prunes, figs and raisins.  Seek health care assistance if you experience chest pain, shortness of breath, muscle cramps, vomiting or diarrhea lasting more than 2 days, fainting, palpitations, or weakness worsens.

## 2024-05-16 ENCOUNTER — OFFICE VISIT (OUTPATIENT)
Dept: INTERNAL MEDICINE | Facility: CLINIC | Age: 59
End: 2024-05-16
Payer: MEDICAID

## 2024-05-16 VITALS
DIASTOLIC BLOOD PRESSURE: 66 MMHG | BODY MASS INDEX: 38.3 KG/M2 | HEART RATE: 60 BPM | WEIGHT: 244 LBS | TEMPERATURE: 98 F | SYSTOLIC BLOOD PRESSURE: 103 MMHG | HEIGHT: 67 IN | RESPIRATION RATE: 18 BRPM | OXYGEN SATURATION: 100 %

## 2024-05-16 DIAGNOSIS — M10.072 ACUTE IDIOPATHIC GOUT INVOLVING TOE OF LEFT FOOT: ICD-10-CM

## 2024-05-16 DIAGNOSIS — E87.6 HYPOKALEMIA: Primary | Chronic | ICD-10-CM

## 2024-05-16 DIAGNOSIS — E66.01 CLASS 2 SEVERE OBESITY DUE TO EXCESS CALORIES WITH SERIOUS COMORBIDITY AND BODY MASS INDEX (BMI) OF 38.0 TO 38.9 IN ADULT: ICD-10-CM

## 2024-05-16 DIAGNOSIS — R06.09 DOE (DYSPNEA ON EXERTION): ICD-10-CM

## 2024-05-16 DIAGNOSIS — E11.65 UNCONTROLLED TYPE 2 DIABETES MELLITUS WITH HYPERGLYCEMIA: Chronic | ICD-10-CM

## 2024-05-16 DIAGNOSIS — M1A.0711 IDIOPATHIC CHRONIC GOUT OF RIGHT FOOT WITH TOPHUS: Chronic | ICD-10-CM

## 2024-05-16 DIAGNOSIS — E78.5 HYPERLIPIDEMIA LDL GOAL <70: Chronic | ICD-10-CM

## 2024-05-16 PROCEDURE — 99214 OFFICE O/P EST MOD 30 MIN: CPT | Mod: 25,S$PBB,, | Performed by: NURSE PRACTITIONER

## 2024-05-16 PROCEDURE — 1159F MED LIST DOCD IN RCRD: CPT | Mod: CPTII,,, | Performed by: NURSE PRACTITIONER

## 2024-05-16 PROCEDURE — 1160F RVW MEDS BY RX/DR IN RCRD: CPT | Mod: CPTII,,, | Performed by: NURSE PRACTITIONER

## 2024-05-16 PROCEDURE — 3074F SYST BP LT 130 MM HG: CPT | Mod: CPTII,,, | Performed by: NURSE PRACTITIONER

## 2024-05-16 PROCEDURE — 3008F BODY MASS INDEX DOCD: CPT | Mod: CPTII,,, | Performed by: NURSE PRACTITIONER

## 2024-05-16 PROCEDURE — 3044F HG A1C LEVEL LT 7.0%: CPT | Mod: CPTII,,, | Performed by: NURSE PRACTITIONER

## 2024-05-16 PROCEDURE — 99215 OFFICE O/P EST HI 40 MIN: CPT | Mod: PBBFAC | Performed by: NURSE PRACTITIONER

## 2024-05-16 PROCEDURE — 4010F ACE/ARB THERAPY RXD/TAKEN: CPT | Mod: CPTII,,, | Performed by: NURSE PRACTITIONER

## 2024-05-16 PROCEDURE — 3072F LOW RISK FOR RETINOPATHY: CPT | Mod: CPTII,,, | Performed by: NURSE PRACTITIONER

## 2024-05-16 PROCEDURE — 3078F DIAST BP <80 MM HG: CPT | Mod: CPTII,,, | Performed by: NURSE PRACTITIONER

## 2024-05-16 RX ORDER — ALLOPURINOL 100 MG/1
100 TABLET ORAL 2 TIMES DAILY
Qty: 60 TABLET | Refills: 6 | Status: SHIPPED | OUTPATIENT
Start: 2024-05-16

## 2024-05-16 RX ORDER — HYDROCHLOROTHIAZIDE 50 MG/1
50 TABLET ORAL DAILY
Qty: 90 TABLET | Refills: 3 | Status: SHIPPED | OUTPATIENT
Start: 2024-05-16 | End: 2025-05-16

## 2024-05-16 NOTE — PROGRESS NOTES
Yelitza Roque, BIANCA   [facility]  [address] [address]      PATIENT NAME: Michael Stone  : 1965  DATE: 24  MRN: [id number]      Reason for Visit / Chief Complaint: Follow-up, lab result review, and Diabetes       History of Present Illness / Problem Focused Workflow     Michael Stone is a 58 y.o. Black or  male presents to the clinic for uncontrolled DM and HTN f/u. Medical problems include uncontrolled DM, HLD, HTN, mild CAD, Covid pneumonia (21), adrenal adenoma, vit d deficiency, covid + 23, morbid obesity, cervical spinal stenosis, MINA on CPAP, bilateral knee OA, tinea pedis, gout, and med non-compliance. Followed by [facility] cardio, ortho and wound clinics and Dr. Gallo, vascular.       Today, pt reports he is taking 40 meq K BID and instructed after lab results on 24. Is aware to begin taking 20 meq BID on tomorrow. Reports CBGs ranging 100s when checked with a couple of readings in the low 200s. Is attempting ADA/cardiac diet. Reports med compliance. Continues to report pain to left 5th toe. Denies chest pain, shortness of breath, cough, fever, headache, dizziness, weakness, abdominal pain, nausea, vomiting, diarrhea, constipation, dysuria, depression, anxiety, SI/HI.    Review of Systems     Review of Systems     See HPI for details    Constitutional: Denies Change in appetite. Denies Chills. Denies Fever. Denies Night sweats.  Eye: Denies Blurred vision. Denies Discharge. Denies Eye pain.  ENT: Denies Decreased hearing. Denies Sore throat. Denies Swollen glands.  Respiratory: Denies Cough. Denies Shortness of breath. Denies Shortness of breath with exertion. Denies Wheezing.  Cardiovascular: DeniesChest pain at rest. Denies Chest pain with exertion. Denies Irregular heartbeat. Denies Palpitations. Denies Edema.  Gastrointestinal: Denies Abdominal pain. Denies Diarrhea. Denies Nausea.  Denies Vomiting. Denies Hematemesis or Hematochezia.  Genitourinary: Denies Dysuria. Denies Urinary frequency. Denies Urinary urgency. Denies Blood in urine.  Endocrine: Denies Cold intolerance. Denies Excessive thirst. Denies Heat intolerance. Denies Weight loss. Denies Weight gain.  Musculoskeletal: Admits Painful joints. Denies Weakness.  Integumentary: Denies Rash. Denies Itching. Denies Dry skin.  Neurologic: Denies Dizziness. Denies Fainting. Denies Headache.  Psychiatric: Denies Depression. Denies Anxiety. Denies Suicidal/Homicidal ideations.    All Other ROS: Negative except as stated in HPI.     Medical / Surgical / Social / Family History       -------------------------------------    Adrenal adenoma    Callus of foot    Coronary artery disease    Diabetes mellitus    Hyperlipidemia    Hypertension    Spinal stenosis    Unspecified osteoarthritis, unspecified site        Past Surgical History:   Procedure Laterality Date    ANGIOGRAPHY      CHOLECYSTECTOMY      FOOT SURGERY Right        Social History     Socioeconomic History    Marital status: Single   Tobacco Use    Smoking status: Never     Passive exposure: Never    Smokeless tobacco: Never   Substance and Sexual Activity    Alcohol use: Never    Drug use: Never    Sexual activity: Yes     Partners: Female     Birth control/protection: None     Social Determinants of Health     Financial Resource Strain: Medium Risk (2/8/2024)    Overall Financial Resource Strain (CARDIA)     Difficulty of Paying Living Expenses: Somewhat hard   Food Insecurity: Food Insecurity Present (2/8/2024)    Hunger Vital Sign     Worried About Running Out of Food in the Last Year: Sometimes true     Ran Out of Food in the Last Year: Sometimes true   Transportation Needs: Unmet Transportation Needs (2/8/2024)    PRAPARE - Transportation     Lack of Transportation (Medical): Yes     Lack of Transportation (Non-Medical): Yes   Physical Activity: Insufficiently Active (2/8/2024)  "   Exercise Vital Sign     Days of Exercise per Week: 1 day     Minutes of Exercise per Session: 10 min   Stress: Stress Concern Present (2/8/2024)    [employer] - Occupational Stress Questionnaire     Feeling of Stress : To some extent   Housing Stability: Low Risk  (2/8/2024)    Housing Stability Vital Sign     Unable to Pay for Housing in the Last Year: No     Number of Places Lived in the Last Year: 1     Unstable Housing in the Last Year: No        Family History   Problem Relation Name Age of Onset    Hypertension Mother Elva Stone     Heart disease Mother Elva Stone     Stroke Father      Cancer Sister Tip Clifton     Heart disease Sister Tip Clifton     Cancer Sister Theodora Pope     Heart disease Sister Theodora Pope     Heart disease Brother Kingsley Stone     Heart disease Brother Andi Stone         Medications and Allergies     Medications  Current Outpatient Medications   Medication Instructions    albuterol (PROVENTIL/VENTOLIN HFA) 90 mcg/actuation inhaler 2 puffs, Inhalation, Every 6 hours PRN    allopurinoL (ZYLOPRIM) 100 mg, Oral, 2 times daily    ammonium lactate 12 % Crea APPLY TWICE DAILY. APPLY A THIN LAYER OF VASELINE OVER LAC-HYDRIN TWICE DAILY. NOTHING BETWEEN TOES    aspirin (ECOTRIN) 81 mg, Oral, Daily    atorvastatin (LIPITOR) 80 mg, Oral, Nightly    BD VEO INSULIN SYRINGE UF 1 mL 31 gauge x 15/64" Syrg USE THREE TIMES DAILY AS DIRECTED    blood sugar diagnostic (ONETOUCH ULTRA TEST) Strp CHECK BLOOD SUGAR THREE TIMES A DAY BEFORE MEALS AS DIRECTED BY DOCTOR    clotrimazole (LOTRIMIN) 1 % cream Topical (Top), 2 times daily, Apply to affected area between toes 2 times daily    colchicine (COLCRYS) 0.6 mg tablet Take 2 tablets now and take 1 tablet in one hour.    colistimethate (COLYMYCIN) 150 mg injection SMARTSIG:Topical    diclofenac sodium (VOLTAREN) 2 g, Topical (Top), 4 times daily    furosemide (LASIX) 20 mg, Oral, Daily " "   HUMALOG U-100 INSULIN 100 unit/mL injection INJECT 20 UNITS SUBCUTANEOUSLY THREE TIMES DAILY BEFORE MEAL(S)    hydroCHLOROthiazide (HYDRODIURIL) 50 mg, Oral, Daily    ID NOW COVID-19 TEST KIT Kit TEST AS DIRECTED TODAY    insulin syringes, disposable, 1 mL Syrg 1 Syringe, Subcutaneous, 3 times daily before meals    lancets (ONETOUCH DELICA PLUS LANCET) 30 gauge Misc CHECK BLOOD SUGAR THREE TIMES A DAY BEFORE MEALS AS DIRECTED BY DOCTOR    KWAKU ENCINAS U-100 INSULIN glargine 100 units/mL SubQ pen Inject 52 units subq qam and 42 units subq at bedtime.    losartan (COZAAR) 100 mg, Oral, Daily    metFORMIN (GLUCOPHAGE) 1,000 mg, Oral, 2 times daily with meals    NIFEdipine (ADALAT CC) 90 mg, Oral, Daily    nitroGLYCERIN (NITROSTAT) 0.4 mg, Sublingual, Every 5 min PRN    ondansetron (ZOFRAN-ODT) 4 mg, Oral, Every 6 hours PRN    potassium chloride SA (K-DUR,KLOR-CON) 20 MEQ tablet Take 2 tabs BID x 3 days then decrease to 1 tab BID.    TRULICITY 4.5 mg, Subcutaneous, Every 7 days         Allergies  Review of patient's allergies indicates:  No Known Allergies    Physical Examination     /66 (BP Location: Right arm, Patient Position: Sitting, BP Method: Large (Automatic))   Pulse 60   Temp 97.9 °F (36.6 °C) (Oral)   Resp 18   Ht 5' 7" (1.702 m)   Wt 110.7 kg (244 lb)   SpO2 100%   BMI 38.22 kg/m²     Physical Exam  Constitutional:       Appearance: He is obese.   Musculoskeletal:      Right foot: Bunion present.      Left foot: Bunion present.        Feet:    Feet:      Right foot:      Skin integrity: Callus present.      Left foot:      Skin integrity: Callus present.      Comments: Callus         General: Alert and oriented, No acute distress.  Head: Normocephalic, Atraumatic.  Eye: Pupils are equal, round and reactive to light, Extraocular movements are intact, Sclera non-icteric.  Ears/Nose/Throat: Normal, Mucosa moist,Clear.  Neck/Thyroid: Supple, Non-tender, No carotid bruit, No lymphadenopathy, No " JVD, Full range of motion.  Respiratory: Clear to auscultation bilaterally; No wheezes, rales or rhonchi,Non-labored respirations, Symmetrical chest wall expansion.  Cardiovascular: Regular rate and rhythm, S1/S2 normal, No murmurs, rubs or gallops.  Gastrointestinal: Soft, Non-tender, Non-distended, Normal bowel sounds, No palpable organomegaly.  Musculoskeletal: Normal range of motion.  Integumentary: Warm, Dry, Intact, No suspicious lesions or rashes.  Extremities: No clubbing, cyanosis or edema  Neurologic: No focal deficits, Cranial Nerves II-XII are grossly intact, Motor strength normal upper and lower extremities, Sensory exam intact.  Psychiatric: Normal interaction, Coherent speech, Appropriate affect     Protective Sensation (w/ 10 gram monofilament):  Right: Intact  Left: Intact    Visual Inspection:  Normal -  Bilateral, Nails Intact - with Evidence of Foot Deformity- Bilateral, and Callus -  Bilateral    Pedal Pulses:   Right: Present  Left: Present    Posterior Tibialis Pulses:   Right:Present  Left: Present      Results     Lab Results   Component Value Date    WBC 7.00 02/15/2024    HGB 13.3 (L) 02/15/2024    HCT 39.5 (L) 02/15/2024     02/15/2024    CHOL 116 06/05/2023    TRIG 98 06/05/2023    ALT 32 02/15/2024    AST 26 02/15/2024     05/13/2024    K 2.9 (L) 05/13/2024    CREATININE 1.35 (H) 05/13/2024    BUN 21.9 05/13/2024    CO2 28 05/13/2024    TSH 1.406 11/29/2023    PSA 1.64 11/29/2023    INR 0.97 08/03/2021    HGBA1C 6.9 05/13/2024      Latest Reference Range & Units 05/13/24 11:44   Uric Acid 3.5 - 7.2 mg/dL 8.2 (H)   (H): Data is abnormally high      Assessment and Plan (including Health Maintenance)     Plan:     1. Hypokalemia  K 2.9.  Will complete 40 meq BID today and begin 20 meq BID tomorrow.  Repeat BMP next week.  Decrease HCTZ to 50 mg daily.  Take meds as prescribed.  Incorporate potassium rich foods into you diet such as nuts, seeds, peas, beans (dried), whole  grains, fresh fruits and vegetables, lean red meat and yogurt.  Education provided on additional sources of potassium-rich foods including: carrots, broccoli, potatoes, celery, spinach, squash, tomatoes, avocados, apricots, cantaloupe, bananas, nectarines, prunes, figs and raisins.  Seek health care assistance if you experience chest pain, shortness of breath, muscle cramps, vomiting or diarrhea lasting more than 2 days, fainting, palpitations, or weakness worsens.    2. Uncontrolled type 2 diabetes mellitus with hyperglycemia  A1C 6.9 at goal. Previous A1C 7.7.  Encouraged and congratulated on continued glycemic control.  Continue trulicity 4.5 mg weekly.  Continue lantus, humalog and metformin.  Pt to contact clinic if begins to persistent low readings.  Encouraged and congratulated on achieving better glucose control.   Follow ADA diet.  Avoid soda, simple sweets, and limit rice/pasta/bread/starches and consume brown options when possible.   Maintain healthy weight with BMI goal <30.   Perform aerobic exercise for 150 minutes per week (or 5 days a week for 30 minutes each day).   Examine feet daily.   Obtain annual dilated eye exam.  Eye exam: 6/26/23  Foot exam: 5/16/24  - Comprehensive Metabolic Panel; Future  - Hemoglobin A1C; Future  - Lipid Panel; Future    3. Class 2 severe obesity due to excess calories with serious comorbidity and body mass index (BMI) of 38.0 to 38.9 in adult  BMI 38. Goal BMI <30.  Aerobic exercise 150 minutes per week.  Avoid soda, simple sugars, sweets, excessive rice, pasta, potatoes or bread.   Choose brown options when available and portion control.  Limit fast foods and fried foods.   Choose complex carbs in moderation (ex: green, leafy vegetables, beans, oatmeal).  Eat plenty of fresh fruits and vegetables with lean meats daily.   Consider permanent healthy lifestyle changes.      4. 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  Decrease HCTZ to 50 mg  daily.  Increase allopurinol to BID.  Stay well hydrated by increasing water intake throughout the day.  Stressed importance of exercise and weight loss to maintain BMI <30.  Instructed on low purine diet; avoid alcohol, sodas, organ/glandular meats (liver, kidney, sweetbreads). Limit seafood and red meat intake.  Eat a balanced diet of fruits, vegetables, complex carbohydrates, and lean sources of protein (boneless/skinless chicken breasts, salmon, lentils, low fat dairy).  Discussed possible benefit of OTC Vitamin C supplementation.     - Uric Acid; Future  - allopurinoL (ZYLOPRIM) 100 MG tablet; Take 1 tablet (100 mg total) by mouth 2 (two) times daily.  Dispense: 60 tablet; Refill: 6      Problem List Items Addressed This Visit          Cardiac/Vascular    Hyperlipidemia LDL goal <70 (Chronic)    Overview     On atorvastatin 80 mg.  Followed by [facility] cardio.         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        Signature:  BIANCA Cabral  [facility]  [address] [facility] [address]

## 2024-05-16 NOTE — PATIENT INSTRUCTIONS
Decrease HCTZ to 50 mg (1 tablet) daily.  Increase allopurinol to 100 mg (1 tablet) twice a day.  Increase Kdur (potassium) to twice a day.  Repeat BMP (bloodwork) on Monday or Tuesday.

## 2024-05-20 ENCOUNTER — LAB VISIT (OUTPATIENT)
Dept: LAB | Facility: HOSPITAL | Age: 59
End: 2024-05-20
Attending: NURSE PRACTITIONER
Payer: MEDICAID

## 2024-05-20 DIAGNOSIS — E87.6 HYPOKALEMIA: Chronic | ICD-10-CM

## 2024-05-20 LAB
ANION GAP SERPL CALC-SCNC: 6 MEQ/L
BUN SERPL-MCNC: 23.2 MG/DL (ref 8.4–25.7)
CALCIUM SERPL-MCNC: 9.1 MG/DL (ref 8.4–10.2)
CHLORIDE SERPL-SCNC: 107 MMOL/L (ref 98–107)
CO2 SERPL-SCNC: 26 MMOL/L (ref 22–29)
CREAT SERPL-MCNC: 1.32 MG/DL (ref 0.73–1.18)
CREAT/UREA NIT SERPL: 18
GFR SERPLBLD CREATININE-BSD FMLA CKD-EPI: >60 ML/MIN/1.73/M2
GLUCOSE SERPL-MCNC: 155 MG/DL (ref 74–100)
POTASSIUM SERPL-SCNC: 3.6 MMOL/L (ref 3.5–5.1)
SODIUM SERPL-SCNC: 139 MMOL/L (ref 136–145)

## 2024-05-20 PROCEDURE — 80048 BASIC METABOLIC PNL TOTAL CA: CPT

## 2024-05-20 PROCEDURE — 36415 COLL VENOUS BLD VENIPUNCTURE: CPT

## 2024-06-06 ENCOUNTER — TELEPHONE (OUTPATIENT)
Dept: INTERNAL MEDICINE | Facility: CLINIC | Age: 59
End: 2024-06-06
Payer: MEDICAID

## 2024-06-06 DIAGNOSIS — E87.6 HYPOKALEMIA: Primary | ICD-10-CM

## 2024-06-06 NOTE — TELEPHONE ENCOUNTER
Pt called stated when he was last seen, SIMON Roque increased his potassium medicine. Pt stated he's still catching cramps. Please advise.

## 2024-06-07 NOTE — TELEPHONE ENCOUNTER
Called pt and informed of providers message below. Pt verbalized understanding with no further questions or concerns at this time.

## 2024-06-12 ENCOUNTER — TELEPHONE (OUTPATIENT)
Dept: INTERNAL MEDICINE | Facility: CLINIC | Age: 59
End: 2024-06-12
Payer: MEDICAID

## 2024-06-12 ENCOUNTER — LAB VISIT (OUTPATIENT)
Dept: LAB | Facility: HOSPITAL | Age: 59
End: 2024-06-12
Attending: NURSE PRACTITIONER
Payer: MEDICAID

## 2024-06-12 DIAGNOSIS — E87.6 HYPOKALEMIA: ICD-10-CM

## 2024-06-12 LAB
ANION GAP SERPL CALC-SCNC: 7 MEQ/L
BUN SERPL-MCNC: 21.4 MG/DL (ref 8.4–25.7)
CALCIUM SERPL-MCNC: 9.6 MG/DL (ref 8.4–10.2)
CHLORIDE SERPL-SCNC: 108 MMOL/L (ref 98–107)
CO2 SERPL-SCNC: 30 MMOL/L (ref 22–29)
CREAT SERPL-MCNC: 1.28 MG/DL (ref 0.73–1.18)
CREAT/UREA NIT SERPL: 17
GFR SERPLBLD CREATININE-BSD FMLA CKD-EPI: >60 ML/MIN/1.73/M2
GLUCOSE SERPL-MCNC: 36 MG/DL (ref 74–100)
POTASSIUM SERPL-SCNC: 3.5 MMOL/L (ref 3.5–5.1)
SODIUM SERPL-SCNC: 145 MMOL/L (ref 136–145)

## 2024-06-12 PROCEDURE — 80048 BASIC METABOLIC PNL TOTAL CA: CPT

## 2024-06-12 PROCEDURE — 36415 COLL VENOUS BLD VENIPUNCTURE: CPT

## 2024-06-18 ENCOUNTER — OFFICE VISIT (OUTPATIENT)
Dept: CARDIOLOGY | Facility: CLINIC | Age: 59
End: 2024-06-18
Payer: MEDICAID

## 2024-06-18 VITALS
HEART RATE: 55 BPM | TEMPERATURE: 98 F | SYSTOLIC BLOOD PRESSURE: 120 MMHG | OXYGEN SATURATION: 100 % | HEIGHT: 67 IN | BODY MASS INDEX: 36.57 KG/M2 | WEIGHT: 233 LBS | DIASTOLIC BLOOD PRESSURE: 78 MMHG | RESPIRATION RATE: 20 BRPM

## 2024-06-18 DIAGNOSIS — G47.33 OSA (OBSTRUCTIVE SLEEP APNEA): Chronic | ICD-10-CM

## 2024-06-18 DIAGNOSIS — E87.6 HYPOKALEMIA: Chronic | ICD-10-CM

## 2024-06-18 DIAGNOSIS — I25.10 MILD CAD: Primary | Chronic | ICD-10-CM

## 2024-06-18 DIAGNOSIS — I87.2 VENOUS INSUFFICIENCY: Chronic | ICD-10-CM

## 2024-06-18 DIAGNOSIS — I10 PRIMARY HYPERTENSION: Chronic | ICD-10-CM

## 2024-06-18 PROBLEM — R07.89 OTHER CHEST PAIN: Status: RESOLVED | Noted: 2022-07-20 | Resolved: 2024-06-18

## 2024-06-18 PROBLEM — R06.09 DOE (DYSPNEA ON EXERTION): Status: RESOLVED | Noted: 2022-05-20 | Resolved: 2024-06-18

## 2024-06-18 PROCEDURE — 3008F BODY MASS INDEX DOCD: CPT | Mod: CPTII,,, | Performed by: NURSE PRACTITIONER

## 2024-06-18 PROCEDURE — 3078F DIAST BP <80 MM HG: CPT | Mod: CPTII,,, | Performed by: NURSE PRACTITIONER

## 2024-06-18 PROCEDURE — 3044F HG A1C LEVEL LT 7.0%: CPT | Mod: CPTII,,, | Performed by: NURSE PRACTITIONER

## 2024-06-18 PROCEDURE — 3074F SYST BP LT 130 MM HG: CPT | Mod: CPTII,,, | Performed by: NURSE PRACTITIONER

## 2024-06-18 PROCEDURE — 4010F ACE/ARB THERAPY RXD/TAKEN: CPT | Mod: CPTII,,, | Performed by: NURSE PRACTITIONER

## 2024-06-18 PROCEDURE — 1159F MED LIST DOCD IN RCRD: CPT | Mod: CPTII,,, | Performed by: NURSE PRACTITIONER

## 2024-06-18 PROCEDURE — 1160F RVW MEDS BY RX/DR IN RCRD: CPT | Mod: CPTII,,, | Performed by: NURSE PRACTITIONER

## 2024-06-18 PROCEDURE — 99214 OFFICE O/P EST MOD 30 MIN: CPT | Mod: S$PBB,,, | Performed by: NURSE PRACTITIONER

## 2024-06-18 PROCEDURE — 99215 OFFICE O/P EST HI 40 MIN: CPT | Mod: PBBFAC | Performed by: NURSE PRACTITIONER

## 2024-06-18 PROCEDURE — 3072F LOW RISK FOR RETINOPATHY: CPT | Mod: CPTII,,, | Performed by: NURSE PRACTITIONER

## 2024-06-18 NOTE — PROGRESS NOTES
CHIEF COMPLAINT:   Chief Complaint   Patient presents with    5 mo follow up visit      No c/o at this time                      Review of patient's allergies indicates:  No Known Allergies                                       HPI:  Michael Stone 58 y.o. male with a past medical history of mild nonobstructive CAD per [facility] Aug. 2021, Diabetes Mellitus Type II, hyperlipidemia, hypertension, MINA on CPAP, venous insufficiency,  adrenal adenoma, and obesity who presents to cardiology clinic for routine follow up and ongoing care.  Latest Echocardiogram obtained on 10.11.23 revealed a preserved EF of 59% and mild tricuspid regurgitation.  Due to a previous abnormal Nuclear stress test, the patient underwent a Left Heart Catheterization on 8.20.21 that revealed very mild nonobstructive CAD (30% ostial ramus). (See reports below).    Patient presents to clinic today denying any cardiovascular complaints of chest pain, shortness of breath, palpitations, orthopnea, PND, claudication, lightheadedness, dizziness or syncope.  He does endorse stable bilateral lower extremity edema that has not progressed since last seen.  Notably, the patient reports that he was seen this morning at [facility] for his venous insufficiency.  He states that everything is stable and he is recommended to continue with his current conservative measures.    Activity wise, the patient is able to perform his routine activities and yard care with a push mower without experiencing any ischemic symptoms.  He remains compliant with his current medications and is currently tolerating without issue.    CARDIAC TESTING   ECHO 10.11.23    Left Ventricle: The left ventricle is normal in size. Normal wall thickness. There is normal systolic function. Biplane (2D) method of discs ejection fraction is 59%.    Left Atrium: Left atrium is mildly dilated.    Right Ventricle: Mild right ventricular enlargement. Systolic function is normal.    Right Atrium: Right atrium is  moderately dilated.    Aortic Valve: There is mild annular calcification present.    Tricuspid Valve: There is mild regurgitation.    IVC/SVC: Normal venous pressure at 3 mmHg.     ECHO 6.1.22  The left ventricle is normal in size with mild concentric hypertrophy and normal systolic function.  The estimated ejection fraction is 59%.  Normal right ventricular size with normal right ventricular systolic function.  Normal left ventricular diastolic function.  There is no pulmonary hypertension.  The estimated PA systolic pressure is 10 mmHg.  Normal central venous pressure (3 mmHg).      CORONARY ANGIOGRAM 8.20.21  Left Main: large vessel.   Left anterior descending: large vessel.   Diagonal 1: small vessel.   Ramus: medium size vessel. 30% ostial stenosis  Circumflex: large vessel.   Obtuse marginal 1: medium size vessel.   Right coronary artery: medium size vessel.   Posterior descending artery: Tiny vessel.     COMPLICATIONS  No immediate complications.    Closure of access site: Radial band  Estimated blood loss: less than 10 ml  Specimens obtained: none   SUMMARY  Very mild nonobstructive 1 vessel disease      Nuclear pharmacologic stress test 06/24/2021:  Rest EKG: Sinus rhythm  Stress EKG: No significant new EKG changes  Medium size area of moderate anterior and apical ischemia  No scar    ECHO 4.16.21  Mild concentric left ventricular hypertrophy  Left ventricular ejection fraction is measured at approximately 50%  Structurally normal mitral valve  Trace mitral regurgitation  Structurally aortic valve is trileaflet  Trace tricuspid regurgitation with RVSP of 38 mmHg  No evidence of pulmonic regurgitation  The pulmonic valve was not well visualized  Mildly dilated left atrium  Borderline dilated right atrium  Normal right ventricular size  No evidence of pericardial effusion  No evidence of pleural effusion     SADE testing- December 2021 - revealed no evidence of significant arterial insufficiency                                                                                                                                                                                    Right lower extremity venous reflux study December 2021 - revealed reflux of 4.3 seconds in the GSV at the level of the upper calf and reflux of 4.8 seconds and a branch of the GSV at the level of the upper calf.         Left lower extremity venous reflux studies - August 24, 2021- revealed no DVT and no substantial reflux identified in the interrogated portions of the left leg deep system, GSV, or SSV.                                                                                                                                                                                                                                                                                            Patient Active Problem List   Diagnosis    Mild CAD    Disorder of tendon of shoulder region    Edema of lower extremity    History of severe acute respiratory syndrome coronavirus 2 (SARS-CoV-2) disease    Hyperlipidemia LDL goal <70    Primary hypertension    Class 2 severe obesity due to excess calories with serious comorbidity and body mass index (BMI) of 38.0 to 38.9 in adult    MINA (obstructive sleep apnea)    Overgrown toenails    Primary osteoarthritis of both knees    Spinal stenosis of cervical region    Tinea pedis    Uncontrolled type 2 diabetes mellitus with hyperglycemia    JOSEPH (dyspnea on exertion)    Venous insufficiency    Other chest pain    Callus of foot    DDD (degenerative disc disease), cervical    Encounter for comprehensive diabetic foot examination, type 2 diabetes mellitus    Hypokalemia    Acute gastritis    Class 2 drug-induced obesity with serious comorbidity and body mass index (BMI) of 37.0 to 37.9 in adult    Primary osteoarthritis of left knee    BMI 35.0-35.9,adult    Pain and swelling of toe of left foot    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     Past Surgical History:   Procedure Laterality Date    ANGIOGRAPHY      CHOLECYSTECTOMY      FOOT SURGERY Right      Social History     Socioeconomic History    Marital status: Single   Tobacco Use    Smoking status: Never     Passive exposure: Never    Smokeless tobacco: Never   Substance and Sexual Activity    Alcohol use: Never    Drug use: Never    Sexual activity: Yes     Partners: Female     Birth control/protection: None     Social Determinants of Health     Financial Resource Strain: Medium Risk (2/8/2024)    Overall Financial Resource Strain (CARDIA)     Difficulty of Paying Living Expenses: Somewhat hard   Food Insecurity: Food Insecurity Present (2/8/2024)    Hunger Vital Sign     Worried About Running Out of Food in the Last Year: Sometimes true     Ran Out of Food in the Last Year: Sometimes true   Transportation Needs: Unmet Transportation Needs (2/8/2024)    PRAPARE - Transportation     Lack of Transportation (Medical): Yes     Lack of Transportation (Non-Medical): Yes   Physical Activity: Insufficiently Active (2/8/2024)    Exercise Vital Sign     Days of Exercise per Week: 1 day     Minutes of Exercise per Session: 10 min   Stress: Stress Concern Present (2/8/2024)    [employer] - Occupational Stress Questionnaire     Feeling of Stress : To some extent   Housing Stability: Low Risk  (2/8/2024)    Housing Stability Vital Sign     Unable to Pay for Housing in the Last Year: No     Number of Places Lived in the Last Year: 1     Unstable Housing in the Last Year: No        Family History   Problem Relation Name Age of Onset    Hypertension Mother Elva Armashamlet     Heart disease Mother Elva Stone     Stroke Father      Cancer Sister Tip Clifton     Heart disease Sister Tip Clifton     Cancer Sister Theodora Barres     Heart disease Sister Theodora Barres     Heart disease Brother Kingsley Stone     Heart disease Brother Andi Stone   "        Current Outpatient Medications:     albuterol (PROVENTIL/VENTOLIN HFA) 90 mcg/actuation inhaler, Inhale 2 puffs into the lungs every 6 (six) hours as needed for Wheezing or Shortness of Breath., Disp: 6.7 g, Rfl: 2    allopurinoL (ZYLOPRIM) 100 MG tablet, Take 1 tablet (100 mg total) by mouth 2 (two) times daily., Disp: 60 tablet, Rfl: 6    ammonium lactate 12 % Crea, APPLY TWICE DAILY. APPLY A THIN LAYER OF VASELINE OVER LAC-HYDRIN TWICE DAILY. NOTHING BETWEEN TOES, Disp: , Rfl:     aspirin (ECOTRIN) 81 MG EC tablet, Take 1 tablet (81 mg total) by mouth once daily., Disp: 90 tablet, Rfl: 3    atorvastatin (LIPITOR) 80 MG tablet, Take 1 tablet (80 mg total) by mouth nightly., Disp: 90 tablet, Rfl: 3    BD VEO INSULIN SYRINGE UF 1 mL 31 gauge x 15/64" Syrg, USE THREE TIMES DAILY AS DIRECTED, Disp: 100 each, Rfl: 0    blood sugar diagnostic (ONETOUCH ULTRA TEST) Str, CHECK BLOOD SUGAR THREE TIMES A DAY BEFORE MEALS AS DIRECTED BY DOCTOR, Disp: 100 strip, Rfl: 11    clotrimazole (LOTRIMIN) 1 % cream, Apply topically 2 (two) times daily. Apply to affected area between toes 2 times daily for 21 days, Disp: 28 g, Rfl: 1    colchicine (COLCRYS) 0.6 mg tablet, Take 2 tablets now and take 1 tablet in one hour., Disp: 3 tablet, Rfl: 1    colistimethate (COLYMYCIN) 150 mg injection, SMARTSIG:Topical, Disp: , Rfl:     diclofenac sodium (VOLTAREN) 1 % Gel, Apply 2 g topically 4 (four) times daily., Disp: 100 g, Rfl: 2    dulaglutide (TRULICITY) 4.5 mg/0.5 mL pen injector, Inject 4.5 mg into the skin every 7 days., Disp: 4 pen , Rfl: 6    furosemide (LASIX) 20 MG tablet, Take 1 tablet (20 mg total) by mouth once daily., Disp: 90 tablet, Rfl: 3    HUMALOG U-100 INSULIN 100 unit/mL injection, INJECT 20 UNITS SUBCUTANEOUSLY THREE TIMES DAILY BEFORE MEAL(S), Disp: 30 mL, Rfl: 0    hydroCHLOROthiazide (HYDRODIURIL) 50 MG tablet, Take 1 tablet (50 mg total) by mouth once daily., Disp: 90 tablet, Rfl: 3    ID NOW COVID-19 TEST " "KIT Kit, TEST AS DIRECTED TODAY, Disp: , Rfl:     insulin syringes, disposable, 1 mL Syrg, Inject 1 Syringe into the skin 3 (three) times daily before meals., Disp: 100 each, Rfl: 11    lancets (ONETOUCH DELICA PLUS LANCET) 30 gauge Misc, CHECK BLOOD SUGAR THREE TIMES A DAY BEFORE MEALS AS DIRECTED BY DOCTOR, Disp: 100 each, Rfl: 11    LANTUS SOLOSTAR U-100 INSULIN glargine 100 units/mL SubQ pen, Inject 52 units subq qam and 42 units subq at bedtime., Disp: 85 mL, Rfl: 1    losartan (COZAAR) 100 MG tablet, Take 1 tablet (100 mg total) by mouth once daily., Disp: 90 tablet, Rfl: 3    metFORMIN (GLUCOPHAGE) 1000 MG tablet, Take 1 tablet (1,000 mg total) by mouth 2 (two) times daily with meals., Disp: 60 tablet, Rfl: 11    NIFEdipine (ADALAT CC) 90 MG TbSR, Take 1 tablet (90 mg total) by mouth once daily., Disp: 30 tablet, Rfl: 11    nitroGLYCERIN (NITROSTAT) 0.4 MG SL tablet, Place 1 tablet (0.4 mg total) under the tongue every 5 (five) minutes as needed for Chest pain., Disp: 25 tablet, Rfl: 6    ondansetron (ZOFRAN-ODT) 4 MG TbDL, Take 1 tablet (4 mg total) by mouth every 6 (six) hours as needed (Nausea)., Disp: 14 tablet, Rfl: 0    potassium chloride SA (K-DUR,KLOR-CON) 20 MEQ tablet, Take 2 tabs BID x 3 days then decrease to 1 tab BID., Disp: 180 tablet, Rfl: 1     ROS:                                                                                                                                                                             Review of Systems   Constitutional: Negative.    Respiratory:  Positive for shortness of breath.    Cardiovascular:  Positive for palpitations.   Gastrointestinal: Negative.    Musculoskeletal: Negative.         BLE pain   Skin: Negative.    Neurological: Negative.    Psychiatric/Behavioral: Negative.          Blood pressure 120/78, pulse (!) 55, temperature 97.9 °F (36.6 °C), temperature source Oral, resp. rate 20, height 5' 7" (1.702 m), weight 105.7 kg (233 lb), SpO2 100%. "   PE:  Physical Exam  Constitutional:       Appearance: Normal appearance.   HENT:      Head: Normocephalic.   Eyes:      Extraocular Movements: Extraocular movements intact.   Cardiovascular:      Rate and Rhythm: Normal rate and regular rhythm.   Pulmonary:      Effort: Pulmonary effort is normal.   Abdominal:      Palpations: Abdomen is soft.   Musculoskeletal:         General: Normal range of motion.      Cervical back: Neck supple.      Comments: Mild BLE edema    Skin:     General: Skin is warm and dry.   Neurological:      General: No focal deficit present.      Mental Status: He is alert and oriented to person, place, and time.   Psychiatric:         Mood and Affect: Mood normal.        ASSESSMENT/PLAN:  Very Mild CAD (30% ostial Ramus Stenosis) per [facility] 8.20.21   - Lexiscan stress test -medium sized area of moderate anterior and apical ischemia and no scar (6.21.21)  - Denies CP or SOB  - Continue ASA, atorvastatin 80 mg, losartan 100 mg, and SL Nitro PRN CP  - Counseled on heart healthy, low cholesterol diet and exercise as tolerated    JOSEPH - Denies any acute symptoms   - EF-59% with mild TR per ECHO 10.11.23  - EF 59% per Echo 6.1.22  - Continue and HCTZ 100 mg and Lasix 20 mg  - Normal PFTs Dec. 2022      Venous Insufficiency  - Reports stable bilateral lower extremity edema.  - Continue Lasix 20 mg and HCTZ 100 mg  - Recommended continued conservative measures- low-sodium diet, compression stocking therapy, and leg elevation  - Follow up with Dr. Gallo as directed    HLD  - LDL at goal - 57  - Continue with Atorvastatin 80 mg po daily   - Counseled patient on low-cholesterol diet and exercise as tolerated    HTN  - BP at goal   - Continue Losartan 100 mg,  HCTZ 50 mg, nifedipine 90 mg, and  Lasix 20 mg  - Counseled on low salt diet and exercise as tolerated     Diabetes Mellitus Type II   - A1C- 6.9  - Continue management per PCP    MINA  - Recommend continued nightly CPAP use    Obesity  - Counseled on  the importance of weight loss through diet and exercise    Hypokalemia  - On supplemental potassium.  Management per PCP    Follow up in Cardiology Clinic in 6 months or sooner if needed   Follow up with PCP and Dr. Gallo as directed

## 2024-06-18 NOTE — PATIENT INSTRUCTIONS
Follow up in Cardiology Clinic in 6 months or sooner if needed   Follow up with PCP and Dr. Gallo as directed

## 2024-06-20 ENCOUNTER — HOSPITAL ENCOUNTER (EMERGENCY)
Facility: HOSPITAL | Age: 59
Discharge: HOME | End: 2024-06-20
Attending: EMERGENCY MEDICINE
Payer: MEDICAID

## 2024-06-20 VITALS
HEART RATE: 67 BPM | HEIGHT: 67 IN | RESPIRATION RATE: 20 BRPM | BODY MASS INDEX: 36.57 KG/M2 | TEMPERATURE: 99 F | SYSTOLIC BLOOD PRESSURE: 118 MMHG | DIASTOLIC BLOOD PRESSURE: 74 MMHG | OXYGEN SATURATION: 97 % | WEIGHT: 233 LBS

## 2024-06-20 DIAGNOSIS — U07.1 COVID: Primary | ICD-10-CM

## 2024-06-20 PROCEDURE — 0240U COVID/FLU A&B PCR: CPT | Performed by: EMERGENCY MEDICINE

## 2024-06-20 PROCEDURE — 99283 EMERGENCY DEPT VISIT LOW MDM: CPT

## 2024-06-20 RX ORDER — NIRMATRELVIR AND RITONAVIR 300-100 MG
KIT ORAL
Qty: 30 TABLET | Refills: 0 | Status: SHIPPED | OUTPATIENT
Start: 2024-06-20 | End: 2024-06-25

## 2024-06-20 NOTE — DISCHARGE INSTRUCTIONS
Patient will be discharged home.  Take Paxlovid as prescribed.  Claritin D 24 hour once daily for 10 days.  Alternate Tylenol and Motrin for 24 hours.  Increase water intake

## 2024-06-21 NOTE — ED PROVIDER NOTES
Encounter Date: 6/20/2024       History     Chief Complaint   Patient presents with    Cough     Cough and runny nose x 4 days.      Michael, 58-year-old male presents to the emergency room with complaints of cough nasal congestion for 4 days.  Patient denies fever at present. Ambulatory gait steady no acute distress vitals stable.  Head atraumatic. Tenderness to the frontal and maxillary sinus cavities. Nares with clear discharge.  Posterior oral pharynx red without exudate.  Neck supple.  BBS CTA.  Nonproductive cough.       No known drug allergies  Past Medical History:  Diagnosis Date  · Adrenal adenoma    · Callus of foot 8/19/2022  · Coronary artery disease    · Diabetes mellitus    · Hyperlipidemia    · Hypertension    · Spinal stenosis    · Unspecified osteoarthritis, unspecified site      Past Surgical History:  Procedure Laterality Date  · ANGIOGRAPHY      · CHOLECYSTECTOMY      · FOOT SURGERY Right         Hypertension Mother     · Heart disease Mother    · Stroke Father    · Cancer Sister    · Heart disease Sister    · Cancer Sister    · Heart disease Sister    · Heart disease Brother    · Heart disease                 The history is provided by the patient. No  was used.     Review of patient's allergies indicates:  No Known Allergies  Past Medical History:   Diagnosis Date    Adrenal adenoma     Callus of foot 8/19/2022    Coronary artery disease     Diabetes mellitus     Hyperlipidemia     Hypertension     Spinal stenosis     Unspecified osteoarthritis, unspecified site      Past Surgical History:   Procedure Laterality Date    ANGIOGRAPHY      CHOLECYSTECTOMY      FOOT SURGERY Right      Family History   Problem Relation Name Age of Onset    Hypertension Mother Elva Demouchet     Heart disease Mother Elva Dickersonouchet     Stroke Father      Cancer Sister Tip Clifton     Heart disease Sister Tip Clifton     Cancer Sister Theodora Pope     Heart disease Sister Theodora  Niko     Heart disease Brother Kingsley Stone     Heart disease Brother Andi Stone      Social History     Tobacco Use    Smoking status: Never     Passive exposure: Never    Smokeless tobacco: Never   Substance Use Topics    Alcohol use: Never    Drug use: Never     Review of Systems   Constitutional: Negative.    HENT:  Positive for congestion, postnasal drip, rhinorrhea, sinus pressure and sinus pain.    Eyes: Negative.    Respiratory:  Positive for cough.    Cardiovascular: Negative.    Gastrointestinal: Negative.    Endocrine: Negative.    Genitourinary: Negative.    Musculoskeletal:  Positive for myalgias.   Skin: Negative.    Allergic/Immunologic: Negative.    Neurological: Negative.    Hematological: Negative.    Psychiatric/Behavioral: Negative.     All other systems reviewed and are negative.      Physical Exam     Initial Vitals [06/20/24 1627]   BP Pulse Resp Temp SpO2   118/74 67 20 98.5 °F (36.9 °C) 97 %      MAP       --         Physical Exam    Nursing note and vitals reviewed.  Constitutional: He appears well-developed and well-nourished.   HENT:   Head: Normocephalic and atraumatic.   Right Ear: External ear normal.   Left Ear: External ear normal.   Eyes: Conjunctivae and EOM are normal. Pupils are equal, round, and reactive to light.   Neck: Neck supple.   Normal range of motion.  Cardiovascular:  Normal rate, regular rhythm, normal heart sounds and intact distal pulses.           Pulmonary/Chest: Breath sounds normal.   Abdominal: Abdomen is soft. Bowel sounds are normal.   Musculoskeletal:         General: Normal range of motion.      Cervical back: Normal range of motion and neck supple.     Neurological: He is alert and oriented to person, place, and time. He has normal strength and normal reflexes. GCS score is 15. GCS eye subscore is 4. GCS verbal subscore is 5. GCS motor subscore is 6.   Skin: Skin is warm and dry. Capillary refill takes less than 2 seconds.   Psychiatric: He  has a normal mood and affect. His behavior is normal. Judgment and thought content normal.         ED Course   Procedures  Labs Reviewed   COVID/FLU A&B PCR - Abnormal; Notable for the following components:       Result Value    SARS-CoV-2 PCR Detected (*)     All other components within normal limits    Narrative:     The Xpert Xpress SARS-CoV-2/FLU/RSV plus is a rapid, multiplexed real-time PCR test intended for the simultaneous qualitative detection and differentiation of SARS-CoV-2, Influenza A, Influenza B, and respiratory syncytial virus (RSV) viral RNA in either nasopharyngeal swab or nasal swab specimens.                Imaging Results    None          Medications - No data to display  Medical Decision Making  Medical decision making        Differential diagnoses:  Covid, Flu, URI    Amount and/or Complexity of Data Reviewed  Labs:      Details: Covid +    Risk  Prescription drug management.                                      Clinical Impression:  Final diagnoses:  [U07.1] COVID (Primary)          ED Disposition Condition    Discharge Stable          ED Prescriptions       Medication Sig Dispense Start Date End Date Auth. Provider    nirmatrelvir-ritonavir (PAXLOVID) 300 mg (150 mg x 2)-100 mg copackaged tablets (EUA) Take 3 tablets by mouth 2 (two) times daily. Each dose contains 2 nirmatrelvir (pink tablets) and 1 ritonavir (white tablet). Take all 3 tablets together 30 tablet 6/20/2024 6/25/2024 Rossi Chris NP          Follow-up Information    None          Rossi Chris NP  06/21/24 [id number]

## 2024-07-19 DIAGNOSIS — E11.65 UNCONTROLLED TYPE 2 DIABETES MELLITUS WITH HYPERGLYCEMIA: Chronic | ICD-10-CM

## 2024-07-22 RX ORDER — INSULIN LISPRO 100 [IU]/ML
INJECTION, SOLUTION INTRAVENOUS; SUBCUTANEOUS
Qty: 30 ML | Refills: 0 | Status: SHIPPED | OUTPATIENT
Start: 2024-07-22

## 2024-07-22 NOTE — TELEPHONE ENCOUNTER
Pharmacy requesting refills for pt's HUMALOG U-100 INSULIN 100 unit/mL injection     LOV:5/16/24    NOV:8/19/24

## 2024-07-23 ENCOUNTER — OFFICE VISIT (OUTPATIENT)
Dept: OPHTHALMOLOGY | Facility: CLINIC | Age: 59
End: 2024-07-23
Payer: MEDICAID

## 2024-07-23 VITALS — BODY MASS INDEX: 36.57 KG/M2 | WEIGHT: 233 LBS | HEIGHT: 67 IN

## 2024-07-23 DIAGNOSIS — E11.9 DIABETES MELLITUS TYPE 2 WITHOUT RETINOPATHY: Primary | ICD-10-CM

## 2024-07-23 DIAGNOSIS — H53.15 VISUAL DISTORTIONS OF SHAPE AND SIZE: ICD-10-CM

## 2024-07-23 DIAGNOSIS — H25.13 AGE-RELATED NUCLEAR CATARACT OF BOTH EYES: ICD-10-CM

## 2024-07-23 PROCEDURE — 99213 OFFICE O/P EST LOW 20 MIN: CPT | Mod: PBBFAC,PN

## 2024-07-23 PROCEDURE — 92134 CPTRZ OPH DX IMG PST SGM RTA: CPT | Mod: PBBFAC,PN | Performed by: OPHTHALMOLOGY

## 2024-07-23 NOTE — PROGRESS NOTES
HPI    Uncontrolled type 2 diabetes mellitus with hyperglycemia  Last edited by Silvia Elizondo MA on 7/23/2024  9:48 AM.            Assessment /Plan     For exam results, see Encounter Report.    There are no diagnoses linked to this encounter.    OCT MAC   07/23/2024  OD: , intact foveal contour, no IRF/SRF  OS: , intact foveal contour, no IRF/SRF      1. T2DM without ophthalmologic manifestations  - Last A1c as below:  (6.9)  05/13/2024  - No signs of retinopathy on exam  - No signs of DME on OCT mac  - Encouraged good BS/BP/Cholesterol control, f/u with PCP regularly  - Monitor with annual DFE (next due 7/2025)    2. Non-visually significant cataracts, OU  - Not currently bothered by symptoms, yearly Mrx  - OK with just OTC readers on 7/23/24  - CTM    3. Disc-at-risk, OU  - Discussed precautions     RTC 1 year DFE/OCTm/MRx

## 2024-07-24 DIAGNOSIS — E11.65 UNCONTROLLED TYPE 2 DIABETES MELLITUS WITH HYPERGLYCEMIA: Chronic | ICD-10-CM

## 2024-07-30 RX ORDER — INSULIN GLARGINE 100 [IU]/ML
INJECTION, SOLUTION SUBCUTANEOUS
Qty: 60 ML | Refills: 0 | Status: SHIPPED | OUTPATIENT
Start: 2024-07-30

## 2024-09-05 LAB
LEFT EYE DM RETINOPATHY: NORMAL
RIGHT EYE DM RETINOPATHY: NORMAL

## 2024-09-06 ENCOUNTER — HOSPITAL ENCOUNTER (OUTPATIENT)
Dept: WOUND CARE | Facility: HOSPITAL | Age: 59
Discharge: HOME | End: 2024-09-06
Attending: FAMILY MEDICINE
Payer: MEDICAID

## 2024-09-06 VITALS
HEART RATE: 61 BPM | TEMPERATURE: 98 F | RESPIRATION RATE: 18 BRPM | SYSTOLIC BLOOD PRESSURE: 102 MMHG | DIASTOLIC BLOOD PRESSURE: 65 MMHG | OXYGEN SATURATION: 95 %

## 2024-09-06 DIAGNOSIS — L60.3 DYSTROPHIC NAIL: ICD-10-CM

## 2024-09-06 DIAGNOSIS — E11.9 ENCOUNTER FOR DIABETIC FOOT EXAM: Primary | ICD-10-CM

## 2024-09-06 DIAGNOSIS — L84 PRE-ULCERATIVE CORN OR CALLOUS: ICD-10-CM

## 2024-09-06 PROCEDURE — 99211 OFF/OP EST MAY X REQ PHY/QHP: CPT

## 2024-09-06 PROCEDURE — 27000999 HC MEDICAL RECORD PHOTO DOCUMENTATION

## 2024-09-06 NOTE — PROGRESS NOTES
CHIEF COMPLAINT:  Chief Complaint   Patient presents with    Diabetes     [facility]     HISTORY OF  PRESENT ILLNESS:  59 y.o. Black or  male being seen today for diabetic foot care.  He has calluses that need to be trimmed.  Prior medical history of hypertension, hyperlipidemia, diabetes, coronary artery disease, etc..  His diabetes is well-controlled at 6.9.  He denies any numbness, tingling, burning in his feet.  He has not had any procedures on his lower extremities.    REVIEW OF SYSTEMS:  Review of Systems   Constitutional:  Negative for chills and fever.   Respiratory:  Negative for cough.    Gastrointestinal:  Negative for nausea.   Musculoskeletal:         As stated in HPI   Skin:         As stated in HPI   Psychiatric/Behavioral: Negative.         Past Medical History:   Diagnosis Date    Adrenal adenoma     Callus of foot 8/19/2022    Coronary artery disease     Diabetes mellitus     Hyperlipidemia     Hypertension     Spinal stenosis     Unspecified osteoarthritis, unspecified site       Past Surgical History:   Procedure Laterality Date    ANGIOGRAPHY      CHOLECYSTECTOMY      FOOT SURGERY Right       Social History     Socioeconomic History    Marital status: Single   Tobacco Use    Smoking status: Never     Passive exposure: Never    Smokeless tobacco: Never   Substance and Sexual Activity    Alcohol use: Never    Drug use: Never    Sexual activity: Yes     Partners: Female     Birth control/protection: None     Social Determinants of Health     Financial Resource Strain: Medium Risk (2/8/2024)    Overall Financial Resource Strain (CARDIA)     Difficulty of Paying Living Expenses: Somewhat hard   Food Insecurity: Food Insecurity Present (2/8/2024)    Hunger Vital Sign     Worried About Running Out of Food in the Last Year: Sometimes true     Ran Out of Food in the Last Year: Sometimes true   Transportation Needs: Unmet Transportation Needs (2/8/2024)    PRAPARE - Transportation     Lack of  Transportation (Medical): Yes     Lack of Transportation (Non-Medical): Yes   Physical Activity: Insufficiently Active (2/8/2024)    Exercise Vital Sign     Days of Exercise per Week: 1 day     Minutes of Exercise per Session: 10 min   Stress: Stress Concern Present (2/8/2024)    [employer] - Occupational Stress Questionnaire     Feeling of Stress : To some extent   Housing Stability: Low Risk  (2/8/2024)    Housing Stability Vital Sign     Unable to Pay for Housing in the Last Year: No     Number of Places Lived in the Last Year: 1     Unstable Housing in the Last Year: No     Review of Most Recent Wound Care-Related Labs:  Lab Results   Component Value Date/Time    WBC 7.00 02/15/2024 07:45 AM    RBC 4.58 (L) 02/15/2024 07:45 AM    HGB 13.3 (L) 02/15/2024 07:45 AM    HCT 39.5 (L) 02/15/2024 07:45 AM    MCV 86.2 02/15/2024 07:45 AM    MCH 29.0 02/15/2024 07:45 AM    CREATININE 1.28 (H) 06/12/2024 10:15 AM    HGBA1C 6.9 05/13/2024 11:44 AM    CRP 4.41 (H) 01/19/2021 04:00 AM        Wound-Related Imaging:            PHYSICAL EXAMINATION:  Blood pressure 102/65, pulse 61, temperature 97.9 °F (36.6 °C), resp. rate 18, SpO2 95%.  General:  VSS, afebrile. No acute distress.   Respiratory: Non labored.  No coughing.  Cardiovascular:  No peripheral edema.   Musculoskeletal:  Full range of motion of all extremities; no joint deformities or edema.   Neurologic:  A&O X 3.    Psychiatric:  Calm, cooperative.  Mood and effect normal.  Responses appropriate.   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 05/16/2024    Pain and swelling of toe of left foot 03/13/2024    Primary osteoarthritis of left knee 10/23/2023    BMI 35.0-35.9,adult 10/23/2023    Class 2 drug-induced obesity with serious comorbidity and body mass index (BMI) of 37.0 to 37.9 in adult 09/12/2023    Acute gastritis 08/10/2023    Hypokalemia 06/09/2023    Encounter for comprehensive diabetic foot examination, type 2 diabetes mellitus 02/28/2023    DDD (degenerative disc disease), cervical 11/02/2022    Callus of foot 08/19/2022    Mild CAD 05/20/2022    Disorder of tendon of shoulder region 05/20/2022    Edema of lower extremity 05/20/2022    History of severe acute respiratory syndrome coronavirus 2 (SARS-CoV-2) disease 05/20/2022    Hyperlipidemia LDL goal <70 05/20/2022    Primary hypertension 05/20/2022    Class 2 severe obesity due to excess calories with serious comorbidity and body mass index (BMI) of 38.0 to 38.9 in adult 05/20/2022    MINA (obstructive sleep apnea) 05/20/2022    Overgrown toenails 05/20/2022    Primary osteoarthritis of both knees 05/20/2022    Spinal stenosis of cervical region 05/20/2022    Tinea pedis 05/20/2022    Uncontrolled type 2 diabetes mellitus with hyperglycemia 05/20/2022    Venous insufficiency 05/20/2022       Diabetic foot care   Diabetes-well-controlled   Hypertension   Hyperlipidemia  Return to clinic in 3 months

## 2024-09-30 ENCOUNTER — LAB VISIT (OUTPATIENT)
Dept: LAB | Facility: HOSPITAL | Age: 59
End: 2024-09-30
Attending: NURSE PRACTITIONER
Payer: MEDICAID

## 2024-09-30 DIAGNOSIS — E78.5 HYPERLIPIDEMIA LDL GOAL <70: Chronic | ICD-10-CM

## 2024-09-30 DIAGNOSIS — M1A.0711 IDIOPATHIC CHRONIC GOUT OF RIGHT FOOT WITH TOPHUS: Chronic | ICD-10-CM

## 2024-09-30 DIAGNOSIS — E11.65 UNCONTROLLED TYPE 2 DIABETES MELLITUS WITH HYPERGLYCEMIA: Chronic | ICD-10-CM

## 2024-09-30 LAB
ALBUMIN SERPL-MCNC: 3.6 G/DL (ref 3.5–5)
ALBUMIN/GLOB SERPL: 1.1 RATIO (ref 1.1–2)
ALP SERPL-CCNC: 108 UNIT/L (ref 40–150)
ALT SERPL-CCNC: 22 UNIT/L (ref 0–55)
ANION GAP SERPL CALC-SCNC: 9 MEQ/L
AST SERPL-CCNC: 19 UNIT/L (ref 5–34)
BILIRUB SERPL-MCNC: 0.5 MG/DL
BUN SERPL-MCNC: 23.4 MG/DL (ref 8.4–25.7)
CALCIUM SERPL-MCNC: 9.4 MG/DL (ref 8.4–10.2)
CHLORIDE SERPL-SCNC: 108 MMOL/L (ref 98–107)
CHOLEST SERPL-MCNC: 113 MG/DL
CHOLEST/HDLC SERPL: 3 {RATIO} (ref 0–5)
CO2 SERPL-SCNC: 27 MMOL/L (ref 22–29)
CREAT SERPL-MCNC: 1.34 MG/DL (ref 0.73–1.18)
CREAT/UREA NIT SERPL: 17
EST. AVERAGE GLUCOSE BLD GHB EST-MCNC: 151.3 MG/DL
GFR SERPLBLD CREATININE-BSD FMLA CKD-EPI: >60 ML/MIN/1.73/M2
GLOBULIN SER-MCNC: 3.4 GM/DL (ref 2.4–3.5)
GLUCOSE SERPL-MCNC: 65 MG/DL (ref 74–100)
HBA1C MFR BLD: 6.9 %
HDLC SERPL-MCNC: 41 MG/DL (ref 35–60)
LDLC SERPL CALC-MCNC: 60 MG/DL (ref 50–140)
POTASSIUM SERPL-SCNC: 3.1 MMOL/L (ref 3.5–5.1)
PROT SERPL-MCNC: 7 GM/DL (ref 6.4–8.3)
SODIUM SERPL-SCNC: 144 MMOL/L (ref 136–145)
TRIGL SERPL-MCNC: 61 MG/DL (ref 34–140)
URATE SERPL-MCNC: 9.2 MG/DL (ref 3.5–7.2)
VLDLC SERPL CALC-MCNC: 12 MG/DL

## 2024-09-30 PROCEDURE — 80053 COMPREHEN METABOLIC PANEL: CPT

## 2024-09-30 PROCEDURE — 80061 LIPID PANEL: CPT

## 2024-09-30 PROCEDURE — 36415 COLL VENOUS BLD VENIPUNCTURE: CPT

## 2024-09-30 PROCEDURE — 83036 HEMOGLOBIN GLYCOSYLATED A1C: CPT

## 2024-09-30 PROCEDURE — 84550 ASSAY OF BLOOD/URIC ACID: CPT

## 2024-10-01 NOTE — PROGRESS NOTES
Criss Real, BIANCA   [facility]  [address] [facility] [address]      PATIENT NAME: Michael Stone  : 1965  DATE: 10/2/24  MRN: [id number]      Reason for Visit / Chief Complaint: Follow-up (Labs completed 24)       History of Present Illness / Problem Focused Workflow     Michael Stone presents to the clinic with Follow-up (Labs completed 24)     58 y.o. Black or  male presents to the clinic for uncontrolled DM and HTN f/u. Medical problems include uncontrolled DM, HLD, HTN, mild CAD, adrenal adenoma, vit d deficiency, morbid obesity, cervical spinal stenosis, MINA on CPAP, bilateral knee OA, tinea pedis, gout, and med non-compliance. Followed by [facility] cardio, ortho and wound clinics and Dr. Gallo, vascular.       10/2/24  Pt presents for overdue DM follow up. A1C stable. Hypokalemia noted despite reported compliance with potassium supplement.  He is agreeable to discontinue hydrochlorothiazide and start spironolactone.  He is to discontinue potassium supplement once he is on the spironolactone for 3-4 days.  Return to clinic in 3 weeks with repeat BMP and blood pressure check.  He reports compliance with medication, medications refilled as needed.  Denies acute complaints. Agreeable to flu vaccine          Review of Systems     Review of Systems   Constitutional:  Negative for fatigue, fever and unexpected weight change.   HENT:  Negative for ear pain, hearing loss, trouble swallowing and voice change.    Respiratory:  Negative for cough and shortness of breath.    Cardiovascular:  Negative for chest pain and palpitations.   Gastrointestinal:  Negative for abdominal pain, diarrhea and vomiting.   Genitourinary:  Negative for dysuria.   Musculoskeletal:  Negative for gait problem.   Skin:  Negative for rash and wound.   Neurological:  Negative for weakness.   Psychiatric/Behavioral:  Negative for suicidal  "ideas.          Medications and Allergies     Medications  Medication List with Changes/Refills   New Medications    SPIRONOLACTONE (ALDACTONE) 50 MG TABLET    Take 1 tablet (50 mg total) by mouth once daily.   Current Medications    ALBUTEROL (PROVENTIL/VENTOLIN HFA) 90 MCG/ACTUATION INHALER    Inhale 2 puffs into the lungs every 6 (six) hours as needed for Wheezing or Shortness of Breath.    ALLOPURINOL (ZYLOPRIM) 100 MG TABLET    Take 1 tablet (100 mg total) by mouth 2 (two) times daily.    AMMONIUM LACTATE 12 % CREA    APPLY TWICE DAILY. APPLY A THIN LAYER OF VASELINE OVER LAC-HYDRIN TWICE DAILY. NOTHING BETWEEN TOES    ASPIRIN (ECOTRIN) 81 MG EC TABLET    Take 1 tablet (81 mg total) by mouth once daily.    ATORVASTATIN (LIPITOR) 80 MG TABLET    Take 1 tablet (80 mg total) by mouth nightly.    BD VEO INSULIN SYRINGE UF 1 ML 31 GAUGE X 15/64" SYRG    USE THREE TIMES DAILY AS DIRECTED    BLOOD SUGAR DIAGNOSTIC (ONETOUCH ULTRA TEST) STRP    CHECK BLOOD SUGAR THREE TIMES A DAY BEFORE MEALS AS DIRECTED BY DOCTOR    CLOTRIMAZOLE (LOTRIMIN) 1 % CREAM    Apply topically 2 (two) times daily. Apply to affected area between toes 2 times daily for 21 days    COLCHICINE (COLCRYS) 0.6 MG TABLET    Take 2 tablets now and take 1 tablet in one hour.    COLISTIMETHATE (COLYMYCIN) 150 MG INJECTION    SMARTSIG:Topical    DICLOFENAC SODIUM (VOLTAREN) 1 % GEL    Apply 2 g topically 4 (four) times daily.    DULAGLUTIDE (TRULICITY) 4.5 MG/0.5 ML PEN INJECTOR    Inject 4.5 mg into the skin every 7 days.    FUROSEMIDE (LASIX) 20 MG TABLET    Take 1 tablet (20 mg total) by mouth once daily.    HUMALOG U-100 INSULIN 100 UNIT/ML INJECTION    INJECT 20 UNITS SUBCUTANEOUSLY THREE TIMES DAILY BEFORE MEAL(S)    ID NOW COVID-19 TEST KIT KIT    TEST AS DIRECTED TODAY    INSULIN GLARGINE U-100, LANTUS, (LANTUS SOLOSTAR U-100 INSULIN) 100 UNIT/ML (3 ML) INPN PEN    INJECT 42 UNITS SUBCUTANEOUSLY IN THE MORNING AND 20 UNITS SUBCUTANEOUSLY EVERY DAY " AT BEDTIME    INSULIN SYRINGES, DISPOSABLE, 1 ML SYRG    Inject 1 Syringe into the skin 3 (three) times daily before meals.    LANCETS (ONETOUCH DELICA PLUS LANCET) 30 GAUGE MISC    CHECK BLOOD SUGAR THREE TIMES A DAY BEFORE MEALS AS DIRECTED BY DOCTOR    LOSARTAN (COZAAR) 100 MG TABLET    Take 1 tablet (100 mg total) by mouth once daily.    NIFEDIPINE (ADALAT CC) 90 MG TBSR    Take 1 tablet (90 mg total) by mouth once daily.    NITROGLYCERIN (NITROSTAT) 0.4 MG SL TABLET    Place 1 tablet (0.4 mg total) under the tongue every 5 (five) minutes as needed for Chest pain.    ONDANSETRON (ZOFRAN-ODT) 4 MG TBDL    Take 1 tablet (4 mg total) by mouth every 6 (six) hours as needed (Nausea).    POTASSIUM CHLORIDE SA (K-DUR,KLOR-CON) 20 MEQ TABLET    Take 2 tabs BID x 3 days then decrease to 1 tab BID.   Changed and/or Refilled Medications    Modified Medication Previous Medication    METFORMIN (GLUCOPHAGE) 1000 MG TABLET metFORMIN (GLUCOPHAGE) 1000 MG tablet       Take 1 tablet (1,000 mg total) by mouth 2 (two) times daily with meals.    Take 1 tablet (1,000 mg total) by mouth 2 (two) times daily with meals.   Discontinued Medications    HYDROCHLOROTHIAZIDE (HYDRODIURIL) 50 MG TABLET    Take 1 tablet (50 mg total) by mouth once daily.         Allergies  Review of patient's allergies indicates:  No Known Allergies    Physical Examination     Vitals:    10/02/24 1413   BP: 132/84   Pulse: (!) 58   Resp: 16   Temp: 97.6 °F (36.4 °C)     Physical Exam  Vitals and nursing note reviewed.   Constitutional:       General: He is not in acute distress.     Appearance: He is not ill-appearing.   HENT:      Head: Normocephalic and atraumatic.      Mouth/Throat:      Mouth: Mucous membranes are moist.      Pharynx: Oropharynx is clear.   Eyes:      General: No scleral icterus.     Extraocular Movements: Extraocular movements intact.      Conjunctiva/sclera: Conjunctivae normal.      Pupils: Pupils are equal, round, and reactive to light.    Neck:      Vascular: No carotid bruit.   Cardiovascular:      Rate and Rhythm: Normal rate and regular rhythm.      Heart sounds: No murmur heard.     No friction rub. No gallop.   Pulmonary:      Effort: Pulmonary effort is normal. No respiratory distress.      Breath sounds: Normal breath sounds. No wheezing, rhonchi or rales.   Abdominal:      General: Abdomen is flat. Bowel sounds are normal. There is no distension.      Palpations: Abdomen is soft. There is no mass.      Tenderness: There is no abdominal tenderness.   Musculoskeletal:         General: Normal range of motion.      Cervical back: Normal range of motion and neck supple.   Skin:     General: Skin is warm and dry.   Neurological:      General: No focal deficit present.      Mental Status: He is alert.   Psychiatric:         Mood and Affect: Mood normal.           Results     Lab Results   Component Value Date    WBC 7.00 02/15/2024    RBC 4.58 (L) 02/15/2024    HGB 13.3 (L) 02/15/2024    HCT 39.5 (L) 02/15/2024    MCV 86.2 02/15/2024    MCH 29.0 02/15/2024    MCHC 33.7 02/15/2024    RDW 13.4 02/15/2024     02/15/2024    MPV 9.2 02/15/2024     Sodium   Date Value Ref Range Status   09/30/2024 144 136 - 145 mmol/L Final     Potassium   Date Value Ref Range Status   09/30/2024 3.1 (L) 3.5 - 5.1 mmol/L Final     Chloride   Date Value Ref Range Status   09/30/2024 108 (H) 98 - 107 mmol/L Final     CO2   Date Value Ref Range Status   09/30/2024 27 22 - 29 mmol/L Final     Blood Urea Nitrogen   Date Value Ref Range Status   09/30/2024 23.4 8.4 - 25.7 mg/dL Final     Creatinine   Date Value Ref Range Status   09/30/2024 1.34 (H) 0.73 - 1.18 mg/dL Final     Calcium   Date Value Ref Range Status   09/30/2024 9.4 8.4 - 10.2 mg/dL Final     Albumin   Date Value Ref Range Status   09/30/2024 3.6 3.5 - 5.0 g/dL Final     Bilirubin Total   Date Value Ref Range Status   09/30/2024 0.5 <=1.5 mg/dL Final     ALP   Date Value Ref Range Status   09/30/2024 108  40 - 150 unit/L Final     AST   Date Value Ref Range Status   09/30/2024 19 5 - 34 unit/L Final     ALT   Date Value Ref Range Status   09/30/2024 22 0 - 55 unit/L Final     Estimated GFR-Non    Date Value Ref Range Status   01/04/2022 69 (L) >>=90 mL/min/1.73 m2 Final     Lab Results   Component Value Date    CHOL 113 09/30/2024     Lab Results   Component Value Date    HDL 41 09/30/2024     Lab Results   Component Value Date    TRIG 61 09/30/2024     Lab Results   Component Value Date    VLDL 12 09/30/2024     Lab Results   Component Value Date    LDL 60.00 09/30/2024     Lab Results   Component Value Date    TSH 1.406 11/29/2023     Lab Results   Component Value Date    PHUR 6.0 11/29/2023    PROTEINUA Negative 11/29/2023    GLUCUA Negative 11/29/2023    KETONESU Negative 01/04/2022    OCCULTUA Trace-Intact (A) 11/29/2023    NITRITE Negative 11/29/2023    LEUKOCYTESUR Negative 11/29/2023     Lab Results   Component Value Date    HGBA1C 6.9 09/30/2024    HGBA1C 6.9 05/13/2024    HGBA1C 7.1 (H) 02/15/2024           Assessment         ICD-10-CM ICD-9-CM   1. Uncontrolled type 2 diabetes mellitus with hyperglycemia  E11.65 250.02   2. Influenza vaccine needed  Z23 V04.81   3. Hypokalemia  E87.6 276.8   4. Primary hypertension  I10 401.9   5. Hyperlipidemia LDL goal <70  E78.5 272.4       Plan      Problem List Items Addressed This Visit          Cardiac/Vascular    Hyperlipidemia LDL goal <70 (Chronic)    Overview     On atorvastatin 80 mg.  Followed by [facility] cardio.         Current Assessment & Plan     At goal  Continue atorvastatin 80mg  Stressed importance of dietary modifications. Follow a low cholesterol, low saturated fat diet with less that 200mg of cholesterol a day.  Avoid fried foods and high saturated fats (high saturated fats less than 7% of calories).  Add Flax Seed/Fish Oil supplements to diet. Increase dietary fiber.  Regular exercise can reduce LDL and raise HDL. Stressed importance of  physical activity 5 times per week for 30 minutes per day.          Primary hypertension (Chronic)    Current Assessment & Plan     At goal.  Continue procardia 90 mg qam and 30 mg qpm.  Continue losartan and lasix. D/c HCTZ, start spironolactone  Low Sodium Diet (DASH Diet - Less than 2 grams of sodium per day).  Monitor blood pressure daily and log. Report consistent numbers greater than 140/90.  Maintain healthy weight with goal BMI <30. Exercise 30 minutes per day, 5 days per week.         Relevant Medications    spironolactone (ALDACTONE) 50 MG tablet       Renal/    Hypokalemia (Chronic)    Current Assessment & Plan     D/c HCTZ, start spironolactone  May d/c kdur once on spironolactone for 3-4 days  Discussed Potassium Rich Foods including: Carrots, Broccoli, Potatoes, Beans (dried), Celery, Spinach, Squash, Tomatoes, Avocados, Apricots, Avocado, Cantaloupe, Bananas, Nectarines and Prunes/Figs/Raisins.  Lab Results   Component Value Date    K 3.1 (L) 09/30/2024              Relevant Orders    Basic Metabolic Panel       Endocrine    Uncontrolled type 2 diabetes mellitus with hyperglycemia - Primary (Chronic)    Current Assessment & Plan     A1C 6.9- at goal. Previous A1C 6.9  Continue lantus 42 units qam and 20 units at bedtime.  Continue metformin.  Continue humalog 20 units with meals TID.  Rx trulicity 4.5 mg weekly.  Has attended diabetes education.  Continue checking CBGs TID and prn.   Check feet daily.            Relevant Medications    metFORMIN (GLUCOPHAGE) 1000 MG tablet     Other Visit Diagnoses       Influenza vaccine needed        Relevant Medications    influenza (Flulaval, Fluzone, Fluarix) 45 mcg/0.5 mL IM vaccine (> or = 6 mo) 0.5 mL (Completed)            Future Appointments   Date Time Provider Department Center   10/28/2024  2:20 PM Criss Real[facility]   12/6/2024 10:30 AM [facility]    12/18/2024  8:45 AM Jerson  BIANCA Rodriguez [facility] [place] [facility]   7/24/2025  9:30 AM PROVIDERS, [facility]            Signature:      [facility]  [address] [facility] [address]    Date of encounter: 10/2/24

## 2024-10-02 ENCOUNTER — OFFICE VISIT (OUTPATIENT)
Dept: INTERNAL MEDICINE | Facility: CLINIC | Age: 59
End: 2024-10-02
Payer: MEDICAID

## 2024-10-02 VITALS
HEART RATE: 58 BPM | RESPIRATION RATE: 16 BRPM | HEIGHT: 67 IN | OXYGEN SATURATION: 98 % | WEIGHT: 230.63 LBS | BODY MASS INDEX: 36.2 KG/M2 | TEMPERATURE: 98 F | DIASTOLIC BLOOD PRESSURE: 84 MMHG | SYSTOLIC BLOOD PRESSURE: 132 MMHG

## 2024-10-02 DIAGNOSIS — I10 PRIMARY HYPERTENSION: Chronic | ICD-10-CM

## 2024-10-02 DIAGNOSIS — E11.65 UNCONTROLLED TYPE 2 DIABETES MELLITUS WITH HYPERGLYCEMIA: Primary | Chronic | ICD-10-CM

## 2024-10-02 DIAGNOSIS — E78.5 HYPERLIPIDEMIA LDL GOAL <70: Chronic | ICD-10-CM

## 2024-10-02 DIAGNOSIS — Z23 INFLUENZA VACCINE NEEDED: ICD-10-CM

## 2024-10-02 DIAGNOSIS — E87.6 HYPOKALEMIA: Chronic | ICD-10-CM

## 2024-10-02 PROCEDURE — 99215 OFFICE O/P EST HI 40 MIN: CPT | Mod: PBBFAC,25

## 2024-10-02 PROCEDURE — 90471 IMMUNIZATION ADMIN: CPT | Mod: PBBFAC

## 2024-10-02 PROCEDURE — 90656 IIV3 VACC NO PRSV 0.5 ML IM: CPT | Mod: PBBFAC

## 2024-10-02 RX ORDER — METFORMIN HYDROCHLORIDE 1000 MG/1
1000 TABLET ORAL 2 TIMES DAILY WITH MEALS
Qty: 180 TABLET | Refills: 2 | Status: SHIPPED | OUTPATIENT
Start: 2024-10-02 | End: 2025-06-29

## 2024-10-02 RX ORDER — SPIRONOLACTONE 50 MG/1
50 TABLET, FILM COATED ORAL DAILY
Qty: 90 TABLET | Refills: 0 | Status: SHIPPED | OUTPATIENT
Start: 2024-10-02 | End: 2024-12-31

## 2024-10-02 RX ADMIN — INFLUENZA VIRUS VACCINE 0.5 ML: 15; 15; 15 SUSPENSION INTRAMUSCULAR at 02:10

## 2024-10-02 NOTE — ASSESSMENT & PLAN NOTE
D/c HCTZ, start spironolactone  May d/c kdur once on spironolactone for 3-4 days  Discussed Potassium Rich Foods including: Carrots, Broccoli, Potatoes, Beans (dried), Celery, Spinach, Squash, Tomatoes, Avocados, Apricots, Avocado, Cantaloupe, Bananas, Nectarines and Prunes/Figs/Raisins.  Lab Results   Component Value Date    K 3.1 (L) 09/30/2024

## 2024-10-02 NOTE — ASSESSMENT & PLAN NOTE
A1C 6.9- at goal. Previous A1C 6.9  Continue lantus 42 units qam and 20 units at bedtime.  Continue metformin.  Continue humalog 20 units with meals TID.  Rx trulicity 4.5 mg weekly.  Has attended diabetes education.  Continue checking CBGs TID and prn.   Check feet daily.

## 2024-10-02 NOTE — ASSESSMENT & PLAN NOTE
At goal.  Continue procardia 90 mg qam and 30 mg qpm.  Continue losartan and lasix. D/c HCTZ, start spironolactone  Low Sodium Diet (DASH Diet - Less than 2 grams of sodium per day).  Monitor blood pressure daily and log. Report consistent numbers greater than 140/90.  Maintain healthy weight with goal BMI <30. Exercise 30 minutes per day, 5 days per week.

## 2024-10-02 NOTE — ASSESSMENT & PLAN NOTE
At goal  Continue atorvastatin 80mg  Stressed importance of dietary modifications. Follow a low cholesterol, low saturated fat diet with less that 200mg of cholesterol a day.  Avoid fried foods and high saturated fats (high saturated fats less than 7% of calories).  Add Flax Seed/Fish Oil supplements to diet. Increase dietary fiber.  Regular exercise can reduce LDL and raise HDL. Stressed importance of physical activity 5 times per week for 30 minutes per day.

## 2024-10-09 ENCOUNTER — TELEPHONE (OUTPATIENT)
Dept: ENDOCRINOLOGY | Facility: CLINIC | Age: 59
End: 2024-10-09
Payer: MEDICAID

## 2024-10-25 ENCOUNTER — LAB VISIT (OUTPATIENT)
Dept: LAB | Facility: HOSPITAL | Age: 59
End: 2024-10-25
Payer: MEDICAID

## 2024-10-25 DIAGNOSIS — E87.6 HYPOKALEMIA: Chronic | ICD-10-CM

## 2024-10-25 LAB
ANION GAP SERPL CALC-SCNC: 9 MEQ/L
BUN SERPL-MCNC: 24.8 MG/DL (ref 8.4–25.7)
CALCIUM SERPL-MCNC: 9.3 MG/DL (ref 8.4–10.2)
CHLORIDE SERPL-SCNC: 108 MMOL/L (ref 98–107)
CO2 SERPL-SCNC: 23 MMOL/L (ref 22–29)
CREAT SERPL-MCNC: 1.29 MG/DL (ref 0.72–1.25)
CREAT/UREA NIT SERPL: 19
GFR SERPLBLD CREATININE-BSD FMLA CKD-EPI: >60 ML/MIN/1.73/M2
GLUCOSE SERPL-MCNC: 172 MG/DL (ref 74–100)
POTASSIUM SERPL-SCNC: 4.4 MMOL/L (ref 3.5–5.1)
SODIUM SERPL-SCNC: 140 MMOL/L (ref 136–145)

## 2024-10-25 PROCEDURE — 80048 BASIC METABOLIC PNL TOTAL CA: CPT

## 2024-10-25 PROCEDURE — 36415 COLL VENOUS BLD VENIPUNCTURE: CPT

## 2024-10-28 ENCOUNTER — OFFICE VISIT (OUTPATIENT)
Dept: INTERNAL MEDICINE | Facility: CLINIC | Age: 59
End: 2024-10-28
Payer: MEDICAID

## 2024-10-28 VITALS
SYSTOLIC BLOOD PRESSURE: 106 MMHG | WEIGHT: 228.38 LBS | HEIGHT: 67 IN | HEART RATE: 67 BPM | DIASTOLIC BLOOD PRESSURE: 68 MMHG | OXYGEN SATURATION: 99 % | BODY MASS INDEX: 35.84 KG/M2

## 2024-10-28 DIAGNOSIS — I10 PRIMARY HYPERTENSION: Chronic | ICD-10-CM

## 2024-10-28 DIAGNOSIS — E87.6 HYPOKALEMIA: Primary | Chronic | ICD-10-CM

## 2024-10-28 DIAGNOSIS — M10.072 ACUTE IDIOPATHIC GOUT INVOLVING TOE OF LEFT FOOT: ICD-10-CM

## 2024-10-28 DIAGNOSIS — E11.65 UNCONTROLLED TYPE 2 DIABETES MELLITUS WITH HYPERGLYCEMIA: Chronic | ICD-10-CM

## 2024-10-28 PROCEDURE — 99213 OFFICE O/P EST LOW 20 MIN: CPT | Mod: PBBFAC

## 2024-10-28 PROCEDURE — 1159F MED LIST DOCD IN RCRD: CPT | Mod: CPTII,,,

## 2024-10-28 PROCEDURE — 4010F ACE/ARB THERAPY RXD/TAKEN: CPT | Mod: CPTII,,,

## 2024-10-28 PROCEDURE — 3044F HG A1C LEVEL LT 7.0%: CPT | Mod: CPTII,,,

## 2024-10-28 PROCEDURE — 99214 OFFICE O/P EST MOD 30 MIN: CPT | Mod: S$PBB,,,

## 2024-10-28 PROCEDURE — 3078F DIAST BP <80 MM HG: CPT | Mod: CPTII,,,

## 2024-10-28 PROCEDURE — 3074F SYST BP LT 130 MM HG: CPT | Mod: CPTII,,,

## 2024-10-28 PROCEDURE — 3008F BODY MASS INDEX DOCD: CPT | Mod: CPTII,,,

## 2024-10-28 PROCEDURE — 1160F RVW MEDS BY RX/DR IN RCRD: CPT | Mod: CPTII,,,

## 2024-10-28 RX ORDER — ALLOPURINOL 200 MG/1
200 TABLET ORAL DAILY
Qty: 90 TABLET | Refills: 2 | Status: SHIPPED | OUTPATIENT
Start: 2024-10-28 | End: 2025-07-25

## 2024-10-28 RX ORDER — SPIRONOLACTONE 50 MG/1
50 TABLET, FILM COATED ORAL DAILY
Qty: 90 TABLET | Refills: 1 | Status: SHIPPED | OUTPATIENT
Start: 2024-10-28

## 2024-11-04 ENCOUNTER — PATIENT OUTREACH (OUTPATIENT)
Dept: ENDOCRINOLOGY | Facility: CLINIC | Age: 59
End: 2024-11-04
Payer: MEDICAID

## 2024-12-06 ENCOUNTER — HOSPITAL ENCOUNTER (OUTPATIENT)
Dept: WOUND CARE | Facility: HOSPITAL | Age: 59
Discharge: HOME | End: 2024-12-06
Attending: FAMILY MEDICINE
Payer: MEDICAID

## 2024-12-06 VITALS
DIASTOLIC BLOOD PRESSURE: 82 MMHG | TEMPERATURE: 98 F | HEART RATE: 67 BPM | OXYGEN SATURATION: 99 % | RESPIRATION RATE: 20 BRPM | SYSTOLIC BLOOD PRESSURE: 127 MMHG

## 2024-12-06 DIAGNOSIS — L84 PRE-ULCERATIVE CORN OR CALLOUS: ICD-10-CM

## 2024-12-06 DIAGNOSIS — E11.9 ENCOUNTER FOR DIABETIC FOOT EXAM: Primary | ICD-10-CM

## 2024-12-06 PROCEDURE — 99211 OFF/OP EST MAY X REQ PHY/QHP: CPT

## 2024-12-06 PROCEDURE — 27000999 HC MEDICAL RECORD PHOTO DOCUMENTATION

## 2024-12-06 RX ORDER — HYDROCHLOROTHIAZIDE 50 MG/1
100 TABLET ORAL
COMMUNITY
Start: 2024-11-26

## 2024-12-06 NOTE — PROGRESS NOTES
CHIEF COMPLAINT:  Chief Complaint   Patient presents with    Nail Care     [facility]     HISTORY OF  PRESENT ILLNESS:  59 y.o. Black or  male being seen today for diabetic foot care.  He has calluses that need to be trimmed.  Prior medical history of hypertension, hyperlipidemia, diabetes, coronary artery disease, etc..  His diabetes is well-controlled at 6.9.  He denies any numbness, tingling, burning in his feet.  He has not had any procedures on his lower extremities.    Today's information:    REVIEW OF SYSTEMS:  Review of Systems   Constitutional:  Negative for chills and fever.   Respiratory:  Negative for cough.    Gastrointestinal:  Negative for nausea.   Musculoskeletal:         As stated in HPI   Skin:         As stated in HPI   Psychiatric/Behavioral: Negative.         Past Medical History:   Diagnosis Date    Adrenal adenoma     Callus of foot 8/19/2022    Coronary artery disease     Diabetes mellitus     Hyperlipidemia     Hypertension     Spinal stenosis     Unspecified osteoarthritis, unspecified site       Past Surgical History:   Procedure Laterality Date    ANGIOGRAPHY      CHOLECYSTECTOMY      FOOT SURGERY Right       Social History     Socioeconomic History    Marital status: Single   Tobacco Use    Smoking status: Never     Passive exposure: Never    Smokeless tobacco: Never   Substance and Sexual Activity    Alcohol use: Never    Drug use: Never    Sexual activity: Yes     Partners: Female     Birth control/protection: None     Social Drivers of Health     Financial Resource Strain: Medium Risk (2/8/2024)    Overall Financial Resource Strain (CARDIA)     Difficulty of Paying Living Expenses: Somewhat hard   Food Insecurity: Food Insecurity Present (2/8/2024)    Hunger Vital Sign     Worried About Running Out of Food in the Last Year: Sometimes true     Ran Out of Food in the Last Year: Sometimes true   Transportation Needs: Unmet Transportation Needs (2/8/2024)    PRAPARE -  Transportation     Lack of Transportation (Medical): Yes     Lack of Transportation (Non-Medical): Yes   Physical Activity: Insufficiently Active (2/8/2024)    Exercise Vital Sign     Days of Exercise per Week: 1 day     Minutes of Exercise per Session: 10 min   Stress: Stress Concern Present (2/8/2024)    [employer] - Occupational Stress Questionnaire     Feeling of Stress : To some extent   Housing Stability: Low Risk  (2/8/2024)    Housing Stability Vital Sign     Unable to Pay for Housing in the Last Year: No     Number of Places Lived in the Last Year: 1     Unstable Housing in the Last Year: No     Review of Most Recent Wound Care-Related Labs:  Lab Results   Component Value Date/Time    WBC 7.00 02/15/2024 07:45 AM    RBC 4.58 (L) 02/15/2024 07:45 AM    HGB 13.3 (L) 02/15/2024 07:45 AM    HCT 39.5 (L) 02/15/2024 07:45 AM    MCV 86.2 02/15/2024 07:45 AM    MCH 29.0 02/15/2024 07:45 AM    CREATININE 1.29 (H) 10/25/2024 07:09 AM    HGBA1C 6.9 09/30/2024 10:20 AM    CRP 4.41 (H) 01/19/2021 04:00 AM        Wound-Related Imaging:            PHYSICAL EXAMINATION:  Blood pressure 127/82, pulse 67, temperature 97.9 °F (36.6 °C), resp. rate 20, SpO2 99%.  General:  VSS, afebrile. No acute distress.   Respiratory: Non labored.  No coughing.  Cardiovascular:  No peripheral edema.   Musculoskeletal:  Full range of motion of all extremities; no joint deformities or edema.   Neurologic:  A&O X 3.    Psychiatric:  Calm, cooperative.  Mood and effect normal.  Responses appropriate.   Integumentary:       Monofilament exam:  Normal bilaterally   Dorsalis pedis and posterior tibial pulses present by Doppler   Proprioception intact bilaterally   Callus at the medial aspect of bilateral great toes pared down with curette  Nails trimmed times 10     Protective Sensation (w/ 10 gram monofilament):  Right: Intact  Left: Intact    Visual Inspection:  Callus -  Bilateral    Pedal Pulses:   Right:  Present  Left: Present    Posterior Tibialis Pulses:   Right:Present  Left: Present       ASSESSMENT/PLAN:    Patient Active Problem List    Diagnosis Date Noted    Encounter for diabetic foot exam 12/06/2024    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 05/16/2024    Pain and swelling of toe of left foot 03/13/2024    Primary osteoarthritis of left knee 10/23/2023    BMI 35.0-35.9,adult 10/23/2023    Class 2 drug-induced obesity with serious comorbidity and body mass index (BMI) of 37.0 to 37.9 in adult 09/12/2023    Acute gastritis 08/10/2023    Hypokalemia 06/09/2023    Encounter for comprehensive diabetic foot examination, type 2 diabetes mellitus 02/28/2023    DDD (degenerative disc disease), cervical 11/02/2022    Callus of foot 08/19/2022    Mild CAD 05/20/2022    Disorder of tendon of shoulder region 05/20/2022    Edema of lower extremity 05/20/2022    History of severe acute respiratory syndrome coronavirus 2 (SARS-CoV-2) disease 05/20/2022    Hyperlipidemia LDL goal <70 05/20/2022    Primary hypertension 05/20/2022    Class 2 severe obesity due to excess calories with serious comorbidity and body mass index (BMI) of 38.0 to 38.9 in adult 05/20/2022    MINA (obstructive sleep apnea) 05/20/2022    Overgrown toenails 05/20/2022    Primary osteoarthritis of both knees 05/20/2022    Spinal stenosis of cervical region 05/20/2022    Tinea pedis 05/20/2022    Uncontrolled type 2 diabetes mellitus with hyperglycemia 05/20/2022    Venous insufficiency 05/20/2022       Diabetic foot care   Diabetes-well-controlled   Hypertension   Hyperlipidemia  Return to clinic in 3 months

## 2024-12-08 ENCOUNTER — HOSPITAL ENCOUNTER (EMERGENCY)
Facility: HOSPITAL | Age: 59
Discharge: HOME | End: 2024-12-08
Attending: EMERGENCY MEDICINE
Payer: MEDICAID

## 2024-12-08 VITALS
SYSTOLIC BLOOD PRESSURE: 105 MMHG | HEIGHT: 67 IN | TEMPERATURE: 98 F | RESPIRATION RATE: 17 BRPM | WEIGHT: 230 LBS | OXYGEN SATURATION: 96 % | DIASTOLIC BLOOD PRESSURE: 75 MMHG | BODY MASS INDEX: 36.1 KG/M2 | HEART RATE: 95 BPM

## 2024-12-08 DIAGNOSIS — R19.7 DIARRHEA, UNSPECIFIED TYPE: Primary | ICD-10-CM

## 2024-12-08 LAB
ALBUMIN SERPL-MCNC: 3.7 G/DL (ref 3.5–5)
ALBUMIN/GLOB SERPL: 0.8 RATIO (ref 1.1–2)
ALP SERPL-CCNC: 108 UNIT/L (ref 40–150)
ALT SERPL-CCNC: 21 UNIT/L (ref 0–55)
ANION GAP SERPL CALC-SCNC: 10 MEQ/L
AST SERPL-CCNC: 17 UNIT/L (ref 5–34)
BASOPHILS # BLD AUTO: 0.04 X10(3)/MCL
BASOPHILS NFR BLD AUTO: 0.4 %
BILIRUB SERPL-MCNC: 0.6 MG/DL
BUN SERPL-MCNC: 23.8 MG/DL (ref 8.4–25.7)
CALCIUM SERPL-MCNC: 9.7 MG/DL (ref 8.4–10.2)
CHLORIDE SERPL-SCNC: 110 MMOL/L (ref 98–107)
CO2 SERPL-SCNC: 21 MMOL/L (ref 22–29)
CREAT SERPL-MCNC: 1.33 MG/DL (ref 0.72–1.25)
CREAT/UREA NIT SERPL: 18
EOSINOPHIL # BLD AUTO: 0.38 X10(3)/MCL (ref 0–0.9)
EOSINOPHIL NFR BLD AUTO: 3.9 %
ERYTHROCYTE [DISTWIDTH] IN BLOOD BY AUTOMATED COUNT: 13.5 % (ref 11.5–17)
GFR SERPLBLD CREATININE-BSD FMLA CKD-EPI: >60 ML/MIN/1.73/M2
GLOBULIN SER-MCNC: 4.4 GM/DL (ref 2.4–3.5)
GLUCOSE SERPL-MCNC: 105 MG/DL (ref 74–100)
HCT VFR BLD AUTO: 43.6 % (ref 42–52)
HGB BLD-MCNC: 14.5 G/DL (ref 14–18)
IMM GRANULOCYTES # BLD AUTO: 0.02 X10(3)/MCL (ref 0–0.04)
IMM GRANULOCYTES NFR BLD AUTO: 0.2 %
LYMPHOCYTES # BLD AUTO: 1.76 X10(3)/MCL (ref 0.6–4.6)
LYMPHOCYTES NFR BLD AUTO: 17.9 %
MCH RBC QN AUTO: 29 PG (ref 27–31)
MCHC RBC AUTO-ENTMCNC: 33.3 G/DL (ref 33–36)
MCV RBC AUTO: 87.2 FL (ref 80–94)
MONOCYTES # BLD AUTO: 0.62 X10(3)/MCL (ref 0.1–1.3)
MONOCYTES NFR BLD AUTO: 6.3 %
NEUTROPHILS # BLD AUTO: 7.01 X10(3)/MCL (ref 2.1–9.2)
NEUTROPHILS NFR BLD AUTO: 71.3 %
PLATELET # BLD AUTO: 353 X10(3)/MCL (ref 130–400)
PMV BLD AUTO: 9.1 FL (ref 7.4–10.4)
POTASSIUM SERPL-SCNC: 3.9 MMOL/L (ref 3.5–5.1)
PROT SERPL-MCNC: 8.1 GM/DL (ref 6.4–8.3)
RBC # BLD AUTO: 5 X10(6)/MCL (ref 4.7–6.1)
SODIUM SERPL-SCNC: 141 MMOL/L (ref 136–145)
WBC # BLD AUTO: 9.83 X10(3)/MCL (ref 4.5–11.5)

## 2024-12-08 PROCEDURE — 85025 COMPLETE CBC W/AUTO DIFF WBC: CPT | Performed by: EMERGENCY MEDICINE

## 2024-12-08 PROCEDURE — 99283 EMERGENCY DEPT VISIT LOW MDM: CPT

## 2024-12-08 PROCEDURE — 25000003 PHARM REV CODE 250: Performed by: EMERGENCY MEDICINE

## 2024-12-08 PROCEDURE — 80053 COMPREHEN METABOLIC PANEL: CPT | Performed by: EMERGENCY MEDICINE

## 2024-12-08 RX ORDER — ALUMINUM HYDROXIDE, MAGNESIUM HYDROXIDE, AND SIMETHICONE 1200; 120; 1200 MG/30ML; MG/30ML; MG/30ML
30 SUSPENSION ORAL ONCE
Status: COMPLETED | OUTPATIENT
Start: 2024-12-08 | End: 2024-12-08

## 2024-12-08 RX ORDER — LIDOCAINE HYDROCHLORIDE 20 MG/ML
15 SOLUTION OROPHARYNGEAL ONCE
Status: COMPLETED | OUTPATIENT
Start: 2024-12-08 | End: 2024-12-08

## 2024-12-08 RX ADMIN — ALUMINUM HYDROXIDE, MAGNESIUM HYDROXIDE, AND SIMETHICONE 30 ML: 1200; 120; 1200 SUSPENSION ORAL at 11:12

## 2024-12-08 RX ADMIN — LIDOCAINE HYDROCHLORIDE 15 ML: 20 SOLUTION ORAL at 11:12

## 2024-12-08 NOTE — ED PROVIDER NOTES
Encounter Date: 12/8/2024       History     Chief Complaint   Patient presents with    Diarrhea     Pt c/o diarrhea and abd discomfort x 3 days; denies n/v     This 59-year-old man presents with complaints of diarrhea and epigastric discomfort for the past 3 days.  He has no nausea or vomiting.  He denies fever or chills.  Has no blood in his stool.       Review of patient's allergies indicates:  No Known Allergies  Past Medical History:   Diagnosis Date    Adrenal adenoma     Callus of foot 8/19/2022    Coronary artery disease     Diabetes mellitus     Hyperlipidemia     Hypertension     Spinal stenosis     Unspecified osteoarthritis, unspecified site      Past Surgical History:   Procedure Laterality Date    ANGIOGRAPHY      CHOLECYSTECTOMY      FOOT SURGERY Right      Family History   Problem Relation Name Age of Onset    Hypertension Mother Elva Stone     Heart disease Mother Elva Stone     Stroke Father      Cancer Sister Tip Clifton     Heart disease Sister Tip Clifton     Cancer Sister Theodora Pope     Heart disease Sister Theodora Pope     Heart disease Brother Kingsley Stone     Heart disease Brother Andi Stone      Social History     Tobacco Use    Smoking status: Never     Passive exposure: Never    Smokeless tobacco: Never   Substance Use Topics    Alcohol use: Never    Drug use: Never     Review of Systems   Constitutional:  Negative for fever.   HENT:  Negative for sore throat.    Respiratory:  Negative for shortness of breath.    Cardiovascular:  Negative for chest pain.   Gastrointestinal:  Positive for abdominal pain and diarrhea. Negative for nausea.   Genitourinary:  Negative for dysuria.   Musculoskeletal:  Negative for back pain.   Skin:  Negative for rash.   Neurological:  Negative for weakness.   Hematological:  Does not bruise/bleed easily.       Physical Exam     Initial Vitals [12/08/24 1113]   BP Pulse Resp Temp SpO2   105/75 95 17 97.5 °F (36.4 °C) 96 %       MAP       --         Physical Exam    Nursing note and vitals reviewed.  Constitutional: He appears well-developed and well-nourished.   HENT:   Head: Normocephalic and atraumatic. Mouth/Throat: Mucous membranes are normal.   Eyes: EOM are normal. Pupils are equal, round, and reactive to light.   Neck: Neck supple.   Normal range of motion.  Cardiovascular:  Normal rate, regular rhythm, normal heart sounds and intact distal pulses.           Pulmonary/Chest: Breath sounds normal.   Abdominal: Abdomen is soft. Bowel sounds are normal. There is abdominal tenderness (epigastric).   Musculoskeletal:         General: Normal range of motion.      Cervical back: Normal range of motion and neck supple.     Neurological: He is alert and oriented to person, place, and time. He has normal strength.   Skin: Skin is warm and dry. Capillary refill takes less than 2 seconds.   Psychiatric: He has a normal mood and affect. His behavior is normal. Judgment and thought content normal.         ED Course   Procedures  Labs Reviewed   COMPREHENSIVE METABOLIC PANEL - Abnormal       Result Value    Sodium 141      Potassium 3.9      Chloride 110 (*)     CO2 21 (*)     Glucose 105 (*)     Blood Urea Nitrogen 23.8      Creatinine 1.33 (*)     Calcium 9.7      Protein Total 8.1      Albumin 3.7      Globulin 4.4 (*)     Albumin/Globulin Ratio 0.8 (*)     Bilirubin Total 0.6            ALT 21      AST 17      eGFR >60      Anion Gap 10.0      BUN/Creatinine Ratio 18     CBC W/ AUTO DIFFERENTIAL    Narrative:     The following orders were created for panel order CBC auto differential.  Procedure                               Abnormality         Status                     ---------                               -----------         ------                     CBC with Differential[[id number]]                           Final result                 Please view results for these tests on the individual orders.   CBC WITH DIFFERENTIAL     WBC 9.83      RBC 5.00      Hgb 14.5      Hct 43.6      MCV 87.2      MCH 29.0      MCHC 33.3      RDW 13.5      Platelet 353      MPV 9.1      Neut % 71.3      Lymph % 17.9      Mono % 6.3      Eos % 3.9      Basophil % 0.4      Lymph # 1.76      Neut # 7.01      Mono # 0.62      Eos # 0.38      Baso # 0.04      IG# 0.02      IG% 0.2            Imaging Results    None          Medications   aluminum-magnesium hydroxide-simethicone 200-200-20 mg/5 mL suspension 30 mL (30 mLs Oral Given 12/8/24 1133)     And   LIDOcaine viscous HCl 2% oral solution 15 mL (15 mLs Oral Given 12/8/24 1133)     Medical Decision Making                                    Clinical Impression:  Final diagnoses:  [R19.7] Diarrhea, unspecified type (Primary)          ED Disposition Condition    Discharge Stable          ED Prescriptions    None       Follow-up Information       Follow up With Specialties Details Why Contact Info    Criss Real, [facility] Schedule an appointment as soon as possible for a visit  As needed, If symptoms worsen [address] [facility] [address]  [phone]               Lucio Greco MD  12/08/24 2961

## 2024-12-17 NOTE — PROGRESS NOTES
CHIEF COMPLAINT:   Chief Complaint   Patient presents with    Follow-up     Denies any cardiac problems                     Review of patient's allergies indicates:  No Known Allergies                                       HPI:  Michael Stone 59 y.o. male with a past medical history of mild nonobstructive CAD per [facility] Aug. 2021, Diabetes Mellitus Type II, hyperlipidemia, hypertension, MINA on CPAP, venous insufficiency,  adrenal adenoma, and obesity who presents to cardiology clinic for routine follow up and ongoing care.  Latest Echocardiogram obtained on 10.11.23 revealed a preserved EF of 59% and mild tricuspid regurgitation.  Most recent ischemic evaluation includes a Left Heart Catheterization on 8.20.21 due to an abnormal nuclear stress test that revealed very mild nonobstructive CAD (30% ostial ramus). (See reports below).    Today the patient reports that he feels great and denies any cardiovascular complaints.  He does not participate in any formal exercise regimen but reportedly tries to remain active throughout his day.  The patient states he is able to perform his regular heavy housework and yard work, which includes mowing the lawn with a push mower without experiencing any angina or angina equivalent symptoms.  He continues to follow with Dr. Gallo at [facility] annually for routine monitoring of venous insufficiency.     CARDIAC TESTING   ECHO 10.11.23    Left Ventricle: The left ventricle is normal in size. Normal wall thickness. There is normal systolic function. Biplane (2D) method of discs ejection fraction is 59%.    Left Atrium: Left atrium is mildly dilated.    Right Ventricle: Mild right ventricular enlargement. Systolic function is normal.    Right Atrium: Right atrium is moderately dilated.    Aortic Valve: There is mild annular calcification present.    Tricuspid Valve: There is mild regurgitation.    IVC/SVC: Normal venous pressure at 3 mmHg.     ECHO 6.1.22  The left ventricle is normal in size  with mild concentric hypertrophy and normal systolic function.  The estimated ejection fraction is 59%.  Normal right ventricular size with normal right ventricular systolic function.  Normal left ventricular diastolic function.  There is no pulmonary hypertension.  The estimated PA systolic pressure is 10 mmHg.  Normal central venous pressure (3 mmHg).      CORONARY ANGIOGRAM 8.20.21  Left Main: large vessel.   Left anterior descending: large vessel.   Diagonal 1: small vessel.   Ramus: medium size vessel. 30% ostial stenosis  Circumflex: large vessel.   Obtuse marginal 1: medium size vessel.   Right coronary artery: medium size vessel.   Posterior descending artery: Tiny vessel.     COMPLICATIONS  No immediate complications.    Closure of access site: Radial band  Estimated blood loss: less than 10 ml  Specimens obtained: none   SUMMARY  Very mild nonobstructive 1 vessel disease      Nuclear pharmacologic stress test 06/24/2021:  Rest EKG: Sinus rhythm  Stress EKG: No significant new EKG changes  Medium size area of moderate anterior and apical ischemia  No scar    ECHO 4.16.21  Mild concentric left ventricular hypertrophy  Left ventricular ejection fraction is measured at approximately 50%  Structurally normal mitral valve  Trace mitral regurgitation  Structurally aortic valve is trileaflet  Trace tricuspid regurgitation with RVSP of 38 mmHg  No evidence of pulmonic regurgitation  The pulmonic valve was not well visualized  Mildly dilated left atrium  Borderline dilated right atrium  Normal right ventricular size  No evidence of pericardial effusion  No evidence of pleural effusion     SADE testing- December 2021 - revealed no evidence of significant arterial insufficiency                                                                                                                                                                                   Right lower extremity venous reflux study December 2021 - revealed  reflux of 4.3 seconds in the GSV at the level of the upper calf and reflux of 4.8 seconds and a branch of the GSV at the level of the upper calf.         Left lower extremity venous reflux studies - August 24, 2021- revealed no DVT and no substantial reflux identified in the interrogated portions of the left leg deep system, GSV, or SSV.                                                                                                                                                                                                                                                                                            Patient Active Problem List   Diagnosis    Mild CAD    Disorder of tendon of shoulder region    History of severe acute respiratory syndrome coronavirus 2 (SARS-CoV-2) disease    Hyperlipidemia LDL goal <70    Primary hypertension    Class 2 severe obesity due to excess calories with serious comorbidity and body mass index (BMI) of 38.0 to 38.9 in adult    MINA (obstructive sleep apnea)    Overgrown toenails    Primary osteoarthritis of both knees    Spinal stenosis of cervical region    Tinea pedis    Uncontrolled type 2 diabetes mellitus with hyperglycemia    Venous insufficiency    Callus of foot    DDD (degenerative disc disease), cervical    Encounter for comprehensive diabetic foot examination, type 2 diabetes mellitus    Hypokalemia    Acute gastritis    Class 2 drug-induced obesity with serious comorbidity and body mass index (BMI) of 37.0 to 37.9 in adult    Primary osteoarthritis of left knee    BMI 35.0-35.9,adult    Pain and swelling of toe of left foot    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    Encounter for diabetic foot exam     Past Surgical History:   Procedure Laterality Date    ANGIOGRAPHY      CHOLECYSTECTOMY      FOOT SURGERY Right      Social History     Socioeconomic History    Marital status: Single   Tobacco Use    Smoking status: Never     Passive exposure: Never     Smokeless tobacco: Never   Substance and Sexual Activity    Alcohol use: Never    Drug use: Never    Sexual activity: Yes     Partners: Female     Birth control/protection: None     Social Drivers of Health     Financial Resource Strain: Medium Risk (2/8/2024)    Overall Financial Resource Strain (CARDIA)     Difficulty of Paying Living Expenses: Somewhat hard   Food Insecurity: Food Insecurity Present (2/8/2024)    Hunger Vital Sign     Worried About Running Out of Food in the Last Year: Sometimes true     Ran Out of Food in the Last Year: Sometimes true   Transportation Needs: Unmet Transportation Needs (2/8/2024)    PRAPARE - Transportation     Lack of Transportation (Medical): Yes     Lack of Transportation (Non-Medical): Yes   Physical Activity: Insufficiently Active (2/8/2024)    Exercise Vital Sign     Days of Exercise per Week: 1 day     Minutes of Exercise per Session: 10 min   Stress: Stress Concern Present (2/8/2024)    [employer] [place] [employer] - Occupational Stress Questionnaire     Feeling of Stress : To some extent   Housing Stability: Low Risk  (2/8/2024)    Housing Stability Vital Sign     Unable to Pay for Housing in the Last Year: No     Number of Places Lived in the Last Year: 1     Unstable Housing in the Last Year: No        Family History   Problem Relation Name Age of Onset    Hypertension Mother Elva Armaschet     Heart disease Mother Elva Stone     Stroke Father      Cancer Sister Tip Clifton     Heart disease Sister Tip Clifton     Cancer Sister Theodora Pope     Heart disease Sister Theodora Barres     Heart disease Brother Kingsley Stone     Heart disease Brother Andi Stone          Current Outpatient Medications:     albuterol (PROVENTIL/VENTOLIN HFA) 90 mcg/actuation inhaler, Inhale 2 puffs into the lungs every 6 (six) hours as needed for Wheezing or Shortness of Breath., Disp: 6.7 g, Rfl: 2    allopurinoL 200 mg Tab, Take 200 mg by mouth once  "daily., Disp: 90 tablet, Rfl: 2    ammonium lactate 12 % Crea, APPLY TWICE DAILY. APPLY A THIN LAYER OF VASELINE OVER LAC-HYDRIN TWICE DAILY. NOTHING BETWEEN TOES, Disp: , Rfl:     aspirin (ECOTRIN) 81 MG EC tablet, Take 1 tablet (81 mg total) by mouth once daily., Disp: 90 tablet, Rfl: 3    BD VEO INSULIN SYRINGE UF 1 mL 31 gauge x 15/64" Syrg, USE THREE TIMES DAILY AS DIRECTED, Disp: 100 each, Rfl: 0    blood sugar diagnostic (ONETOUCH ULTRA TEST) Strp, CHECK BLOOD SUGAR THREE TIMES A DAY BEFORE MEALS AS DIRECTED BY DOCTOR, Disp: 100 strip, Rfl: 11    colchicine (COLCRYS) 0.6 mg tablet, Take 2 tablets now and take 1 tablet in one hour., Disp: 3 tablet, Rfl: 1    colistimethate (COLYMYCIN) 150 mg injection, SMARTSIG:Topical, Disp: , Rfl:     diclofenac sodium (VOLTAREN) 1 % Gel, Apply 2 g topically 4 (four) times daily., Disp: 100 g, Rfl: 2    dulaglutide (TRULICITY) 4.5 mg/0.5 mL pen injector, Inject 4.5 mg into the skin every 7 days., Disp: 4 pen , Rfl: 6    HUMALOG U-100 INSULIN 100 unit/mL injection, INJECT 20 UNITS SUBCUTANEOUSLY THREE TIMES DAILY BEFORE MEAL(S), Disp: 30 mL, Rfl: 0    insulin glargine U-100, Lantus, (LANTUS SOLOSTAR U-100 INSULIN) 100 unit/mL (3 mL) InPn pen, INJECT 42 UNITS SUBCUTANEOUSLY IN THE MORNING AND 20 UNITS SUBCUTANEOUSLY EVERY DAY AT BEDTIME, Disp: 60 mL, Rfl: 0    insulin syringes, disposable, 1 mL Syrg, Inject 1 Syringe into the skin 3 (three) times daily before meals., Disp: 100 each, Rfl: 11    lancets (ONETOUCH DELICA PLUS LANCET) 30 gauge Misc, CHECK BLOOD SUGAR THREE TIMES A DAY BEFORE MEALS AS DIRECTED BY DOCTOR, Disp: 100 each, Rfl: 11    metFORMIN (GLUCOPHAGE) 1000 MG tablet, Take 1 tablet (1,000 mg total) by mouth 2 (two) times daily with meals., Disp: 180 tablet, Rfl: 2    nitroGLYCERIN (NITROSTAT) 0.4 MG SL tablet, Place 1 tablet (0.4 mg total) under the tongue every 5 (five) minutes as needed for Chest pain., Disp: 25 tablet, Rfl: 6    spironolactone (ALDACTONE) 50 MG " "tablet, Take 1 tablet (50 mg total) by mouth once daily., Disp: 90 tablet, Rfl: 1    atorvastatin (LIPITOR) 80 MG tablet, Take 1 tablet (80 mg total) by mouth nightly., Disp: 90 tablet, Rfl: 3    clotrimazole (LOTRIMIN) 1 % cream, Apply topically 2 (two) times daily. Apply to affected area between toes 2 times daily for 21 days (Patient not taking: Reported on 12/18/2024), Disp: 28 g, Rfl: 1    furosemide (LASIX) 20 MG tablet, Take 1 tablet (20 mg total) by mouth once daily., Disp: 90 tablet, Rfl: 3    ID NOW COVID-19 TEST KIT Kit, , Disp: , Rfl:     losartan (COZAAR) 100 MG tablet, Take 1 tablet (100 mg total) by mouth once daily., Disp: 90 tablet, Rfl: 3    NIFEdipine (ADALAT CC) 90 MG TbSR, Take 1 tablet (90 mg total) by mouth once daily., Disp: 30 tablet, Rfl: 11    ondansetron (ZOFRAN-ODT) 4 MG TbDL, Take 1 tablet (4 mg total) by mouth every 6 (six) hours as needed (Nausea). (Patient not taking: Reported on 12/18/2024), Disp: 14 tablet, Rfl: 0    potassium chloride SA (K-DUR,KLOR-CON) 20 MEQ tablet, Take 2 tabs BID x 3 days then decrease to 1 tab BID. (Patient not taking: Reported on 12/18/2024), Disp: 180 tablet, Rfl: 1     ROS:                                                                                                                                                                             Review of Systems   Constitutional: Negative.    Respiratory:  Positive for shortness of breath.    Cardiovascular:  Positive for palpitations.   Gastrointestinal: Negative.    Musculoskeletal: Negative.         BLE pain   Skin: Negative.    Neurological: Negative.    Psychiatric/Behavioral: Negative.          Blood pressure 117/71, pulse 70, temperature 98.3 °F (36.8 °C), temperature source Oral, resp. rate 18, height 5' 7" (1.702 m), weight 103 kg (227 lb), SpO2 100%.   PE:  Physical Exam  Constitutional:       Appearance: Normal appearance.   HENT:      Head: Normocephalic.   Eyes:      Extraocular Movements: " Extraocular movements intact.   Cardiovascular:      Rate and Rhythm: Normal rate and regular rhythm.   Pulmonary:      Effort: Pulmonary effort is normal.   Abdominal:      Palpations: Abdomen is soft.   Musculoskeletal:         General: Normal range of motion.      Cervical back: Neck supple.      Comments: Mild BLE edema    Skin:     General: Skin is warm and dry.   Neurological:      General: No focal deficit present.      Mental Status: He is alert and oriented to person, place, and time.   Psychiatric:         Mood and Affect: Mood normal.        ASSESSMENT/PLAN:  Very Mild CAD (30% ostial Ramus Stenosis) per [facility] 8.20.21   - Lexiscan stress test -medium sized area of moderate anterior and apical ischemia and no scar (6.21.21)  - EF-59% with mild TR per ECHO 10.11.23  - Denies CP or SOB  - Continue ASA, atorvastatin 80 mg, losartan 100 mg, and SL Nitro PRN CP  - Counseled on heart healthy, low cholesterol diet and exercise as tolerated  - EKG today -NSR with no ischemic changes     Venous Insufficiency  - Reports stable bilateral lower extremity edema.  - Continue Lasix 20 mg  - Recommended continued conservative measures- low-sodium diet, compression stocking therapy, and leg elevation  - Follow up with Dr. Gallo as directed    HLD  - LDL at goal - 60  - Continue with Atorvastatin 80 mg po daily   - Counseled patient on low-cholesterol diet and exercise as tolerated    HTN  - BP at goal 117/71  - Continue Losartan 100 mg,  nifedipine 90 mg, Aldactone 50 mg, and  Lasix 20 mg  - Counseled on low salt diet and exercise as tolerated     Diabetes Mellitus Type II   - A1C- 6.9  - Continue management per PCP    MINA  - Recommend nightly compliance with CPAP use    Obesity  - Counseled on the importance of weight loss through diet and exercise        EKG  Follow up in Cardiology Clinic in 6 months or sooner if needed   Follow up with PCP and Dr. Gallo as directed

## 2024-12-18 ENCOUNTER — OFFICE VISIT (OUTPATIENT)
Dept: CARDIOLOGY | Facility: CLINIC | Age: 59
End: 2024-12-18
Payer: MEDICAID

## 2024-12-18 VITALS
TEMPERATURE: 98 F | HEIGHT: 67 IN | HEART RATE: 70 BPM | RESPIRATION RATE: 18 BRPM | SYSTOLIC BLOOD PRESSURE: 117 MMHG | BODY MASS INDEX: 35.63 KG/M2 | WEIGHT: 227 LBS | OXYGEN SATURATION: 100 % | DIASTOLIC BLOOD PRESSURE: 71 MMHG

## 2024-12-18 DIAGNOSIS — I25.10 MILD CAD: Primary | Chronic | ICD-10-CM

## 2024-12-18 DIAGNOSIS — R06.09 DOE (DYSPNEA ON EXERTION): ICD-10-CM

## 2024-12-18 DIAGNOSIS — E78.5 HYPERLIPIDEMIA LDL GOAL <70: Chronic | ICD-10-CM

## 2024-12-18 DIAGNOSIS — I87.2 VENOUS INSUFFICIENCY: Chronic | ICD-10-CM

## 2024-12-18 DIAGNOSIS — G47.33 OSA (OBSTRUCTIVE SLEEP APNEA): Chronic | ICD-10-CM

## 2024-12-18 DIAGNOSIS — I10 PRIMARY HYPERTENSION: Chronic | ICD-10-CM

## 2024-12-18 PROBLEM — R60.0 EDEMA OF LOWER EXTREMITY: Status: RESOLVED | Noted: 2022-05-20 | Resolved: 2024-12-18

## 2024-12-18 LAB
OHS QRS DURATION: 92 MS
OHS QTC CALCULATION: 434 MS

## 2024-12-18 PROCEDURE — 93005 ELECTROCARDIOGRAM TRACING: CPT

## 2024-12-18 PROCEDURE — 3008F BODY MASS INDEX DOCD: CPT | Mod: CPTII,,, | Performed by: NURSE PRACTITIONER

## 2024-12-18 PROCEDURE — 99214 OFFICE O/P EST MOD 30 MIN: CPT | Mod: S$PBB,,, | Performed by: NURSE PRACTITIONER

## 2024-12-18 PROCEDURE — 1160F RVW MEDS BY RX/DR IN RCRD: CPT | Mod: CPTII,,, | Performed by: NURSE PRACTITIONER

## 2024-12-18 PROCEDURE — 99215 OFFICE O/P EST HI 40 MIN: CPT | Mod: PBBFAC,25 | Performed by: NURSE PRACTITIONER

## 2024-12-18 PROCEDURE — 1159F MED LIST DOCD IN RCRD: CPT | Mod: CPTII,,, | Performed by: NURSE PRACTITIONER

## 2024-12-18 PROCEDURE — 3074F SYST BP LT 130 MM HG: CPT | Mod: CPTII,,, | Performed by: NURSE PRACTITIONER

## 2024-12-18 PROCEDURE — 4010F ACE/ARB THERAPY RXD/TAKEN: CPT | Mod: CPTII,,, | Performed by: NURSE PRACTITIONER

## 2024-12-18 PROCEDURE — 3078F DIAST BP <80 MM HG: CPT | Mod: CPTII,,, | Performed by: NURSE PRACTITIONER

## 2024-12-18 PROCEDURE — 3044F HG A1C LEVEL LT 7.0%: CPT | Mod: CPTII,,, | Performed by: NURSE PRACTITIONER

## 2024-12-18 RX ORDER — NIFEDIPINE 90 MG/1
90 TABLET, EXTENDED RELEASE ORAL DAILY
Qty: 30 TABLET | Refills: 11 | Status: SHIPPED | OUTPATIENT
Start: 2024-12-18 | End: 2025-12-18

## 2024-12-18 RX ORDER — FUROSEMIDE 20 MG/1
20 TABLET ORAL DAILY
Qty: 90 TABLET | Refills: 3 | Status: SHIPPED | OUTPATIENT
Start: 2024-12-18 | End: 2025-12-18

## 2024-12-18 RX ORDER — ATORVASTATIN CALCIUM 80 MG/1
80 TABLET, FILM COATED ORAL NIGHTLY
Qty: 90 TABLET | Refills: 3 | Status: SHIPPED | OUTPATIENT
Start: 2024-12-18 | End: 2025-12-18

## 2024-12-18 RX ORDER — LOSARTAN POTASSIUM 100 MG/1
100 TABLET ORAL DAILY
Qty: 90 TABLET | Refills: 3 | Status: SHIPPED | OUTPATIENT
Start: 2024-12-18 | End: 2025-12-18

## 2024-12-18 NOTE — PATIENT INSTRUCTIONS
EKG  Follow up in Cardiology Clinic in 6 months or sooner if needed   Follow up with PCP and Dr. Gallo as directed

## 2025-01-13 DIAGNOSIS — E11.65 UNCONTROLLED TYPE 2 DIABETES MELLITUS WITH HYPERGLYCEMIA: Chronic | ICD-10-CM

## 2025-01-13 RX ORDER — INSULIN LISPRO 100 [IU]/ML
20 INJECTION, SOLUTION INTRAVENOUS; SUBCUTANEOUS
Qty: 30 ML | Refills: 2 | Status: SHIPPED | OUTPATIENT
Start: 2025-01-13

## 2025-01-24 DIAGNOSIS — E11.65 TYPE 2 DIABETES MELLITUS WITH HYPERGLYCEMIA, WITH LONG-TERM CURRENT USE OF INSULIN: ICD-10-CM

## 2025-01-24 DIAGNOSIS — Z79.4 TYPE 2 DIABETES MELLITUS WITH HYPERGLYCEMIA, WITH LONG-TERM CURRENT USE OF INSULIN: ICD-10-CM

## 2025-01-24 DIAGNOSIS — E11.65 UNCONTROLLED TYPE 2 DIABETES MELLITUS WITH HYPERGLYCEMIA: ICD-10-CM

## 2025-01-24 RX ORDER — BLOOD-GLUCOSE SENSOR
EACH MISCELLANEOUS
Qty: 3 EACH | Refills: 11 | Status: SHIPPED | OUTPATIENT
Start: 2025-01-24

## 2025-01-24 RX ORDER — INSULIN GLARGINE 100 [IU]/ML
INJECTION, SOLUTION SUBCUTANEOUS
Qty: 60 ML | Refills: 0 | Status: CANCELLED | OUTPATIENT
Start: 2025-01-24

## 2025-01-24 RX ORDER — BLOOD-GLUCOSE,RECEIVER,CONT
EACH MISCELLANEOUS
Qty: 1 EACH | Refills: 0 | Status: SHIPPED | OUTPATIENT
Start: 2025-01-24

## 2025-01-24 NOTE — TELEPHONE ENCOUNTER
Dexcom  and sensors sent to pharmacy. I have also placed referral for pt to attend DM education to learn how to apply and use device. Please educate him that he should only attend that appointment once he has the dexcom in his possession. I would not recommend trying to start the device on his own as this may lead to wasted product.     Thanks

## 2025-01-24 NOTE — TELEPHONE ENCOUNTER
----- Message from Nurse Nicole sent at 1/24/2025 10:25 AM CST -----  Regarding: Dexcom  Patient is requesting an order for a Dexcom and . Patient is checking CBG's 3 or more times a day, pt would like dexcom to be sent to his pharmacy, [facility] in [place], please review.  ----- Message -----  From: Nory Zurita  Sent: 1/17/2025   3:18 PM CST  To: Farhan Kahn Staff    Who Called: Michael Stone    Pharmacy is calling to request assistance with Rx    Pharmacy name and phone number: [facility] [phone] fax # [phone]  Pharmacy contact: rl  Patient Name: michael  Prescription Name: continue glucose monitor   What do they need to clarify?: Rx form- signed/dated, recent chart notes,  diagnosis and treatment plan under history of present illness or assessment and plan        Preferred Method of Contact: Phone Call  Patient's Preferred Phone Number on File: [phone]   Best Call Back Number, if different:  Additional Information: pharmacy call, please advise, thanks

## 2025-01-28 DIAGNOSIS — E11.65 UNCONTROLLED TYPE 2 DIABETES MELLITUS WITH HYPERGLYCEMIA: Chronic | ICD-10-CM

## 2025-01-28 RX ORDER — INSULIN GLARGINE 100 [IU]/ML
INJECTION, SOLUTION SUBCUTANEOUS
Qty: 60 ML | Refills: 2 | Status: SHIPPED | OUTPATIENT
Start: 2025-01-28

## 2025-01-28 NOTE — TELEPHONE ENCOUNTER
----- Message from Linda sent at 1/28/2025  8:59 AM CST -----  Who Called: Michael Stone    Refill or New Rx:Refill  RX Name and Strength:insulin glargine U-100, Lantus, (LANTUS SOLOSTAR U-100 INSULIN) 100 unit/mL (3 mL) InPn pen  How is the patient currently taking it? (ex. 1XDay):  Is this a 30 day or 90 day RX:  Local or Mail Order:  List of preferred pharmacies on file (remove unneeded): [unfilled]  If different Pharmacy is requested, enter Pharmacy information here including location and phone number: [facility] - [address]   Ordering Provider:        Patient's Preferred Phone Number on File: [phone]   Best Call Back Number, if different:  Additional Information:

## 2025-01-28 NOTE — TELEPHONE ENCOUNTER
Called pt and relayed PCP's message below. Pt verbalized understanding and stated he has not received the sensors or  yet.

## 2025-01-29 ENCOUNTER — TELEPHONE (OUTPATIENT)
Dept: INTERNAL MEDICINE | Facility: CLINIC | Age: 60
End: 2025-01-29
Payer: MEDICAID

## 2025-01-29 NOTE — TELEPHONE ENCOUNTER
Called pt and gave pt information listed below to schedule him for class. Pt verbalized understanding.    [facility]   [address] [address]   Phone: [phone]

## 2025-01-29 NOTE — TELEPHONE ENCOUNTER
----- Message from Rhina Pedroza sent at 1/29/2025  8:12 AM CST -----  .Type:  Patient Returning Call    Who Called:Pt  Who Left Message for Patient:Pt  Does the patient know what this is regarding?:Blood sugar class  Would the patient rather a call back or a response via [facility]?   Best Call Back Number:[phone]   Additional Information: PT received blood sugar . He was told not to use yet , he would be sent to class. Pt would like call back to schedule class.

## 2025-02-04 ENCOUNTER — TELEPHONE (OUTPATIENT)
Dept: INTERNAL MEDICINE | Facility: CLINIC | Age: 60
End: 2025-02-04
Payer: MEDICAID

## 2025-02-04 NOTE — TELEPHONE ENCOUNTER
----- Message from Phuong sent at 2025  4:32 PM CST -----  Regardin request  .Type:  Needs Medical Advice    Who Called: [facility]  Symptoms (please be specific):    How long has patient had these symptoms:    Pharmacy name and phone #:    Would the patient rather a call back or a response via [facility]?   Best Call Back Number: [phone]  Additional Information: sending fax    Glucose monitor

## 2025-02-05 ENCOUNTER — TELEPHONE (OUTPATIENT)
Dept: INTERNAL MEDICINE | Facility: CLINIC | Age: 60
End: 2025-02-05
Payer: MEDICAID

## 2025-02-13 ENCOUNTER — NUTRITION (OUTPATIENT)
Dept: DIABETES | Facility: CLINIC | Age: 60
End: 2025-02-13
Payer: MEDICAID

## 2025-02-13 DIAGNOSIS — E11.65 TYPE 2 DIABETES MELLITUS WITH HYPERGLYCEMIA, WITH LONG-TERM CURRENT USE OF INSULIN: ICD-10-CM

## 2025-02-13 DIAGNOSIS — Z79.4 TYPE 2 DIABETES MELLITUS WITH HYPERGLYCEMIA, WITH LONG-TERM CURRENT USE OF INSULIN: ICD-10-CM

## 2025-02-13 PROCEDURE — 99211 OFF/OP EST MAY X REQ PHY/QHP: CPT | Mod: PBBFAC | Performed by: DIETITIAN, REGISTERED

## 2025-02-13 PROCEDURE — G0108 DIAB MANAGE TRN  PER INDIV: HCPCS | Mod: PBBFAC | Performed by: DIETITIAN, REGISTERED

## 2025-02-13 NOTE — PROGRESS NOTES
Diabetes Care Specialist Progress Note  Author: Yulia Smith RD  Date: 2/13/2025    Intake    Program Intake  Reason for Diabetes Program Visit:: Initial Diabetes Assessment  Current diabetes risk level:: moderate  In the last month, have you used the ER or been admitted to the hospital: No    Current Diabetes Treatment: Insulin, DM Injectables  DM Injectables Type/Dose: Trulicity 4.5 mg  Method of insulin delivery?: Injections  Injection Type: Vial/Sryringe, Pens  Vial/Syringe Type/Dose: Humalog 20 units TIDAC  Pen Type/Dose: Lantus 42 units AM and 52 units PM    Continuous Glucose Monitoring  Patient has CGM: Yes  Personal CGM type:: Dexcom G7 with  - start today    Lab Results   Component Value Date    HGBA1C 6.9 09/30/2024               There is no height or weight on file to calculate BMI.    Lifestyle Coping Support & Clinical    Lifestyle/Coping/Support  Psychosocial/Coping Skills Assessment Completed: : No  Deffered due to:: Time  Area of need?: Deferred    Problem Review  Active Comorbidities: Hypertension    Diabetes Self-Management Skills Assessment    Medication Skills Assessment  Patient is able to identify current diabetes medications, dosages, and appropriate timing of medications.: no  Patient reports problems or concerns with current medication regimen.: yes  Medication regimen problems/concerns:: concerned about side effects  Patient is  aware that some diabetes medications can cause low blood sugar?: Yes  Medication Skills Assessment Completed:: Yes  Assessment indicates:: Instruction Needed  Area of need?: Yes    Diabetes Disease Process/Treatment Options  Diabetes Disease Process/Treatment Options: Skills Assessment Completed: No  Deferred due to:: Time  Area of need?: Deferred    Nutrition/Healthy Eating  Nutrition/Healthy Eating Skills Assessment Completed:: No  Deffered due to:: Time  Area of need?: Deferred    Physical Activity/Exercise  Physical Activity/Exercise Skills  Assessment Completed: : No  Deffered due to:: Time  Area of need?: Deferred    Home Blood Glucose Monitoring  Personal CGM type:: Dexcom G7 with  - start today  Home Blood Glucose Monitoring Skills Assessment Completed: : Yes  Assessment indicates:: Instruction Needed  Area of need?: Yes    Acute Complications  Have you ever had hypoglycemia (low BG 70 or less)?: yes  How often and what are your symptoms?: shaky and weak  How do you treat hypoglycemia?: candy, soda or juice  Have you ever had hyperglycemia (high  or more)?: yes  How often and what are your symptoms?: frequent urination and thirst  Acute Complications Skills Assessment Completed: : Yes  Assessment indicates:: Instruction Needed  Area of need?: Yes    Chronic Complications  Reviewed health maintenance: no (see comments)  Chronic Complications Skills Assessment Completed: : No  Deferred due to:: Time  Area of need?: Deferred      Assessment Summary and Plan    Based on today's diabetes care assessment, the following areas of need were identified:      Identified Areas of Need      Medication/Current Diabetes Treatment: Yes - see care plan   Lifestyle Coping/Support: Deferred   Diabetes Disease Process/Treatment Options: Deferred   Nutrition/Healthy Eating: Deferred    Physical Activity/Exercise: Deferred    Home Blood Glucose Monitoring: Yes - see care plan   Acute Complications: Yes - Reviewed blood glucose goals, prevention, detection, signs and symptoms, and treatment of hypoglycemia and hyperglycemia, and when to contact the clinic.   Chronic Complications: Deferred       Today's interventions were provided through individual discussion, instruction, and written materials were provided.      Patient verbalized understanding of instruction and written materials.  Pt was able to return back demonstration of instructions today. Patient understood key points, needs reinforcement and further instruction.     Diabetes Self-Management Care  "Plan:    Today's Diabetes Self-Management Care Plan was developed with Michael's input. Michael has agreed to work toward the following goal(s) to improve his/her overall diabetes control.      Care Plan: Diabetes Management   Updates made since 2/14/2024 12:00 AM        Problem: Blood Glucose Self-Monitoring         Goal: Pt agrees to use Dexcom G7 with  for CGM and return in 1 week for report review    Start Date: 2/13/2025   Expected End Date: 2/20/2025   Priority: High   Barriers: No Barriers Identified   Note:    DEXCOM G7 CGM TRAINING  Dexcom Stereotaxis7 mobile apps downloaded to phone. Education was provided using "Quick Start Guide" and demo device per Dexcom protocol. [facility] data share set-up.    Patient will use Dexcom G7  to receive continuous glucose data.        Overview:  5 minute glucose reading updates, trending arrows, BG graph screens, reports screen,  connectivity and functions.   Menus: Trend graph, start sensor, enter BG, events, alerts, settings, replace sensor, stop sensor, and shutdown  Dexcom G7 mobile ro/ Settings:                          * Urgent Low: 55 mg/dL                          * Urgent Low Soon: On                          * Low Alert: 70 mg/dL; Snooze off                          * High Alert: 250 mg/dL; Snooze off                           * Signal Loss: on                          * Brief Sensor Issue: on     Reviewed additional setting options.  Patient paired sensor using 4-digit code listed on sensor cap..    Reviewed where to find sensor insertion time and expiration date.   Reviewed appropriate calibration techniques.  Reviewed sensor site selection. Patient selected and prepped site using aseptic technique, inserted sensor, applied overlay tape and started session.   Reviewed sensor removal from site. Discussed times to remove sensor per  guidelines include MRI or diatherapy.   Patient able to demonstrate without difficulty. " "Encouraged to review manual and/or training videos prior to starting another sensor.   Reviewed problem solving aspects of sensor transmission/variables that can disrupt RF transmission.  range 20 feet, but the first 3 hrs keep within 3 feet of transmitter.  Patient instructed on lag time of interstitial fluid from CBG and was advised to treat hypoglycemia and dose insulin based on SMBG values.  Dexcom technical support contact number given and examples of when to contact them discussed.          Problem: Medications         Goal: Patient Agrees to take Diabetes Medication(s) Lantus 42 units in am and 20 units pm + Humalog 20 units prior to meals    Start Date: 2/13/2025   Expected End Date: 2/20/2025   Priority: High   Barriers: No Barriers Identified   Note:    2/13/25 - pt states he is taking 52 units Lantus at night instead of 20 units. Also taking sooner than 12 hrs apart. Reviewed taking 12 hrs apart due to "stacking" insulin and risking hypoglycemia. Some overnight and daytime hypoglycemia reported. Will take as directed and return in 1 week for review. Reviewed difference between long and fast-acting.       Task: Reviewed with patient all current diabetes medications and provided basic review of the purpose, dosage, frequency, side effects, and storage of both oral and injectable diabetes medications. Completed 2/13/2025        Task: Discussed guidelines for preventing, detecting and treating hypoglycemia and hyperglycemia and reviewed the importance of meal and medication timing with diabetes mediations for prevention of hypoglycemia and maximum drug benefit. Completed 2/13/2025          Follow Up Plan     Follow up in about 1 week (around 2/20/2025) for at [facility] for Dexcom  download.    Today's care plan and follow up schedule was discussed with patient.  Michael verbalized understanding of the care plan, goals, and agrees to follow up plan.        The patient was encouraged to " communicate with his/her health care provider/physician and care team regarding his/her condition(s) and treatment.  I provided the patient with my contact information today and encouraged to contact me via phone or [facility] Patient Portal as needed.     Length of Visit   Total Time: 60 Minutes

## 2025-02-20 ENCOUNTER — CLINICAL SUPPORT (OUTPATIENT)
Facility: CLINIC | Age: 60
End: 2025-02-20
Payer: MEDICAID

## 2025-02-20 DIAGNOSIS — E11.65 UNCONTROLLED TYPE 2 DIABETES MELLITUS WITH HYPERGLYCEMIA: Primary | Chronic | ICD-10-CM

## 2025-02-20 NOTE — Clinical Note
Evens Kahn,  (see media for full Dexcom report) We had these findings: Pattern of overnight lows despite decreasing PM insulin to 20 units from 52. Variability is high at 39% + 8% lows. Also noted some drops in afternoon. States he is active in afternoon and sometimes forgets to eat or has small meal in evening. He does report decreased appetite with Trulicity.  States he does not take his Lantus or Humalog in the morning if gluc < 100 such as yesterday. When we met at noon, he had not taken his Humalog or Lantus that morning. He reports that he did take his 20 units of Humalog and 20 units of Lantus the previous night of 2/19 and typically takes Lantus between 5-6 am and pm. So from the report it looks like he took his Humalog after eating in the afternoon. Reiterated before meals only and discussed the option of a lower Humalog dose with correction. He seems to understand a correction but am afraid he may not adhere to it. Let me know if you have any questions/suggestions.  Thank you, Yulia Smith Subjective   Patient ID: Tylor is a 54 year old male.    Chief Complaint   Patient presents with   • Establish Care   • New Patient     Urinary symptoms - for the past 6 weeks - discomfort with pressure in the suprapubic area, slight increased frequency.  Up at night on occasion.  No change in stream.    Skin tags on the eyelids      Tylor has no past medical history of No known problems.  Tylor does not have a problem list on file.  Tylor has no past surgical history on file.  His family history includes Allergic Rhinitis in his brother, daughter, daughter, and daughter; Congestive Heart Failure in his father; Migraine in his son; Parkinsonism in his mother.  Tylor reports that he has never smoked. He has never used smokeless tobacco. He reports current alcohol use of about 6.0 standard drinks of alcohol per week. He reports that he does not use drugs.  Tylor has a current medication list which includes the following prescription(s): finasteride and ciprofloxacin.  Tylor   Current Outpatient Medications   Medication Sig Dispense Refill   • finasteride (PROSCAR) 5 MG tablet Take 1 tablet by mouth daily. Collaborating MD: Dr. Gautam Kaur MD 30 tablet 5   • ciprofloxacin (CIPRO) 500 MG tablet Take 1 tablet by mouth in the morning and 1 tablet in the evening. Do all this for 14 days. Collaborating MD: Dr. Gautam Kaur MD 28 tablet 0     No current facility-administered medications for this visit.     Tylor has No Known Allergies.    Review of Systems   Constitutional: Negative for activity change, appetite change, chills, diaphoresis, fatigue, fever and unexpected weight change.   HENT: Positive for congestion (allergies ). Negative for dental problem, drooling, ear discharge, ear pain, facial swelling, mouth sores, nosebleeds, postnasal drip, rhinorrhea, sinus pressure, sinus pain, sneezing and sore throat.    Eyes: Positive for visual disturbance (glasses). Negative for pain, discharge, redness  and itching.   Respiratory: Negative for cough, chest tightness, shortness of breath and wheezing.    Cardiovascular: Negative for chest pain, palpitations and leg swelling.   Gastrointestinal: Negative for abdominal distention, abdominal pain, blood in stool, constipation, diarrhea, nausea and vomiting.   Endocrine: Negative for cold intolerance, heat intolerance, polydipsia, polyphagia and polyuria.   Genitourinary: Positive for frequency. Negative for difficulty urinating, dysuria, flank pain, hematuria and urgency.   Musculoskeletal: Negative for arthralgias, joint swelling and myalgias.   Skin: Negative for color change, pallor and rash.        Hair thinning, skin tags   Allergic/Immunologic: Negative for environmental allergies, food allergies and immunocompromised state.   Neurological: Negative for dizziness, syncope, facial asymmetry, weakness, light-headedness, numbness and headaches.   Hematological: Negative for adenopathy. Does not bruise/bleed easily.   Psychiatric/Behavioral: Negative for agitation, behavioral problems, confusion, decreased concentration and dysphoric mood.       Objective    Blood pressure 108/76, pulse 66, temperature 97.5 °F (36.4 °C), height 6' (1.829 m), weight 90.5 kg (199 lb 9.6 oz), SpO2 98 %.     Physical Exam  Vitals reviewed.   Constitutional:       General: He is not in acute distress.     Appearance: He is well-developed. He is not diaphoretic.   HENT:      Head: Normocephalic and atraumatic.      Right Ear: Tympanic membrane, ear canal and external ear normal.      Left Ear: Tympanic membrane, ear canal and external ear normal.      Nose: Nose normal.      Mouth/Throat:      Pharynx: Uvula midline. No oropharyngeal exudate.      Neck: Normal range of motion and neck supple.   Eyes:      General:         Right eye: No discharge.         Left eye: No discharge.      Conjunctiva/sclera: Conjunctivae normal.      Pupils: Pupils are equal, round, and reactive to light.    Neck:      Thyroid: No thyromegaly.   Cardiovascular:      Rate and Rhythm: Normal rate and regular rhythm.      Heart sounds: Normal heart sounds. No murmur heard.    No friction rub. No gallop.   Pulmonary:      Effort: Pulmonary effort is normal. No respiratory distress.      Breath sounds: Normal breath sounds. No wheezing or rales.   Abdominal:      General: Bowel sounds are normal.      Palpations: Abdomen is soft. There is no mass.      Tenderness: There is no abdominal tenderness.   Genitourinary:     Prostate: Enlarged (boggy).      Rectum: Normal. Guaiac result negative.   Musculoskeletal:         General: No deformity. Normal range of motion.   Lymphadenopathy:      Cervical: No cervical adenopathy.   Skin:     General: Skin is warm and dry.      Comments: Tiny skin tags on the eyelids   Neurological:      Mental Status: He is alert and oriented to person, place, and time.   Psychiatric:         Behavior: Behavior normal.         Thought Content: Thought content normal.         Judgment: Judgment normal.         Assessment   1. Encounter for general adult medical examination with abnormal findings  Work on healthy Mediterranean diet and 150 minutes of cardiovascular exercise weekly, as well as weight loss.     - CBC WITH DIFFERENTIAL; Future  - COMPREHENSIVE MET PNL (FAST); Future  - LIPID PANEL WITH REFLEX; Future  - PSA, TOTAL (SCREENING); Future  - URINALYSIS & REFLEX MICROSCOPY WITH CULTURE IF INDICATED  - POCT URINE DIP NON-AUTO  - URINE MICROSCOPIC ONLY    2. Suprapubic pain  Likely secondary to prostatitis - cipro 500 mg bid for 2 wekes prescribed.    - URINALYSIS & REFLEX MICROSCOPY WITH CULTURE IF INDICATED; Future  - PSA, TOTAL (SCREENING); Future  - URINALYSIS & REFLEX MICROSCOPY WITH CULTURE IF INDICATED  - URINE MICROSCOPIC ONLY    3. Prostate cancer screening    - PSA, TOTAL (SCREENING); Future  - URINE MICROSCOPIC ONLY    4. Screen for colon cancer    - OPEN ACCESS COLONOSCOPY  - URINE  MICROSCOPIC ONLY       Health Maintenance Summary     DTaP/Tdap/Td Vaccine (1 - Tdap)  Overdue - never done    Colorectal Cancer Screen- (Colonoscopy - Every 10 Years)  Scheduled for 9/12/2022    Shingles Vaccine (1 of 2)  Overdue - never done    COVID-19 Vaccine (3 - Booster for Pfizer series)  Overdue since 3/22/2022    Influenza Vaccine (1)  Due since 9/1/2022    Hepatitis B Vaccine (1 of 3 - 3-dose series)  Postponed until 9/25/2032    Depression Screening (Yearly)  Next due on 8/31/2023    Meningococcal Vaccine   Aged Out    HPV Vaccine   Aged Out    Pneumococcal Vaccine 0-64   Aged Out      patient is going to call insurance regarding immunizations.      Schedule follow up: in a year, at next annual exam     Collaborating MD: MD Kelin Kraft PA-C

## 2025-02-21 NOTE — PROGRESS NOTES
Diabetes Care Specialist Progress Note  Author: Yulia Smith RD  Date: 2/21/2025    Intake    Program Intake  Reason for Diabetes Program Visit:: Intervention  Type of Intervention:: Individual  Individual: Education  Education: Self-Management Skill Review  Current diabetes risk level:: moderate    Current Diabetes Treatment: Insulin, DM Injectables, Oral Medications  Oral Medication Type/Dose: Metformin 1000mg BID  DM Injectables Type/Dose: Trulicity 4.5 mg  Method of insulin delivery?: Injections  Injection Type: Vial/Sryringe, Pens  Vial/Syringe Type/Dose: Humalog 20 units TIDAC by vial  Pen Type/Dose: Lantus 42 units AM and 20 units PM by pen    Continuous Glucose Monitoring  Patient has CGM: Yes  Personal CGM type:: Dexcom G7 with   GMI Date: 02/21/25  GMI Value: 7.2 %      Lab Results   Component Value Date    HGBA1C 6.9 09/30/2024               There is no height or weight on file to calculate BMI.    Lifestyle Coping Support & Clinical    Lifestyle/Coping/Support  Does anyone in your family have diabetes or does anyone in your family support you in your diabetes care?: family and nieces  How do you deal with stress/distress?: works outside  Psychosocial/Coping Skills Assessment Completed: : Yes  Assessment indicates:: Adequate understanding  Area of need?: No         Diabetes Self-Management Skills Assessment    Medication Skills Assessment  Patient is able to identify current diabetes medications, dosages, and appropriate timing of medications.: yes  Patient reports problems or concerns with current medication regimen.: yes  Medication regimen problems/concerns:: concerned about side effects (nocturnal and post meal hypoglycemia)  Patient is  aware that some diabetes medications can cause low blood sugar?: Yes  Medication Skills Assessment Completed:: Yes  Assessment indicates:: Instruction Needed  Area of need?: Yes    Diabetes Disease Process/Treatment Options  Diabetes Disease  Process/Treatment Options: Skills Assessment Completed: No  Deferred due to:: Time  Area of need?: Deferred    Nutrition/Healthy Eating  Meal Plan 24 Hour Recall - Breakfast: 6 am - eggs and sausage and coffee with cream and 2 tsp sugar or 1 cup grits  Meal Plan 24 Hour Recall - Lunch: about 1pm - small bowl of cereal and milk or cucumbers or salad  Meal Plan 24 Hour Recall - Dinner: sometimes skips or 1/2 peanut butter sandwich  Meal Plan 24 Hour Recall - Beverage: mostly water and admits to small can of coke with dinner 1-2 times per week.  Who shops/cooks?: self or sisters  Patient can identify foods that impact blood sugar.: yes  Nutrition/Healthy Eating Skills Assessment Completed:: Yes  Assessment indicates:: Instruction Needed  Area of need?: Yes    Physical Activity/Exercise  Patient's daily activity level:: constantly moving  Patient formally exercises outside of work.: no  Patient can identify forms of physical activity.: yes  Physical Activity/Exercise Skills Assessment Completed: : Yes  Assessment indicates:: Instruction Needed  Area of need?: Yes    Home Blood Glucose Monitoring  Patient states that blood sugar is checked at home daily.: yes  Personal CGM type:: Dexcom G7 with   What is your A1c [employer]?: <7  Home Blood Glucose Monitoring Skills Assessment Completed: : Yes  Assessment indicates:: Instruction Needed  Area of need?: Yes    Acute Complications  Have you ever had hypoglycemia (low BG 70 or less)?: yes  How often and what are your symptoms?: shaly and weak  How do you treat hypoglycemia?: regular soda, juice or 2-3 cookies  Acute Complications Skills Assessment Completed: : Yes  Assessment indicates:: Instruction Needed  Area of need?: Yes           Assessment Summary and Plan    Based on today's diabetes care assessment, the following areas of need were identified:      Identified Areas of Need      Medication/Current Diabetes Treatment: Yes - will review meds with PCP for overnight  "lows. Pt states holding insulin in AM if glucose is <100   Lifestyle Coping/Support: No   Diabetes Disease Process/Treatment Options: Deferred   Nutrition/Healthy Eating: Yes - reminded that sodas will cause hyperglycemia   Physical Activity/Exercise: Yes - have snack or keep peppermints for outdoor activities   Home Blood Glucose Monitoring: Yes - reviewed Dexcom report. Noted pattern of overnight lows despite decreasing PM insulin to 20 units from 52. Noted 8% lows is too high and variability is high at 39%. See attached AGP report.   Acute Complications: Yes - see care plan.   Chronic Complications:         Today's interventions were provided through individual discussion, instruction, and written materials were provided.      Patient verbalized understanding of instruction and written materials.  Pt was able to return back demonstration of instructions today. Patient understood key points, needs reinforcement and further instruction.     Diabetes Self-Management Care Plan:    Today's Diabetes Self-Management Care Plan was developed with Michael's input. Michael has agreed to work toward the following goal(s) to improve his/her overall diabetes control.      Care Plan: Diabetes Management   Updates made since 2/22/2024 12:00 AM        Problem: Blood Glucose Self-Monitoring Resolved 2/21/2025        Goal: Pt agrees to use Dexcom G7 with  for CGM and return in 1 week for report review Completed 2/21/2025   Start Date: 2/13/2025   Expected End Date: 2/20/2025   This Visit's Progress: Met   Priority: High   Barriers: No Barriers Identified   Note:    DEXCOM G7 CGM TRAINING  Dexcom G7 mobile apps downloaded to phone. Education was provided using "Quick Start Guide" and demo device per Dexcom protocol. [facility] data share set-up.    Patient will use Dexcom G7  to receive continuous glucose data.        Overview:  5 minute glucose reading updates, trending arrows, BG graph screens, reports screen, "  connectivity and functions.   Menus: Trend graph, start sensor, enter BG, events, alerts, settings, replace sensor, stop sensor, and shutdown  Dexcom GFlowBelow Aero mobile ro/ Settings:                          * Urgent Low: 55 mg/dL                          * Urgent Low Soon: On                          * Low Alert: 70 mg/dL; Snooze off                          * High Alert: 250 mg/dL; Snooze off                           * Signal Loss: on                          * Brief Sensor Issue: on     Reviewed additional setting options.  Patient paired sensor using 4-digit code listed on sensor cap..    Reviewed where to find sensor insertion time and expiration date.   Reviewed appropriate calibration techniques.  Reviewed sensor site selection. Patient selected and prepped site using aseptic technique, inserted sensor, applied overlay tape and started session.   Reviewed sensor removal from site. Discussed times to remove sensor per  guidelines include MRI or diatherapy.   Patient able to demonstrate without difficulty. Encouraged to review manual and/or training videos prior to starting another sensor.   Reviewed problem solving aspects of sensor transmission/variables that can disrupt RF transmission.  range 20 feet, but the first 3 hrs keep within 3 feet of transmitter.  Patient instructed on lag time of interstitial fluid from CBG and was advised to treat hypoglycemia and dose insulin based on SMBG values.  Dexcom technical support contact number given and examples of when to contact them discussed.          Problem: Medications Resolved 2/21/2025        Goal: Patient Agrees to take Diabetes Medication(s) Lantus 42 units in am and 20 units pm + Humalog 20 units prior to meals Completed 2/21/2025   Start Date: 2/13/2025   Expected End Date: 2/20/2025   This Visit's Progress: Met   Priority: High   Barriers: No Barriers Identified   Note:    2/13/25 - pt states he is taking 52 units Lantus at  "night instead of 20 units. Also taking sooner than 12 hrs apart. Reviewed taking 12 hrs apart due to "stacking" insulin and risking hypoglycemia. Some overnight and daytime hypoglycemia reported. Will take as directed and return in 1 week for review. Reviewed difference between long and fast-acting.       Task: Reviewed with patient all current diabetes medications and provided basic review of the purpose, dosage, frequency, side effects, and storage of both oral and injectable diabetes medications. Completed 2/13/2025        Task: Discussed guidelines for preventing, detecting and treating hypoglycemia and hyperglycemia and reviewed the importance of meal and medication timing with diabetes mediations for prevention of hypoglycemia and maximum drug benefit. Completed 2/13/2025        Problem: Acute Complications         Goal: Patient agrees to identify and manage signs and symptoms of high/low blood sugar (hyper/hypoglycemia) by keeping a log of events and using proper treatment.    Start Date: 2/21/2025   Expected End Date: 3/20/2025   Priority: High   Barriers: No Barriers Identified   Note:    2/21/25 - pt states that he stays busy outdoors during the day after his lunch and sometimes forgets to eat dinner and has lows. Suggested keep peppermints or glucose tablets in pocket and at bedside for overnight lows. Will alert PCP to Dexcom report.       Task: Reviewed proper treatment of hypoglycemia with the rule of 15--patient to eat 15g simple carbohydrate (4 glucose tablets, 1 glucose gel, 5 pieces hard candy, ½ cup fruit juice, ½ can regular soda, etc) and wait 15 minutes and recheck home glucose. Completed 2/21/2025        Task: Reviewed common causes and precautions to help prevent hyper/hypoglycemic events. Completed 2/21/2025        Task: Reviewed signs and symptoms of hyper/hypoglycemia, what range is considered to be hyper/hypoglycemia, and when to seek further medical attention. Completed 2/21/2025    "       Follow Up Plan     Follow up in about 4 weeks (around 3/20/2025) for Disease Process, health maintenance, chronic complications.    Today's care plan and follow up schedule was discussed with patient.  Michael verbalized understanding of the care plan, goals, and agrees to follow up plan.        The patient was encouraged to communicate with his/her health care provider/physician and care team regarding his/her condition(s) and treatment.  I provided the patient with my contact information today and encouraged to contact me via phone or [facility] Patient Portal as needed.     Length of Visit   Total Time: 60 Minutes

## 2025-03-07 ENCOUNTER — HOSPITAL ENCOUNTER (OUTPATIENT)
Dept: WOUND CARE | Facility: HOSPITAL | Age: 60
Discharge: HOME | End: 2025-03-07
Attending: FAMILY MEDICINE
Payer: MEDICAID

## 2025-03-07 VITALS
OXYGEN SATURATION: 98 % | RESPIRATION RATE: 20 BRPM | TEMPERATURE: 97 F | SYSTOLIC BLOOD PRESSURE: 118 MMHG | DIASTOLIC BLOOD PRESSURE: 71 MMHG | HEART RATE: 63 BPM

## 2025-03-07 DIAGNOSIS — E11.9 ENCOUNTER FOR DIABETIC FOOT EXAM: Primary | ICD-10-CM

## 2025-03-07 DIAGNOSIS — L84 PRE-ULCERATIVE CORN OR CALLOUS: ICD-10-CM

## 2025-03-07 DIAGNOSIS — E11.9 TYPE 2 DIABETES MELLITUS WITHOUT COMPLICATION, UNSPECIFIED WHETHER LONG TERM INSULIN USE: ICD-10-CM

## 2025-03-07 PROCEDURE — 11720 DEBRIDE NAIL 1-5: CPT

## 2025-03-07 PROCEDURE — 99211 OFF/OP EST MAY X REQ PHY/QHP: CPT

## 2025-03-07 PROCEDURE — 27000999 HC MEDICAL RECORD PHOTO DOCUMENTATION

## 2025-03-07 PROCEDURE — 11056 PARNG/CUTG B9 HYPRKR LES 2-4: CPT

## 2025-03-07 PROCEDURE — 11719 TRIM NAIL(S) ANY NUMBER: CPT

## 2025-03-07 NOTE — PROGRESS NOTES
CHIEF COMPLAINT:  Chief Complaint   Patient presents with    [facility]     HISTORY OF  PRESENT ILLNESS:  59 y.o. Black or  male being seen today for diabetic foot care.  He has calluses that need to be trimmed.  Prior medical history of hypertension, hyperlipidemia, diabetes, coronary artery disease, etc..  His diabetes is well-controlled at 6.9.  He denies any numbness, tingling, burning in his feet.  He has not had any procedures on his lower extremities.    Today's information:  Patient has been doing well since last clinic visit.  His last hemoglobin A1c was 6.9.  He does have a continuous glucose monitor and his CBG this morning is 106.  He denies any numbness, tingling, burning in his feet.  He has calluses that need to be pared down.  Nails need to be trimmed.  He denies any lesions or ulcers since last clinic visit.    REVIEW OF SYSTEMS:  Review of Systems   Constitutional:  Negative for chills and fever.   Respiratory:  Negative for cough.    Gastrointestinal:  Negative for nausea.   Musculoskeletal:         As stated in HPI   Skin:         As stated in HPI   Psychiatric/Behavioral: Negative.         Past Medical History:   Diagnosis Date    Adrenal adenoma     Callus of foot 8/19/2022    Coronary artery disease     Diabetes mellitus     Hyperlipidemia     Hypertension     Spinal stenosis     Unspecified osteoarthritis, unspecified site       Past Surgical History:   Procedure Laterality Date    ANGIOGRAPHY      CHOLECYSTECTOMY      FOOT SURGERY Right       Social History     Socioeconomic History    Marital status: Single   Tobacco Use    Smoking status: Never     Passive exposure: Never    Smokeless tobacco: Never   Substance and Sexual Activity    Alcohol use: Never    Drug use: Never    Sexual activity: Yes     Partners: Female     Birth control/protection: None     Social Drivers of Health     Financial Resource Strain: Medium Risk (2/8/2024)    Overall Financial Resource Strain (CARDIA)      Difficulty of Paying Living Expenses: Somewhat hard   Food Insecurity: Food Insecurity Present (2/8/2024)    Hunger Vital Sign     Worried About Running Out of Food in the Last Year: Sometimes true     Ran Out of Food in the Last Year: Sometimes true   Transportation Needs: Unmet Transportation Needs (2/8/2024)    PRAPARE - Transportation     Lack of Transportation (Medical): Yes     Lack of Transportation (Non-Medical): Yes   Physical Activity: Insufficiently Active (2/8/2024)    Exercise Vital Sign     Days of Exercise per Week: 1 day     Minutes of Exercise per Session: 10 min   Stress: Stress Concern Present (2/8/2024)    [employer] - Occupational Stress Questionnaire     Feeling of Stress : To some extent   Housing Stability: Low Risk  (2/8/2024)    Housing Stability Vital Sign     Unable to Pay for Housing in the Last Year: No     Number of Places Lived in the Last Year: 1     Unstable Housing in the Last Year: No     Review of Most Recent Wound Care-Related Labs:  Lab Results   Component Value Date/Time    WBC 9.83 12/08/2024 11:21 AM    RBC 5.00 12/08/2024 11:21 AM    HGB 14.5 12/08/2024 11:21 AM    HCT 43.6 12/08/2024 11:21 AM    MCV 87.2 12/08/2024 11:21 AM    MCH 29.0 12/08/2024 11:21 AM    CREATININE 1.33 (H) 12/08/2024 11:21 AM    HGBA1C 6.9 09/30/2024 10:20 AM    CRP 4.41 (H) 01/19/2021 04:00 AM        Wound-Related Imaging:              PHYSICAL EXAMINATION:  Blood pressure 118/71, pulse 63, temperature 97 °F (36.1 °C), resp. rate 20, SpO2 98%.  General:  No acute distress.   Respiratory: Non labored.  No coughing.  Cardiovascular:  No peripheral edema.   Musculoskeletal:  Normal gait  Neurologic:  A&O X 3.    Psychiatric:  Calm, cooperative.  Mood and effect normal  Integumentary:       Monofilament exam:  Normal bilaterally   Dorsalis pedis and posterior tibial pulses present by Doppler   Proprioception intact bilaterally   Right foot: Callus at the medial aspect of  great toe pared down   Left foot: Callus at the medial aspect of great toe pared down  Nails trimmed times 10  Nail debridement done on 2 nails of the left foot and 3 nails of the right foot     Protective Sensation (w/ 10 gram monofilament):  Right: Intact  Left: Intact    Visual Inspection:  Callus -  Bilateral    Pedal Pulses:   Right: Present  Left: Present    Posterior Tibialis Pulses:   Right:Present  Left: Present       ASSESSMENT/PLAN:    Patient Active Problem List    Diagnosis Date Noted    Encounter for diabetic foot exam 12/06/2024    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 05/16/2024    Pain and swelling of toe of left foot 03/13/2024    Primary osteoarthritis of left knee 10/23/2023    BMI 35.0-35.9,adult 10/23/2023    Class 2 drug-induced obesity with serious comorbidity and body mass index (BMI) of 37.0 to 37.9 in adult 09/12/2023    Acute gastritis 08/10/2023    Hypokalemia 06/09/2023    Encounter for comprehensive diabetic foot examination, type 2 diabetes mellitus 02/28/2023    DDD (degenerative disc disease), cervical 11/02/2022    Callus of foot 08/19/2022    Mild CAD 05/20/2022    Disorder of tendon of shoulder region 05/20/2022    History of severe acute respiratory syndrome coronavirus 2 (SARS-CoV-2) disease 05/20/2022    Hyperlipidemia LDL goal <70 05/20/2022    Primary hypertension 05/20/2022    Class 2 severe obesity due to excess calories with serious comorbidity and body mass index (BMI) of 38.0 to 38.9 in adult 05/20/2022    MINA (obstructive sleep apnea) 05/20/2022    Overgrown toenails 05/20/2022    Primary osteoarthritis of both knees 05/20/2022    Spinal stenosis of cervical region 05/20/2022    Tinea pedis 05/20/2022    Uncontrolled type 2 diabetes mellitus with hyperglycemia 05/20/2022    Venous insufficiency 05/20/2022       Diabetic foot care   Diabetes-well-controlled   Hypertension   Hyperlipidemia  Return to clinic in 4 months

## 2025-03-13 ENCOUNTER — TELEPHONE (OUTPATIENT)
Dept: INTERNAL MEDICINE | Facility: CLINIC | Age: 60
End: 2025-03-13
Payer: MEDICAID

## 2025-03-13 ENCOUNTER — OFFICE VISIT (OUTPATIENT)
Dept: INTERNAL MEDICINE | Facility: CLINIC | Age: 60
End: 2025-03-13
Payer: MEDICAID

## 2025-03-13 VITALS
HEIGHT: 67 IN | OXYGEN SATURATION: 100 % | BODY MASS INDEX: 35.26 KG/M2 | SYSTOLIC BLOOD PRESSURE: 116 MMHG | RESPIRATION RATE: 20 BRPM | WEIGHT: 224.63 LBS | DIASTOLIC BLOOD PRESSURE: 75 MMHG | HEART RATE: 60 BPM

## 2025-03-13 DIAGNOSIS — E11.65 UNCONTROLLED TYPE 2 DIABETES MELLITUS WITH HYPERGLYCEMIA: Chronic | ICD-10-CM

## 2025-03-13 PROBLEM — E11.9 ENCOUNTER FOR COMPREHENSIVE DIABETIC FOOT EXAMINATION, TYPE 2 DIABETES MELLITUS: Status: RESOLVED | Noted: 2023-02-28 | Resolved: 2025-03-13

## 2025-03-13 PROBLEM — E11.9 ENCOUNTER FOR DIABETIC FOOT EXAM: Status: RESOLVED | Noted: 2024-12-06 | Resolved: 2025-03-13

## 2025-03-13 PROCEDURE — 3008F BODY MASS INDEX DOCD: CPT | Mod: CPTII,,,

## 2025-03-13 PROCEDURE — 95251 CONT GLUC MNTR ANALYSIS I&R: CPT | Mod: ,,,

## 2025-03-13 PROCEDURE — 3078F DIAST BP <80 MM HG: CPT | Mod: CPTII,,,

## 2025-03-13 PROCEDURE — 4010F ACE/ARB THERAPY RXD/TAKEN: CPT | Mod: CPTII,,,

## 2025-03-13 PROCEDURE — 1160F RVW MEDS BY RX/DR IN RCRD: CPT | Mod: CPTII,,,

## 2025-03-13 PROCEDURE — 3074F SYST BP LT 130 MM HG: CPT | Mod: CPTII,,,

## 2025-03-13 PROCEDURE — 1159F MED LIST DOCD IN RCRD: CPT | Mod: CPTII,,,

## 2025-03-13 PROCEDURE — 99214 OFFICE O/P EST MOD 30 MIN: CPT | Mod: S$PBB,,,

## 2025-03-13 PROCEDURE — 99215 OFFICE O/P EST HI 40 MIN: CPT | Mod: PBBFAC

## 2025-03-13 RX ORDER — INSULIN GLARGINE 100 [IU]/ML
INJECTION, SOLUTION SUBCUTANEOUS
Qty: 60 ML | Refills: 2 | Status: SHIPPED | OUTPATIENT
Start: 2025-03-13

## 2025-03-13 RX ORDER — ALBUTEROL SULFATE 90 UG/1
2 INHALANT RESPIRATORY (INHALATION) EVERY 6 HOURS PRN
Qty: 6.7 G | Refills: 2 | Status: SHIPPED | OUTPATIENT
Start: 2025-03-13 | End: 2026-03-13

## 2025-03-13 NOTE — ASSESSMENT & PLAN NOTE
Reviewed dexcom portal extensively and reviewed proper insulin dosing per HPI  A1C 6.9- at goal. Previous A1C 6.9  Continue lantus 42 units qam and 20 units at bedtime.  Continue metformin.  Continue humalog 20 units with meals TID.  Rx trulicity 4.5 mg weekly.  Has attended diabetes education.  Continue checking CBGs TID and prn.   Check feet daily.

## 2025-03-13 NOTE — PROGRESS NOTES
Criss Real, BIANCA   [facility]  [address] [address]      PATIENT NAME: Michael Stone  : 1965  DATE: 3/13/25  MRN: [id number]      Reason for Visit / Chief Complaint: Diabetes (Pt is here for diabetes medication review per Diabetes education)       History of Present Illness / Problem Focused Workflow     Michael Stone presents to the clinic with Diabetes (Pt is here for diabetes medication review per Diabetes education)     59 y.o. Black or  male presents to the clinic. Medical problems include uncontrolled DM, HLD, HTN, mild CAD, adrenal adenoma, vit d deficiency, morbid obesity, cervical spinal stenosis, MINA on CPAP, bilateral knee OA, tinea pedis, gout, and med non-compliance. Followed by [facility] cardio, ortho and wound clinics and Dr. Gallo, vascular.       10/2/24  Pt presents for overdue DM follow up. A1C stable. Hypokalemia noted despite reported compliance with potassium supplement.  He is agreeable to discontinue hydrochlorothiazide and start spironolactone.  He is to discontinue potassium supplement once he is on the spironolactone for 3-4 days.  Return to clinic in 3 weeks with repeat BMP and blood pressure check. He reports compliance with medication, medications refilled as needed.  Denies acute complaints. Agreeable to flu vaccine    10/28/24  Pt presents for HTN and hypokalemia follow up. Repeat BMP after changing diuretic to spironolactone is WNL. He is no longer taking K supplement. BP is at goal today. Uric acid was elevated in recent labs, he is agreeable to inc allopurinol dose. He is currently taking 100mg once daily. No acute complaints. RTC 6 months with labs prior for HTN, HLD, and DM follow up    3/13/25  Pt presents for in between visit for dexcom/medication management following message from DM educator regarding labile blood sugars. Pt portal reflects high variability, frequently as  "high as 290s and as low as 50s. I suspect poor/inconsistent diet choices and improper dosing of insulin. It is evident that pt is using humalog to treat hyperglycemic episodes which is resulting in low readings- he admits to this. We discussed in detail proper dosing of insulin, diet control, and reviewed daily dexcom reports together. He reports he has a good understanding of plan. Follow up as scheduled next month with labs prior.          Review of Systems     Review of Systems   Constitutional:  Negative for fatigue, fever and unexpected weight change.   HENT:  Negative for ear pain, hearing loss, trouble swallowing and voice change.    Respiratory:  Negative for cough and shortness of breath.    Cardiovascular:  Negative for chest pain and palpitations.   Gastrointestinal:  Negative for abdominal pain, diarrhea and vomiting.   Genitourinary:  Negative for dysuria.   Musculoskeletal:  Negative for gait problem.   Skin:  Negative for rash and wound.   Neurological:  Negative for weakness.   Psychiatric/Behavioral:  Negative for suicidal ideas.          Medications and Allergies     Medications  Medication List with Changes/Refills   Current Medications    ALLOPURINOL 200 MG TAB    Take 200 mg by mouth once daily.    AMMONIUM LACTATE 12 % CREA    APPLY TWICE DAILY. APPLY A THIN LAYER OF VASELINE OVER LAC-HYDRIN TWICE DAILY. NOTHING BETWEEN TOES    ASPIRIN (ECOTRIN) 81 MG EC TABLET    Take 1 tablet (81 mg total) by mouth once daily.    ATORVASTATIN (LIPITOR) 80 MG TABLET    Take 1 tablet (80 mg total) by mouth nightly.    BD VEO INSULIN SYRINGE UF 1 ML 31 GAUGE X 15/64" SYRG    USE THREE TIMES DAILY AS DIRECTED    BLOOD SUGAR DIAGNOSTIC (ONETOUCH ULTRA TEST) STRP    CHECK BLOOD SUGAR THREE TIMES A DAY BEFORE MEALS AS DIRECTED BY DOCTOR    BLOOD-GLUCOSE METER,CONTINUOUS (DEXCOM ) MISC    Utilize for glucose readings    BLOOD-GLUCOSE SENSOR (DEXCOM G7 SENSOR) LEONARDO    Place new sensor every 10 days    " CLOTRIMAZOLE (LOTRIMIN) 1 % CREAM    Apply topically 2 (two) times daily. Apply to affected area between toes 2 times daily for 21 days    COLCHICINE (COLCRYS) 0.6 MG TABLET    Take 2 tablets now and take 1 tablet in one hour.    COLISTIMETHATE (COLYMYCIN) 150 MG INJECTION    SMARTSIG:Topical    DICLOFENAC SODIUM (VOLTAREN) 1 % GEL    Apply 2 g topically 4 (four) times daily.    DULAGLUTIDE (TRULICITY) 4.5 MG/0.5 ML PEN INJECTOR    Inject 4.5 mg into the skin every 7 days.    FUROSEMIDE (LASIX) 20 MG TABLET    Take 1 tablet (20 mg total) by mouth once daily.    HUMALOG U-100 INSULIN 100 UNIT/ML INJECTION    Inject 20 Units into the skin 3 (three) times daily before meals.    ID NOW COVID-19 TEST KIT KIT        INSULIN SYRINGES, DISPOSABLE, 1 ML SYRG    Inject 1 Syringe into the skin 3 (three) times daily before meals.    LANCETS (ONETOUCH DELICA PLUS LANCET) 30 GAUGE MISC    CHECK BLOOD SUGAR THREE TIMES A DAY BEFORE MEALS AS DIRECTED BY DOCTOR    LOSARTAN (COZAAR) 100 MG TABLET    Take 1 tablet (100 mg total) by mouth once daily.    METFORMIN (GLUCOPHAGE) 1000 MG TABLET    Take 1 tablet (1,000 mg total) by mouth 2 (two) times daily with meals.    NIFEDIPINE (ADALAT CC) 90 MG TBSR    Take 1 tablet (90 mg total) by mouth once daily.    NITROGLYCERIN (NITROSTAT) 0.4 MG SL TABLET    Place 1 tablet (0.4 mg total) under the tongue every 5 (five) minutes as needed for Chest pain.    ONDANSETRON (ZOFRAN-ODT) 4 MG TBDL    Take 1 tablet (4 mg total) by mouth every 6 (six) hours as needed (Nausea).    POTASSIUM CHLORIDE SA (K-DUR,KLOR-CON) 20 MEQ TABLET    Take 2 tabs BID x 3 days then decrease to 1 tab BID.    SPIRONOLACTONE (ALDACTONE) 50 MG TABLET    Take 1 tablet (50 mg total) by mouth once daily.   Changed and/or Refilled Medications    Modified Medication Previous Medication    ALBUTEROL (PROVENTIL/VENTOLIN HFA) 90 MCG/ACTUATION INHALER albuterol (PROVENTIL/VENTOLIN HFA) 90 mcg/actuation inhaler       Inhale 2 puffs  into the lungs every 6 (six) hours as needed for Wheezing or Shortness of Breath.    Inhale 2 puffs into the lungs every 6 (six) hours as needed for Wheezing or Shortness of Breath.    INSULIN GLARGINE U-100, LANTUS, (LANTUS SOLOSTAR U-100 INSULIN) 100 UNIT/ML (3 ML) INPN PEN insulin glargine U-100, Lantus, (LANTUS SOLOSTAR U-100 INSULIN) 100 unit/mL (3 mL) InPn pen       INJECT 42 UNITS SUBCUTANEOUSLY IN THE MORNING AND 20 UNITS SUBCUTANEOUSLY EVERY DAY AT BEDTIME    INJECT 42 UNITS SUBCUTANEOUSLY IN THE MORNING AND 20 UNITS SUBCUTANEOUSLY EVERY DAY AT BEDTIME         Allergies  Review of patient's allergies indicates:  No Known Allergies    Physical Examination     Vitals:    03/13/25 1212   BP: 116/75   Pulse: 60   Resp: 20     Physical Exam  Vitals and nursing note reviewed.   Constitutional:       General: He is not in acute distress.     Appearance: He is not ill-appearing.   HENT:      Head: Normocephalic and atraumatic.      Mouth/Throat:      Mouth: Mucous membranes are moist.      Pharynx: Oropharynx is clear.   Eyes:      General: No scleral icterus.     Extraocular Movements: Extraocular movements intact.      Conjunctiva/sclera: Conjunctivae normal.      Pupils: Pupils are equal, round, and reactive to light.   Neck:      Vascular: No carotid bruit.   Cardiovascular:      Rate and Rhythm: Normal rate and regular rhythm.      Heart sounds: No murmur heard.     No friction rub. No gallop.   Pulmonary:      Effort: Pulmonary effort is normal. No respiratory distress.      Breath sounds: Normal breath sounds. No wheezing, rhonchi or rales.   Abdominal:      General: Abdomen is flat. Bowel sounds are normal. There is no distension.      Palpations: Abdomen is soft. There is no mass.      Tenderness: There is no abdominal tenderness.   Musculoskeletal:         General: Normal range of motion.      Cervical back: Normal range of motion and neck supple.   Skin:     General: Skin is warm and dry.   Neurological:       General: No focal deficit present.      Mental Status: He is alert.   Psychiatric:         Mood and Affect: Mood normal.           Results     Lab Results   Component Value Date    WBC 9.83 12/08/2024    RBC 5.00 12/08/2024    HGB 14.5 12/08/2024    HCT 43.6 12/08/2024    MCV 87.2 12/08/2024    MCH 29.0 12/08/2024    MCHC 33.3 12/08/2024    RDW 13.5 12/08/2024     12/08/2024    MPV 9.1 12/08/2024     Sodium   Date Value Ref Range Status   12/08/2024 141 136 - 145 mmol/L Final     Potassium   Date Value Ref Range Status   12/08/2024 3.9 3.5 - 5.1 mmol/L Final     Chloride   Date Value Ref Range Status   12/08/2024 110 (H) 98 - 107 mmol/L Final     CO2   Date Value Ref Range Status   12/08/2024 21 (L) 22 - 29 mmol/L Final     Blood Urea Nitrogen   Date Value Ref Range Status   12/08/2024 23.8 8.4 - 25.7 mg/dL Final     Creatinine   Date Value Ref Range Status   12/08/2024 1.33 (H) 0.72 - 1.25 mg/dL Final     Calcium   Date Value Ref Range Status   12/08/2024 9.7 8.4 - 10.2 mg/dL Final     Albumin   Date Value Ref Range Status   12/08/2024 3.7 3.5 - 5.0 g/dL Final     Bilirubin Total   Date Value Ref Range Status   12/08/2024 0.6 <=1.5 mg/dL Final     ALP   Date Value Ref Range Status   12/08/2024 108 40 - 150 unit/L Final     AST   Date Value Ref Range Status   12/08/2024 17 5 - 34 unit/L Final     ALT   Date Value Ref Range Status   12/08/2024 21 0 - 55 unit/L Final     Estimated GFR-Non    Date Value Ref Range Status   01/04/2022 69 (L) >>=90 mL/min/1.73 m2 Final     Lab Results   Component Value Date    CHOL 113 09/30/2024     Lab Results   Component Value Date    HDL 41 09/30/2024     Lab Results   Component Value Date    TRIG 61 09/30/2024     Lab Results   Component Value Date    VLDL 12 09/30/2024     Lab Results   Component Value Date    LDL 60.00 09/30/2024     Lab Results   Component Value Date    TSH 1.406 11/29/2023     Lab Results   Component Value Date    PHUR 6.0 11/29/2023     PROTEINUA Negative 11/29/2023    GLUCUA Negative 11/29/2023    KETONESU Negative 01/04/2022    OCCULTUA Trace-Intact (A) 11/29/2023    NITRITE Negative 11/29/2023    LEUKOCYTESUR Negative 11/29/2023     Lab Results   Component Value Date    HGBA1C 6.9 09/30/2024    HGBA1C 6.9 05/13/2024    HGBA1C 7.1 (H) 02/15/2024           Assessment         ICD-10-CM ICD-9-CM   1. Uncontrolled type 2 diabetes mellitus with hyperglycemia  E11.65 250.02       Plan      Problem List Items Addressed This Visit       Uncontrolled type 2 diabetes mellitus with hyperglycemia (Chronic)    Current Assessment & Plan   Reviewed dexcom portal extensively and reviewed proper insulin dosing per HPI  A1C 6.9- at goal. Previous A1C 6.9  Continue lantus 42 units qam and 20 units at bedtime.  Continue metformin.  Continue humalog 20 units with meals TID.  Rx trulicity 4.5 mg weekly.  Has attended diabetes education.  Continue checking CBGs TID and prn.   Check feet daily.            Relevant Medications    insulin glargine U-100, Lantus, (LANTUS SOLOSTAR U-100 INSULIN) 100 unit/mL (3 mL) InPn pen       Future Appointments   Date Time Provider Department Center   3/20/2025  9:00 AM Yulia Smith RD, [facility]   4/28/2025  8:40 AM Criss Real, BIANCA JOS[facility]   6/18/2025  8:45 AM Jewel Arthur, BIANCA [facility]   7/9/2025  9:00 AM Fab Lang, DO [facility]   7/24/2025  9:40 AM PROVIDERS, [facility]            Signature:      [facility]  [address] [facility] [id number]    Date of encounter: 3/13/25

## 2025-03-14 DIAGNOSIS — E11.65 UNCONTROLLED TYPE 2 DIABETES MELLITUS WITH HYPERGLYCEMIA: Chronic | ICD-10-CM

## 2025-03-14 RX ORDER — DULAGLUTIDE 4.5 MG/.5ML
4.5 INJECTION, SOLUTION SUBCUTANEOUS
Qty: 4 PEN | Refills: 6 | Status: SHIPPED | OUTPATIENT
Start: 2025-03-14

## 2025-03-14 NOTE — TELEPHONE ENCOUNTER
Spoke to PA dept and was informed on their end all they see if the Rx  since it was first  sent in 2024. Informed PA dept I will request a new Rx and proceed from there.      LOV:3/13/25    NOV: 25

## 2025-04-25 ENCOUNTER — LAB VISIT (OUTPATIENT)
Dept: LAB | Facility: HOSPITAL | Age: 60
End: 2025-04-25
Attending: NURSE PRACTITIONER
Payer: MEDICAID

## 2025-04-25 ENCOUNTER — RESULTS FOLLOW-UP (OUTPATIENT)
Dept: INTERNAL MEDICINE | Facility: CLINIC | Age: 60
End: 2025-04-25

## 2025-04-25 DIAGNOSIS — E11.65 UNCONTROLLED TYPE 2 DIABETES MELLITUS WITH HYPERGLYCEMIA: Chronic | ICD-10-CM

## 2025-04-25 LAB
ALBUMIN SERPL-MCNC: 3.4 G/DL (ref 3.5–5)
ALBUMIN/GLOB SERPL: 1 RATIO (ref 1.1–2)
ALP SERPL-CCNC: 110 UNIT/L (ref 40–150)
ALT SERPL-CCNC: 23 UNIT/L (ref 0–55)
ANION GAP SERPL CALC-SCNC: 6 MEQ/L
AST SERPL-CCNC: 18 UNIT/L (ref 11–45)
BILIRUB SERPL-MCNC: 0.6 MG/DL
BUN SERPL-MCNC: 25.3 MG/DL (ref 8.4–25.7)
CALCIUM SERPL-MCNC: 9.2 MG/DL (ref 8.4–10.2)
CHLORIDE SERPL-SCNC: 113 MMOL/L (ref 98–107)
CHOLEST SERPL-MCNC: 100 MG/DL
CHOLEST/HDLC SERPL: 3 {RATIO} (ref 0–5)
CO2 SERPL-SCNC: 24 MMOL/L (ref 22–29)
CREAT SERPL-MCNC: 1.28 MG/DL (ref 0.72–1.25)
CREAT UR-MCNC: 246.2 MG/DL (ref 63–166)
CREAT/UREA NIT SERPL: 20
EST. AVERAGE GLUCOSE BLD GHB EST-MCNC: 168.6 MG/DL
GFR SERPLBLD CREATININE-BSD FMLA CKD-EPI: >60 ML/MIN/1.73/M2
GLOBULIN SER-MCNC: 3.4 GM/DL (ref 2.4–3.5)
GLUCOSE SERPL-MCNC: 115 MG/DL (ref 74–100)
HBA1C MFR BLD: 7.5 %
HDLC SERPL-MCNC: 37 MG/DL (ref 35–60)
LDLC SERPL CALC-MCNC: 53 MG/DL (ref 50–140)
MICROALBUMIN UR-MCNC: 26.5 UG/ML
MICROALBUMIN/CREAT RATIO PNL UR: 10.8 MG/GM CR (ref 0–30)
POTASSIUM SERPL-SCNC: 4.5 MMOL/L (ref 3.5–5.1)
PROT SERPL-MCNC: 6.8 GM/DL (ref 6.4–8.3)
SODIUM SERPL-SCNC: 143 MMOL/L (ref 136–145)
TRIGL SERPL-MCNC: 52 MG/DL (ref 34–140)
VLDLC SERPL CALC-MCNC: 10 MG/DL

## 2025-04-25 PROCEDURE — 83036 HEMOGLOBIN GLYCOSYLATED A1C: CPT

## 2025-04-25 PROCEDURE — 80053 COMPREHEN METABOLIC PANEL: CPT

## 2025-04-25 PROCEDURE — 80061 LIPID PANEL: CPT

## 2025-04-25 PROCEDURE — 36415 COLL VENOUS BLD VENIPUNCTURE: CPT

## 2025-04-25 PROCEDURE — 82043 UR ALBUMIN QUANTITATIVE: CPT

## 2025-04-28 ENCOUNTER — TELEPHONE (OUTPATIENT)
Dept: INTERNAL MEDICINE | Facility: CLINIC | Age: 60
End: 2025-04-28

## 2025-04-28 ENCOUNTER — OFFICE VISIT (OUTPATIENT)
Dept: INTERNAL MEDICINE | Facility: CLINIC | Age: 60
End: 2025-04-28
Payer: MEDICAID

## 2025-04-28 VITALS
SYSTOLIC BLOOD PRESSURE: 108 MMHG | OXYGEN SATURATION: 100 % | DIASTOLIC BLOOD PRESSURE: 72 MMHG | BODY MASS INDEX: 34.29 KG/M2 | HEART RATE: 79 BPM | HEIGHT: 67 IN | RESPIRATION RATE: 20 BRPM | WEIGHT: 218.5 LBS

## 2025-04-28 DIAGNOSIS — M1A.0711 IDIOPATHIC CHRONIC GOUT OF RIGHT FOOT WITH TOPHUS: Chronic | ICD-10-CM

## 2025-04-28 DIAGNOSIS — Z79.4 TYPE 2 DIABETES MELLITUS WITH HYPERGLYCEMIA, WITH LONG-TERM CURRENT USE OF INSULIN: ICD-10-CM

## 2025-04-28 DIAGNOSIS — G47.33 OSA (OBSTRUCTIVE SLEEP APNEA): Primary | Chronic | ICD-10-CM

## 2025-04-28 DIAGNOSIS — E11.65 TYPE 2 DIABETES MELLITUS WITH HYPERGLYCEMIA, WITH LONG-TERM CURRENT USE OF INSULIN: ICD-10-CM

## 2025-04-28 DIAGNOSIS — I10 PRIMARY HYPERTENSION: Chronic | ICD-10-CM

## 2025-04-28 DIAGNOSIS — E87.6 HYPOKALEMIA: Chronic | ICD-10-CM

## 2025-04-28 DIAGNOSIS — Z00.00 WELL ADULT EXAM: ICD-10-CM

## 2025-04-28 PROBLEM — E66.1 CLASS 2 DRUG-INDUCED OBESITY WITH SERIOUS COMORBIDITY AND BODY MASS INDEX (BMI) OF 37.0 TO 37.9 IN ADULT: Chronic | Status: RESOLVED | Noted: 2023-09-12 | Resolved: 2025-04-28

## 2025-04-28 PROBLEM — K29.00 ACUTE GASTRITIS: Status: RESOLVED | Noted: 2023-08-10 | Resolved: 2025-04-28

## 2025-04-28 PROBLEM — E66.812 CLASS 2 SEVERE OBESITY DUE TO EXCESS CALORIES WITH SERIOUS COMORBIDITY AND BODY MASS INDEX (BMI) OF 38.0 TO 38.9 IN ADULT: Status: RESOLVED | Noted: 2022-05-20 | Resolved: 2025-04-28

## 2025-04-28 PROBLEM — E66.01 CLASS 2 SEVERE OBESITY DUE TO EXCESS CALORIES WITH SERIOUS COMORBIDITY AND BODY MASS INDEX (BMI) OF 38.0 TO 38.9 IN ADULT: Status: RESOLVED | Noted: 2022-05-20 | Resolved: 2025-04-28

## 2025-04-28 PROBLEM — E66.812 CLASS 2 DRUG-INDUCED OBESITY WITH SERIOUS COMORBIDITY AND BODY MASS INDEX (BMI) OF 37.0 TO 37.9 IN ADULT: Chronic | Status: RESOLVED | Noted: 2023-09-12 | Resolved: 2025-04-28

## 2025-04-28 PROCEDURE — 3008F BODY MASS INDEX DOCD: CPT | Mod: CPTII,,,

## 2025-04-28 PROCEDURE — 3074F SYST BP LT 130 MM HG: CPT | Mod: CPTII,,,

## 2025-04-28 PROCEDURE — 99214 OFFICE O/P EST MOD 30 MIN: CPT | Mod: PBBFAC

## 2025-04-28 PROCEDURE — 1160F RVW MEDS BY RX/DR IN RCRD: CPT | Mod: CPTII,,,

## 2025-04-28 PROCEDURE — 3061F NEG MICROALBUMINURIA REV: CPT | Mod: CPTII,,,

## 2025-04-28 PROCEDURE — 3078F DIAST BP <80 MM HG: CPT | Mod: CPTII,,,

## 2025-04-28 PROCEDURE — 3066F NEPHROPATHY DOC TX: CPT | Mod: CPTII,,,

## 2025-04-28 PROCEDURE — 3051F HG A1C>EQUAL 7.0%<8.0%: CPT | Mod: CPTII,,,

## 2025-04-28 PROCEDURE — 99214 OFFICE O/P EST MOD 30 MIN: CPT | Mod: S$PBB,,,

## 2025-04-28 PROCEDURE — 1159F MED LIST DOCD IN RCRD: CPT | Mod: CPTII,,,

## 2025-04-28 PROCEDURE — 4010F ACE/ARB THERAPY RXD/TAKEN: CPT | Mod: CPTII,,,

## 2025-04-28 RX ORDER — POTASSIUM CHLORIDE 20 MEQ/1
20 TABLET, EXTENDED RELEASE ORAL 2 TIMES DAILY
Qty: 180 TABLET | Refills: 2 | Status: SHIPPED | OUTPATIENT
Start: 2025-04-28 | End: 2026-01-23

## 2025-04-28 RX ORDER — ALLOPURINOL 200 MG/1
200 TABLET ORAL DAILY
Qty: 90 TABLET | Refills: 2 | Status: SHIPPED | OUTPATIENT
Start: 2025-04-28 | End: 2026-01-23

## 2025-04-28 RX ORDER — DULAGLUTIDE 4.5 MG/.5ML
4.5 INJECTION, SOLUTION SUBCUTANEOUS
Qty: 4 PEN | Refills: 6 | Status: SHIPPED | OUTPATIENT
Start: 2025-04-28

## 2025-04-28 RX ORDER — INSULIN LISPRO 100 [IU]/ML
20 INJECTION, SOLUTION INTRAVENOUS; SUBCUTANEOUS
Qty: 30 ML | Refills: 2 | Status: SHIPPED | OUTPATIENT
Start: 2025-04-28

## 2025-04-28 RX ORDER — METFORMIN HYDROCHLORIDE 1000 MG/1
1000 TABLET ORAL 2 TIMES DAILY WITH MEALS
Qty: 180 TABLET | Refills: 2 | Status: SHIPPED | OUTPATIENT
Start: 2025-04-28 | End: 2026-01-23

## 2025-04-28 RX ORDER — ALBUTEROL SULFATE 90 UG/1
2 INHALANT RESPIRATORY (INHALATION) EVERY 6 HOURS PRN
Qty: 6.7 G | Refills: 2 | Status: SHIPPED | OUTPATIENT
Start: 2025-04-28 | End: 2026-04-28

## 2025-04-28 RX ORDER — COLCHICINE 0.6 MG/1
TABLET ORAL
Qty: 3 TABLET | Refills: 1 | Status: SHIPPED | OUTPATIENT
Start: 2025-04-28

## 2025-04-28 RX ORDER — INSULIN GLARGINE 100 [IU]/ML
INJECTION, SOLUTION SUBCUTANEOUS
Qty: 60 ML | Refills: 2 | Status: SHIPPED | OUTPATIENT
Start: 2025-04-28

## 2025-04-28 RX ORDER — SPIRONOLACTONE 50 MG/1
50 TABLET, FILM COATED ORAL DAILY
Qty: 90 TABLET | Refills: 2 | Status: SHIPPED | OUTPATIENT
Start: 2025-04-28 | End: 2026-01-23

## 2025-04-28 NOTE — ASSESSMENT & PLAN NOTE
A1C 7.5%. Previous A1C 6.9  Continue lantus 42 units qam and 20 units at bedtime.  Continue metformin.  Continue humalog 20 units with meals TID.  Rx trulicity 4.5 mg weekly.  Has attended diabetes education.  Continue checking CBGs TID and prn.   Check feet daily.      PAIN SCALE 10 OF 10.

## 2025-04-28 NOTE — ASSESSMENT & PLAN NOTE
Continue allopurinol daily, colchicine PRN  Lab Results   Component Value Date    LABURIC 9.2 (H) 09/30/2024     Stay well hydrated by increasing water intake throughout the day.  Stressed importance of exercise and weight loss to maintain BMI <30.  Avoid alcohol, sodas, organ/glandular meats (liver, kidney, sweetbreads). Limit seafood and red meat intake.  Eat a balanced diet of fruits, vegetables, complex carbohydrates, and lean sources of protein (boneless/skinless chicken breasts, salmon, lentils, low fat dairy).  Discussed possible benefit of OTC Vitamin C supplementation.

## 2025-04-28 NOTE — PROGRESS NOTES
Criss Real, BIANCA   [facility]  [address] [facility] [address]      PATIENT NAME: Michael Stone  : 1965  DATE: 25  MRN: [id number]      Reason for Visit / Chief Complaint: Follow-up (Pt here for 6m f/u with lab review) and Abdominal Pain (Pt c/o pain under breast stating it hurts to breath occasionally ongoing for 2 days)       History of Present Illness / Problem Focused Workflow     Michael Stone presents to the clinic with Follow-up (Pt here for 6m f/u with lab review) and Abdominal Pain (Pt c/o pain under breast stating it hurts to breath occasionally ongoing for 2 days)     59 y.o. Black or  male presents to the clinic. Medical problems include uncontrolled DM, HLD, HTN, mild CAD, adrenal adenoma, vit d deficiency, morbid obesity, cervical spinal stenosis, MINA on CPAP, bilateral knee OA, tinea pedis, gout, and med non-compliance. Followed by [facility] cardio, ortho and wound clinics and Dr. Gallo, vascular.       3/13/25  Pt presents for in between visit for dexcom/medication management following message from DM educator regarding labile blood sugars. Pt portal reflects high variability, frequently as high as 290s and as low as 50s. I suspect poor/inconsistent diet choices and improper dosing of insulin. It is evident that pt is using humalog to treat hyperglycemic episodes which is resulting in low readings- he admits to this. We discussed in detail proper dosing of insulin, diet control, and reviewed daily dexcom reports together. He reports he has a good understanding of plan. Follow up as scheduled next month with labs prior.    25  Pt presents for DM follow up, A1C now 7.5%, previoulsy 6.9%. He admits diet is not consistent. Reinforced ADA diet. He states he attended last DM education visit but was not seen for unknown reason, encouraged him to call to reschedule. He reports proper medication use. He  "is complaining of rib pain x2 days on right side. Unaffected by breathing or activity, tender to palpation. Denies CP and SOB, exam unremarkable other than mild tenderness. Denies recent illness or injury. Encouraged watchful waiting with ibuprofen and tylenol, RTC PRN. Follow up for wellness in 6 months with labs prior.           Review of Systems     Review of Systems   Constitutional:  Negative for fatigue, fever and unexpected weight change.   HENT:  Negative for ear pain, hearing loss, trouble swallowing and voice change.    Respiratory:  Negative for cough and shortness of breath.    Cardiovascular:  Negative for chest pain and palpitations.   Gastrointestinal:  Negative for abdominal pain, diarrhea and vomiting.   Genitourinary:  Negative for dysuria.   Musculoskeletal:  Negative for gait problem.        Rib pain   Skin:  Negative for rash and wound.   Neurological:  Negative for weakness.   Psychiatric/Behavioral:  Negative for suicidal ideas.          Medications and Allergies     Medications  Medication List with Changes/Refills   Current Medications    AMMONIUM LACTATE 12 % CREA    APPLY TWICE DAILY. APPLY A THIN LAYER OF VASELINE OVER LAC-HYDRIN TWICE DAILY. NOTHING BETWEEN TOES    ASPIRIN (ECOTRIN) 81 MG EC TABLET    Take 1 tablet (81 mg total) by mouth once daily.    ATORVASTATIN (LIPITOR) 80 MG TABLET    Take 1 tablet (80 mg total) by mouth nightly.    BD VEO INSULIN SYRINGE UF 1 ML 31 GAUGE X 15/64" SYRG    USE THREE TIMES DAILY AS DIRECTED    BLOOD SUGAR DIAGNOSTIC (ONETOUCH ULTRA TEST) STRP    CHECK BLOOD SUGAR THREE TIMES A DAY BEFORE MEALS AS DIRECTED BY DOCTOR    BLOOD-GLUCOSE METER,CONTINUOUS (DEXCOM ) MISC    Utilize for glucose readings    BLOOD-GLUCOSE SENSOR (DEXCOM G7 SENSOR) LEONARDO    Place new sensor every 10 days    CLOTRIMAZOLE (LOTRIMIN) 1 % CREAM    Apply topically 2 (two) times daily. Apply to affected area between toes 2 times daily for 21 days    COLISTIMETHATE (COLYMYCIN) " 150 MG INJECTION    SMARTSIG:Topical    DICLOFENAC SODIUM (VOLTAREN) 1 % GEL    Apply 2 g topically 4 (four) times daily.    FUROSEMIDE (LASIX) 20 MG TABLET    Take 1 tablet (20 mg total) by mouth once daily.    ID NOW COVID-19 TEST KIT KIT        INSULIN SYRINGES, DISPOSABLE, 1 ML SYRG    Inject 1 Syringe into the skin 3 (three) times daily before meals.    LANCETS (ONETOUCH DELICA PLUS LANCET) 30 GAUGE MISC    CHECK BLOOD SUGAR THREE TIMES A DAY BEFORE MEALS AS DIRECTED BY DOCTOR    LOSARTAN (COZAAR) 100 MG TABLET    Take 1 tablet (100 mg total) by mouth once daily.    NIFEDIPINE (ADALAT CC) 90 MG TBSR    Take 1 tablet (90 mg total) by mouth once daily.    NITROGLYCERIN (NITROSTAT) 0.4 MG SL TABLET    Place 1 tablet (0.4 mg total) under the tongue every 5 (five) minutes as needed for Chest pain.    ONDANSETRON (ZOFRAN-ODT) 4 MG TBDL    Take 1 tablet (4 mg total) by mouth every 6 (six) hours as needed (Nausea).   Changed and/or Refilled Medications    Modified Medication Previous Medication    ALBUTEROL (PROVENTIL/VENTOLIN HFA) 90 MCG/ACTUATION INHALER albuterol (PROVENTIL/VENTOLIN HFA) 90 mcg/actuation inhaler       Inhale 2 puffs into the lungs every 6 (six) hours as needed for Wheezing or Shortness of Breath.    Inhale 2 puffs into the lungs every 6 (six) hours as needed for Wheezing or Shortness of Breath.    ALLOPURINOL 200 MG TAB allopurinoL 200 mg Tab       Take 200 mg by mouth once daily.    Take 200 mg by mouth once daily.    COLCHICINE (COLCRYS) 0.6 MG TABLET colchicine (COLCRYS) 0.6 mg tablet       Take 2 tablets now and take 1 tablet in one hour.    Take 2 tablets now and take 1 tablet in one hour.    DULAGLUTIDE (TRULICITY) 4.5 MG/0.5 ML PEN INJECTOR dulaglutide (TRULICITY) 4.5 mg/0.5 mL pen injector       Inject 4.5 mg into the skin every 7 days.    Inject 4.5 mg into the skin every 7 days.    HUMALOG U-100 INSULIN 100 UNIT/ML INJECTION HUMALOG U-100 INSULIN 100 unit/mL injection       Inject 20 Units  into the skin 3 (three) times daily before meals.    Inject 20 Units into the skin 3 (three) times daily before meals.    INSULIN GLARGINE U-100, LANTUS, (LANTUS SOLOSTAR U-100 INSULIN) 100 UNIT/ML (3 ML) INPN PEN insulin glargine U-100, Lantus, (LANTUS SOLOSTAR U-100 INSULIN) 100 unit/mL (3 mL) InPn pen       INJECT 42 UNITS SUBCUTANEOUSLY IN THE MORNING AND 20 UNITS SUBCUTANEOUSLY EVERY DAY AT BEDTIME    INJECT 42 UNITS SUBCUTANEOUSLY IN THE MORNING AND 20 UNITS SUBCUTANEOUSLY EVERY DAY AT BEDTIME    METFORMIN (GLUCOPHAGE) 1000 MG TABLET metFORMIN (GLUCOPHAGE) 1000 MG tablet       Take 1 tablet (1,000 mg total) by mouth 2 (two) times daily with meals.    Take 1 tablet (1,000 mg total) by mouth 2 (two) times daily with meals.    POTASSIUM CHLORIDE SA (K-DUR,KLOR-CON) 20 MEQ TABLET potassium chloride SA (K-DUR,KLOR-CON) 20 MEQ tablet       Take 1 tablet (20 mEq total) by mouth 2 (two) times daily.    Take 2 tabs BID x 3 days then decrease to 1 tab BID.    SPIRONOLACTONE (ALDACTONE) 50 MG TABLET spironolactone (ALDACTONE) 50 MG tablet       Take 1 tablet (50 mg total) by mouth once daily.    Take 1 tablet (50 mg total) by mouth once daily.         Allergies  Review of patient's allergies indicates:  No Known Allergies    Physical Examination     Vitals:    04/28/25 0843   BP: 108/72   Pulse: 79   Resp: 20     Physical Exam  Vitals and nursing note reviewed.   Constitutional:       General: He is not in acute distress.     Appearance: He is not ill-appearing.   HENT:      Head: Normocephalic and atraumatic.      Mouth/Throat:      Mouth: Mucous membranes are moist.      Pharynx: Oropharynx is clear.   Eyes:      General: No scleral icterus.     Extraocular Movements: Extraocular movements intact.      Conjunctiva/sclera: Conjunctivae normal.      Pupils: Pupils are equal, round, and reactive to light.   Neck:      Vascular: No carotid bruit.   Cardiovascular:      Rate and Rhythm: Normal rate and regular rhythm.       Heart sounds: No murmur heard.     No friction rub. No gallop.   Pulmonary:      Effort: Pulmonary effort is normal. No respiratory distress.      Breath sounds: Normal breath sounds. No wheezing, rhonchi or rales.   Abdominal:      General: Abdomen is flat. Bowel sounds are normal. There is no distension.      Palpations: Abdomen is soft. There is no mass.      Tenderness: There is no abdominal tenderness.   Musculoskeletal:         General: Tenderness present. Normal range of motion.      Cervical back: Normal range of motion and neck supple.   Skin:     General: Skin is warm and dry.   Neurological:      General: No focal deficit present.      Mental Status: He is alert.   Psychiatric:         Mood and Affect: Mood normal.           Results     Lab Results   Component Value Date    WBC 9.83 12/08/2024    RBC 5.00 12/08/2024    HGB 14.5 12/08/2024    HCT 43.6 12/08/2024    MCV 87.2 12/08/2024    MCH 29.0 12/08/2024    MCHC 33.3 12/08/2024    RDW 13.5 12/08/2024     12/08/2024    MPV 9.1 12/08/2024     Sodium   Date Value Ref Range Status   04/25/2025 143 136 - 145 mmol/L Final     Potassium   Date Value Ref Range Status   04/25/2025 4.5 3.5 - 5.1 mmol/L Final     Chloride   Date Value Ref Range Status   04/25/2025 113 (H) 98 - 107 mmol/L Final     CO2   Date Value Ref Range Status   04/25/2025 24 22 - 29 mmol/L Final     Blood Urea Nitrogen   Date Value Ref Range Status   04/25/2025 25.3 8.4 - 25.7 mg/dL Final     Creatinine   Date Value Ref Range Status   04/25/2025 1.28 (H) 0.72 - 1.25 mg/dL Final     Calcium   Date Value Ref Range Status   04/25/2025 9.2 8.4 - 10.2 mg/dL Final     Albumin   Date Value Ref Range Status   04/25/2025 3.4 (L) 3.5 - 5.0 g/dL Final     Bilirubin Total   Date Value Ref Range Status   04/25/2025 0.6 <=1.5 mg/dL Final     ALP   Date Value Ref Range Status   04/25/2025 110 40 - 150 unit/L Final     AST   Date Value Ref Range Status   04/25/2025 18 11 - 45 unit/L Final     ALT    Date Value Ref Range Status   04/25/2025 23 0 - 55 unit/L Final     Estimated GFR-Non    Date Value Ref Range Status   01/04/2022 69 (L) >>=90 mL/min/1.73 m2 Final     Lab Results   Component Value Date    CHOL 100 04/25/2025     Lab Results   Component Value Date    HDL 37 04/25/2025     Lab Results   Component Value Date    TRIG 52 04/25/2025     Lab Results   Component Value Date    VLDL 10 04/25/2025     Lab Results   Component Value Date    LDL 53.00 04/25/2025     Lab Results   Component Value Date    TSH 1.406 11/29/2023     Lab Results   Component Value Date    PHUR 6.0 11/29/2023    PROTEINUA Negative 11/29/2023    GLUCUA Negative 11/29/2023    KETONESU Negative 01/04/2022    OCCULTUA Trace-Intact (A) 11/29/2023    NITRITE Negative 11/29/2023    LEUKOCYTESUR Negative 11/29/2023     Lab Results   Component Value Date    HGBA1C 7.5 (H) 04/25/2025    HGBA1C 6.9 09/30/2024    HGBA1C 6.9 05/13/2024           Assessment         ICD-10-CM ICD-9-CM   1. MINA (obstructive sleep apnea)  G47.33 327.23   2. Type 2 diabetes mellitus with hyperglycemia, with long-term current use of insulin  E11.65 250.00    Z79.4 790.29     V58.67   3. Hypokalemia  E87.6 276.8   4. Primary hypertension  I10 401.9   5. Well adult exam  Z00.00 V70.0   6. 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  Continue procardia 90 mg qam and 30 mg qpm.  Continue losartan and lasix, spironolactone  Low Sodium Diet (DASH Diet - Less than 2 grams of sodium per day).  Monitor blood pressure daily and log. Report consistent numbers greater than 140/90.  Maintain healthy weight with goal BMI <30. Exercise 30 minutes per day, 5 days per week.         Relevant Medications    spironolactone (ALDACTONE) 50 MG tablet    MINA (obstructive sleep apnea) - Primary (Chronic)    Current Assessment  & Plan   Intermittent CPAP compliance- reinforced nightly use  Pt reports compliance with CPAP nightly  Expect lifetime use and decreased daytime sleepiness/fatigue.   Avoid excessive alcohol, smoking and overuse of sedatives at bedtime.  Weight management discussed.  Adjust sleep position as needed for increased comfort.            Type 2 diabetes mellitus with hyperglycemia, with long-term current use of insulin    Current Assessment & Plan   A1C 7.5%. Previous A1C 6.9  Continue lantus 42 units qam and 20 units at bedtime.  Continue metformin.  Continue humalog 20 units with meals TID.  Rx trulicity 4.5 mg weekly.  Has attended diabetes education.  Continue checking CBGs TID and prn.   Check feet daily.            Relevant Medications    metFORMIN (GLUCOPHAGE) 1000 MG tablet    HUMALOG U-100 INSULIN 100 unit/mL injection    dulaglutide (TRULICITY) 4.5 mg/0.5 mL pen injector    insulin glargine U-100, Lantus, (LANTUS SOLOSTAR U-100 INSULIN) 100 unit/mL (3 mL) InPn pen    Other Relevant Orders    Comprehensive Metabolic Panel    Hemoglobin A1C    Lipid Panel    Hypokalemia (Chronic)    Current Assessment & Plan   Continue kdur BID  Discussed Potassium Rich Foods including: Carrots, Broccoli, Potatoes, Beans (dried), Celery, Spinach, Squash, Tomatoes, Avocados, Apricots, Avocado, Cantaloupe, Bananas, Nectarines and Prunes/Figs/Raisins.  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  Stressed importance of exercise and weight loss to maintain BMI <30.  Avoid alcohol, sodas, organ/glandular meats (liver, kidney, sweetbreads). Limit seafood and red meat  intake.  Eat a balanced diet of fruits, vegetables, complex carbohydrates, and lean sources of protein (boneless/skinless chicken breasts, salmon, lentils, low fat dairy).  Discussed possible benefit of OTC Vitamin C supplementation.            Relevant Medications    colchicine (COLCRYS) 0.6 mg tablet    allopurinoL 200 mg Tab    Other Relevant Orders    Uric Acid     Other Visit Diagnoses         Well adult exam        Relevant Orders    Urinalysis, Reflex to Urine Culture    PSA, Screening    CBC Auto Differential    Comprehensive Metabolic Panel    Hemoglobin A1C    Lipid Panel    TSH    T4, Free    Vitamin D            Future Appointments   Date Time Provider Department Center   6/18/2025  8:45 AM Jerson, Laterica L, FNP [facility] CARD [place] [facility]   7/9/2025  9:00 AM Fab Lang DO [facility] [place] [facility]   7/24/2025  9:20 AM TALIA, [facility] [place]    10/30/2025  8:00 AM Cirss Real FNP [facility] [place] [facility]            Signature:      [facility]  [address] [facility] [address]    Date of encounter: 4/28/25

## 2025-04-28 NOTE — TELEPHONE ENCOUNTER
----- Message from Federico sent at 4/28/2025 11:04 AM CDT -----  .Who Called: Michael Rogers is requesting assistance/information from provider's office.Symptoms (please be specific): na How long has patient had these symptoms:  naList of preferred pharmacies on file (remove unneeded): [unfilled]If different, enter pharmacy into here including location and phone number: naPreferred Method of Contact: Phone CallPatient's Preferred Phone Number on File: [phone] Best Call Back Number, if different:Additional Information: pt wants nurse to give him a call regarding the doctor he was referred to. He lost card with doctor's name on it.

## 2025-04-28 NOTE — ASSESSMENT & PLAN NOTE
Intermittent CPAP compliance- reinforced nightly use  Pt reports compliance with CPAP nightly  Expect lifetime use and decreased daytime sleepiness/fatigue.   Avoid excessive alcohol, smoking and overuse of sedatives at bedtime.  Weight management discussed.  Adjust sleep position as needed for increased comfort.

## 2025-04-28 NOTE — ASSESSMENT & PLAN NOTE
Continue kdur BID  Discussed Potassium Rich Foods including: Carrots, Broccoli, Potatoes, Beans (dried), Celery, Spinach, Squash, Tomatoes, Avocados, Apricots, Avocado, Cantaloupe, Bananas, Nectarines and Prunes/Figs/Raisins.  Lab Results   Component Value Date    K 4.5 04/25/2025

## 2025-04-28 NOTE — ASSESSMENT & PLAN NOTE
At goal.  Continue procardia 90 mg qam and 30 mg qpm.  Continue losartan and lasix, spironolactone  Low Sodium Diet (DASH Diet - Less than 2 grams of sodium per day).  Monitor blood pressure daily and log. Report consistent numbers greater than 140/90.  Maintain healthy weight with goal BMI <30. Exercise 30 minutes per day, 5 days per week.

## 2025-05-16 ENCOUNTER — HOSPITAL ENCOUNTER (EMERGENCY)
Facility: HOSPITAL | Age: 60
Discharge: HOME | End: 2025-05-16
Attending: SPECIALIST
Payer: MEDICAID

## 2025-05-16 VITALS
TEMPERATURE: 98 F | HEIGHT: 67 IN | DIASTOLIC BLOOD PRESSURE: 83 MMHG | WEIGHT: 215 LBS | SYSTOLIC BLOOD PRESSURE: 132 MMHG | OXYGEN SATURATION: 97 % | BODY MASS INDEX: 33.74 KG/M2 | RESPIRATION RATE: 20 BRPM | HEART RATE: 67 BPM

## 2025-05-16 DIAGNOSIS — M17.0 OSTEOARTHRITIS OF BOTH KNEES, UNSPECIFIED OSTEOARTHRITIS TYPE: ICD-10-CM

## 2025-05-16 DIAGNOSIS — M25.561 CHRONIC PAIN OF RIGHT KNEE: Primary | ICD-10-CM

## 2025-05-16 DIAGNOSIS — G89.29 CHRONIC PAIN OF RIGHT KNEE: Primary | ICD-10-CM

## 2025-05-16 PROCEDURE — 63600175 PHARM REV CODE 636 W HCPCS: Mod: JZ,TB | Performed by: SPECIALIST

## 2025-05-16 PROCEDURE — 96372 THER/PROPH/DIAG INJ SC/IM: CPT | Performed by: SPECIALIST

## 2025-05-16 PROCEDURE — 99284 EMERGENCY DEPT VISIT MOD MDM: CPT | Mod: 25

## 2025-05-16 RX ORDER — TRAMADOL HYDROCHLORIDE 50 MG/1
50 TABLET, FILM COATED ORAL EVERY 6 HOURS PRN
Qty: 20 TABLET | Refills: 0 | Status: SHIPPED | OUTPATIENT
Start: 2025-05-16

## 2025-05-16 RX ORDER — KETOROLAC TROMETHAMINE 30 MG/ML
30 INJECTION, SOLUTION INTRAMUSCULAR; INTRAVENOUS
Status: COMPLETED | OUTPATIENT
Start: 2025-05-16 | End: 2025-05-16

## 2025-05-16 RX ADMIN — KETOROLAC TROMETHAMINE 30 MG: 30 INJECTION, SOLUTION INTRAMUSCULAR at 05:05

## 2025-05-16 NOTE — ED PROVIDER NOTES
Encounter Date: 5/16/2025       History     Chief Complaint   Patient presents with    Knee Pain     Pain start x 1 month but worse tonight. Hx of fluid in left knee. No obvious swelling noted     59-year-old male with left knee pain which is a chronic condition he has had for years has worsened over the last month and states he has an appointment with Orthopedics in 2 weeks but the pain worsened overnight; he has a history of DJD secondary to osteoarthritis; patient also with a history of coronary artery disease, T2 DM, hyperlipidemia, hypertension     The history is provided by the patient.     Review of patient's allergies indicates:  No Known Allergies  Past Medical History:   Diagnosis Date    Adrenal adenoma     Callus of foot 8/19/2022    Coronary artery disease     Diabetes mellitus     Hyperlipidemia     Hypertension     Spinal stenosis     Unspecified osteoarthritis, unspecified site      Past Surgical History:   Procedure Laterality Date    ANGIOGRAPHY      CHOLECYSTECTOMY      FOOT SURGERY Right      Family History   Problem Relation Name Age of Onset    Hypertension Mother Elva Armaschedank     Heart disease Mother Elva Stone     Diabetes Mother Elva Stone     Stroke Father      Cancer Sister Tip Clifton     Heart disease Sister Tip Clifton     Cancer Sister Theodora Pope     Heart disease Sister Theodora Pope     Heart disease Brother Kingsley Stone     Heart disease Brother Andi Stone      Social History[1]  Review of Systems   Constitutional: Negative.    HENT: Negative.     Respiratory: Negative.     Cardiovascular: Negative.    Gastrointestinal: Negative.    Genitourinary: Negative.    Musculoskeletal:  Positive for arthralgias.   Neurological: Negative.        Physical Exam     Initial Vitals [05/16/25 0455]   BP Pulse Resp Temp SpO2   132/83 67 20 97.7 °F (36.5 °C) 97 %      MAP       --         Physical Exam    Nursing note and vitals reviewed.  Constitutional:  He appears well-developed and well-nourished.   HENT:   Head: Normocephalic and atraumatic.   Eyes: EOM are normal. Pupils are equal, round, and reactive to light.   Neck: Neck supple.   Normal range of motion.  Cardiovascular:  Normal rate and normal heart sounds.           Pulmonary/Chest: No respiratory distress.   Musculoskeletal:         General: Normal range of motion.      Cervical back: Normal range of motion and neck supple.      Comments: Left knee with mild effusion, fairly good range of motion, tenderness medially     Neurological: He is alert and oriented to person, place, and time.   Skin: Skin is warm and dry.         ED Course   Procedures  Labs Reviewed - No data to display       Imaging Results    None          Medications   ketorolac injection 30 mg (has no administration in time range)     Medical Decision Making  59-year-old male with left knee pain which is a chronic condition he has had for years has worsened over the last month and states he has an appointment with Orthopedics in 2 weeks but the pain worsened overnight; he has a history of DJD secondary to osteoarthritis; patient also with a history of coronary artery disease, T2 DM, hyperlipidemia, hypertension     DIFFERENTIAL DIAGNOSIS- osteoarthritis, joint effusion    Risk  Prescription drug management.  Risk Details: Compression bandage placed and patient was given Toradol 30 mg IM; a prescription for Ultram sent to his pharmacy; he will keep his orthopedic appointment as scheduled                                      Clinical Impression:  Final diagnoses:  [M25.561, G89.29] Chronic pain of right knee (Primary)  [M17.0] Osteoarthritis of both knees, unspecified osteoarthritis type          ED Disposition Condition    Discharge Stable          ED Prescriptions       Medication Sig Dispense Start Date End Date Auth. Provider    traMADoL (ULTRAM) 50 mg tablet Take 1 tablet (50 mg total) by mouth every 6 (six) hours as needed for Pain. 20  tablet 5/16/2025 -- Bin Frederick MD          Follow-up Information       Follow up With Specialties Details Why Contact Info    Orthopedic clinic   Scheduled appointment                  [1]   Social History  Tobacco Use    Smoking status: Never     Passive exposure: Never    Smokeless tobacco: Never   Substance Use Topics    Alcohol use: Never    Drug use: Never        Bin Frederick MD  05/16/25 6245

## 2025-05-18 ENCOUNTER — HOSPITAL ENCOUNTER (EMERGENCY)
Facility: HOSPITAL | Age: 60
Discharge: HOME | End: 2025-05-18
Payer: MEDICAID

## 2025-05-18 VITALS
HEART RATE: 56 BPM | SYSTOLIC BLOOD PRESSURE: 126 MMHG | TEMPERATURE: 98 F | BODY MASS INDEX: 33.74 KG/M2 | DIASTOLIC BLOOD PRESSURE: 80 MMHG | HEIGHT: 67 IN | RESPIRATION RATE: 16 BRPM | OXYGEN SATURATION: 98 % | WEIGHT: 215 LBS

## 2025-05-18 DIAGNOSIS — R11.0 NAUSEA: ICD-10-CM

## 2025-05-18 DIAGNOSIS — K52.9 GASTROENTERITIS: Primary | ICD-10-CM

## 2025-05-18 DIAGNOSIS — A09 DIARRHEA OF INFECTIOUS ORIGIN: ICD-10-CM

## 2025-05-18 DIAGNOSIS — R11.2 NAUSEA AND VOMITING, UNSPECIFIED VOMITING TYPE: ICD-10-CM

## 2025-05-18 LAB
BACTERIA #/AREA URNS AUTO: ABNORMAL /HPF
BILIRUB UR QL STRIP.AUTO: NEGATIVE
CLARITY UR: CLEAR
COLOR UR AUTO: YELLOW
GLUCOSE SERPL-MCNC: 204 MG/DL (ref 70–110)
GLUCOSE UR QL STRIP: NEGATIVE
HGB UR QL STRIP: NEGATIVE
HYALINE CASTS URNS QL MICRO: ABNORMAL /LPF
KETONES UR QL STRIP: ABNORMAL
LEUKOCYTE ESTERASE UR QL STRIP: NEGATIVE
NITRITE UR QL STRIP: NEGATIVE
PH UR STRIP: 5.5 [PH]
POCT GLUCOSE: 204 MG/DL (ref 70–110)
PROT UR QL STRIP: 30
RBC #/AREA URNS AUTO: ABNORMAL /HPF
SP GR UR STRIP.AUTO: >=1.03 (ref 1–1.03)
SQUAMOUS #/AREA URNS AUTO: ABNORMAL /HPF
UROBILINOGEN UR STRIP-ACNC: 0.2
WBC #/AREA URNS AUTO: ABNORMAL /HPF

## 2025-05-18 PROCEDURE — 96374 THER/PROPH/DIAG INJ IV PUSH: CPT

## 2025-05-18 PROCEDURE — 82962 GLUCOSE BLOOD TEST: CPT

## 2025-05-18 PROCEDURE — 63600175 PHARM REV CODE 636 W HCPCS

## 2025-05-18 PROCEDURE — 99284 EMERGENCY DEPT VISIT MOD MDM: CPT

## 2025-05-18 PROCEDURE — 81003 URINALYSIS AUTO W/O SCOPE: CPT

## 2025-05-18 RX ORDER — ONDANSETRON HYDROCHLORIDE 2 MG/ML
4 INJECTION, SOLUTION INTRAVENOUS
Status: COMPLETED | OUTPATIENT
Start: 2025-05-18 | End: 2025-05-18

## 2025-05-18 RX ORDER — ONDANSETRON 4 MG/1
4 TABLET, FILM COATED ORAL EVERY 6 HOURS
Qty: 12 TABLET | Refills: 0 | Status: SHIPPED | OUTPATIENT
Start: 2025-05-18

## 2025-05-18 RX ORDER — ONDANSETRON 4 MG/1
4 TABLET, ORALLY DISINTEGRATING ORAL
Status: DISCONTINUED | OUTPATIENT
Start: 2025-05-18 | End: 2025-05-18

## 2025-05-18 RX ADMIN — ONDANSETRON 4 MG: 2 INJECTION INTRAMUSCULAR; INTRAVENOUS at 10:05

## 2025-05-18 NOTE — ED PROVIDER NOTES
Encounter Date: 5/18/2025       History     Chief Complaint   Patient presents with    Abdominal Pain     Complains of generalized abdominal pain with nausea x 2 days then started vomiting today with loose BM     58 y/o M w/pmhx HTN, DM complains of approx 3 days of diarrhea and vomiting w/o blood. No known fevers or sick contacts. Able to take PO. No urinary, testicular complaints. Denies CP and SOB.        Review of patient's allergies indicates:  No Known Allergies  Past Medical History:   Diagnosis Date    Adrenal adenoma     Callus of foot 8/19/2022    Coronary artery disease     Diabetes mellitus     Hyperlipidemia     Hypertension     Spinal stenosis     Unspecified osteoarthritis, unspecified site      Past Surgical History:   Procedure Laterality Date    ANGIOGRAPHY      CHOLECYSTECTOMY      FOOT SURGERY Right      Family History   Problem Relation Name Age of Onset    Hypertension Mother Elva Stone     Heart disease Mother Elva Stone     Diabetes Mother Elva Stone     Stroke Father      Cancer Sister Tip Clifton     Heart disease Sister Tip Clifton     Cancer Sister Theodora Pope     Heart disease Sister Theodora Pope     Heart disease Brother Kingsley Stone     Heart disease Brother Andi Stone      Social History[1]  Review of Systems   Constitutional:  Negative for fever.   HENT:  Negative for sore throat.    Respiratory:  Negative for cough and shortness of breath.    Cardiovascular:  Negative for chest pain and palpitations.   Gastrointestinal:  Positive for abdominal pain, diarrhea, nausea and vomiting. Negative for blood in stool.   Genitourinary:  Negative for difficulty urinating and dysuria.   Musculoskeletal:  Negative for back pain and myalgias.   Neurological:  Negative for dizziness and light-headedness.       Physical Exam     Initial Vitals [05/18/25 1004]   BP Pulse Resp Temp SpO2   116/77 73 20 97.7 °F (36.5 °C) 97 %      MAP       --          Physical Exam    Constitutional: He appears well-developed and well-nourished.   HENT:   Head: Normocephalic and atraumatic.   Eyes: EOM are normal.   Cardiovascular:  Normal rate, regular rhythm and normal heart sounds.           No murmur heard.  Pulmonary/Chest: Breath sounds normal. No respiratory distress. He has no wheezes. He has no rhonchi. He has no rales.   Abdominal: Abdomen is soft. Bowel sounds are normal. There is abdominal tenderness.   Mild tenderness to central abd   Musculoskeletal:         General: No edema. Normal range of motion.     Neurological: He is alert and oriented to person, place, and time. GCS score is 15. GCS eye subscore is 4. GCS verbal subscore is 5. GCS motor subscore is 6.   Psychiatric: He has a normal mood and affect. His behavior is normal. Judgment and thought content normal.         ED Course   Procedures  Labs Reviewed   URINALYSIS, REFLEX TO URINE CULTURE - Abnormal       Result Value    Color, UA Yellow      Appearance, UA Clear      Specific Gravity, UA >=1.030      pH, UA 5.5      Protein, UA 30 (*)     Glucose, UA Negative      Ketones, UA Trace (*)     Blood, UA Negative      Bilirubin, UA Negative      Urobilinogen, UA 0.2      Nitrites, UA Negative      Leukocyte Esterase, UA Negative     URINALYSIS, MICROSCOPIC - Abnormal    Bacteria, UA None Seen      Hyaline Casts, UA Many (*)     RBC, UA 0-5      WBC, UA 0-5      Squamous Epithelial Cells, UA Many (*)    POCT GLUCOSE, HAND-HELD DEVICE - Abnormal    POC Glucose 204 (*)    POCT GLUCOSE - Abnormal    POCT Glucose 204 (*)           Imaging Results    None          Medications   ondansetron injection 4 mg (4 mg Intravenous Given 5/18/25 1043)     Medical Decision Making  58 y/o M w/pmhx HTN, DM complains of approx 3 days of diarrhea and vomiting w/o blood. No known fevers or sick contacts.  VSS  Exam unremarkable, mild tenderness to central abd.  DDX includes, but not limited to: AGE, obstruction, pancreatitis,  intoxication, DKA.  Labs unremarkable. Patient feeling better after treatment with Zofran, able to take PO. Likely AGE given diarrhea and vomiting together. Return precautions and Zofran RX. Stable for dicharge and follow up.    Amount and/or Complexity of Data Reviewed  Labs: ordered.    Risk  Prescription drug management.                                      Clinical Impression:  Final diagnoses:  [A09] Diarrhea of infectious origin  [K52.9] Gastroenteritis (Primary)  [R11.0] Nausea  [R11.2] Nausea and vomiting, unspecified vomiting type          ED Disposition Condition    Discharge Stable          ED Prescriptions       Medication Sig Dispense Start Date End Date Auth. Provider    ondansetron (ZOFRAN) 4 MG tablet Take 1 tablet (4 mg total) by mouth every 6 (six) hours. 12 tablet 5/18/2025 -- Matty Huff MD          Follow-up Information       Follow up With Specialties Details Why Contact Info    Criss Real, [facility] Internal Medicine Schedule an appointment as soon as possible for a visit   [address] [facility] [address] [phone]      [facility] - Emergency Dept Emergency Medicine Call   [address] [facility] [address] [phone]                 [1]   Social History  Tobacco Use    Smoking status: Never     Passive exposure: Never    Smokeless tobacco: Never   Substance Use Topics    Alcohol use: Never    Drug use: Never        Matty Huff MD  05/19/25 4963

## 2025-05-18 NOTE — DISCHARGE INSTRUCTIONS
Your symptoms are likely due to a virus. You may use Zofran as directed for nausea and Imodium sparingly for diarrhea symptoms. Follow up with your PCP and return if your symptoms severely worsen. Be aware of your blood sugar as your diet may be changed due to your discomfort.

## 2025-05-19 DIAGNOSIS — E11.65 TYPE 2 DIABETES MELLITUS WITH HYPERGLYCEMIA, WITH LONG-TERM CURRENT USE OF INSULIN: ICD-10-CM

## 2025-05-19 DIAGNOSIS — Z79.4 TYPE 2 DIABETES MELLITUS WITH HYPERGLYCEMIA, WITH LONG-TERM CURRENT USE OF INSULIN: ICD-10-CM

## 2025-05-19 DIAGNOSIS — E08.11 DIABETES MELLITUS DUE TO UNDERLYING CONDITION WITH KETOACIDOSIS WITH COMA: ICD-10-CM

## 2025-05-19 RX ORDER — INSULIN GLARGINE 100 [IU]/ML
INJECTION, SOLUTION SUBCUTANEOUS
Qty: 60 ML | Refills: 2 | Status: SHIPPED | OUTPATIENT
Start: 2025-05-19

## 2025-06-03 ENCOUNTER — OFFICE VISIT (OUTPATIENT)
Dept: ORTHOPEDICS | Facility: CLINIC | Age: 60
End: 2025-06-03
Payer: MEDICAID

## 2025-06-03 ENCOUNTER — HOSPITAL ENCOUNTER (OUTPATIENT)
Dept: RADIOLOGY | Facility: HOSPITAL | Age: 60
Discharge: HOME | End: 2025-06-03
Attending: NURSE PRACTITIONER
Payer: MEDICAID

## 2025-06-03 VITALS
SYSTOLIC BLOOD PRESSURE: 110 MMHG | TEMPERATURE: 98 F | DIASTOLIC BLOOD PRESSURE: 73 MMHG | HEART RATE: 65 BPM | HEIGHT: 67 IN | BODY MASS INDEX: 33.98 KG/M2 | WEIGHT: 216.5 LBS

## 2025-06-03 DIAGNOSIS — M17.12 PRIMARY OSTEOARTHRITIS OF LEFT KNEE: Primary | ICD-10-CM

## 2025-06-03 DIAGNOSIS — R52 PAIN: ICD-10-CM

## 2025-06-03 PROCEDURE — 73564 X-RAY EXAM KNEE 4 OR MORE: CPT | Mod: TC,LT

## 2025-06-03 PROCEDURE — 99213 OFFICE O/P EST LOW 20 MIN: CPT | Mod: PBBFAC,25 | Performed by: NURSE PRACTITIONER

## 2025-06-03 PROCEDURE — 3061F NEG MICROALBUMINURIA REV: CPT | Mod: CPTII,,, | Performed by: NURSE PRACTITIONER

## 2025-06-03 PROCEDURE — 4010F ACE/ARB THERAPY RXD/TAKEN: CPT | Mod: CPTII,,, | Performed by: NURSE PRACTITIONER

## 2025-06-03 PROCEDURE — 3074F SYST BP LT 130 MM HG: CPT | Mod: CPTII,,, | Performed by: NURSE PRACTITIONER

## 2025-06-03 PROCEDURE — 73564 X-RAY EXAM KNEE 4 OR MORE: CPT | Mod: TC,RT

## 2025-06-03 PROCEDURE — 1160F RVW MEDS BY RX/DR IN RCRD: CPT | Mod: CPTII,,, | Performed by: NURSE PRACTITIONER

## 2025-06-03 PROCEDURE — 3066F NEPHROPATHY DOC TX: CPT | Mod: CPTII,,, | Performed by: NURSE PRACTITIONER

## 2025-06-03 PROCEDURE — 1159F MED LIST DOCD IN RCRD: CPT | Mod: CPTII,,, | Performed by: NURSE PRACTITIONER

## 2025-06-03 PROCEDURE — 3078F DIAST BP <80 MM HG: CPT | Mod: CPTII,,, | Performed by: NURSE PRACTITIONER

## 2025-06-03 PROCEDURE — 99214 OFFICE O/P EST MOD 30 MIN: CPT | Mod: S$PBB,,, | Performed by: NURSE PRACTITIONER

## 2025-06-03 PROCEDURE — 3051F HG A1C>EQUAL 7.0%<8.0%: CPT | Mod: CPTII,,, | Performed by: NURSE PRACTITIONER

## 2025-06-03 PROCEDURE — 3008F BODY MASS INDEX DOCD: CPT | Mod: CPTII,,, | Performed by: NURSE PRACTITIONER

## 2025-06-17 ENCOUNTER — TELEPHONE (OUTPATIENT)
Dept: INTERNAL MEDICINE | Facility: CLINIC | Age: 60
End: 2025-06-17
Payer: MEDICAID

## 2025-06-17 NOTE — TELEPHONE ENCOUNTER
Pt called stating that his insurance is no longer covering the cbg machine he has or the strips. They are now covering contour next machine and contour next strips. Can this be sent in.

## 2025-06-19 DIAGNOSIS — Z79.4 TYPE 2 DIABETES MELLITUS WITH HYPERGLYCEMIA, WITH LONG-TERM CURRENT USE OF INSULIN: Primary | ICD-10-CM

## 2025-06-19 DIAGNOSIS — E11.65 TYPE 2 DIABETES MELLITUS WITH HYPERGLYCEMIA, WITH LONG-TERM CURRENT USE OF INSULIN: Primary | ICD-10-CM

## 2025-06-19 RX ORDER — INSULIN PUMP SYRINGE, 3 ML
EACH MISCELLANEOUS
Qty: 1 EACH | Refills: 0 | Status: SHIPPED | OUTPATIENT
Start: 2025-06-19

## 2025-06-19 RX ORDER — LANCETS
EACH MISCELLANEOUS
Qty: 100 EACH | Refills: 11 | Status: SHIPPED | OUTPATIENT
Start: 2025-06-19

## 2025-07-07 ENCOUNTER — CLINICAL SUPPORT (OUTPATIENT)
Dept: DIABETES | Facility: CLINIC | Age: 60
End: 2025-07-07
Payer: MEDICAID

## 2025-07-07 DIAGNOSIS — Z79.4 TYPE 2 DIABETES MELLITUS WITH HYPERGLYCEMIA, WITH LONG-TERM CURRENT USE OF INSULIN: Primary | ICD-10-CM

## 2025-07-07 DIAGNOSIS — E11.65 TYPE 2 DIABETES MELLITUS WITH HYPERGLYCEMIA, WITH LONG-TERM CURRENT USE OF INSULIN: Primary | ICD-10-CM

## 2025-07-07 PROCEDURE — G0108 DIAB MANAGE TRN  PER INDIV: HCPCS | Mod: PBBFAC,PN | Performed by: DIETITIAN, REGISTERED

## 2025-07-08 NOTE — PROGRESS NOTES
Diabetes Care Specialist Progress Note  Author: Yulia Smith RD, [facility]  Date: 7/8/2025    Intake    Program Intake  Reason for Diabetes Program Visit:: Intervention  Type of Intervention:: Individual  Individual: Education  Education: Self-Management Skill Review    Current Diabetes Treatment: DM Injectables, Insulin, Oral Medications  Oral Medication Type/Dose: Metformin 1000mg BID  DM Injectables Type/Dose: Trulicity 4.5mg  Method of insulin delivery?: Injections  Injection Type: Vial/Sryringe, Pens  Vial/Syringe Type/Dose: Humalog 20 units TIDAC (has not been using)  Pen Type/Dose: Lantus 42 units AM (today took 20 units because blood glucose was in 90s) and 20 units at night.    Continuous Glucose Monitoring  Personal CGM type:: dexcom G7 with   GMI Date: 07/07/25  GMI Value: 7.8 %      Lab Results   Component Value Date    HGBA1C 7.5 (H) 04/25/2025               There is no height or weight on file to calculate BMI.    Lifestyle Coping Support & Clinical              Diabetes Self-Management Skills Assessment    Medication Skills Assessment  Patient is able to identify current diabetes medications, dosages, and appropriate timing of medications.: no  Patient reports problems or concerns with current medication regimen.: yes  Medication regimen problems/concerns:: concerned about side effects  Patient is  aware that some diabetes medications can cause low blood sugar?: Yes  Medication Skills Assessment Completed:: Yes  Assessment indicates:: Instruction Needed  Area of need?: Yes    Diabetes Disease Process/Treatment Options  Diabetes Disease Process/Treatment Options: Skills Assessment Completed: No  Deferred due to:: Time  Area of need?: Deferred         Physical Activity/Exercise  Patient's daily activity level:: constantly moving    Home Blood Glucose Monitoring  Personal CGM type:: dexcom G7 with   Home Blood Glucose Monitoring Skills Assessment Completed: : Yes  Assessment indicates::  Instruction Needed  Area of need?: Yes    Acute Complications  Have you ever had hypoglycemia (low BG 70 or less)?: yes  How do you treat hypoglycemia?: peppermints or juice  Have you ever had hyperglycemia (high  or more)?: yes  How do you treat hyperglycemia? : sometimes takes fast-acting insulin  Acute Complications Skills Assessment Completed: : Yes  Assessment indicates:: Instruction Needed  Area of need?: Yes    Chronic Complications  Chronic Complications Skills Assessment Completed: : No  Deferred due to:: Time  Area of need?: Deferred      Assessment Summary and Plan    Based on today's diabetes care assessment, the following areas of need were identified:      Identified Areas of Need      Medication/Current Diabetes Treatment: Yes see care plan   Lifestyle Coping/Support:     Diabetes Disease Process/Treatment Options: Deferred   Nutrition/Healthy Eating:      Physical Activity/Exercise:      Home Blood Glucose Monitoring: Yes reviewed AGP: 48% TIR, 34% high, 17% very high, 1% low and <1% very low. Lows tend to be happening by giving fast-acting insulin in-between meals and early morning. Highs are due to not giving fast-acting insulin before meals. He will work on changing this behavior and begin entering insulin doses into dexcom for pattern management.    Acute Complications: Yes Reviewed blood glucose goals, prevention, detection, signs and symptoms, and treatment of hypoglycemia and hyperglycemia, and when to contact the clinic.   Chronic Complications: Deferred       Today's interventions were provided through individual discussion, instruction, and written materials were provided.      Patient verbalized understanding of instruction and written materials.  Pt was able to return back demonstration of instructions today. Patient understood key points, needs reinforcement and further instruction.     Diabetes Self-Management Care Plan:    Today's Diabetes Self-Management Care Plan was developed  "with Michael's input. Michael has agreed to work toward the following goal(s) to improve his/her overall diabetes control.      Care Plan: Diabetes Management   Updates made since 7/8/2024 12:00 AM        Problem: Blood Glucose Self-Monitoring Resolved 2/21/2025        Goal: Pt agrees to use Dexcom G7 with  for CGM and return in 1 week for report review Completed 2/21/2025   Start Date: 2/13/2025   Expected End Date: 2/20/2025   This Visit's Progress: Met   Priority: High   Barriers: No Barriers Identified   Note:    DEXCOM G7 CGM TRAINING  Dexcom G7 mobile apps downloaded to phone. Education was provided using "Quick Start Guide" and demo device per Dexweendy protocol. [facility] data share set-up.    Patient will use Dexcom G7  to receive continuous glucose data.        Overview:  5 minute glucose reading updates, trending arrows, BG graph screens, reports screen,  connectivity and functions.   Menus: Trend graph, start sensor, enter BG, events, alerts, settings, replace sensor, stop sensor, and shutdown  Dexcom G7 mobile ro/ Settings:                          * Urgent Low: 55 mg/dL                          * Urgent Low Soon: On                          * Low Alert: 70 mg/dL; Snooze off                          * High Alert: 250 mg/dL; Snooze off                           * Signal Loss: on                          * Brief Sensor Issue: on     Reviewed additional setting options.  Patient paired sensor using 4-digit code listed on sensor cap..    Reviewed where to find sensor insertion time and expiration date.   Reviewed appropriate calibration techniques.  Reviewed sensor site selection. Patient selected and prepped site using aseptic technique, inserted sensor, applied overlay tape and started session.   Reviewed sensor removal from site. Discussed times to remove sensor per  guidelines include MRI or diatherapy.   Patient able to demonstrate without difficulty. Encouraged " "to review manual and/or training videos prior to starting another sensor.   Reviewed problem solving aspects of sensor transmission/variables that can disrupt RF transmission.  range 20 feet, but the first 3 hrs keep within 3 feet of transmitter.  Patient instructed on lag time of interstitial fluid from CBG and was advised to treat hypoglycemia and dose insulin based on SMBG values.  Dexcom technical support contact number given and examples of when to contact them discussed.          Problem: Medications Resolved 2/21/2025        Goal: Patient Agrees to take Diabetes Medication(s) Lantus 42 units in am and 20 units pm + Humalog 20 units prior to meals Completed 2/21/2025   Start Date: 2/13/2025   Expected End Date: 2/20/2025   This Visit's Progress: Met   Priority: High   Barriers: No Barriers Identified   Note:    2/13/25 - pt states he is taking 52 units Lantus at night instead of 20 units. Also taking sooner than 12 hrs apart. Reviewed taking 12 hrs apart due to "stacking" insulin and risking hypoglycemia. Some overnight and daytime hypoglycemia reported. Will take as directed and return in 1 week for review. Reviewed difference between long and fast-acting.       Task: Reviewed with patient all current diabetes medications and provided basic review of the purpose, dosage, frequency, side effects, and storage of both oral and injectable diabetes medications. Completed 2/13/2025        Task: Discussed guidelines for preventing, detecting and treating hypoglycemia and hyperglycemia and reviewed the importance of meal and medication timing with diabetes mediations for prevention of hypoglycemia and maximum drug benefit. Completed 2/13/2025        Problem: Acute Complications Deleted 7/8/2025        Goal: Patient agrees to identify and manage signs and symptoms of high/low blood sugar (hyper/hypoglycemia) by keeping a log of events and using proper treatment. Completed 7/7/2025   Start Date: 2/21/2025 "   Expected End Date: 3/20/2025   This Visit's Progress: Met   Priority: High   Barriers: No Barriers Identified   Note:    2/21/25 - pt states that he stays busy outdoors during the day after his lunch and sometimes forgets to eat dinner and has lows. Suggested keep peppermints or glucose tablets in pocket and at bedside for overnight lows. Will alert PCP to Dexcom report.       Task: Reviewed proper treatment of hypoglycemia with the rule of 15--patient to eat 15g simple carbohydrate (4 glucose tablets, 1 glucose gel, 5 pieces hard candy, ½ cup fruit juice, ½ can regular soda, etc) and wait 15 minutes and recheck home glucose. Completed 2/21/2025        Task: Reviewed common causes and precautions to help prevent hyper/hypoglycemic events. Completed 2/21/2025        Task: Reviewed signs and symptoms of hyper/hypoglycemia, what range is considered to be hyper/hypoglycemia, and when to seek further medical attention. Completed 2/21/2025        Problem: Medications         Goal: Patient Agrees to take humalog prior to eating, long-acting in am and evening and to keep a log of insulin in dexcom for review    Start Date: 7/7/2025   Expected End Date: 8/18/2025   Priority: High   Note:    Reviewed timing of fast-acting insulin to prevent post meal hyperglycemia and to avoid taking fast-acting after or in-between meals to avoid hypoglycemia. Pt will inquire about changing to fast-acting insulin pens instead of vials for ease of use and when eating away from home for family gatherings.       Task: Reviewed with patient all current diabetes medications and provided basic review of the purpose, dosage, frequency, side effects, and storage of both oral and injectable diabetes medications.         Task: Reviewed possible resources for acquiring cost prohibitive medication.         Task: Instructed patient on how to self-administer insulin         Task: Discussed guidelines for preventing, detecting and treating hypoglycemia  and hyperglycemia and reviewed the importance of meal and medication timing with diabetes mediations for prevention of hypoglycemia and maximum drug benefit.           Follow Up Plan     Follow up in about 4 weeks (around 8/4/2025) for AGP review, Psychosocial/Coping, chronic complications, health maintenance.    Today's care plan and follow up schedule was discussed with patient.  Michael verbalized understanding of the care plan, goals, and agrees to follow up plan.        The patient was encouraged to communicate with his/her health care provider/physician and care team regarding his/her condition(s) and treatment.  I provided the patient with my contact information today and encouraged to contact me via phone or [facility] Portal as needed.     Length of Visit   Total Time: 60 Minutes

## 2025-07-17 DIAGNOSIS — E11.65 TYPE 2 DIABETES MELLITUS WITH HYPERGLYCEMIA, WITH LONG-TERM CURRENT USE OF INSULIN: ICD-10-CM

## 2025-07-17 DIAGNOSIS — Z79.4 TYPE 2 DIABETES MELLITUS WITH HYPERGLYCEMIA, WITH LONG-TERM CURRENT USE OF INSULIN: ICD-10-CM

## 2025-07-17 RX ORDER — INSULIN PUMP SYRINGE, 3 ML
EACH MISCELLANEOUS
Qty: 1 EACH | Refills: 0 | Status: SHIPPED | OUTPATIENT
Start: 2025-07-17

## 2025-07-17 NOTE — TELEPHONE ENCOUNTER
Copied from CRM #[id number]. Topic: Medications - Medication Refill  >> Jul 17, 2025 10:48 AM Rhina Pedroza wrote:  Who Called: Michael Stone    Refill or New Rx:Refill  What supplies are needed:Dexcom  What is the brand of the supplies:dexcom  If checking glucose, how many times do they check it?:N/A  Who prescribed the original supplies:reyna  List of preferred pharmacies on file (remove uneeded): [facility] [address] [place] [address]         What Medical Supply company is the patient requesting?: N/A    Preferred Method of Contact: Phone Call  Patient's Preferred Phone Number on File: [phone]   Best Call Back Number, if different:  Additional Information:

## 2025-07-18 ENCOUNTER — HOSPITAL ENCOUNTER (OUTPATIENT)
Dept: WOUND CARE | Facility: HOSPITAL | Age: 60
Discharge: HOME | End: 2025-07-18
Attending: FAMILY MEDICINE
Payer: MEDICAID

## 2025-07-18 VITALS
DIASTOLIC BLOOD PRESSURE: 75 MMHG | RESPIRATION RATE: 18 BRPM | OXYGEN SATURATION: 98 % | TEMPERATURE: 98 F | HEART RATE: 54 BPM | SYSTOLIC BLOOD PRESSURE: 123 MMHG

## 2025-07-18 DIAGNOSIS — E11.9 ENCOUNTER FOR DIABETIC FOOT EXAM: Primary | ICD-10-CM

## 2025-07-18 DIAGNOSIS — L60.3 DYSTROPHIC NAIL: ICD-10-CM

## 2025-07-18 DIAGNOSIS — I25.10 MILD CAD: ICD-10-CM

## 2025-07-18 DIAGNOSIS — L84 PRE-ULCERATIVE CORN OR CALLOUS: ICD-10-CM

## 2025-07-18 PROCEDURE — 99211 OFF/OP EST MAY X REQ PHY/QHP: CPT

## 2025-07-18 PROCEDURE — 11056 PARNG/CUTG B9 HYPRKR LES 2-4: CPT

## 2025-07-18 PROCEDURE — 11720 DEBRIDE NAIL 1-5: CPT

## 2025-07-18 PROCEDURE — 11719 TRIM NAIL(S) ANY NUMBER: CPT

## 2025-07-18 PROCEDURE — 27000999 HC MEDICAL RECORD PHOTO DOCUMENTATION

## 2025-07-18 RX ORDER — NITROGLYCERIN 0.4 MG/1
0.4 TABLET SUBLINGUAL EVERY 5 MIN PRN
Qty: 25 TABLET | Refills: 6 | Status: SHIPPED | OUTPATIENT
Start: 2025-07-18 | End: 2026-07-18

## 2025-07-18 NOTE — PROGRESS NOTES
CHIEF COMPLAINT:  Chief Complaint   Patient presents with    Nail Care     HISTORY OF  PRESENT ILLNESS:  59 y.o. Black or  male being seen today for diabetic foot care.  He has calluses that need to be trimmed.  Prior medical history of hypertension, hyperlipidemia, diabetes, coronary artery disease, etc..  His diabetes is well-controlled at 6.9.  He denies any numbness, tingling, burning in his feet.  He has not had any procedures on his lower extremities.    Today's information:  Patient has been doing well since last clinic visit.  His last hemoglobin A1c was 7.5.  He has calluses on both feet that need to be pared down.  He denies any numbness, tingling, burning in his feet.    REVIEW OF SYSTEMS:  Review of Systems   Constitutional:  Negative for chills and fever.   Respiratory:  Negative for cough.    Gastrointestinal:  Negative for nausea.   Musculoskeletal:         As stated in HPI   Skin:         As stated in HPI   Psychiatric/Behavioral: Negative.         Past Medical History:   Diagnosis Date    Adrenal adenoma     Callus of foot 8/19/2022    Coronary artery disease     Diabetes mellitus     Hyperlipidemia     Hypertension     Spinal stenosis     Unspecified osteoarthritis, unspecified site       Past Surgical History:   Procedure Laterality Date    ANGIOGRAPHY      CHOLECYSTECTOMY      FOOT SURGERY Right       Social History     Socioeconomic History    Marital status: Single   Tobacco Use    Smoking status: Never     Passive exposure: Never    Smokeless tobacco: Never   Substance and Sexual Activity    Alcohol use: Never    Drug use: Never    Sexual activity: Yes     Partners: Female     Birth control/protection: None     Social Drivers of Health     Financial Resource Strain: Medium Risk (2/8/2024)    Overall Financial Resource Strain (CARDIA)     Difficulty of Paying Living Expenses: Somewhat hard   Food Insecurity: Food Insecurity Present (2/8/2024)    Hunger Vital Sign     Worried  About Running Out of Food in the Last Year: Sometimes true     Ran Out of Food in the Last Year: Sometimes true   Transportation Needs: Unmet Transportation Needs (2/8/2024)    PRAPARE - Transportation     Lack of Transportation (Medical): Yes     Lack of Transportation (Non-Medical): Yes   Physical Activity: Insufficiently Active (2/8/2024)    Exercise Vital Sign     Days of Exercise per Week: 1 day     Minutes of Exercise per Session: 10 min   Stress: Stress Concern Present (2/8/2024)    [employer] - Occupational Stress Questionnaire     Feeling of Stress : To some extent   Housing Stability: Low Risk  (2/8/2024)    Housing Stability Vital Sign     Unable to Pay for Housing in the Last Year: No     Number of Places Lived in the Last Year: 1     Unstable Housing in the Last Year: No     Review of Most Recent Wound Care-Related Labs:  Lab Results   Component Value Date/Time    WBC 9.83 12/08/2024 11:21 AM    RBC 5.00 12/08/2024 11:21 AM    HGB 14.5 12/08/2024 11:21 AM    HCT 43.6 12/08/2024 11:21 AM    MCV 87.2 12/08/2024 11:21 AM    MCH 29.0 12/08/2024 11:21 AM    CREATININE 1.28 (H) 04/25/2025 08:35 AM    HGBA1C 7.5 (H) 04/25/2025 08:35 AM    CRP 4.41 (H) 01/19/2021 04:00 AM        Wound-Related Imaging:                    PHYSICAL EXAMINATION:  Blood pressure 123/75, pulse (!) 54, temperature 97.6 °F (36.4 °C), resp. rate 18, SpO2 98%.  General:  No acute distress  Respiratory: Non labored  Cardiovascular:  No peripheral edema.   Musculoskeletal:  Normal gait  Neurologic:  A&O X 3.    Psychiatric:  Calm, cooperative.  Mood and effect normal  Integumentary:       Monofilament exam:  Normal bilaterally   Dorsalis pedis and posterior tibial pulses present by Doppler   Proprioception intact bilaterally   Right foot: Callus on great toe pared down   Left foot: Callus on great toe pared down  Nails trimmed times 10  Nail debridement done on 2 nails of the left foot and 1 nail on right  foot     Protective Sensation (w/ 10 gram monofilament):  Right: Intact  Left: Intact    Visual Inspection:  Callus -  Bilateral    Pedal Pulses:   Right: Present  Left: Present    Posterior Tibialis Pulses:   Right:Present  Left: Present       ASSESSMENT/PLAN:    Patient Active Problem List    Diagnosis Date Noted    Encounter for diabetic foot exam 07/18/2025    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 05/16/2024    Pain and swelling of toe of left foot 03/13/2024    Primary osteoarthritis of left knee 10/23/2023    BMI 33.0-33.9,adult 10/23/2023    Hypokalemia 06/09/2023    DDD (degenerative disc disease), cervical 11/02/2022    Callus of foot 08/19/2022    Mild CAD 05/20/2022    Disorder of tendon of shoulder region 05/20/2022    History of severe acute respiratory syndrome coronavirus 2 (SARS-CoV-2) disease 05/20/2022    Hyperlipidemia LDL goal <70 05/20/2022    Primary hypertension 05/20/2022    MINA (obstructive sleep apnea) 05/20/2022    Overgrown toenails 05/20/2022    Primary osteoarthritis of both knees 05/20/2022    Spinal stenosis of cervical region 05/20/2022    Tinea pedis 05/20/2022    Type 2 diabetes mellitus with hyperglycemia, with long-term current use of insulin 05/20/2022    Venous insufficiency 05/20/2022       Diabetic foot care   Diabetes-well-controlled   Hypertension   Hyperlipidemia  Return to clinic in 3-4 months

## 2025-07-18 NOTE — TELEPHONE ENCOUNTER
Returned call to patient to inform him that he has refills on file at his pharmacy no answer, unable to leave a voicemail.

## 2025-07-18 NOTE — TELEPHONE ENCOUNTER
----- Message from Tia sent at 7/18/2025 10:15 AM CDT -----  Regarding: REFILL  PT NEEDS REFILLS:    colchicine (COLCRYS) 0.6 mg tablet    HUMALOG U-100 INSULIN 100 unit/mL injection    metFORMIN (GLUCOPHAGE) 1000 MG tablet    nitroGLYCERIN (NITROSTAT) 0.4 MG SL tablet    Dexcom sensors

## 2025-07-21 ENCOUNTER — TELEPHONE (OUTPATIENT)
Dept: INTERNAL MEDICINE | Facility: CLINIC | Age: 60
End: 2025-07-21
Payer: MEDICAID

## 2025-07-21 DIAGNOSIS — E87.6 HYPOKALEMIA: Chronic | ICD-10-CM

## 2025-07-21 RX ORDER — POTASSIUM CHLORIDE 20 MEQ/1
20 TABLET, EXTENDED RELEASE ORAL 2 TIMES DAILY
Qty: 180 TABLET | Refills: 2 | Status: SHIPPED | OUTPATIENT
Start: 2025-07-21 | End: 2026-04-17

## 2025-07-24 ENCOUNTER — CLINICAL SUPPORT (OUTPATIENT)
Dept: DIABETES | Facility: CLINIC | Age: 60
End: 2025-07-24
Payer: MEDICAID

## 2025-07-24 ENCOUNTER — OFFICE VISIT (OUTPATIENT)
Dept: OPHTHALMOLOGY | Facility: CLINIC | Age: 60
End: 2025-07-24
Payer: MEDICAID

## 2025-07-24 VITALS — HEIGHT: 67 IN | WEIGHT: 216.5 LBS | BODY MASS INDEX: 33.98 KG/M2

## 2025-07-24 DIAGNOSIS — E11.65 TYPE 2 DIABETES MELLITUS WITH HYPERGLYCEMIA, WITH LONG-TERM CURRENT USE OF INSULIN: Primary | ICD-10-CM

## 2025-07-24 DIAGNOSIS — H25.13 AGE-RELATED NUCLEAR CATARACT OF BOTH EYES: ICD-10-CM

## 2025-07-24 DIAGNOSIS — E11.9 DIABETES MELLITUS TYPE 2 WITHOUT RETINOPATHY: Primary | ICD-10-CM

## 2025-07-24 DIAGNOSIS — Z79.4 TYPE 2 DIABETES MELLITUS WITH HYPERGLYCEMIA, WITH LONG-TERM CURRENT USE OF INSULIN: Primary | ICD-10-CM

## 2025-07-24 DIAGNOSIS — H53.15 VISUAL DISTORTIONS OF SHAPE AND SIZE: ICD-10-CM

## 2025-07-24 PROCEDURE — 99211 OFF/OP EST MAY X REQ PHY/QHP: CPT | Mod: PBBFAC,27 | Performed by: DIETITIAN, REGISTERED

## 2025-07-24 PROCEDURE — 99212 OFFICE O/P EST SF 10 MIN: CPT | Mod: PBBFAC,PN

## 2025-07-24 PROCEDURE — G0108 DIAB MANAGE TRN  PER INDIV: HCPCS | Mod: PBBFAC | Performed by: DIETITIAN, REGISTERED

## 2025-07-24 NOTE — PROGRESS NOTES
HPI    RTC 1 year DFE/OCTm/Mrx  Patient's A1c on 4/25/2025 was 7.5  Last edited by Zuri Doherty MA on 7/24/2025  9:21 AM.        OCT MAC   7/24/25  OD: 287, NFC, no IRF/SRF  OS: 296, NFC, no IRF/SRF  07/23/2024  OD: 282, intact foveal contour, no IRF/SRF  OS: 296, intact foveal contour, no IRF/SRF    OCT RNFL  7/24/25  OD: 80, all green  OS: 85, N yellow, rest green    Assessment /Plan     For exam results, see Encounter Report.    Diabetes mellitus type 2 without retinopathy    Age-related nuclear cataract of both eyes    Visual distortions of shape and size        1. T2DM without ophthalmologic manifestations  - Last A1c as below 7.5% 4/25/25  - No signs of retinopathy on exam  - No signs of DME on OCT mac  - Encouraged good BS/BP/Cholesterol control, f/u with PCP regularly  - Monitor with annual DFE (next due 7/2026)    2. Non-visually significant cataracts, OU  - Not currently bothered by symptoms, yearly Mrx  - OK with just OTC readers on 7/23/24  - CTM    3. Disc-at-risk, OU  - Discussed precautions       RTC 1 year DFE, OCT MAC

## 2025-07-24 NOTE — Clinical Note
Evens Kahn,  I met with Michael and he is hoping to get his Humalog in pen form instead of vial. Could you send a script for Humalog pen + pen needles to [facility] in [place]?  Thank you, Yulia Smith Diabetes Care Specialist

## 2025-07-25 NOTE — PROGRESS NOTES
Diabetes Care Specialist Progress Note  Author: Yulia Smith RD, [facility]  Date: 7/25/2025    Intake    Program Intake  Reason for Diabetes Program Visit:: Intervention  Type of Intervention:: Individual  Individual: Education  Education: Self-Management Skill Review    Current Diabetes Treatment: DM Injectables, Insulin, Oral Medications  Oral Medication Type/Dose: MF 1000mg BID  DM Injectables Type/Dose: Trulicity 4.5 mg  Method of insulin delivery?: Injections  Injection Type: Vial/Sryringe, Pens  Vial/Syringe Type/Dose: Humalog 20 units TIDAC  Pen Type/Dose: Lantus 42 units AM and 20 units at night (takes 1/2 dose or holds if < 100)    Continuous Glucose Monitoring  Patient has CGM: Yes  Personal CGM type:: dexcom G7 with  (restarted last 3 days)      Lab Results   Component Value Date    HGBA1C 7.5 (H) 04/25/2025               There is no height or weight on file to calculate BMI.    Lifestyle Coping Support & Clinical              Diabetes Self-Management Skills Assessment    Medication Skills Assessment  Patient is able to identify current diabetes medications, dosages, and appropriate timing of medications.: yes  Patient reports problems or concerns with current medication regimen.: no  Medication Skills Assessment Completed:: Yes  Assessment indicates:: Adequate understanding  Area of need?: No    Diabetes Disease Process/Treatment Options  Diabetes Type?: Type II  Is patient aware of what causes diabetes?: Yes  Does patient understand the pathophysiology of diabetes?: Yes  Diabetes Disease Process/Treatment Options: Skills Assessment Completed: Yes  Assessment indicates:: Adequate understanding  Area of need?: No              Home Blood Glucose Monitoring  Patient states that blood sugar is checked at home daily.: yes  Personal CGM type:: dexcom G7 with  (restarted last 3 days)  Home Blood Glucose Monitoring Skills Assessment Completed: : Yes  Assessment indicates:: Instruction  Needed  Area of need?: Yes         Chronic Complications  Chronic Complications Skills Assessment Completed: : No  Deferred due to:: Time  Area of need?: Deferred      Assessment Summary and Plan    Based on today's diabetes care assessment, the following areas of need were identified:      Identified Areas of Need      Medication/Current Diabetes Treatment: Yes - lipohypertrophy noted on lower right abdomen where he regularly injects insulin. Instructed to rotate injection sites. Will monitor in 1 mth.   Lifestyle Coping/Support:     Diabetes Disease Process/Treatment Options: No   Nutrition/Healthy Eating:      Physical Activity/Exercise:      Home Blood Glucose Monitoring: Yes see care plan. Reviewed AGP but has only been using Dexcom for 3 days. Noted hypoglycemia when started new sensor. No symptoms reported at that time.    Acute Complications:      Chronic Complications: Deferred       Today's interventions were provided through individual discussion, instruction, and written materials were provided.      Patient verbalized understanding of instruction and written materials.  Pt was able to return back demonstration of instructions today. Patient understood key points, needs reinforcement and further instruction.     Diabetes Self-Management Care Plan:    Today's Diabetes Self-Management Care Plan was developed with Mcihael's input. Michael has agreed to work toward the following goal(s) to improve his/her overall diabetes control.      Care Plan: Diabetes Management   Updates made since 7/25/2024 12:00 AM        Problem: Blood Glucose Self-Monitoring Resolved 2/21/2025        Goal: Pt agrees to use Dexcom G7 with  for CGM and return in 1 week for report review Completed 2/21/2025   Start Date: 2/13/2025   Expected End Date: 2/20/2025   This Visit's Progress: Met   Priority: High   Barriers: No Barriers Identified   Note:    DEXCOM G7 CGM TRAINING  Dexcom G7 mobile apps downloaded to phone. Education was  "provided using "Quick Start Guide" and demo device per Dexcom protocol. [facility] data share set-up.    Patient will use Dexcom G7  to receive continuous glucose data.        Overview:  5 minute glucose reading updates, trending arrows, BG graph screens, reports screen,  connectivity and functions.   Menus: Trend graph, start sensor, enter BG, events, alerts, settings, replace sensor, stop sensor, and shutdown  Dexcom G7 mobile ro/ Settings:                          * Urgent Low: 55 mg/dL                          * Urgent Low Soon: On                          * Low Alert: 70 mg/dL; Snooze off                          * High Alert: 250 mg/dL; Snooze off                           * Signal Loss: on                          * Brief Sensor Issue: on     Reviewed additional setting options.  Patient paired sensor using 4-digit code listed on sensor cap..    Reviewed where to find sensor insertion time and expiration date.   Reviewed appropriate calibration techniques.  Reviewed sensor site selection. Patient selected and prepped site using aseptic technique, inserted sensor, applied overlay tape and started session.   Reviewed sensor removal from site. Discussed times to remove sensor per  guidelines include MRI or diatherapy.   Patient able to demonstrate without difficulty. Encouraged to review manual and/or training videos prior to starting another sensor.   Reviewed problem solving aspects of sensor transmission/variables that can disrupt RF transmission.  range 20 feet, but the first 3 hrs keep within 3 feet of transmitter.  Patient instructed on lag time of interstitial fluid from CBG and was advised to treat hypoglycemia and dose insulin based on SMBG values.  Dexcom technical support contact number given and examples of when to contact them discussed.          Problem: Medications Resolved 2/21/2025        Goal: Patient Agrees to take Diabetes Medication(s) Lantus " "42 units in am and 20 units pm + Humalog 20 units prior to meals Completed 2/21/2025   Start Date: 2/13/2025   Expected End Date: 2/20/2025   This Visit's Progress: Met   Priority: High   Barriers: No Barriers Identified   Note:    2/13/25 - pt states he is taking 52 units Lantus at night instead of 20 units. Also taking sooner than 12 hrs apart. Reviewed taking 12 hrs apart due to "stacking" insulin and risking hypoglycemia. Some overnight and daytime hypoglycemia reported. Will take as directed and return in 1 week for review. Reviewed difference between long and fast-acting.       Task: Reviewed with patient all current diabetes medications and provided basic review of the purpose, dosage, frequency, side effects, and storage of both oral and injectable diabetes medications. Completed 2/13/2025        Task: Discussed guidelines for preventing, detecting and treating hypoglycemia and hyperglycemia and reviewed the importance of meal and medication timing with diabetes mediations for prevention of hypoglycemia and maximum drug benefit. Completed 2/13/2025        Problem: Acute Complications Deleted 7/8/2025        Goal: Patient agrees to identify and manage signs and symptoms of high/low blood sugar (hyper/hypoglycemia) by keeping a log of events and using proper treatment. Completed 7/7/2025   Start Date: 2/21/2025   Expected End Date: 3/20/2025   This Visit's Progress: Met   Priority: High   Barriers: No Barriers Identified   Note:    2/21/25 - pt states that he stays busy outdoors during the day after his lunch and sometimes forgets to eat dinner and has lows. Suggested keep peppermints or glucose tablets in pocket and at bedside for overnight lows. Will alert PCP to Dexcom report.       Task: Reviewed proper treatment of hypoglycemia with the rule of 15--patient to eat 15g simple carbohydrate (4 glucose tablets, 1 glucose gel, 5 pieces hard candy, ½ cup fruit juice, ½ can regular soda, etc) and wait 15 " minutes and recheck home glucose. Completed 2/21/2025        Task: Reviewed common causes and precautions to help prevent hyper/hypoglycemic events. Completed 2/21/2025        Task: Reviewed signs and symptoms of hyper/hypoglycemia, what range is considered to be hyper/hypoglycemia, and when to seek further medical attention. Completed 2/21/2025        Problem: Medications         Goal: Patient Agrees to take humalog prior to eating, long-acting in am and evening and to keep a log of insulin in dexcom for review    Start Date: 7/7/2025   Expected End Date: 8/18/2025   This Visit's Progress: No change   Priority: High   Note:    Reviewed timing of fast-acting insulin to prevent post meal hyperglycemia and to avoid taking fast-acting after or in-between meals to avoid hypoglycemia. Pt will inquire about changing to fast-acting insulin pens instead of vials for ease of use and when eating away from home for family gatherings.    7/25/25 - pt has not been keeping log because he was without his dexcom sensors due to falling off       Problem: Blood Glucose Self-Monitoring         Goal: Pt agrees to contact Dexcom customer support for sensor replacement    Start Date: 7/25/2025   Expected End Date: 8/29/2025   Priority: High   Barriers: No Barriers Identified   Note:    Provided additional ideas for adhesion         Follow Up Plan     Follow up in about 4 weeks (around 8/21/2025) for agp review.    Today's care plan and follow up schedule was discussed with patient.  Michael verbalized understanding of the care plan, goals, and agrees to follow up plan.        The patient was encouraged to communicate with his/her health care provider/physician and care team regarding his/her condition(s) and treatment.  I provided the patient with my contact information today and encouraged to contact me via phone or [facility] Patient Portal as needed.     Length of Visit   Total Time: 45 Minutes

## 2025-07-28 ENCOUNTER — HOSPITAL ENCOUNTER (EMERGENCY)
Facility: HOSPITAL | Age: 60
Discharge: HOME | End: 2025-07-28
Attending: EMERGENCY MEDICINE
Payer: MEDICAID

## 2025-07-28 VITALS
TEMPERATURE: 98 F | HEIGHT: 67 IN | OXYGEN SATURATION: 97 % | RESPIRATION RATE: 18 BRPM | WEIGHT: 214 LBS | HEART RATE: 56 BPM | DIASTOLIC BLOOD PRESSURE: 69 MMHG | BODY MASS INDEX: 33.59 KG/M2 | SYSTOLIC BLOOD PRESSURE: 108 MMHG

## 2025-07-28 DIAGNOSIS — E87.5 HYPERKALEMIA: Primary | ICD-10-CM

## 2025-07-28 DIAGNOSIS — R06.02 SHORTNESS OF BREATH: ICD-10-CM

## 2025-07-28 DIAGNOSIS — E87.6 HYPOKALEMIA: Chronic | ICD-10-CM

## 2025-07-28 LAB
ALBUMIN SERPL-MCNC: 3.5 G/DL (ref 3.5–5)
ALBUMIN/GLOB SERPL: 0.9 RATIO (ref 1.1–2)
ALP SERPL-CCNC: 104 UNIT/L (ref 40–150)
ALT SERPL-CCNC: 25 UNIT/L (ref 0–55)
ANION GAP SERPL CALC-SCNC: 6 MEQ/L
AST SERPL-CCNC: 16 UNIT/L (ref 11–45)
BASOPHILS # BLD AUTO: 0.02 X10(3)/MCL
BASOPHILS NFR BLD AUTO: 0.3 %
BILIRUB SERPL-MCNC: 0.6 MG/DL
BUN SERPL-MCNC: 29 MG/DL (ref 8.4–25.7)
CALCIUM SERPL-MCNC: 9.1 MG/DL (ref 8.4–10.2)
CHLORIDE SERPL-SCNC: 111 MMOL/L (ref 98–107)
CO2 SERPL-SCNC: 21 MMOL/L (ref 22–29)
CREAT SERPL-MCNC: 1.12 MG/DL (ref 0.72–1.25)
CREAT/UREA NIT SERPL: 26
EOSINOPHIL # BLD AUTO: 0.21 X10(3)/MCL (ref 0–0.9)
EOSINOPHIL NFR BLD AUTO: 2.7 %
ERYTHROCYTE [DISTWIDTH] IN BLOOD BY AUTOMATED COUNT: 12.2 % (ref 11.5–17)
GFR SERPLBLD CREATININE-BSD FMLA CKD-EPI: >60 ML/MIN/1.73/M2
GLOBULIN SER-MCNC: 3.9 GM/DL (ref 2.4–3.5)
GLUCOSE SERPL-MCNC: 117 MG/DL (ref 74–100)
HCT VFR BLD AUTO: 39 % (ref 42–52)
HGB BLD-MCNC: 13.1 G/DL (ref 14–18)
IMM GRANULOCYTES # BLD AUTO: 0.02 X10(3)/MCL (ref 0–0.04)
IMM GRANULOCYTES NFR BLD AUTO: 0.3 %
LYMPHOCYTES # BLD AUTO: 1.56 X10(3)/MCL (ref 0.6–4.6)
LYMPHOCYTES NFR BLD AUTO: 19.9 %
MCH RBC QN AUTO: 30.3 PG (ref 27–31)
MCHC RBC AUTO-ENTMCNC: 33.6 G/DL (ref 33–36)
MCV RBC AUTO: 90.3 FL (ref 80–94)
MONOCYTES # BLD AUTO: 0.6 X10(3)/MCL (ref 0.1–1.3)
MONOCYTES NFR BLD AUTO: 7.7 %
NEUTROPHILS # BLD AUTO: 5.41 X10(3)/MCL (ref 2.1–9.2)
NEUTROPHILS NFR BLD AUTO: 69.1 %
NT-PROBNP SERPL-MCNC: 36 PG/ML
PLATELET # BLD AUTO: 279 X10(3)/MCL (ref 130–400)
PMV BLD AUTO: 8.8 FL (ref 7.4–10.4)
POTASSIUM SERPL-SCNC: 5.4 MMOL/L (ref 3.5–5.1)
PROT SERPL-MCNC: 7.4 GM/DL (ref 6.4–8.3)
RBC # BLD AUTO: 4.32 X10(6)/MCL (ref 4.7–6.1)
SODIUM SERPL-SCNC: 138 MMOL/L (ref 136–145)
WBC # BLD AUTO: 7.82 X10(3)/MCL (ref 4.5–11.5)

## 2025-07-28 PROCEDURE — 83880 ASSAY OF NATRIURETIC PEPTIDE: CPT | Performed by: EMERGENCY MEDICINE

## 2025-07-28 PROCEDURE — 80053 COMPREHEN METABOLIC PANEL: CPT | Performed by: EMERGENCY MEDICINE

## 2025-07-28 PROCEDURE — 93010 ELECTROCARDIOGRAM REPORT: CPT | Mod: ,,, | Performed by: INTERNAL MEDICINE

## 2025-07-28 PROCEDURE — 99285 EMERGENCY DEPT VISIT HI MDM: CPT | Mod: 25

## 2025-07-28 PROCEDURE — 85025 COMPLETE CBC W/AUTO DIFF WBC: CPT | Performed by: EMERGENCY MEDICINE

## 2025-07-28 PROCEDURE — 93005 ELECTROCARDIOGRAM TRACING: CPT

## 2025-07-28 RX ORDER — POTASSIUM CHLORIDE 20 MEQ/1
20 TABLET, EXTENDED RELEASE ORAL DAILY
Qty: 90 TABLET | Refills: 2 | Status: SHIPPED | OUTPATIENT
Start: 2025-07-28 | End: 2026-04-24

## 2025-07-28 NOTE — ED PROVIDER NOTES
Encounter Date: 7/28/2025       History     Chief Complaint   Patient presents with    Shortness of Breath     SOB x 2 months --denies pain     This 59-year-old gentleman presents with complaints of shortness of breath.  He states that it can occur when he is walking or when he is sitting.  He has no chest pain or sweats.  He denies recent pulmonary infections.  He is a nonsmoker.  Does have a history of coronary artery disease and obstructive sleep apnea.       Review of patient's allergies indicates:  No Known Allergies  Past Medical History:   Diagnosis Date    Adrenal adenoma     Callus of foot 8/19/2022    Coronary artery disease     Diabetes mellitus     Hyperlipidemia     Hypertension     Spinal stenosis     Unspecified osteoarthritis, unspecified site      Past Surgical History:   Procedure Laterality Date    ANGIOGRAPHY      CHOLECYSTECTOMY      FOOT SURGERY Right      Family History   Problem Relation Name Age of Onset    Hypertension Mother Elva Stone     Heart disease Mother Elva Stone     Diabetes Mother Elva Stone     Stroke Father      Cancer Sister Tip Clifton     Heart disease Sister Tip Clifton     Cancer Sister Theodora Pope     Heart disease Sister Theodora Pope     Heart disease Brother Kingsley Stone     Heart disease Brother Andi Stone      Social History[1]  Review of Systems   Constitutional:  Negative for fever.   HENT:  Negative for sore throat.    Respiratory:  Positive for shortness of breath.    Cardiovascular:  Negative for chest pain.   Gastrointestinal:  Negative for nausea.   Genitourinary:  Negative for dysuria.   Musculoskeletal:  Negative for back pain.   Skin:  Negative for rash.   Neurological:  Negative for weakness.   Hematological:  Does not bruise/bleed easily.       Physical Exam     Initial Vitals [07/28/25 0652]   BP Pulse Resp Temp SpO2   124/81 72 18 97.8 °F (36.6 °C) 98 %      MAP       --         Physical Exam    Nursing note  and vitals reviewed.  Constitutional: He appears well-developed and well-nourished.   HENT:   Head: Normocephalic and atraumatic. Mouth/Throat: Mucous membranes are normal.   Eyes: EOM are normal. Pupils are equal, round, and reactive to light.   Neck: Neck supple.   Normal range of motion.  Cardiovascular:  Normal rate, regular rhythm, normal heart sounds and intact distal pulses.           Pulmonary/Chest: Breath sounds normal.   Abdominal: Abdomen is soft. Bowel sounds are normal.   Musculoskeletal:         General: Normal range of motion.      Cervical back: Normal range of motion and neck supple.     Neurological: He is alert and oriented to person, place, and time. He has normal strength.   Skin: Skin is warm and dry. Capillary refill takes less than 2 seconds.   Psychiatric: He has a normal mood and affect. His behavior is normal. Judgment and thought content normal.         ED Course   Procedures  Labs Reviewed   COMPREHENSIVE METABOLIC PANEL - Abnormal       Result Value    Sodium 138      Potassium 5.4 (*)     Chloride 111 (*)     CO2 21 (*)     Glucose 117 (*)     Blood Urea Nitrogen 29.0 (*)     Creatinine 1.12      Calcium 9.1      Protein Total 7.4      Albumin 3.5      Globulin 3.9 (*)     Albumin/Globulin Ratio 0.9 (*)     Bilirubin Total 0.6            ALT 25      AST 16      eGFR >60      Anion Gap 6.0      BUN/Creatinine Ratio 26     CBC WITH DIFFERENTIAL - Abnormal    WBC 7.82      RBC 4.32 (*)     Hgb 13.1 (*)     Hct 39.0 (*)     MCV 90.3      MCH 30.3      MCHC 33.6      RDW 12.2      Platelet 279      MPV 8.8      Neut % 69.1      Lymph % 19.9      Mono % 7.7      Eos % 2.7      Basophil % 0.3      Imm Grans % 0.3      Neut # 5.41      Lymph # 1.56      Mono # 0.60      Eos # 0.21      Baso # 0.02      Imm Gran # 0.02     NT-PRO NATRIURETIC PEPTIDE - Normal    NT-proBNP 36      Narrative:     NOTE:  Access complete set of age - and/or gender-specific reference intervals for this test  in the [facility] Collection Manual.   CBC W/ AUTO DIFFERENTIAL    Narrative:     The following orders were created for panel order CBC auto differential.  Procedure                               Abnormality         Status                     ---------                               -----------         ------                     CBC with Differential[[id number]]       Abnormal            Final result                 Please view results for these tests on the individual orders.     EKG Readings: (Independently Interpreted)   Rhythm: Normal Sinus Rhythm. Heart Rate: 72. Ectopy: No Ectopy. Conduction: Normal. ST Segments: Normal ST Segments. T Waves: Normal. Clinical Impression: Normal Sinus Rhythm   7/28/25 0646       Imaging Results              X-Ray Chest PA And Lateral (Final result)  Result time 07/28/25 07:24:08      Final result by Prince Palmer MD (07/28/25 07:24:08)                   Narrative:    EXAMINATION  XR CHEST PA AND LATERAL    CLINICAL HISTORY  Shortness of breath    TECHNIQUE  A total of 2 images submitted of the chest.    COMPARISON  25 April 2023    FINDINGS  Lines/tubes/devices: ECG leads overlie the imaged region.    The cardiomediastinal silhouette and central pulmonary vasculature are unremarkable contour and size.  The trachea is midline. There is no lobar consolidation, pleural effusion, or pneumothorax.    There is no acute osseous or extrathoracic abnormality.    IMPRESSION  No convincing acute abnormality.      Electronically signed by: Prince Palmer  Date:    07/28/2025  Time:    07:24                                     Medications - No data to display  Medical Decision Making  Amount and/or Complexity of Data Reviewed  Labs: ordered.  Radiology: ordered.    Risk  Prescription drug management.                                          Clinical Impression:  Final diagnoses:  [R06.02] Shortness of breath  [E87.5] Hyperkalemia (Primary)          ED Disposition Condition     Discharge Stable          ED Prescriptions       Medication Sig Dispense Start Date End Date Auth. Provider    potassium chloride SA (K-DUR,KLOR-CON) 20 MEQ tablet Take 1 tablet (20 mEq total) by mouth once daily. 90 tablet 7/28/2025 4/24/2026 Lucio Greco MD          Follow-up Information       Follow up With Specialties Details Why Contact Info    Criss Real, [facility] Schedule an appointment as soon as possible for a visit  As needed, If symptoms worsen [address] [facility] [id number] [phone]                     [1]   Social History  Tobacco Use    Smoking status: Never     Passive exposure: Never    Smokeless tobacco: Never   Substance Use Topics    Alcohol use: Never    Drug use: Never        Lucio Greco MD  07/28/25 0802

## 2025-07-28 NOTE — DISCHARGE INSTRUCTIONS
Hold your potassium pill for 3 days then decrease potassium from 1 pill twice daily to one pill once daily.

## 2025-07-29 LAB
OHS QRS DURATION: 88 MS
OHS QTC CALCULATION: 418 MS

## 2025-08-14 DIAGNOSIS — Z79.4 TYPE 2 DIABETES MELLITUS WITH HYPERGLYCEMIA, WITH LONG-TERM CURRENT USE OF INSULIN: ICD-10-CM

## 2025-08-14 DIAGNOSIS — E11.65 TYPE 2 DIABETES MELLITUS WITH HYPERGLYCEMIA, WITH LONG-TERM CURRENT USE OF INSULIN: ICD-10-CM

## 2025-08-21 ENCOUNTER — CLINICAL SUPPORT (OUTPATIENT)
Facility: HOSPITAL | Age: 60
End: 2025-08-21
Attending: FAMILY MEDICINE
Payer: MEDICAID

## 2025-08-21 DIAGNOSIS — Z79.4 TYPE 2 DIABETES MELLITUS WITH HYPERGLYCEMIA, WITH LONG-TERM CURRENT USE OF INSULIN: Primary | ICD-10-CM

## 2025-08-21 DIAGNOSIS — E11.65 TYPE 2 DIABETES MELLITUS WITH HYPERGLYCEMIA, WITH LONG-TERM CURRENT USE OF INSULIN: Primary | ICD-10-CM

## 2025-08-26 ENCOUNTER — TELEPHONE (OUTPATIENT)
Dept: INTERNAL MEDICINE | Facility: CLINIC | Age: 60
End: 2025-08-26
Payer: MEDICAID

## 2025-08-26 RX ORDER — ALBUTEROL SULFATE 90 UG/1
2 INHALANT RESPIRATORY (INHALATION) EVERY 6 HOURS PRN
Qty: 6.7 G | Refills: 2 | Status: SHIPPED | OUTPATIENT
Start: 2025-08-26 | End: 2026-08-26

## 2025-09-04 DIAGNOSIS — Z79.4 TYPE 2 DIABETES MELLITUS WITH HYPERGLYCEMIA, WITH LONG-TERM CURRENT USE OF INSULIN: ICD-10-CM

## 2025-09-04 DIAGNOSIS — E11.65 TYPE 2 DIABETES MELLITUS WITH HYPERGLYCEMIA, WITH LONG-TERM CURRENT USE OF INSULIN: ICD-10-CM

## 2025-09-05 ENCOUNTER — TELEPHONE (OUTPATIENT)
Facility: CLINIC | Age: 60
End: 2025-09-05
Payer: MEDICAID

## 2025-09-05 RX ORDER — DULAGLUTIDE 4.5 MG/.5ML
4.5 INJECTION, SOLUTION SUBCUTANEOUS
Qty: 4 PEN | Refills: 6 | Status: SHIPPED | OUTPATIENT
Start: 2025-09-05